# Patient Record
Sex: FEMALE | Race: WHITE | Employment: OTHER | URBAN - METROPOLITAN AREA
[De-identification: names, ages, dates, MRNs, and addresses within clinical notes are randomized per-mention and may not be internally consistent; named-entity substitution may affect disease eponyms.]

---

## 2017-01-09 RX ORDER — ZOLEDRONIC ACID 5 MG/100ML
5 INJECTION, SOLUTION INTRAVENOUS ONCE
Status: COMPLETED | OUTPATIENT
Start: 2017-01-11 | End: 2017-01-11

## 2017-01-11 ENCOUNTER — HOSPITAL ENCOUNTER (OUTPATIENT)
Dept: INFUSION THERAPY | Age: 82
Discharge: HOME | End: 2017-01-11
Payer: MEDICARE

## 2017-01-11 VITALS
DIASTOLIC BLOOD PRESSURE: 73 MMHG | HEART RATE: 80 BPM | TEMPERATURE: 96.8 F | SYSTOLIC BLOOD PRESSURE: 134 MMHG | RESPIRATION RATE: 18 BRPM

## 2017-01-11 PROCEDURE — 96374 THER/PROPH/DIAG INJ IV PUSH: CPT

## 2017-01-11 PROCEDURE — 74011250636 HC RX REV CODE- 250/636: Performed by: INTERNAL MEDICINE

## 2017-01-11 RX ADMIN — ZOLEDRONIC ACID 5 MG: 5 INJECTION, SOLUTION INTRAVENOUS at 11:04

## 2017-01-11 NOTE — PROGRESS NOTES
Outpatient Infusion Center Short Visit Progress Note    8615 Pt admit to [facility] for Reclast ambulatory in stable condition. Assessment completed. No new concerns voiced. Visit Vitals    /73    Pulse 80    Temp 96.8 °F (36 °C)    Resp 18       PIV started with positive blood return flushed and de-accessed per protocol. Medications:  Reclast    1130 Pt tolerated treatment well. D/c home ambulatory in no distress. Jazmin Faye

## 2017-01-31 DIAGNOSIS — M51.16 LUMBAR DISC DISEASE WITH RADICULOPATHY: ICD-10-CM

## 2017-01-31 RX ORDER — DULOXETIN HYDROCHLORIDE 30 MG/1
CAPSULE, DELAYED RELEASE ORAL
Qty: 180 CAP | Refills: 1 | Status: SHIPPED | OUTPATIENT
Start: 2017-01-31 | End: 2017-12-01

## 2017-05-28 DIAGNOSIS — K21.9 GASTROESOPHAGEAL REFLUX DISEASE WITHOUT ESOPHAGITIS: ICD-10-CM

## 2017-05-29 RX ORDER — DULOXETIN HYDROCHLORIDE 60 MG/1
CAPSULE, DELAYED RELEASE ORAL
Qty: 90 CAP | Refills: 0 | Status: SHIPPED | OUTPATIENT
Start: 2017-05-29 | End: 2017-11-19 | Stop reason: SDUPTHER

## 2017-05-29 RX ORDER — OMEPRAZOLE 40 MG/1
CAPSULE, DELAYED RELEASE ORAL
Qty: 90 CAP | Refills: 1 | Status: SHIPPED | OUTPATIENT
Start: 2017-05-29 | End: 2017-11-19 | Stop reason: SDUPTHER

## 2017-05-29 RX ORDER — DOXYCYCLINE 100 MG/1
CAPSULE ORAL
Qty: 180 CAP | Refills: 0 | Status: SHIPPED | OUTPATIENT
Start: 2017-05-29 | End: 2019-01-08

## 2017-06-06 ENCOUNTER — OFFICE VISIT (OUTPATIENT)
Dept: INTERNAL MEDICINE CLINIC | Age: 82
End: 2017-06-06

## 2017-06-06 VITALS
WEIGHT: 128 LBS | OXYGEN SATURATION: 98 % | RESPIRATION RATE: 16 BRPM | SYSTOLIC BLOOD PRESSURE: 144 MMHG | DIASTOLIC BLOOD PRESSURE: 74 MMHG | BODY MASS INDEX: 21.33 KG/M2 | HEIGHT: 65 IN | HEART RATE: 88 BPM

## 2017-06-06 DIAGNOSIS — R25.1 TREMOR: Primary | ICD-10-CM

## 2017-06-06 NOTE — PROGRESS NOTES
Chief Complaint   Patient presents with    Follow-up     6 mon, left hand shaking more      Patient Active Problem List    Diagnosis    Spinal stenosis    Lumbar disc disease with radiculopathy     Pain management and PT  Trans foraminal injections not helping      Osteoporosis     . DEXA Results (most recent):    Results from Hospital Encounter encounter on 08/13/15   DEXA BONE DENSITY STUDY AXIAL   Narrative **Final Report**      ICD Codes / Adm. Diagnosis: V76.12  724.2 / Disorder of bone and cartilage    Lumbago  Examination:  MM DEXA AXIAL SKELETON  - [id number] - Aug 13 2015 10:45AM  Accession No:  [id number]  Reason:  screening      REPORT:  Bone Mineral Density    Indication: Monitoring, estrogen therapy  Age: [de-identified]  Sex: Female. Menopause status:         postmenopausal.  Hormone replacement therapy: yes/no     Risk factors for fall:   None   Risk factors for osteoporosis:  Tobacco use    Current medication for osteoporosis: Calcium, vitamin D, estrogen. Comparison: 2013    Technique: Imaging was performed on the [employer]. World Health   Organization meta-analysis fracture risk calculator (FRAX) analysis was   performed for 10 year fracture risk probability assessment    Excluded sites: L2 due to fracture, L1 due to degenerative changes    Findings:    Fractures identified on Lateral scanogram:  L2    Lumbar spine: L1-L3, excluding L2  Bone mineral density (gm/cm2): 1.113  % of peak bone mass: 94  % for age matched controls:  80  T score: -0.6  Z score:  1.5    Hip: Right femoral neck  Bone mineral density (gm/cm2):  0.718  % of peak bone mass: 69  % for age matched controls:  100  T score: -2.3  Z score:  0           IMPRESSION:    This patient is osteopenic using the World Health Organization criteria  As compared to the prior study, there has been a statistically significant   decrease in bone mineral density.   10 year probability of major osteoporotic fracture:  16.3%  10 year probability of hip fracture:  5.6%             Signing/Reading Doctor: Clive Goodrich ([id number])    Approved: Clive Goodrich ([id number])  Aug 17 2015 10:59AM                                         Pancreatic cyst    Visual loss    PAC (premature atrial contraction)    Rosacea    Anxiety    Hypercholesterolemia    GERD (gastroesophageal reflux disease)     Feels better on high dose       Osteopenia    Cancer (HCC)     skin cancer      Hyperlipidemia LDL goal <130    Osteopenia     Neg 2.3  T score with fracture seen    reclast 2015 sept  New L1 fracture          Subjective:      Cameron Combs is a 80 y.o.  right handed female referred for evaluation of possible tremors  Symptoms have included motor symptoms including tremor with motion. She denies visual symptoms including diplopia, sensory symptoms including paresthesias of generalized, motor symptoms including impaired gait and extensor spasm and cerebellar symptoms including dysmetria. Other notable observations include . Onset of symptoms was gradual, starting about 2 years ago. Symptoms are currently of mild severity. Symptoms occur intermittent and last minute. Previous evaluation has included none, results were . Outside reports reviewed: none. Patient Active Problem List    Diagnosis Date Noted    Spinal stenosis 09/23/2016    Lumbar disc disease with radiculopathy 07/22/2016    Osteoporosis 07/22/2016    Pancreatic cyst 07/14/2015    Visual loss 01/21/2015    PAC (premature atrial contraction) 07/09/2013    Rosacea 06/27/2012    Anxiety 08/16/2011    Hypercholesterolemia     GERD (gastroesophageal reflux disease)     Osteopenia     Cancer ([address]     Hyperlipidemia LDL goal <130 07/20/2010    Osteopenia 07/20/2010        Review of Systems  A comprehensive review of systems was negative except for that written in the HPI.     Objective:     Visit Vitals    /74    Pulse 88    Resp 16    Ht 5' 5\" (1.651 m)    Wt 128 lb (58.1 kg)    SpO2 98%    BMI 21.3 kg/m2     Visit Vitals    /74    Pulse 88    Resp 16    Ht 5' 5\" (1.651 m)    Wt 128 lb (58.1 kg)    SpO2 98%    BMI 21.3 kg/m2     General appearance: alert, cooperative, no distress, appears stated age  Neurologic: Motor:grossly normal  Gait: Normal  No tremor at rest or with intentionm  Imaging      Lab Review      Assessment/Plan:     , tremor benign. The patient is reassured that these symptoms do not appear to represent a serious or threatening condition.   Will observe for progression

## 2017-06-06 NOTE — MR AVS SNAPSHOT
Visit Information Date & Time Provider Department Dept. Phone Encounter #  
 6/6/2017 10:15 AM Freddy Gordon MD [facility] [phone] [id number] Your Appointments 7/24/2017  1:15 PM  
Medicare Physical with Freddy Gordon MD  
[facility]) Appt Note: [address] [facility] [id number] [place] [address] [address] [facility] [address] [facility] [address] Upcoming Health Maintenance Date Due DTaP/Tdap/Td series (1 - Tdap) 5/3/2007 Pneumococcal 65+ High/Highest Risk (2 of 2 - PPSV23) 1/2/2010 GLAUCOMA SCREENING Q2Y 7/1/2016 MEDICARE YEARLY EXAM 7/23/2017 INFLUENZA AGE 9 TO ADULT 8/1/2017 Allergies as of 6/6/2017  Review Complete On: 12/20/2016 By: Roselyn lAlison LPN No Known Allergies Current Immunizations  Reviewed on 3/30/2016 Name Date Influenza Vaccine (Quad) PF 9/27/2016  1:05 PM  
 Pneumococcal Vaccine (Unspecified Type) 1/2/2005 TD Vaccine 5/2/2007 Zoster 1/2/2007 Not reviewed this visit Vitals BP Pulse Resp Height(growth percentile) Weight(growth percentile) SpO2  
 144/74 88 16 5' 5\" (1.651 m) 128 lb (58.1 kg) 98% BMI OB Status Smoking Status 21.3 kg/m2 Postmenopausal Former Smoker Vitals History BMI and BSA Data Body Mass Index Body Surface Area  
 21.3 kg/m 2 1.63 m 2 Preferred Pharmacy Pharmacy Name Phone [address] [facility], [address] [address] [place] [address] [address] Zoila Gomez [address] Your Updated Medication List  
  
   
This list is accurate as of: 6/6/17 10:52 AM.  Always use your most recent med list. amLODIPine 5 mg tablet Commonly known as:  Benjiman Floro Take 0.5 Tabs by mouth daily. CALTRATE PLUS PO Take 1 Tab by mouth. Takes one po daily. doxycycline 100 mg capsule Commonly known as:  VIBRAMYCIN  
 Take 1 capsule by mouth two times daily * DULoxetine 30 mg capsule Commonly known as:  CYMBALTA Take 1 capsule by mouth  daily for 2 weeks then 2  capsules daily * DULoxetine 60 mg capsule Commonly known as:  CYMBALTA Take 1 capsule by mouth  every day FEMHRT LOW DOSE PO Take 0.5 mg by mouth daily. FISH OIL PO Take  by mouth. GenTeal Moderate 0.3 % Drop Generic drug:  artificial tears(hypromellose) Administer  to both eyes as needed. MULTIVITAMIN PO Takes one po daily. omeprazole 40 mg capsule Commonly known as:  PRILOSEC Take 1 capsule by mouth  daily REFRESH LIQUIGEL 1 % Dlgl Generic drug:  carboxymethylcellulose sodium Apply  to eye. RESTASIS 0.05 % ophthalmic emulsion Generic drug:  cycloSPORINE Administer 1 drop to both eyes two (2) times a day. triamcinolone acetonide 0.1 % ointment Commonly known as:  KENALOG  
use thin layer   prn  
  
 VITAMIN D3 2,000 unit Tab Generic drug:  cholecalciferol (vitamin D3) Take 2,000 Units by mouth daily. * Notice: This list has 2 medication(s) that are the same as other medications prescribed for you. Read the directions carefully, and ask your doctor or other care provider to review them with you. Introducing [facility]! Wallace Dodd introduces [employer] patient portal. Now you can access parts of your medical record, email your doctor's office, and request medication refills online. 1. In your internet browser, go to [email or web address] 2. Click on the First Time User? Click Here link in the Sign In box. You will see the New Member Sign Up page. 3. Enter your [employer] Access Code exactly as it appears below. You will not need to use this code after youve completed the sign-up process. If you do not sign up before the expiration date, you must request a new code. · [employer] Access Code: [email or web address] Expires: 9/4/2017 10:52 AM 
 4. Enter the last four digits of your Social Security Number (xxxx) and Date of Birth (mm/dd/yyyy) as indicated and click Submit. You will be taken to the next sign-up page. 5. Create a [employer] ID. This will be your [employer] login ID and cannot be changed, so think of one that is secure and easy to remember. 6. Create a [employer] password. You can change your password at any time. 7. Enter your Password Reset Question and Answer. This can be used at a later time if you forget your password. 8. Enter your e-mail address. You will receive e-mail notification when new information is available in [address] 9. Click Sign Up. You can now view and download portions of your medical record. 10. Click the Download Summary menu link to download a portable copy of your medical information. If you have questions, please visit the Frequently Asked Questions section of the [employer] website. Remember, [employer] is NOT to be used for urgent needs. For medical emergencies, dial 911. Now available from your iPhone and Android! Please provide this summary of care documentation to your next provider. Your primary care clinician is listed as Bailey Molina. If you have any questions after today's visit, please call [phone].

## 2017-07-18 ENCOUNTER — TELEPHONE (OUTPATIENT)
Dept: INTERNAL MEDICINE CLINIC | Age: 82
End: 2017-07-18

## 2017-07-18 DIAGNOSIS — K21.9 GASTROESOPHAGEAL REFLUX DISEASE WITHOUT ESOPHAGITIS: ICD-10-CM

## 2017-07-18 DIAGNOSIS — M81.0 OSTEOPOROSIS WITHOUT CURRENT PATHOLOGICAL FRACTURE, UNSPECIFIED OSTEOPOROSIS TYPE: Primary | ICD-10-CM

## 2017-07-18 DIAGNOSIS — Z79.899 HIGH RISK MEDICATION USE: ICD-10-CM

## 2017-07-18 DIAGNOSIS — E78.5 HYPERLIPIDEMIA LDL GOAL <130: ICD-10-CM

## 2017-07-19 ENCOUNTER — HOSPITAL ENCOUNTER (OUTPATIENT)
Dept: LAB | Age: 82
Discharge: HOME | End: 2017-07-19
Payer: MEDICARE

## 2017-07-19 PROCEDURE — 36415 COLL VENOUS BLD VENIPUNCTURE: CPT

## 2017-07-19 PROCEDURE — 82306 VITAMIN D 25 HYDROXY: CPT

## 2017-07-19 PROCEDURE — 80061 LIPID PANEL: CPT

## 2017-07-19 PROCEDURE — 83735 ASSAY OF MAGNESIUM: CPT

## 2017-07-19 PROCEDURE — 85025 COMPLETE CBC W/AUTO DIFF WBC: CPT

## 2017-07-19 PROCEDURE — 80053 COMPREHEN METABOLIC PANEL: CPT

## 2017-07-20 LAB
25(OH)D3+25(OH)D2 SERPL-MCNC: 51.4 NG/ML (ref 30–100)
ALBUMIN SERPL-MCNC: 3.9 G/DL (ref 3.5–4.7)
ALBUMIN/GLOB SERPL: 1.6 {RATIO} (ref 1.2–2.2)
ALP SERPL-CCNC: 58 IU/L (ref 39–117)
ALT SERPL-CCNC: 15 IU/L (ref 0–32)
AST SERPL-CCNC: 23 IU/L (ref 0–40)
BASOPHILS # BLD AUTO: 0 X10E3/UL (ref 0–0.2)
BASOPHILS NFR BLD AUTO: 0 %
BILIRUB SERPL-MCNC: 0.2 MG/DL (ref 0–1.2)
BUN SERPL-MCNC: 19 MG/DL (ref 8–27)
BUN/CREAT SERPL: 31 (ref 12–28)
CALCIUM SERPL-MCNC: 8.8 MG/DL (ref 8.7–10.3)
CHLORIDE SERPL-SCNC: 103 MMOL/L (ref 96–106)
CHOLEST SERPL-MCNC: 209 MG/DL (ref 100–199)
CO2 SERPL-SCNC: 26 MMOL/L (ref 18–29)
CREAT SERPL-MCNC: 0.62 MG/DL (ref 0.57–1)
EOSINOPHIL # BLD AUTO: 0.1 X10E3/UL (ref 0–0.4)
EOSINOPHIL NFR BLD AUTO: 2 %
ERYTHROCYTE [DISTWIDTH] IN BLOOD BY AUTOMATED COUNT: 13.2 % (ref 12.3–15.4)
GLOBULIN SER CALC-MCNC: 2.4 G/DL (ref 1.5–4.5)
GLUCOSE SERPL-MCNC: 84 MG/DL (ref 65–99)
HCT VFR BLD AUTO: 41.3 % (ref 34–46.6)
HDLC SERPL-MCNC: 81 MG/DL
HGB BLD-MCNC: 13.4 G/DL (ref 11.1–15.9)
IMM GRANULOCYTES # BLD: 0 X10E3/UL (ref 0–0.1)
IMM GRANULOCYTES NFR BLD: 0 %
INTERPRETATION, 910389: NORMAL
LDLC SERPL CALC-MCNC: 113 MG/DL (ref 0–99)
LYMPHOCYTES # BLD AUTO: 1.7 X10E3/UL (ref 0.7–3.1)
LYMPHOCYTES NFR BLD AUTO: 27 %
MAGNESIUM SERPL-MCNC: 2.2 MG/DL (ref 1.6–2.3)
MCH RBC QN AUTO: 31.1 PG (ref 26.6–33)
MCHC RBC AUTO-ENTMCNC: 32.4 G/DL (ref 31.5–35.7)
MCV RBC AUTO: 96 FL (ref 79–97)
MONOCYTES # BLD AUTO: 0.6 X10E3/UL (ref 0.1–0.9)
MONOCYTES NFR BLD AUTO: 10 %
NEUTROPHILS # BLD AUTO: 3.8 X10E3/UL (ref 1.4–7)
NEUTROPHILS NFR BLD AUTO: 61 %
PLATELET # BLD AUTO: 160 X10E3/UL (ref 150–379)
POTASSIUM SERPL-SCNC: 4.3 MMOL/L (ref 3.5–5.2)
PROT SERPL-MCNC: 6.3 G/DL (ref 6–8.5)
RBC # BLD AUTO: 4.31 X10E6/UL (ref 3.77–5.28)
SODIUM SERPL-SCNC: 140 MMOL/L (ref 134–144)
TRIGL SERPL-MCNC: 76 MG/DL (ref 0–149)
VLDLC SERPL CALC-MCNC: 15 MG/DL (ref 5–40)
WBC # BLD AUTO: 6.2 X10E3/UL (ref 3.4–10.8)

## 2017-07-24 ENCOUNTER — OFFICE VISIT (OUTPATIENT)
Dept: INTERNAL MEDICINE CLINIC | Age: 82
End: 2017-07-24

## 2017-07-24 VITALS
HEIGHT: 65 IN | RESPIRATION RATE: 15 BRPM | SYSTOLIC BLOOD PRESSURE: 140 MMHG | BODY MASS INDEX: 21.83 KG/M2 | OXYGEN SATURATION: 98 % | WEIGHT: 131 LBS | TEMPERATURE: 97.9 F | HEART RATE: 88 BPM | DIASTOLIC BLOOD PRESSURE: 80 MMHG

## 2017-07-24 DIAGNOSIS — Z13.39 SCREENING FOR ALCOHOLISM: ICD-10-CM

## 2017-07-24 DIAGNOSIS — F41.9 ANXIETY: ICD-10-CM

## 2017-07-24 DIAGNOSIS — R07.9 CHEST PAIN AT REST: Primary | ICD-10-CM

## 2017-07-24 DIAGNOSIS — M81.0 OSTEOPOROSIS WITHOUT CURRENT PATHOLOGICAL FRACTURE, UNSPECIFIED OSTEOPOROSIS TYPE: ICD-10-CM

## 2017-07-24 DIAGNOSIS — Z00.00 ROUTINE GENERAL MEDICAL EXAMINATION AT A HEALTH CARE FACILITY: ICD-10-CM

## 2017-07-24 DIAGNOSIS — K21.9 GASTROESOPHAGEAL REFLUX DISEASE WITHOUT ESOPHAGITIS: ICD-10-CM

## 2017-07-24 RX ORDER — FLUOCINONIDE 0.5 MG/G
OINTMENT TOPICAL 2 TIMES DAILY
Qty: 15 G | Refills: 0 | Status: SHIPPED | OUTPATIENT
Start: 2017-07-24 | End: 2017-12-01

## 2017-07-24 NOTE — PROGRESS NOTES
Chief Complaint   Patient presents with    Annual Wellness Visit       This is a Subsequent Medicare Annual Wellness Visit providing Personalized Prevention Plan Services (PPPS) (Performed 12 months after initial AWV and PPPS )    I have reviewed the patient's medical history in detail and updated the computerized patient record. History     Past Medical History:   Diagnosis Date    Arthritis     Cancer ([address]     skin cancer - basal and squamous cell    GERD (gastroesophageal reflux disease)     Hypercholesterolemia     PATIENT DENIES    Osteopenia     PAC (premature atrial contraction) 7/9/2013    Pancreatic cyst 7/14/2015    Psychiatric disorder     anxiety    Urinary frequency     Visual loss 1/21/2015      Past Surgical History:   Procedure Laterality Date    CARDIAC SURG PROCEDURE UNLIST  2/11    normal stress test    ENDOSCOPY, COLON, DIAGNOSTIC  2008    HX APPENDECTOMY  2000    HX CATARACT REMOVAL  2012    HX GI  2008    EGD    HX ORTHOPAEDIC Right 2010    knee - arthroscopy    HX ORTHOPAEDIC Right march 2011    TKR  right    HX TONSILLECTOMY      HX TUBAL LIGATION  1976     Current Outpatient Prescriptions   Medication Sig Dispense Refill    omeprazole (PRILOSEC) 40 mg capsule Take 1 capsule by mouth  daily 90 Cap 1    DULoxetine (CYMBALTA) 60 mg capsule Take 1 capsule by mouth  every day 90 Cap 0    doxycycline (VIBRAMYCIN) 100 mg capsule Take 1 capsule by mouth two times daily 180 Cap 0    DULoxetine (CYMBALTA) 30 mg capsule Take 1 capsule by mouth  daily for 2 weeks then 2  capsules daily 180 Cap 1    amLODIPine (NORVASC) 5 mg tablet Take 0.5 Tabs by mouth daily. 30 Tab 0    DOCOSAHEXANOIC ACID/EPA (FISH OIL PO) Take  by mouth.  cycloSPORINE (RESTASIS) 0.05 % ophthalmic emulsion Administer 1 drop to both eyes two (2) times a day.  carboxymethylcellulose sodium (REFRESH LIQUIGEL) 1 % dlgl Apply  to eye.       artificial tears,hypromellose, (GENTEAL MODERATE) 0.3 % drop Administer  to both eyes as needed.  cholecalciferol, vitamin D3, (VITAMIN D3) 2,000 unit Tab Take 2,000 Units by mouth daily.  MULTIVITAMIN PO Takes one po daily.  triamcinolone acetonide (KENALOG) 0.1 % ointment use thin layer   prn 30 g 1    NORETHIND AC/ETHINYL ESTRADIOL (FEMHRT LOW DOSE PO) Take 0.5 mg by mouth daily.  CALCIUM CARB/VIT D3/MINERALS (CALTRATE PLUS PO) Take 1 Tab by mouth. Takes one po daily. No Known Allergies  Family History   Problem Relation Age of Onset    Stroke Mother     Hypertension Mother     Other Mother      aneurysm - aorta    Cancer Father      stomach AND ESOPHAGEAL cancer    Stroke Other     Stroke Maternal Grandmother     Other Son      BLOOD CLOT IN AORTA AND POST OP DVT    Anesth Problems Neg Hx      Social History   Substance Use Topics    Smoking status: Former Smoker     Quit date: 8/5/1956    Smokeless tobacco: Never Used      Comment: former cigarette smoker    Alcohol use 7.0 oz/week     7 Glasses of wine, 7 Shots of liquor per week     Patient Active Problem List   Diagnosis Code    Hyperlipidemia LDL goal <130 E78.5    Hypercholesterolemia E78.00    GERD (gastroesophageal reflux disease) K21.9    Cancer ([facility] [address] C80.1    Anxiety F41.9    Rosacea L71.9    PAC (premature atrial contraction) I49.1    Visual loss H54.7    Pancreatic cyst K86.2    Lumbar disc disease with radiculopathy M51.16    Osteoporosis M81.0    Spinal stenosis M48.00       Depression Risk Factor Screening:     PHQ over the last two weeks 7/24/2017   PHQ Not Done -   Little interest or pleasure in doing things Not at all   Feeling down, depressed or hopeless Not at all   Total Score PHQ 2 0     Alcohol Risk Factor Screening: On any occasion during the past 3 months, have you had more than 3 drinks containing alcohol? No    Do you average more than 7 drinks per week?   No        Functional Ability and Level of Safety:     Hearing Loss moderate    Activities of Daily Living   Self-care. Requires assistance with: no ADLs    Fall Risk   Fall Risk Assessment, last 12 mths 12/20/2016   Able to walk? Yes   Fall in past 12 months? No   Fall with injury? -   Number of falls in past 12 months -   Fall Risk Score -     Abuse Screen   Patient is not abused    Review of Systems   A comprehensive review of systems was negative except for that written in the HPI. Physical Examination     Evaluation of Cognitive Function:  Mood/affect:  happy  Appearance: age appropriate  Family member/caregiver input: no    Visit Vitals    /80    Pulse 88    Temp 97.9 °F (36.6 °C) (Oral)    Resp 15    Ht 5' 5\" (1.651 m)    Wt 131 lb (59.4 kg)    SpO2 98%    BMI 21.8 kg/m2     General appearance: alert, cooperative, no distress, appears stated age  Neck: supple, symmetrical, trachea midline, no adenopathy, thyroid: not enlarged, symmetric, no tenderness/mass/nodules, no carotid bruit and no JVD  Lungs: clear to auscultation bilaterally  Heart: regular rate and rhythm, S1, S2 normal, no murmur, click, rub or gallop  Abdomen: soft, non-tender. Bowel sounds normal. No masses,  no organomegaly    Patient Care Team:  Ford Dandy, MD as PCP - General  Liv Clark MD (Ophthalmology)    Advice/Referrals/Counseling   Education and counseling provided:  Are appropriate based on today's review and evaluation  End-of-Life planning (with patient's consent)  Pneumococcal Vaccine      Assessment/Plan   Drew was seen today for annual wellness visit.     Diagnoses and all orders for this visit:    Chest pain at rest  -     AMB POC EKG ROUTINE W/ 12 LEADS, INTER & REP  -     REFERRAL TO CARDIOLOGY    Osteoporosis without current pathological fracture, unspecified osteoporosis type    Routine general medical examination at a health care facility    Screening for alcoholism    Anxiety    Gastroesophageal reflux disease without esophagitis    Other orders  - fluocinoNIDE (LIDEX) 0.05 % ointment; Apply  to affected area two (2) times a day. .          Fatigue  Low pep  Stress  Weight gain  Itching ears  2 episodes chest pain in JUne no relief with  Antacids  Both at night when lying downSubjective: Follow up of her chronic problems, including osteoporosis, on yearly Reclast.  Has had two doses. Follow up on slightly elevated lipids with a high HDL and slightly elevated LDL. GERD is a chronic issue. She doesn't always follow the best lifestyle. Sometimes eats late and lays down. Has had two episodes of very severe chest pain over the last couple of months that have been in the middle of the night, have been very severe. She considered going to the ER. Both times she took an antacid, like a Pepcid Complete or Tums, and had no immediate relief, but within an hour the discomfort disappeared. She is concerned about these. She's not seen anybody. She has had no symptoms in the last couple weeks. She doesn't exercise as much as she used to. She has some squamous cell cancers on her feet, so is not able to wear sneakers to walk. Right now has to wear open toe sandal-type shoes until the foot is better. She's gained a little bit of weight. She has some stress at home. She has a little bit of low energy. The stress is related to her  aging and issues and her best friend is having lung cancer surgery this week. Patient's alcohol intake is modest, but not 0. Eats late. Takes a daily PPI and requires those. Physical Examination:  She has a regular heart rhythm, clear lungs. Normal cardiac exam.      Plan:  1. If EKG is unchanged, I still think it would be reasonable to have her evaluated and will get cardiology to see her. If her EKG shows any acute changes or new changes will get a workup more quickly. 2. I told her she could go to the ER for acute chest pain, get cardiac enzymes to rule out ischemia.   3. She'll continue with her Cymbalta for stress, anxiety, depression. She is hopefully after her friend having surgery this week she will have less worry and stress. 4. She has also been having some eczema in her ear canal and along the outside of her ear. She's used a little Kenalog in the past.  She says it doesn't work as well. I switched her to Lidex ointment, will try that. Advance Care Planning (ACP) Provider Conversation Snapshot    Date of ACP Conversation: 07/24/17  Persons included in Conversation:  patient  Length of ACP Conversation in minutes:  <16 minutes (Non-Billable)    Authorized Decision Maker (if patient is incapable of making informed decisions): This person is: Other Legally Authorized Decision Maker (e.g. Next of Kin)          For Patients with Decision Making Capacity:   Values/Goals: Exploration of values, goals, and preferences if recovery is not expected, even with continued medical treatment in the event of:  Imminent death  Severe, permanent brain injury    Conversation Outcomes / Follow-Up Plan:   Recommended completion of Advance Directive form after review of ACP materials and conversation with prospective healthcare agent       1. Osteoporosis without current pathological fracture, unspecified osteoporosis type  reclast yearly    2. Routine general medical examination at a health care facility      3. Screening for alcoholism  Has reduced    4. Chest pain at rest  Atypical may be gerd  ekg non diagnostic  Needs work up  - AMB POC EKG ROUTINE W/ 12 LEADS, INTER & REP  - REFERRAL TO CARDIOLOGY    5. Anxiety  controlled    6.  Gastroesophageal reflux disease without esophagitis  Stay on PPI

## 2017-07-24 NOTE — PROGRESS NOTES
Coordination of Care Questions    1. Have you been to the ER, urgent care clinic since your last visit? No       Hospitalized since your last visit? No    2. Have you seen or consulted any other health care providers outside of the [address] [place] [address] since your last visit? Include any pap smears or colon screening.  Yes Dr Kenton Webber

## 2017-07-24 NOTE — MR AVS SNAPSHOT
Visit Information Date & Time Provider Department Dept. Phone Encounter #  
 7/24/2017  1:15 PM Harpreet Cordon, [address] [facility] [phone] [id number] Upcoming Health Maintenance Date Due DTaP/Tdap/Td series (1 - Tdap) 5/3/2007 GLAUCOMA SCREENING Q2Y 7/1/2016 MEDICARE YEARLY EXAM 7/23/2017 INFLUENZA AGE 9 TO ADULT 8/1/2017 Allergies as of 7/24/2017  Review Complete On: 7/24/2017 By: Ned Richmond LPN No Known Allergies Current Immunizations  Reviewed on 7/24/2017 Name Date Influenza Vaccine (Quad) PF 9/27/2016  1:05 PM  
 Pneumococcal Vaccine (Unspecified Type) 1/2/2005 TD Vaccine 5/2/2007 Zoster 1/2/2007 Reviewed by Harpreet Cordon MD on 7/24/2017 at  1:26 PM  
You Were Diagnosed With   
  
 Codes Comments Chest pain at rest    -  Primary ICD-10-CM: R07.9 ICD-9-CM: 786.50 Osteoporosis without current pathological fracture, unspecified osteoporosis type     ICD-10-CM: M81.0 ICD-9-CM: 733.00 Routine general medical examination at a health care facility     ICD-10-CM: Z00.00 ICD-9-CM: V70.0 Screening for alcoholism     ICD-10-CM: Z13.89 ICD-9-CM: V79.1 Anxiety     ICD-10-CM: F41.9 ICD-9-CM: 300.00 Gastroesophageal reflux disease without esophagitis     ICD-10-CM: K21.9 ICD-9-CM: 530.81 Vitals BP Pulse Temp Resp Height(growth percentile) Weight(growth percentile) 140/80 88 97.9 °F (36.6 °C) (Oral) 15 5' 5\" (1.651 m) 131 lb (59.4 kg) SpO2 BMI OB Status Smoking Status 98% 21.8 kg/m2 Postmenopausal Former Smoker Vitals History BMI and BSA Data Body Mass Index Body Surface Area  
 21.8 kg/m 2 1.65 m 2 Preferred Pharmacy Pharmacy Name Phone [address] [address] [phone] Your Updated Medication List  
  
   
This list is accurate as of: 7/24/17  2:31 PM.  Always use your most recent med list.  
  
  
 CALTRATE PLUS PO Take 1 Tab by mouth. Takes one po daily. doxycycline 100 mg capsule Commonly known as:  VIBRAMYCIN Take 1 capsule by mouth two times daily * DULoxetine 30 mg capsule Commonly known as:  CYMBALTA Take 1 capsule by mouth  daily for 2 weeks then 2  capsules daily * DULoxetine 60 mg capsule Commonly known as:  CYMBALTA Take 1 capsule by mouth  every day FEMHRT LOW DOSE PO Take 0.5 mg by mouth daily. FISH OIL PO Take  by mouth. fluocinoNIDE 0.05 % ointment Commonly known as:  LIDEX Apply  to affected area two (2) times a day. GenTeal Moderate 0.3 % Drop Generic drug:  artificial tears(hypromellose) Administer  to both eyes as needed. MULTIVITAMIN PO Takes one po daily. omeprazole 40 mg capsule Commonly known as:  PRILOSEC Take 1 capsule by mouth  daily REFRESH LIQUIGEL 1 % Dlgl Generic drug:  carboxymethylcellulose sodium Apply  to eye. RESTASIS 0.05 % ophthalmic emulsion Generic drug:  cycloSPORINE Administer 1 drop to both eyes two (2) times a day. VITAMIN D3 2,000 unit Tab Generic drug:  cholecalciferol (vitamin D3) Take 2,000 Units by mouth daily. * Notice: This list has 2 medication(s) that are the same as other medications prescribed for you. Read the directions carefully, and ask your doctor or other care provider to review them with you. Prescriptions Sent to Pharmacy Refills  
 fluocinoNIDE (LIDEX) 0.05 % ointment 0 Sig: Apply  to affected area two (2) times a day. Class: Normal  
 Pharmacy: [address] [place] [address] 18, [address] #: [phone] Route: Topical  
  
We Performed the Following AMB POC EKG ROUTINE W/ 12 LEADS, INTER & REP [24064 CPT(R)] REFERRAL TO CARDIOLOGY [KAH00 Custom] Comments:  
 Please evaluate patient for 2 episode CP. To-Do List   
 08/24/2017 10:00 AM  
 Appointment with [address] [place] [address] 2 at [address] [place] [address] ([phone]) Shower or bathe using soap and water. Do not use deodorant, powder, perfumes, or lotion the day of your exam.  If your prior mammograms were not performed at [facility] 6 please bring films with you or forward prior images 2 days before your procedure. Check in at registration 15min before your appointment time unless you were instructed to do otherwise. A script is not necessary, but if you have one, please bring it on the day of the mammogram or have it faxed to the department. [facility] [phone] [address] [place] [address]  [phone] [address]  [phone] [facility] [phone] [facility] [address] [facility] Janesyaakov Alanis [phone] Referral Information Referral ID Referred By Referred To  
  
 [id number] MD Rafael   
   [address] Phone: [phone] Fax: [phone] Visits Status Start Date End Date 1 New Request 7/24/17 7/24/18 If your referral has a status of pending review or denied, additional information will be sent to support the outcome of this decision. Introducing [facility]! Collins Grey introduces CallVU patient portal. Now you can access parts of your medical record, email your doctor's office, and request medication refills online. 1. In your internet browser, go to [email or web address] 2. Click on the First Time User? Click Here link in the Sign In box. You will see the New Member Sign Up page. 3. Enter your CallVU Access Code exactly as it appears below. You will not need to use this code after youve completed the sign-up process. If you do not sign up before the expiration date, you must request a new code. · CallVU Access Code: [email or web address] Expires: 9/4/2017 10:52 AM 
 
4.  Enter the last four digits of your Social Security Number (xxxx) and Date of Birth (mm/dd/yyyy) as indicated and click Submit. You will be taken to the next sign-up page. 5. Create a Apparity ID. This will be your Apparity login ID and cannot be changed, so think of one that is secure and easy to remember. 6. Create a Apparity password. You can change your password at any time. 7. Enter your Password Reset Question and Answer. This can be used at a later time if you forget your password. 8. Enter your e-mail address. You will receive e-mail notification when new information is available in [address] 9. Click Sign Up. You can now view and download portions of your medical record. 10. Click the Download Summary menu link to download a portable copy of your medical information. If you have questions, please visit the Frequently Asked Questions section of the Apparity website. Remember, Apparity is NOT to be used for urgent needs. For medical emergencies, dial 911. Now available from your iPhone and Android! Please provide this summary of care documentation to your next provider. Your primary care clinician is listed as Toni Lowe. If you have any questions after today's visit, please call [phone].

## 2017-07-31 ENCOUNTER — HOSPITAL ENCOUNTER (EMERGENCY)
Age: 82
Discharge: HOME | End: 2017-07-31
Attending: EMERGENCY MEDICINE
Payer: MEDICARE

## 2017-07-31 ENCOUNTER — APPOINTMENT (OUTPATIENT)
Dept: GENERAL RADIOLOGY | Age: 82
End: 2017-07-31
Attending: EMERGENCY MEDICINE
Payer: MEDICARE

## 2017-07-31 VITALS
RESPIRATION RATE: 17 BRPM | BODY MASS INDEX: 21.83 KG/M2 | OXYGEN SATURATION: 97 % | TEMPERATURE: 97.7 F | HEIGHT: 65 IN | WEIGHT: 131 LBS | DIASTOLIC BLOOD PRESSURE: 68 MMHG | HEART RATE: 68 BPM | SYSTOLIC BLOOD PRESSURE: 149 MMHG

## 2017-07-31 DIAGNOSIS — R07.9 ACUTE CHEST PAIN: Primary | ICD-10-CM

## 2017-07-31 LAB
ALBUMIN SERPL BCP-MCNC: 3.7 G/DL (ref 3.5–5)
ALBUMIN/GLOB SERPL: 1.1 {RATIO} (ref 1.1–2.2)
ALP SERPL-CCNC: 82 U/L (ref 45–117)
ALT SERPL-CCNC: 72 U/L (ref 12–78)
ANION GAP BLD CALC-SCNC: 3 MMOL/L (ref 5–15)
AST SERPL W P-5'-P-CCNC: 107 U/L (ref 15–37)
BASOPHILS # BLD AUTO: 0 K/UL (ref 0–0.1)
BASOPHILS # BLD: 0 % (ref 0–1)
BILIRUB SERPL-MCNC: 0.5 MG/DL (ref 0.2–1)
BUN SERPL-MCNC: 17 MG/DL (ref 6–20)
BUN/CREAT SERPL: 28 (ref 12–20)
CALCIUM SERPL-MCNC: 8.8 MG/DL (ref 8.5–10.1)
CHLORIDE SERPL-SCNC: 104 MMOL/L (ref 97–108)
CK SERPL-CCNC: 70 U/L (ref 26–192)
CO2 SERPL-SCNC: 32 MMOL/L (ref 21–32)
CREAT SERPL-MCNC: 0.61 MG/DL (ref 0.55–1.02)
EOSINOPHIL # BLD: 0.1 K/UL (ref 0–0.4)
EOSINOPHIL NFR BLD: 2 % (ref 0–7)
ERYTHROCYTE [DISTWIDTH] IN BLOOD BY AUTOMATED COUNT: 13 % (ref 11.5–14.5)
GLOBULIN SER CALC-MCNC: 3.5 G/DL (ref 2–4)
GLUCOSE SERPL-MCNC: 98 MG/DL (ref 65–100)
HCT VFR BLD AUTO: 42.7 % (ref 35–47)
HGB BLD-MCNC: 14 G/DL (ref 11.5–16)
LYMPHOCYTES # BLD AUTO: 20 % (ref 12–49)
LYMPHOCYTES # BLD: 1.4 K/UL (ref 0.8–3.5)
MCH RBC QN AUTO: 31.5 PG (ref 26–34)
MCHC RBC AUTO-ENTMCNC: 32.8 G/DL (ref 30–36.5)
MCV RBC AUTO: 96.2 FL (ref 80–99)
MONOCYTES # BLD: 0.5 K/UL (ref 0–1)
MONOCYTES NFR BLD AUTO: 8 % (ref 5–13)
NEUTS SEG # BLD: 5.1 K/UL (ref 1.8–8)
NEUTS SEG NFR BLD AUTO: 70 % (ref 32–75)
PLATELET # BLD AUTO: 147 K/UL (ref 150–400)
POTASSIUM SERPL-SCNC: 4.4 MMOL/L (ref 3.5–5.1)
PROT SERPL-MCNC: 7.2 G/DL (ref 6.4–8.2)
RBC # BLD AUTO: 4.44 M/UL (ref 3.8–5.2)
SODIUM SERPL-SCNC: 139 MMOL/L (ref 136–145)
TROPONIN I SERPL-MCNC: <0.04 NG/ML
WBC # BLD AUTO: 7.2 K/UL (ref 3.6–11)

## 2017-07-31 PROCEDURE — 84484 ASSAY OF TROPONIN QUANT: CPT | Performed by: EMERGENCY MEDICINE

## 2017-07-31 PROCEDURE — 80053 COMPREHEN METABOLIC PANEL: CPT | Performed by: EMERGENCY MEDICINE

## 2017-07-31 PROCEDURE — 36415 COLL VENOUS BLD VENIPUNCTURE: CPT | Performed by: EMERGENCY MEDICINE

## 2017-07-31 PROCEDURE — 85025 COMPLETE CBC W/AUTO DIFF WBC: CPT | Performed by: EMERGENCY MEDICINE

## 2017-07-31 PROCEDURE — 71010 XR CHEST PORT: CPT

## 2017-07-31 PROCEDURE — 99285 EMERGENCY DEPT VISIT HI MDM: CPT

## 2017-07-31 PROCEDURE — 82550 ASSAY OF CK (CPK): CPT | Performed by: EMERGENCY MEDICINE

## 2017-07-31 PROCEDURE — 93005 ELECTROCARDIOGRAM TRACING: CPT

## 2017-08-01 LAB
ATRIAL RATE: 70 BPM
CALCULATED P AXIS, ECG09: 75 DEGREES
CALCULATED R AXIS, ECG10: 2 DEGREES
CALCULATED T AXIS, ECG11: 7 DEGREES
DIAGNOSIS, 93000: NORMAL
P-R INTERVAL, ECG05: 156 MS
Q-T INTERVAL, ECG07: 390 MS
QRS DURATION, ECG06: 80 MS
QTC CALCULATION (BEZET), ECG08: 421 MS
VENTRICULAR RATE, ECG03: 70 BPM

## 2017-08-01 NOTE — DISCHARGE INSTRUCTIONS
Chest Pain: Care Instructions  Your Care Instructions  There are many things that can cause chest pain. Some are not serious and will get better on their own in a few days. But some kinds of chest pain need more testing and treatment. Your doctor may have recommended a follow-up visit in the next 8 to 12 hours. If you are not getting better, you may need more tests or treatment. Even though your doctor has released you, you still need to watch for any problems. The doctor carefully checked you, but sometimes problems can develop later. If you have new symptoms or if your symptoms do not get better, get medical care right away. If you have worse or different chest pain or pressure that lasts more than 5 minutes or you passed out (lost consciousness), call 911 or seek other emergency help right away. A medical visit is only one step in your treatment. Even if you feel better, you still need to do what your doctor recommends, such as going to all suggested follow-up appointments and taking medicines exactly as directed. This will help you recover and help prevent future problems. How can you care for yourself at home? · Rest until you feel better. · Take your medicine exactly as prescribed. Call your doctor if you think you are having a problem with your medicine. · Do not drive after taking a prescription pain medicine. When should you call for help? Call 911 if:  · You passed out (lost consciousness). · You have severe difficulty breathing. · You have symptoms of a heart attack. These may include:  ¨ Chest pain or pressure, or a strange feeling in your chest.  ¨ Sweating. ¨ Shortness of breath. ¨ Nausea or vomiting. ¨ Pain, pressure, or a strange feeling in your back, neck, jaw, or upper belly or in one or both shoulders or arms. ¨ Lightheadedness or sudden weakness. ¨ A fast or irregular heartbeat.   After you call 911, the  may tell you to chew 1 adult-strength or 2 to 4 low-dose aspirin. Wait for an ambulance. Do not try to drive yourself. Call your doctor today if:  · You have any trouble breathing. · Your chest pain gets worse. · You are dizzy or lightheaded, or you feel like you may faint. · You are not getting better as expected. · You are having new or different chest pain. Where can you learn more? Go to http://lyndsey-miriam.info/. Enter A120 in the search box to learn more about \"Chest Pain: Care Instructions. \"  Current as of: March 20, 2017  Content Version: 11.3  © 7980-5090 [employer]. Care instructions adapted under license by [employer] (which disclaims liability or warranty for this information). If you have questions about a medical condition or this instruction, always ask your healthcare professional. [address] any warranty or liability for your use of this information. We hope that we have addressed all of your medical concerns. The examination and treatment you received in the Emergency Department were for an emergent problem and were not intended as complete care. It is important that you follow up with your healthcare provider(s) for ongoing care. If your symptoms worsen or do not improve as expected, and you are unable to reach your usual health care provider(s), you should return to the Emergency Department. Today's healthcare is undergoing tremendous change, and patient satisfaction surveys are one of the many tools to assess the quality of medical care. You may receive a survey from the [employer] organization regarding your experience in the Emergency Department. I hope that your experience has been completely positive, particularly the medical care that I provided. As such, please participate in the survey; anything less than excellent does not meet my expectations or intentions.         [facility] and [facility] participate in nationally recognized quality of care measures. If your blood pressure is greater than 120/80, as reported below, we urge that you seek medical care to address the potential of high blood pressure, commonly known as hypertension. Hypertension can be hereditary or can be caused by certain medical conditions, pain, stress, or \"white coat syndrome. \"       Please make an appointment with your health care provider(s) for follow up of your Emergency Department visit. VITALS:   Patient Vitals for the past 8 hrs:   Temp Pulse Resp BP SpO2   07/31/17 2045 - 75 18 157/76 99 %   07/31/17 2030 - 70 16 (!) 149/95 100 %   07/31/17 2015 - - - 161/71 100 %   07/31/17 2010 97.7 °F (36.5 °C) 67 16 171/88 98 %          Thank you for allowing us to provide you with medical care today. We realize that you have many choices for your emergency care needs. Please choose us in the future for any continued health care needs.       Yahir Vidales MD    [address] [place] [address].   Office: [phone]            Recent Results (from the past 24 hour(s))   EKG, 12 LEAD, INITIAL    Collection Time: 07/31/17  8:11 PM   Result Value Ref Range    Ventricular Rate 73 BPM    Atrial Rate 73 BPM    P-R Interval 148 ms    QRS Duration 86 ms    Q-T Interval 386 ms    QTC Calculation (Bezet) 425 ms    Calculated P Axis 66 degrees    Calculated R Axis -6 degrees    Calculated T Axis 2 degrees    Diagnosis       Sinus rhythm with premature atrial complexes  Possible Left atrial enlargement  Inferior infarct , age undetermined  Abnormal ECG  When compared with ECG of 16-SEP-2016 12:11,  premature atrial complexes are now present  Inferior infarct is now present     CBC WITH AUTOMATED DIFF    Collection Time: 07/31/17  8:22 PM   Result Value Ref Range    WBC 7.2 3.6 - 11.0 K/uL    RBC 4.44 3.80 - 5.20 M/uL    HGB 14.0 11.5 - 16.0 g/dL    HCT 42.7 35.0 - 47.0 %    MCV 96.2 80.0 - 99.0 FL    MCH 31.5 26.0 - 34.0 PG MCHC 32.8 30.0 - 36.5 g/dL    RDW 13.0 11.5 - 14.5 %    PLATELET 269 (L) 648 - 400 K/uL    NEUTROPHILS 70 32 - 75 %    LYMPHOCYTES 20 12 - 49 %    MONOCYTES 8 5 - 13 %    EOSINOPHILS 2 0 - 7 %    BASOPHILS 0 0 - 1 %    ABS. NEUTROPHILS 5.1 1.8 - 8.0 K/UL    ABS. LYMPHOCYTES 1.4 0.8 - 3.5 K/UL    ABS. MONOCYTES 0.5 0.0 - 1.0 K/UL    ABS. EOSINOPHILS 0.1 0.0 - 0.4 K/UL    ABS. BASOPHILS 0.0 0.0 - 0.1 K/UL   METABOLIC PANEL, COMPREHENSIVE    Collection Time: 07/31/17  8:22 PM   Result Value Ref Range    Sodium 139 136 - 145 mmol/L    Potassium 4.4 3.5 - 5.1 mmol/L    Chloride 104 97 - 108 mmol/L    CO2 32 21 - 32 mmol/L    Anion gap 3 (L) 5 - 15 mmol/L    Glucose 98 65 - 100 mg/dL    BUN 17 6 - 20 MG/DL    Creatinine 0.61 0.55 - 1.02 MG/DL    BUN/Creatinine ratio 28 (H) 12 - 20      GFR est AA >60 >60 ml/min/1.73m2    GFR est non-AA >60 >60 ml/min/1.73m2    Calcium 8.8 8.5 - 10.1 MG/DL    Bilirubin, total 0.5 0.2 - 1.0 MG/DL    ALT (SGPT) 72 12 - 78 U/L    AST (SGOT) 107 (H) 15 - 37 U/L    Alk. phosphatase 82 45 - 117 U/L    Protein, total 7.2 6.4 - 8.2 g/dL    Albumin 3.7 3.5 - 5.0 g/dL    Globulin 3.5 2.0 - 4.0 g/dL    A-G Ratio 1.1 1.1 - 2.2     CK W/ REFLX CKMB    Collection Time: 07/31/17  8:22 PM   Result Value Ref Range    CK 70 26 - 192 U/L   TROPONIN I    Collection Time: 07/31/17  8:22 PM   Result Value Ref Range    Troponin-I, Qt. <0.04 <0.05 ng/mL       Xr Chest Port    Result Date: 7/31/2017  EXAM:  XR CHEST PORT INDICATION:  chest pain, nausea, diaphoresis, beginning at 1845 hours COMPARISON:  None. FINDINGS: A portable AP radiograph of the chest was obtained at 2019 hours. The patient is on a cardiac monitor. The lungs are clear. The cardiac and mediastinal contours and pulmonary vascularity are remarkable for mild tortuosity of the descending aorta. The bones and soft tissues are grossly within normal limits. IMPRESSION: No acute cardiopulmonary process seen.

## 2017-08-01 NOTE — ED PROVIDER NOTES
HPI Comments: 80 y.o. female with past medical history significant for hypercholesterolemia, PAC's, GERD, skin cancer, and anxiety who presents from home with chief complaint of chest pain. Pt reports a constant epigastric and  lower sternal chest pain and \"tightness\" which started 3 hours ago after \"1 small sip of scotch\", it radiated to to R shoulder, and lasted for 45 minutes. Pt also c/o nausea, vomiting, diaphoresis, and 2-3 large belches during the episode. She states this is the 3rd episode of similar chest pain in the last 2 months, but this episode lasted longer. Pt notes she has hx of esophagitis for which she takes omeprazole, and she took 2 Rolaids when her pain started today. She is currently pain-free. Per Pt's , Pt was seen by her PCP 1 week ago for her prior episodes of chest pain who recommended cardiology follow up. She is scheduled to see cardiology 3 days from now. Pt reports she has previously had several stress tests, 1 nuclear, all of which were normal. She states she stopped regularly exercising 1 year ago d/t back problems. Pt notes she ate chips 5-6 hours ago. Pt denies hx of cardiac problems, cardiac catheterizations, HTN, and DM. Pt denies SOB. There are no other acute medical concerns at this time. Significant FMHx: Grandmother: stroke at 52 y.o. Mother: \"ruptured\" aorta    PCP: Justen Lopez MD    Note written by Esther Samano, as dictated by Myesha Ureña MD 8:42 PM    The history is provided by the patient.         Past Medical History:   Diagnosis Date    Arthritis     Cancer ([facility] [address]     skin cancer - basal and squamous cell    GERD (gastroesophageal reflux disease)     Hypercholesterolemia     PATIENT DENIES    Osteopenia     PAC (premature atrial contraction) 7/9/2013    Pancreatic cyst 7/14/2015    Psychiatric disorder     anxiety    Urinary frequency     Visual loss 1/21/2015       Past Surgical History:   Procedure Laterality Date    CARDIAC SURG PROCEDURE UNLIST  2/11    normal stress test    ENDOSCOPY, COLON, DIAGNOSTIC  2008    HX APPENDECTOMY  2000    HX CATARACT REMOVAL  2012    HX GI  2008    EGD    HX ORTHOPAEDIC Right 2010    knee - arthroscopy    HX ORTHOPAEDIC Right march 2011    TKR  right    HX TONSILLECTOMY      HX TUBAL LIGATION  1976         Family History:   Problem Relation Age of Onset   [facility] Stroke Mother     Hypertension Mother     Other Mother      aneurysm - aorta    Cancer Father      stomach AND ESOPHAGEAL cancer    Stroke Other     Stroke Maternal Grandmother     Other Son      BLOOD CLOT IN AORTA AND POST OP DVT    Anesth Problems Neg Hx        Social History     Social History    Marital status:      Spouse name: N/A    Number of children: N/A    Years of education: N/A     Occupational History    Not on file. Social History Main Topics    Smoking status: Former Smoker     Quit date: 8/5/1956    Smokeless tobacco: Never Used      Comment: former cigarette smoker    Alcohol use 7.0 oz/week     7 Glasses of wine, 7 Shots of liquor per week    Drug use: No    Sexual activity: Not Currently     Partners: Male     Other Topics Concern    Not on file     Social History Narrative         ALLERGIES: Review of patient's allergies indicates no known allergies. Review of Systems   Constitutional: Positive for diaphoresis. Respiratory: Positive for chest tightness. Negative for shortness of breath. Cardiovascular: Positive for chest pain. Gastrointestinal: Positive for nausea and vomiting. All other systems reviewed and are negative. Vitals:    07/31/17 2030 07/31/17 2045 07/31/17 2100 07/31/17 2115   BP: (!) 149/95 157/76 147/74 149/68   Pulse: 70 75 68 68   Resp: 16 18 14 17   Temp:       SpO2: 100% 99% 98% 97%   Weight:       Height:                Physical Exam   Constitutional: She is oriented to person, place, and time. She appears well-developed and well-nourished. No distress. HENT:   Head: Normocephalic and atraumatic. Eyes: Conjunctivae are normal. No scleral icterus. Neck: Neck supple. No tracheal deviation present. Cardiovascular: Normal rate, regular rhythm, normal heart sounds and intact distal pulses. Exam reveals no gallop and no friction rub. No murmur heard. Pulmonary/Chest: Effort normal and breath sounds normal. She has no wheezes. She has no rales. Abdominal: Soft. She exhibits no distension. There is no tenderness. There is no rebound and no guarding. Musculoskeletal: She exhibits no edema. Neurological: She is alert and oriented to person, place, and time. Skin: Skin is warm and dry. No rash noted. Psychiatric: She has a normal mood and affect. Nursing note and vitals reviewed. Note written by Esther Marte, as dictated by Milady Rudd MD 8:42 PM    [facility]  ED Course       Procedures    ED EKG interpretation:  Rhythm: normal sinus rhythm; Rate (approx.): 70; septal Q waves  Note written by Esther Marte, as dictated by Milady Rudd MD 8:22 PM    Progress Note:  Results, treatment, and follow up plan have been discussed with patient/. Questions were answered. Milady Rudd MD    A/P: CP - suspect GI etiology; reassuring exam; VSS; EKG, CBC, CMP, trop, CXR ok; PCP, GI, and cards f/u.   Milady Rudd MD

## 2017-08-24 ENCOUNTER — HOSPITAL ENCOUNTER (OUTPATIENT)
Dept: MAMMOGRAPHY | Age: 82
Discharge: HOME | End: 2017-08-24
Attending: OBSTETRICS & GYNECOLOGY
Payer: MEDICARE

## 2017-08-24 DIAGNOSIS — Z12.31 VISIT FOR SCREENING MAMMOGRAM: ICD-10-CM

## 2017-08-24 PROCEDURE — 77067 SCR MAMMO BI INCL CAD: CPT

## 2017-09-30 ENCOUNTER — HOSPITAL ENCOUNTER (OUTPATIENT)
Dept: CT IMAGING | Age: 82
Discharge: HOME | End: 2017-09-30
Attending: INTERNAL MEDICINE
Payer: MEDICARE

## 2017-09-30 DIAGNOSIS — R10.12 LUQ PAIN: ICD-10-CM

## 2017-09-30 DIAGNOSIS — R10.9 ABDOMINAL PAIN: ICD-10-CM

## 2017-09-30 DIAGNOSIS — R10.11 RUQ ABDOMINAL PAIN: ICD-10-CM

## 2017-09-30 PROCEDURE — 74176 CT ABD & PELVIS W/O CONTRAST: CPT

## 2017-10-31 ENCOUNTER — OFFICE VISIT (OUTPATIENT)
Dept: INTERNAL MEDICINE CLINIC | Age: 82
End: 2017-10-31

## 2017-10-31 VITALS
BODY MASS INDEX: 22.82 KG/M2 | DIASTOLIC BLOOD PRESSURE: 70 MMHG | WEIGHT: 137 LBS | TEMPERATURE: 98 F | OXYGEN SATURATION: 98 % | RESPIRATION RATE: 20 BRPM | SYSTOLIC BLOOD PRESSURE: 148 MMHG | HEART RATE: 78 BPM | HEIGHT: 65 IN

## 2017-10-31 DIAGNOSIS — R10.13 EPIGASTRIC PAIN: Primary | ICD-10-CM

## 2017-10-31 DIAGNOSIS — I49.1 PAC (PREMATURE ATRIAL CONTRACTION): ICD-10-CM

## 2017-10-31 RX ORDER — DICYCLOMINE HYDROCHLORIDE 10 MG/1
CAPSULE ORAL
COMMUNITY
Start: 2017-10-11 | End: 2017-12-01

## 2017-10-31 NOTE — PROGRESS NOTES
Chief Complaint   Patient presents with    Medication Evaluation     Patient has new medication wants to discuss before taking. Subjective:  She has been having episodic abdominal pain that radiates into the chest since June. She had one episode in July where she was seen in the ER. She had normal cardiac enzymes, normal liver enzymes, normal CBC, and sent her home. She saw Dr. Guille Lambert from cardiology. Nuclear stress test and echocardiogram were normal.  He didn't think it was cardiac. She did after a long wait in September saw Dr. Sari Moreno from [facility]. He scheduled her for a CAT scan, which was normal. He said he thought she had gallbladder disease. She tells me 30 years ago at the [facility] ER they told her she might have had gallbladder trouble. There is no family history of gallbladder. She's never had a stone that's been seen. She says attacks occur after meals, not related to wine. She has vomited a couple of times. She says she thinks she's had six attacks in the last 3 1/2 to 4 months. She is fine today. Appetite is good, no jaundice. No urinary symptoms, no bowel symptoms other than a little bit of constipation. Dr. Sari Moreno gave her some Bentyl or Dicyclomine. She is afraid of it. She took three. She read the side effects of constipation, dry eyes, dry mouth, and she decided not to take it. Physical Examination:  Her blood pressure was okay. Her abdomen was non tender. She looked healthy. Impression/Plan:  I suspect she could have gallbladder disease and maybe a calculus cholecystitis. She needs an ultrasound to make sure she doesn't have noncalcified stones. If the ultrasound is negative then she needs a nuclear HIDA scan to evaluate for gallbladder function. In the meantime she'll eat a light diet, she won't take the Dicyclomine, and she'll follow up with me after the tests are done.     Vitals:    10/31/17 1641   BP: 148/70   Pulse: 78   Resp: 20 Temp: 98 °F (36.7 °C)   TempSrc: Oral   SpO2: 98%   Weight: 137 lb (62.1 kg)   Height: 5' 5\" (1.651 m)     Diagnoses and all orders for this visit:    1. Epigastric pain  -     US ABD COMP; Future  -     NM HEPATOBILIARY DUCT SCAN; Future    2. PAC (premature atrial contraction)      Lab Results   Component Value Date/Time    GFR est AA >60 07/31/2017 08:22 PM    GFR est non-AA >60 07/31/2017 08:22 PM    Creatinine 0.61 07/31/2017 08:22 PM    BUN 17 07/31/2017 08:22 PM    Sodium 139 07/31/2017 08:22 PM    Potassium 4.4 07/31/2017 08:22 PM    Chloride 104 07/31/2017 08:22 PM    CO2 32 07/31/2017 08:22 PM     1. PAC (premature atrial contraction)  No issue    2. Epigastric pain  Continued spells worry gallbladder may be esophageal spasm  - US ABD COMP; Future  - NM HEPATOBILIARY DUCT SCAN; Future      CT Results (most recent):    Results from Hospital Encounter encounter on 09/30/17   CT ABD PELV WO CONT   Narrative EXAM:  CT ABDOMEN PELVIS WITHOUT CONTRAST  INDICATION:  Abdominal pain  COMPARISON: X-ray of the lumbar spine, 2/10/2016. MRI of the lumbar spine,  4/26/2016. Peggyann Gang TECHNIQUE:   Unenhanced multislice helical CT was performed from the diaphragm to the  symphysis pubis without intravenous contrast administration. Contiguous 5 mm  axial images were reconstructed and lung and soft tissue windows were generated. Coronal and sagittal reformations were generated. CT dose reduction was achieved through use of a standardized protocol tailored  for this examination and automatic exposure control for dose modulation. Peggyann Gang FINDINGS:  INCIDENTALLY IMAGED CHEST:  Heart/vessels: Within normal limits. Lungs/Pleura: Within normal limits. .  ABDOMEN:  Liver: Within normal limits. Gallbladder/Biliary: Within normal limits. Spleen: Within normal limits. Pancreas: Within normal limits. Adrenals: Within normal limits. Kidneys: Punctate calculus in the lower pole of the left kidney.  Low-attenuation  areas in the central sinus of the left kidney which may represent central sinus  cysts. Peritoneum/Mesenteries: Within normal limits. Extraperitoneum: Within normal limits. Gastrointestinal tract: Within normal limits. The appendix is not confidently  identified  Vascular: Calcifications in the aorta. Lerry Chris PELVIS:  Extraperitoneum: Within normal limits. Ureters: Within normal limits. Bladder: Within normal limits. Reproductive System: Exophytic, rounded soft tissue attenuation projecting off  the lower uterine segment suggesting a fibroid. .  MSK:   Bone cement within L1 which appears compressed. There is posterior pedicle screw  and shiva fixation of L1 and L2. Left inferior facet fractures at L2 and L3. Slight dextroscoliosis of the lumbar spine. Laminectomies from L1-L4. .       Impression IMPRESSION:   1. Punctate calculus in the lower pole the left kidney. 2.  Posterior pedicle screw and shiva fixation of L1 and L2. There is bone cement  within L1 which is slightly compressed. There are postlaminectomy changes from  L1-L4.  Fractures of the left inferior facets of both L2 and L3.   3. Incidental findings as above

## 2017-10-31 NOTE — MR AVS SNAPSHOT
Visit Information Date & Time Provider Department Dept. Phone Encounter #  
 10/31/2017  4:30 PM Zoe Mcginnis, [address] [facility] [phone] [id number] Upcoming Health Maintenance Date Due DTaP/Tdap/Td series (1 - Tdap) 5/3/2007 GLAUCOMA SCREENING Q2Y 7/1/2016 INFLUENZA AGE 9 TO ADULT 8/1/2017 MEDICARE YEARLY EXAM 7/25/2018 Allergies as of 10/31/2017  Review Complete On: 9/30/2017 By: Tej Cortes No Known Allergies Current Immunizations  Reviewed on 10/31/2017 Name Date Influenza Vaccine (Quad) PF 9/27/2016  1:05 PM  
 Pneumococcal Conjugate (PCV-13) 10/12/2016 TD Vaccine 5/2/2007 ZZZ-RETIRED (DO NOT USE) Pneumococcal Vaccine (Unspecified Type) 1/2/2005 Zoster 1/2/2007 Reviewed by Zoe Mcginnis MD on 10/31/2017 at  4:55 PM  
You Were Diagnosed With   
  
 Codes Comments Epigastric pain    -  Primary ICD-10-CM: R10.13 ICD-9-CM: 789.06   
 PAC (premature atrial contraction)     ICD-10-CM: I49.1 ICD-9-CM: 427.61 Vitals BP Pulse Temp Resp Height(growth percentile) Weight(growth percentile) 148/70 78 98 °F (36.7 °C) (Oral) 20 5' 5\" (1.651 m) 137 lb (62.1 kg) SpO2 BMI OB Status Smoking Status 98% 22.8 kg/m2 Postmenopausal Former Smoker Vitals History BMI and BSA Data Body Mass Index Body Surface Area  
 22.8 kg/m 2 1.69 m 2 Preferred Pharmacy Pharmacy Name Phone [address] [place] [address] [phone] Your Updated Medication List  
  
   
This list is accurate as of: 10/31/17  5:16 PM.  Always use your most recent med list.  
  
  
  
  
 CALTRATE PLUS PO Take 1 Tab by mouth. Takes one po daily. dicyclomine 10 mg capsule Commonly known as:  BENTYL  
  
 doxycycline 100 mg capsule Commonly known as:  VIBRAMYCIN Take 1 capsule by mouth two times daily * DULoxetine 30 mg capsule Commonly known as:  CYMBALTA Take 1 capsule by mouth  daily for 2 weeks then 2  capsules daily * DULoxetine 60 mg capsule Commonly known as:  CYMBALTA Take 1 capsule by mouth  every day FEMHRT LOW DOSE PO Take 0.5 mg by mouth daily. FISH OIL PO Take  by mouth. fluocinoNIDE 0.05 % ointment Commonly known as:  LIDEX Apply  to affected area two (2) times a day. GenTeal Moderate 0.3 % Drop Generic drug:  artificial tears(hypromellose) Administer  to both eyes as needed. MULTIVITAMIN PO Takes one po daily. omeprazole 40 mg capsule Commonly known as:  PRILOSEC Take 1 capsule by mouth  daily REFRESH LIQUIGEL 1 % Dlgl Generic drug:  carboxymethylcellulose sodium Apply  to eye. RESTASIS 0.05 % ophthalmic emulsion Generic drug:  cycloSPORINE Administer 1 drop to both eyes two (2) times a day. VITAMIN D3 2,000 unit Tab Generic drug:  cholecalciferol (vitamin D3) Take 2,000 Units by mouth daily. * Notice: This list has 2 medication(s) that are the same as other medications prescribed for you. Read the directions carefully, and ask your doctor or other care provider to review them with you. To-Do List   
 10/31/2017 Imaging:  NM HEPATOBILIARY DUCT SCAN   
  
 10/31/2017 Imaging:  US ABD COMP [facility] & HEALTH SERVICES! [facility] introduces [employer] patient portal. Now you can access parts of your medical record, email your doctor's office, and request medication refills online. 1. In your internet browser, go to [email or web address] 2. Click on the First Time User? Click Here link in the Sign In box. You will see the New Member Sign Up page. 3. Enter your Bluenote Access Code exactly as it appears below. You will not need to use this code after youve completed the sign-up process. If you do not sign up before the expiration date, you must request a new code. · Bluenote Access Code: [email or web address] Expires: 1/29/2018  4:47 PM 
 
4. Enter the last four digits of your Social Security Number (xxxx) and Date of Birth (mm/dd/yyyy) as indicated and click Submit. You will be taken to the next sign-up page. 5. Create a "[employer] ID. This will be your "[employer] login ID and cannot be changed, so think of one that is secure and easy to remember. 6. Create a "[employer] password. You can change your password at any time. 7. Enter your Password Reset Question and Answer. This can be used at a later time if you forget your password. 8. Enter your e-mail address. You will receive e-mail notification when new information is available in [address] 9. Click Sign Up. You can now view and download portions of your medical record. 10. Click the Download Summary menu link to download a portable copy of your medical information. If you have questions, please visit the Frequently Asked Questions section of the "[employer] website. Remember, "[employer] is NOT to be used for urgent needs. For medical emergencies, dial 911. Now available from your iPhone and Android! Please provide this summary of care documentation to your next provider. Your primary care clinician is listed as Benny Carcamo. If you have any questions after today's visit, please call [phone].

## 2017-11-15 ENCOUNTER — HOSPITAL ENCOUNTER (OUTPATIENT)
Dept: NUCLEAR MEDICINE | Age: 82
Discharge: HOME | End: 2017-11-15
Attending: INTERNAL MEDICINE
Payer: MEDICARE

## 2017-11-15 ENCOUNTER — HOSPITAL ENCOUNTER (OUTPATIENT)
Dept: ULTRASOUND IMAGING | Age: 82
Discharge: HOME | End: 2017-11-15
Attending: INTERNAL MEDICINE
Payer: MEDICARE

## 2017-11-15 VITALS — WEIGHT: 136 LBS | BODY MASS INDEX: 22.63 KG/M2

## 2017-11-15 DIAGNOSIS — R10.13 EPIGASTRIC PAIN: ICD-10-CM

## 2017-11-15 PROCEDURE — 74011250636 HC RX REV CODE- 250/636: Performed by: INTERNAL MEDICINE

## 2017-11-15 PROCEDURE — 78227 HEPATOBIL SYST IMAGE W/DRUG: CPT

## 2017-11-15 PROCEDURE — 74011000258 HC RX REV CODE- 258: Performed by: INTERNAL MEDICINE

## 2017-11-15 PROCEDURE — 76700 US EXAM ABDOM COMPLETE: CPT

## 2017-11-15 RX ORDER — SODIUM CHLORIDE 0.9 % (FLUSH) 0.9 %
10 SYRINGE (ML) INJECTION
Status: COMPLETED | OUTPATIENT
Start: 2017-11-15 | End: 2017-11-15

## 2017-11-15 RX ORDER — SODIUM CHLORIDE 9 MG/ML
25 INJECTION, SOLUTION INTRAVENOUS
Status: COMPLETED | OUTPATIENT
Start: 2017-11-15 | End: 2017-11-15

## 2017-11-15 RX ADMIN — Medication 10 ML: at 11:20

## 2017-11-15 RX ADMIN — SODIUM CHLORIDE 25 ML: 900 INJECTION, SOLUTION INTRAVENOUS at 12:25

## 2017-11-15 RX ADMIN — SINCALIDE 1.23 MCG: 5 INJECTION, POWDER, LYOPHILIZED, FOR SOLUTION INTRAVENOUS at 12:25

## 2017-11-17 NOTE — PROGRESS NOTES
Gall bladder not functioning normally   And  Sludge seen    Advise surgical consult at [address] [place] [address] or his young associate DR Satish Stubbs  Or DR Pam Saavedra  ( all same office)

## 2017-11-19 DIAGNOSIS — K21.9 GASTROESOPHAGEAL REFLUX DISEASE WITHOUT ESOPHAGITIS: ICD-10-CM

## 2017-11-19 RX ORDER — DULOXETIN HYDROCHLORIDE 60 MG/1
CAPSULE, DELAYED RELEASE ORAL
Qty: 90 CAP | Refills: 1 | Status: SHIPPED | OUTPATIENT
Start: 2017-11-19 | End: 2018-04-25 | Stop reason: SDUPTHER

## 2017-11-19 RX ORDER — OMEPRAZOLE 40 MG/1
CAPSULE, DELAYED RELEASE ORAL
Qty: 90 CAP | Refills: 1 | Status: SHIPPED | OUTPATIENT
Start: 2017-11-19 | End: 2018-06-18 | Stop reason: SDUPTHER

## 2017-11-20 NOTE — PROGRESS NOTES
Notified Belia Ortiz () per Dr Darren Arnett of the following: The gall bladder is not functioning normally and sludge is seen. Advises surgical consult at [facility] with Dr Hollie Adams or one of his associates. He states understanding and wrote everything down. Provided him with contact information.

## 2017-11-22 ENCOUNTER — OFFICE VISIT (OUTPATIENT)
Dept: SURGERY | Age: 82
End: 2017-11-22

## 2017-11-22 VITALS
HEART RATE: 84 BPM | BODY MASS INDEX: 22.41 KG/M2 | TEMPERATURE: 98.1 F | SYSTOLIC BLOOD PRESSURE: 140 MMHG | HEIGHT: 65 IN | WEIGHT: 134.5 LBS | DIASTOLIC BLOOD PRESSURE: 80 MMHG | RESPIRATION RATE: 18 BRPM | OXYGEN SATURATION: 98 %

## 2017-11-22 DIAGNOSIS — K80.50 BILIARY COLIC: Primary | ICD-10-CM

## 2017-11-22 NOTE — PROGRESS NOTES
1. Have you been to the ER, urgent care clinic since your last visit? Hospitalized since your last visit? No    2. Have you seen or consulted any other health care providers outside of the [address] [place] [address] since your last visit? Include any pap smears or colon screening.  Plastic surgeon Dr. Hudson Resendiz

## 2017-11-22 NOTE — MR AVS SNAPSHOT
Visit Information Date & Time Provider Department Dept. Phone Encounter #  
 11/22/2017  1:00 PM Vera Mathias, [address] [phone] [id number] Follow-up Instructions Routing History Upcoming Health Maintenance Date Due DTaP/Tdap/Td series (1 - Tdap) 5/3/2007 GLAUCOMA SCREENING Q2Y 7/1/2016 Influenza Age 5 to Adult 8/1/2017 MEDICARE YEARLY EXAM 7/25/2018 Allergies as of 11/22/2017  Review Complete On: 11/22/2017 By: Vera Mathias MD  
 No Known Allergies Current Immunizations  Reviewed on 10/31/2017 Name Date Influenza Vaccine (Quad) PF 9/27/2016  1:05 PM  
 Pneumococcal Conjugate (PCV-13) 10/12/2016 TD Vaccine 5/2/2007 ZZZ-RETIRED (DO NOT USE) Pneumococcal Vaccine (Unspecified Type) 1/2/2005 Zoster 1/2/2007 Not reviewed this visit You Were Diagnosed With   
  
 Codes Comments Biliary colic    -  Primary LBY-11-WX: K80.50 ICD-9-CM: 574.20 Vitals BP Pulse Temp Resp Height(growth percentile) Weight(growth percentile) 140/80 (BP 1 Location: Left arm, BP Patient Position: Sitting) 84 98.1 °F (36.7 °C) (Oral) 18 5' 5\" (1.651 m) 134 lb 8 oz (61 kg) SpO2 BMI OB Status Smoking Status 98% 22.38 kg/m2 Postmenopausal Former Smoker Vitals History BMI and BSA Data Body Mass Index Body Surface Area  
 22.38 kg/m 2 1.67 m 2 Preferred Pharmacy Pharmacy Name Phone [address] [place] [address] [phone] Your Updated Medication List  
  
   
This list is accurate as of: 11/22/17  2:00 PM.  Always use your most recent med list.  
  
  
  
  
 CALTRATE PLUS PO Take 1 Tab by mouth. Takes one po daily. dicyclomine 10 mg capsule Commonly known as:  BENTYL  
  
 doxycycline 100 mg capsule Commonly known as:  VIBRAMYCIN Take 1 capsule by mouth two times daily * DULoxetine 30 mg capsule Commonly known as:  CYMBALTA Take 1 capsule by mouth  daily for 2 weeks then 2  capsules daily * DULoxetine 60 mg capsule Commonly known as:  CYMBALTA TAKE 1 CAPSULE BY MOUTH  EVERY DAY  
  
 FEMHRT LOW DOSE PO Take 0.5 mg by mouth daily. FISH OIL PO Take  by mouth. fluocinoNIDE 0.05 % ointment Commonly known as:  LIDEX Apply  to affected area two (2) times a day. GenTeal Moderate 0.3 % Drop Generic drug:  artificial tears(hypromellose) Administer  to both eyes as needed. MULTIVITAMIN PO Takes one po daily. omeprazole 40 mg capsule Commonly known as:  PRILOSEC  
TAKE 1 CAPSULE BY MOUTH  DAILY REFRESH LIQUIGEL 1 % Dlgl Generic drug:  carboxymethylcellulose sodium Apply  to eye. RESTASIS 0.05 % ophthalmic emulsion Generic drug:  cycloSPORINE Administer 1 drop to both eyes two (2) times a day. VITAMIN D3 2,000 unit Tab Generic drug:  cholecalciferol (vitamin D3) Take 2,000 Units by mouth daily. * Notice: This list has 2 medication(s) that are the same as other medications prescribed for you. Read the directions carefully, and ask your doctor or other care provider to review them with you. Introducing hospitals & HEALTH SERVICES! [facility] introduces MediW[employer] patient portal. Now you can access parts of your medical record, email your doctor's office, and request medication refills online. 1. In your internet browser, go to [email or web address] 2. Click on the First Time User? Click Here link in the Sign In box. You will see the New Member Sign Up page. 3. Enter your [employer] Access Code exactly as it appears below. You will not need to use this code after youve completed the sign-up process. If you do not sign up before the expiration date, you must request a new code. · [employer] Access Code: [email or web address] Expires: 1/29/2018  3:47 PM 
 
 4. Enter the last four digits of your Social Security Number (xxxx) and Date of Birth (mm/dd/yyyy) as indicated and click Submit. You will be taken to the next sign-up page. 5. Create a [employer] ID. This will be your [employer] login ID and cannot be changed, so think of one that is secure and easy to remember. 6. Create a [employer] password. You can change your password at any time. 7. Enter your Password Reset Question and Answer. This can be used at a later time if you forget your password. 8. Enter your e-mail address. You will receive e-mail notification when new information is available in [address] 9. Click Sign Up. You can now view and download portions of your medical record. 10. Click the Download Summary menu link to download a portable copy of your medical information. If you have questions, please visit the Frequently Asked Questions section of the [employer] website. Remember, [employer] is NOT to be used for urgent needs. For medical emergencies, dial 911. Now available from your iPhone and Android! Please provide this summary of care documentation to your next provider. Your primary care clinician is listed as Lata Cooper. If you have any questions after today's visit, please call [phone].

## 2017-11-22 NOTE — PROGRESS NOTES
Surgery Consult    Subjective:      Lisa Og is a 80 y.o.  female who was referred by Dr Doug Mcintosh for evaluation of gallbladder surgery. The pt states that she is doing fine rihgt now. Recently, she has been having horrible pain all along her chest and abdomen to the point where she couldn't distinguish it from a heart attack or reflux. The intense pain first started in 1986-87 for which she may have gone to the ER and learned about her gallbladder problems. Thereafter, she suffered from esophagitis; muscle spasms in her esophagus that could be very painful at times. She had been seeing Dr Brent Hagen starting around that times and her symptoms improved. From that point on, she hadn't been experiencing much pain until this past summer. She started having episodes of waking up in the middle of the night and not feeling well all along her chest.  It would last about 45 minutes and it was one of the worst feelings of pain she has ever felt. She projectile vomited twice as a result of this pain. She can recall having 4-5 episodes like this, one in 06/2017, one in 07/2017, and 09/2017--from what she can recall. Dr Jenifer Freitas thought it was her gallbladder and ordered a CT scan as a result of that. She then received an Rx hat she was supposed to take 4x/day so as to prevent esophageal spasms. However, she started reading up on the side effects of the Rx and discontinued it as a result. She took it three times and stopped. The pain she has been experiencing is unlike any of the GERD pain she has experienced. She has been checked by cardiologists and she seems to be fine. PMHx:  She has been dealing with GERD since 1986-87. About 50 years ago, she was told that she has two cysts on her spleen. SHx:  She states that she had major back surgery (Dr Rob Nguyễn) last year. Since then, while the back surgery worked beautifully, she has not been feeling like her normal self.     NM HEPATOBILIARY W INTERVENTION from 11/15/2017:  FINDINGS:  The initial images demonstrate prompt hepatic tracer uptake. The common bile  duct is visualized at 12 minutes. The gallbladder is visualized at 40 minutes. It demonstrates progressive filling. The duodenum is visualized at 20 minutes. Post CCK images demonstrate decreased gallbladder contraction. Calculated  ejection fraction between 0 and 30 minutes is 10 %. IMPRESSION:  Biliary dyskinesia. US ABD COMP from 11/15/2017:  FINDINGS:   Liver: Echogenicity is within normal limits. No focal liver lesion. The  intrahepatic portal veins and hepatic veins are patent. Main portal vein flow: Toward the liver with a velocity of 34 cm/s. Diameter of  0.9 cm. Fluid: No ascites. Aorta and IVC: No abdominal aortic aneurysm. IVC is patent. Gallbladder: No mobile gallstones are demonstrated. There is a nonmobile  heterogeneous echogenic structure adjacent to the gallbladder wall measuring 1.6  x 0.9 x 1.1 cm without internal vascularity. No gallbladder wall thickening or  pericholecystic fluid. Negative sonographic Vanegas sign. Bile ducts: There is no intra or extrahepatic biliary ductal dilatation. The  common bile duct measures 6 mm. Pancreas: There is a cyst or cystic lesion in the distal pancreatic body  measuring 1.3 x 0.9 x 1.2 cm. Kidneys: Right length: 9.1 cm. Left length: 11.1 cm. No hydronephrosis. A left  kidney cyst measures 1.1 x 0.9 x 1.3 cm. Spleen: 10.5 cm in length, which is within normal limits. There is a focal  hypoechoic area within the spleen measuring 1.1 x 1.0 x 0.6 cm. IMPRESSION:   1. A nonmobile heterogeneous echogenic structure adjacent to the gallbladder  wall measuring 1.6 cm may represent tumefactive sludge or a collection of  cholesterol crystals. A polyp or mass is unlikely given the appearance and lack  of internal vascularity.   2. A cyst or cystic lesion in the distal pancreatic body measuring 1.3 cm is  incompletely characterized. This could be further evaluated with CT or MRI with  and without contrast as clinically warranted. 3. A focal hypoechoic area within the spleen measuring 1.1 cm is incompletely  characterized. CT ABD PELV WO CONT from 09/30/2017:  FINDINGS:  INCIDENTALLY IMAGED CHEST:  Heart/vessels: Within normal limits. Lungs/Pleura: Within normal limits. ABDOMEN:  Liver: Within normal limits. Gallbladder/Biliary: Within normal limits. Spleen: Within normal limits. Pancreas: Within normal limits. Adrenals: Within normal limits. Kidneys: Punctate calculus in the lower pole of the left kidney. Low-attenuation  areas in the central sinus of the left kidney which may represent central sinus  cysts. Peritoneum/Mesenteries: Within normal limits. Extraperitoneum: Within normal limits. Gastrointestinal tract: Within normal limits. The appendix is not confidently  identified  Vascular: Calcifications in the aorta. PELVIS:  Extraperitoneum: Within normal limits. Ureters: Within normal limits. Bladder: Within normal limits. Reproductive System: Exophytic, rounded soft tissue attenuation projecting off  the lower uterine segment suggesting a fibroid. MSK:   Bone cement within L1 which appears compressed. There is posterior pedicle screw  and shiva fixation of L1 and L2. Left inferior facet fractures at L2 and L3. Slight dextroscoliosis of the lumbar spine. Laminectomies from L1-L4. IMPRESSION:   1. Punctate calculus in the lower pole the left kidney. 2.  Posterior pedicle screw and shiva fixation of L1 and L2. There is bone cement  within L1 which is slightly compressed. There are postlaminectomy changes from  L1-L4. Fractures of the left inferior facets of both L2 and L3.   3. Incidental findings as above    I independently reviewed these images.          Chief Complaint   Patient presents with    Epigastric Pain     see U/S and Hida Scan done on 11/15/17-here for evaluation for gallbladder surgery Patient Active Problem List    Diagnosis Date Noted    Biliary colic 87/90/6977    Spinal stenosis 09/23/2016    Lumbar disc disease with radiculopathy 07/22/2016    Osteoporosis 07/22/2016    Pancreatic cyst 07/14/2015    Visual loss 01/21/2015    PAC (premature atrial contraction) 07/09/2013    Rosacea 06/27/2012    Anxiety 08/16/2011    Hypercholesterolemia     GERD (gastroesophageal reflux disease)     Cancer ([facility]     Hyperlipidemia LDL goal <130 07/20/2010     Past Medical History:   Diagnosis Date    Arthritis     Biliary colic 79/54/8299    Cancer ([facility]     skin cancer - basal and squamous cell    GERD (gastroesophageal reflux disease)     Hypercholesterolemia     PATIENT DENIES    Osteopenia     PAC (premature atrial contraction) 7/9/2013    Pancreatic cyst 7/14/2015    Psychiatric disorder     anxiety    Urinary frequency     Visual loss 1/21/2015      Past Surgical History:   Procedure Laterality Date    CARDIAC SURG PROCEDURE UNLIST  2/11    normal stress test    ENDOSCOPY, COLON, DIAGNOSTIC  2008    HX APPENDECTOMY  2000    HX CATARACT REMOVAL  2012    HX GI  2008    EGD    HX ORTHOPAEDIC Right 2010    knee - arthroscopy    HX ORTHOPAEDIC Right march 2011    TKR  right    HX ORTHOPAEDIC  2016    back surgery    HX OTHER SURGICAL  11/2017    karina. leg skin cancer surgery    HX TONSILLECTOMY      HX TUBAL LIGATION  1976      Social History   Substance Use Topics    Smoking status: Former Smoker     Quit date: 8/5/1956    Smokeless tobacco: Never Used      Comment: former cigarette smoker    Alcohol use 7.0 oz/week     7 Glasses of wine, 7 Shots of liquor per week      Family History   Problem Relation Age of Onset    Stroke Mother     Hypertension Mother     Other Mother      aneurysm - aorta    Cancer Father      stomach AND ESOPHAGEAL cancer    Stroke Other     Stroke Maternal Grandmother     Other Son      BLOOD CLOT IN AORTA AND POST OP DVT    Anesth Problems Neg Hx       Current Outpatient Prescriptions   Medication Sig    omeprazole (PRILOSEC) 40 mg capsule TAKE 1 CAPSULE BY MOUTH  DAILY    DULoxetine (CYMBALTA) 60 mg capsule TAKE 1 CAPSULE BY MOUTH  EVERY DAY    doxycycline (VIBRAMYCIN) 100 mg capsule Take 1 capsule by mouth two times daily    DULoxetine (CYMBALTA) 30 mg capsule Take 1 capsule by mouth  daily for 2 weeks then 2  capsules daily    DOCOSAHEXANOIC ACID/EPA (FISH OIL PO) Take  by mouth.  carboxymethylcellulose sodium (REFRESH LIQUIGEL) 1 % dlgl Apply  to eye.  cholecalciferol, vitamin D3, (VITAMIN D3) 2,000 unit Tab Take 2,000 Units by mouth daily.  MULTIVITAMIN PO Takes one po daily.  NORETHIND AC/ETHINYL ESTRADIOL (FEMHRT LOW DOSE PO) Take 0.5 mg by mouth daily.  CALCIUM CARB/VIT D3/MINERALS (CALTRATE PLUS PO) Take 1 Tab by mouth. Takes one po daily.  dicyclomine (BENTYL) 10 mg capsule     fluocinoNIDE (LIDEX) 0.05 % ointment Apply  to affected area two (2) times a day.  cycloSPORINE (RESTASIS) 0.05 % ophthalmic emulsion Administer 1 drop to both eyes two (2) times a day.  artificial tears,hypromellose, (GENTEAL MODERATE) 0.3 % drop Administer  to both eyes as needed. No current facility-administered medications for this visit. No Known Allergies     Review of Systems:    A comprehensive review of systems was negative except for that written in the History of Present Illness. Objective:     Visit Vitals    /80 (BP 1 Location: Left arm, BP Patient Position: Sitting)    Pulse 84    Temp 98.1 °F (36.7 °C) (Oral)    Resp 18    Ht 5' 5\" (1.651 m)    Wt 134 lb 8 oz (61 kg)    SpO2 98%    BMI 22.38 kg/m2         Physical Exam:  General appearance: alert, cooperative, no distress, appears stated age  Head: Normocephalic, without obvious abnormality, atraumatic  Neurologic: Grossly normal      Assessment:       ICD-10-CM ICD-9-CM    1.  Biliary colic E90.72 424.30          Plan:     Patient has elected for laparoscopic cholecystectomy. Risks and benefits explained and the patient will schedule an appointment at their earliest convenience. Written by shady Ramirez, as dictated by Hilaria Mims MD.    Total face to face time with patient: 27 minutes. Greater than 50% of the time was spent in counseling. Signed By: Hilaria Mims MD     November 22, 2017

## 2017-12-01 ENCOUNTER — HOSPITAL ENCOUNTER (OUTPATIENT)
Dept: PREADMISSION TESTING | Age: 82
Discharge: HOME | End: 2017-12-01
Payer: MEDICARE

## 2017-12-01 VITALS
SYSTOLIC BLOOD PRESSURE: 170 MMHG | WEIGHT: 133 LBS | DIASTOLIC BLOOD PRESSURE: 77 MMHG | HEIGHT: 65 IN | BODY MASS INDEX: 22.16 KG/M2 | HEART RATE: 74 BPM | TEMPERATURE: 97.7 F

## 2017-12-01 LAB
ALBUMIN SERPL-MCNC: 3.6 G/DL (ref 3.5–5)
ALBUMIN/GLOB SERPL: 1.2 {RATIO} (ref 1.1–2.2)
ALP SERPL-CCNC: 56 U/L (ref 45–117)
ALT SERPL-CCNC: 22 U/L (ref 12–78)
ANION GAP SERPL CALC-SCNC: 7 MMOL/L (ref 5–15)
AST SERPL-CCNC: 19 U/L (ref 15–37)
BASOPHILS # BLD: 0 K/UL (ref 0–0.1)
BASOPHILS NFR BLD: 0 % (ref 0–1)
BILIRUB SERPL-MCNC: 0.5 MG/DL (ref 0.2–1)
BUN SERPL-MCNC: 19 MG/DL (ref 6–20)
BUN/CREAT SERPL: 35 (ref 12–20)
CALCIUM SERPL-MCNC: 9.1 MG/DL (ref 8.5–10.1)
CHLORIDE SERPL-SCNC: 103 MMOL/L (ref 97–108)
CO2 SERPL-SCNC: 30 MMOL/L (ref 21–32)
CREAT SERPL-MCNC: 0.54 MG/DL (ref 0.55–1.02)
EOSINOPHIL # BLD: 0.1 K/UL (ref 0–0.4)
EOSINOPHIL NFR BLD: 2 % (ref 0–7)
ERYTHROCYTE [DISTWIDTH] IN BLOOD BY AUTOMATED COUNT: 13.1 % (ref 11.5–14.5)
GLOBULIN SER CALC-MCNC: 3.1 G/DL (ref 2–4)
GLUCOSE SERPL-MCNC: 82 MG/DL (ref 65–100)
HCT VFR BLD AUTO: 41.8 % (ref 35–47)
HGB BLD-MCNC: 13.9 G/DL (ref 11.5–16)
LYMPHOCYTES # BLD: 1.6 K/UL (ref 0.8–3.5)
LYMPHOCYTES NFR BLD: 31 % (ref 12–49)
MCH RBC QN AUTO: 31.8 PG (ref 26–34)
MCHC RBC AUTO-ENTMCNC: 33.3 G/DL (ref 30–36.5)
MCV RBC AUTO: 95.7 FL (ref 80–99)
MONOCYTES # BLD: 0.6 K/UL (ref 0–1)
MONOCYTES NFR BLD: 11 % (ref 5–13)
NEUTS SEG # BLD: 2.9 K/UL (ref 1.8–8)
NEUTS SEG NFR BLD: 56 % (ref 32–75)
PLATELET # BLD AUTO: 157 K/UL (ref 150–400)
POTASSIUM SERPL-SCNC: 4.3 MMOL/L (ref 3.5–5.1)
PROT SERPL-MCNC: 6.7 G/DL (ref 6.4–8.2)
RBC # BLD AUTO: 4.37 M/UL (ref 3.8–5.2)
SODIUM SERPL-SCNC: 140 MMOL/L (ref 136–145)
WBC # BLD AUTO: 5.2 K/UL (ref 3.6–11)

## 2017-12-01 PROCEDURE — 85025 COMPLETE CBC W/AUTO DIFF WBC: CPT | Performed by: SURGERY

## 2017-12-01 PROCEDURE — 80053 COMPREHEN METABOLIC PANEL: CPT | Performed by: SURGERY

## 2017-12-01 PROCEDURE — 36415 COLL VENOUS BLD VENIPUNCTURE: CPT | Performed by: SURGERY

## 2017-12-01 RX ORDER — NORETHINDRONE ACETATE AND ETHINYL ESTRADIOL .5; 2.5 MG/1; UG/1
1 TABLET ORAL DAILY
COMMUNITY
End: 2017-12-01

## 2017-12-01 RX ORDER — ASPIRIN 81 MG/1
81 TABLET ORAL DAILY
COMMUNITY
End: 2020-10-15 | Stop reason: ALTCHOICE

## 2017-12-01 NOTE — PERIOP NOTES
PT/FAMILY PROVIDED AND REVIEWED PRE-OP INSTRUCTION SHEET. PATIENT GIVEN SURGICAL SITE INFORMATION FAQS INFORMATION HANDOUT. PT PROVIDED WITH GOOD HAND HYGIENE TIPS. PT GIVEN OPPORTUNITY TO ASK QUESTIONS.   PATIENT PROVIDED WITH SRINI WIPES, ORAL AND WRITTEN INSTRUCTIONS

## 2017-12-04 ENCOUNTER — TELEPHONE (OUTPATIENT)
Dept: INTERNAL MEDICINE CLINIC | Age: 82
End: 2017-12-04

## 2017-12-04 NOTE — TELEPHONE ENCOUNTER
Patient is going away for the weekend and would like to know if there is anything she could take for her gallbladder encase there is a flare up. Her surgery is December 15th 2017. She also wants to know if she should stop taking the baby asprin before surgery? She can be reached at [phone].

## 2017-12-05 NOTE — TELEPHONE ENCOUNTER
Notified the patient per Dr Jie Joe to go ahead and stop aspirin. Also following a low fat diet is the only way to prevent attacks. She states understanding and agrees with the treatment plan.

## 2017-12-14 ENCOUNTER — ANESTHESIA EVENT (OUTPATIENT)
Dept: SURGERY | Age: 82
End: 2017-12-14
Payer: MEDICARE

## 2017-12-15 ENCOUNTER — ANESTHESIA (OUTPATIENT)
Dept: SURGERY | Age: 82
End: 2017-12-15
Payer: MEDICARE

## 2017-12-15 ENCOUNTER — HOSPITAL ENCOUNTER (OUTPATIENT)
Age: 82
Setting detail: OUTPATIENT SURGERY
Discharge: HOME | End: 2017-12-15
Attending: SURGERY | Admitting: SURGERY
Payer: MEDICARE

## 2017-12-15 VITALS
WEIGHT: 133 LBS | HEART RATE: 59 BPM | RESPIRATION RATE: 18 BRPM | HEIGHT: 65 IN | OXYGEN SATURATION: 93 % | BODY MASS INDEX: 22.16 KG/M2 | TEMPERATURE: 97.6 F | SYSTOLIC BLOOD PRESSURE: 153 MMHG | DIASTOLIC BLOOD PRESSURE: 52 MMHG

## 2017-12-15 PROCEDURE — 77030037032 HC INSRT SCIS CLICKLLINE DISP STOR -B: Performed by: SURGERY

## 2017-12-15 PROCEDURE — 77030018876 HC APPL CLP LIG4 COVD -B: Performed by: SURGERY

## 2017-12-15 PROCEDURE — 77030035045 HC TRCR ENDOSC VRSPRT BLDLSS COVD -B: Performed by: SURGERY

## 2017-12-15 PROCEDURE — 76010000138 HC OR TIME 0.5 TO 1 HR: Performed by: SURGERY

## 2017-12-15 PROCEDURE — 77030017006 HC DISECTR CRV1 J&J -B: Performed by: SURGERY

## 2017-12-15 PROCEDURE — 77030018836 HC SOL IRR NACL ICUM -A: Performed by: SURGERY

## 2017-12-15 PROCEDURE — 74011250636 HC RX REV CODE- 250/636: Performed by: SURGERY

## 2017-12-15 PROCEDURE — 77030008684 HC TU ET CUF COVD -B: Performed by: ANESTHESIOLOGY

## 2017-12-15 PROCEDURE — 77030035051: Performed by: SURGERY

## 2017-12-15 PROCEDURE — 77030035048 HC TRCR ENDOSC OPTCL COVD -B: Performed by: SURGERY

## 2017-12-15 PROCEDURE — 77030020782 HC GWN BAIR PAWS FLX 3M -B

## 2017-12-15 PROCEDURE — 77030002933 HC SUT MCRYL J&J -A: Performed by: SURGERY

## 2017-12-15 PROCEDURE — 74011250636 HC RX REV CODE- 250/636

## 2017-12-15 PROCEDURE — 76060000032 HC ANESTHESIA 0.5 TO 1 HR: Performed by: SURGERY

## 2017-12-15 PROCEDURE — 77030009852 HC PCH RTVR ENDOSC COVD -B: Performed by: SURGERY

## 2017-12-15 PROCEDURE — 88304 TISSUE EXAM BY PATHOLOGIST: CPT | Performed by: SURGERY

## 2017-12-15 PROCEDURE — 74011250636 HC RX REV CODE- 250/636: Performed by: ANESTHESIOLOGY

## 2017-12-15 PROCEDURE — 77030035029 HC NDL INSUF VERES DISP COVD -B: Performed by: SURGERY

## 2017-12-15 PROCEDURE — 76210000021 HC REC RM PH II 0.5 TO 1 HR: Performed by: SURGERY

## 2017-12-15 PROCEDURE — 77030011640 HC PAD GRND REM COVD -A: Performed by: SURGERY

## 2017-12-15 PROCEDURE — 77030008756 HC TU IRR SUC STRY -B: Performed by: SURGERY

## 2017-12-15 PROCEDURE — 77030010507 HC ADH SKN DERMBND J&J -B: Performed by: SURGERY

## 2017-12-15 PROCEDURE — 76210000016 HC OR PH I REC 1 TO 1.5 HR: Performed by: SURGERY

## 2017-12-15 PROCEDURE — 77030026438 HC STYL ET INTUB CARD -A: Performed by: ANESTHESIOLOGY

## 2017-12-15 PROCEDURE — 77030032490 HC SLV COMPR SCD KNE COVD -B: Performed by: SURGERY

## 2017-12-15 PROCEDURE — 77030031139 HC SUT VCRL2 J&J -A: Performed by: SURGERY

## 2017-12-15 PROCEDURE — 77030020747 HC TU INSUF ENDOSC TELE -A: Performed by: SURGERY

## 2017-12-15 PROCEDURE — 74011000250 HC RX REV CODE- 250: Performed by: SURGERY

## 2017-12-15 PROCEDURE — 74011000250 HC RX REV CODE- 250

## 2017-12-15 RX ORDER — ESTRADIOL AND NORETHINDRONE ACETATE 1; .5 MG/1; MG/1
TABLET ORAL
COMMUNITY
Start: 2017-11-19 | End: 2018-03-23 | Stop reason: ALTCHOICE

## 2017-12-15 RX ORDER — SODIUM CHLORIDE 0.9 % (FLUSH) 0.9 %
5-10 SYRINGE (ML) INJECTION AS NEEDED
Status: DISCONTINUED | OUTPATIENT
Start: 2017-12-15 | End: 2017-12-15 | Stop reason: HOSPADM

## 2017-12-15 RX ORDER — SODIUM CHLORIDE, SODIUM LACTATE, POTASSIUM CHLORIDE, CALCIUM CHLORIDE 600; 310; 30; 20 MG/100ML; MG/100ML; MG/100ML; MG/100ML
125 INJECTION, SOLUTION INTRAVENOUS CONTINUOUS
Status: DISCONTINUED | OUTPATIENT
Start: 2017-12-15 | End: 2017-12-15 | Stop reason: HOSPADM

## 2017-12-15 RX ORDER — OXYCODONE AND ACETAMINOPHEN 5; 325 MG/1; MG/1
1 TABLET ORAL AS NEEDED
Status: DISCONTINUED | OUTPATIENT
Start: 2017-12-15 | End: 2017-12-15 | Stop reason: HOSPADM

## 2017-12-15 RX ORDER — FENTANYL CITRATE 50 UG/ML
50 INJECTION, SOLUTION INTRAMUSCULAR; INTRAVENOUS AS NEEDED
Status: DISCONTINUED | OUTPATIENT
Start: 2017-12-15 | End: 2017-12-15 | Stop reason: HOSPADM

## 2017-12-15 RX ORDER — BUPIVACAINE HYDROCHLORIDE 5 MG/ML
INJECTION, SOLUTION EPIDURAL; INTRACAUDAL AS NEEDED
Status: DISCONTINUED | OUTPATIENT
Start: 2017-12-15 | End: 2017-12-15 | Stop reason: HOSPADM

## 2017-12-15 RX ORDER — ROCURONIUM BROMIDE 10 MG/ML
INJECTION, SOLUTION INTRAVENOUS AS NEEDED
Status: DISCONTINUED | OUTPATIENT
Start: 2017-12-15 | End: 2017-12-15 | Stop reason: HOSPADM

## 2017-12-15 RX ORDER — HYDROCODONE BITARTRATE AND ACETAMINOPHEN 5; 325 MG/1; MG/1
1 TABLET ORAL
Qty: 15 TAB | Refills: 0 | Status: SHIPPED | OUTPATIENT
Start: 2017-12-15 | End: 2018-01-08 | Stop reason: ALTCHOICE

## 2017-12-15 RX ORDER — CEFAZOLIN SODIUM/WATER 2 G/20 ML
2 SYRINGE (ML) INTRAVENOUS ONCE
Status: COMPLETED | OUTPATIENT
Start: 2017-12-15 | End: 2017-12-15

## 2017-12-15 RX ORDER — SUCCINYLCHOLINE CHLORIDE 20 MG/ML
INJECTION INTRAMUSCULAR; INTRAVENOUS AS NEEDED
Status: DISCONTINUED | OUTPATIENT
Start: 2017-12-15 | End: 2017-12-15 | Stop reason: HOSPADM

## 2017-12-15 RX ORDER — SODIUM CHLORIDE 0.9 % (FLUSH) 0.9 %
5-10 SYRINGE (ML) INJECTION EVERY 8 HOURS
Status: DISCONTINUED | OUTPATIENT
Start: 2017-12-15 | End: 2017-12-15 | Stop reason: HOSPADM

## 2017-12-15 RX ORDER — ONDANSETRON 2 MG/ML
INJECTION INTRAMUSCULAR; INTRAVENOUS AS NEEDED
Status: DISCONTINUED | OUTPATIENT
Start: 2017-12-15 | End: 2017-12-15 | Stop reason: HOSPADM

## 2017-12-15 RX ORDER — DIPHENHYDRAMINE HYDROCHLORIDE 50 MG/ML
12.5 INJECTION, SOLUTION INTRAMUSCULAR; INTRAVENOUS AS NEEDED
Status: DISCONTINUED | OUTPATIENT
Start: 2017-12-15 | End: 2017-12-15 | Stop reason: HOSPADM

## 2017-12-15 RX ORDER — MORPHINE SULFATE 10 MG/ML
2 INJECTION, SOLUTION INTRAMUSCULAR; INTRAVENOUS
Status: DISCONTINUED | OUTPATIENT
Start: 2017-12-15 | End: 2017-12-15 | Stop reason: HOSPADM

## 2017-12-15 RX ORDER — SODIUM CHLORIDE 9 MG/ML
25 INJECTION, SOLUTION INTRAVENOUS CONTINUOUS
Status: DISCONTINUED | OUTPATIENT
Start: 2017-12-15 | End: 2017-12-15 | Stop reason: HOSPADM

## 2017-12-15 RX ORDER — FENTANYL CITRATE 50 UG/ML
25 INJECTION, SOLUTION INTRAMUSCULAR; INTRAVENOUS
Status: DISCONTINUED | OUTPATIENT
Start: 2017-12-15 | End: 2017-12-15 | Stop reason: HOSPADM

## 2017-12-15 RX ORDER — PHENYLEPHRINE HCL IN 0.9% NACL 0.4MG/10ML
SYRINGE (ML) INTRAVENOUS AS NEEDED
Status: DISCONTINUED | OUTPATIENT
Start: 2017-12-15 | End: 2017-12-15 | Stop reason: HOSPADM

## 2017-12-15 RX ORDER — NEOSTIGMINE METHYLSULFATE 1 MG/ML
INJECTION INTRAVENOUS AS NEEDED
Status: DISCONTINUED | OUTPATIENT
Start: 2017-12-15 | End: 2017-12-15 | Stop reason: HOSPADM

## 2017-12-15 RX ORDER — MIDAZOLAM HYDROCHLORIDE 1 MG/ML
1 INJECTION, SOLUTION INTRAMUSCULAR; INTRAVENOUS AS NEEDED
Status: DISCONTINUED | OUTPATIENT
Start: 2017-12-15 | End: 2017-12-15 | Stop reason: HOSPADM

## 2017-12-15 RX ORDER — ONDANSETRON 2 MG/ML
4 INJECTION INTRAMUSCULAR; INTRAVENOUS AS NEEDED
Status: DISCONTINUED | OUTPATIENT
Start: 2017-12-15 | End: 2017-12-15 | Stop reason: HOSPADM

## 2017-12-15 RX ORDER — LIDOCAINE HYDROCHLORIDE 10 MG/ML
0.1 INJECTION, SOLUTION EPIDURAL; INFILTRATION; INTRACAUDAL; PERINEURAL AS NEEDED
Status: DISCONTINUED | OUTPATIENT
Start: 2017-12-15 | End: 2017-12-15 | Stop reason: HOSPADM

## 2017-12-15 RX ORDER — PROPOFOL 10 MG/ML
INJECTION, EMULSION INTRAVENOUS AS NEEDED
Status: DISCONTINUED | OUTPATIENT
Start: 2017-12-15 | End: 2017-12-15 | Stop reason: HOSPADM

## 2017-12-15 RX ORDER — DEXAMETHASONE SODIUM PHOSPHATE 4 MG/ML
INJECTION, SOLUTION INTRA-ARTICULAR; INTRALESIONAL; INTRAMUSCULAR; INTRAVENOUS; SOFT TISSUE AS NEEDED
Status: DISCONTINUED | OUTPATIENT
Start: 2017-12-15 | End: 2017-12-15 | Stop reason: HOSPADM

## 2017-12-15 RX ORDER — ACETAMINOPHEN 10 MG/ML
INJECTION, SOLUTION INTRAVENOUS AS NEEDED
Status: DISCONTINUED | OUTPATIENT
Start: 2017-12-15 | End: 2017-12-15 | Stop reason: HOSPADM

## 2017-12-15 RX ORDER — LIDOCAINE HYDROCHLORIDE 20 MG/ML
INJECTION, SOLUTION EPIDURAL; INFILTRATION; INTRACAUDAL; PERINEURAL AS NEEDED
Status: DISCONTINUED | OUTPATIENT
Start: 2017-12-15 | End: 2017-12-15 | Stop reason: HOSPADM

## 2017-12-15 RX ORDER — MIDAZOLAM HYDROCHLORIDE 1 MG/ML
0.5 INJECTION, SOLUTION INTRAMUSCULAR; INTRAVENOUS
Status: DISCONTINUED | OUTPATIENT
Start: 2017-12-15 | End: 2017-12-15 | Stop reason: HOSPADM

## 2017-12-15 RX ORDER — BUPIVACAINE HYDROCHLORIDE AND EPINEPHRINE 5; 5 MG/ML; UG/ML
30 INJECTION, SOLUTION EPIDURAL; INTRACAUDAL; PERINEURAL ONCE
Status: DISCONTINUED | OUTPATIENT
Start: 2017-12-15 | End: 2017-12-15 | Stop reason: HOSPADM

## 2017-12-15 RX ORDER — GLYCOPYRROLATE 0.2 MG/ML
INJECTION INTRAMUSCULAR; INTRAVENOUS AS NEEDED
Status: DISCONTINUED | OUTPATIENT
Start: 2017-12-15 | End: 2017-12-15 | Stop reason: HOSPADM

## 2017-12-15 RX ORDER — FENTANYL CITRATE 50 UG/ML
INJECTION, SOLUTION INTRAMUSCULAR; INTRAVENOUS AS NEEDED
Status: DISCONTINUED | OUTPATIENT
Start: 2017-12-15 | End: 2017-12-15 | Stop reason: HOSPADM

## 2017-12-15 RX ORDER — MORPHINE SULFATE 4 MG/ML
INJECTION, SOLUTION INTRAMUSCULAR; INTRAVENOUS AS NEEDED
Status: DISCONTINUED | OUTPATIENT
Start: 2017-12-15 | End: 2017-12-15 | Stop reason: HOSPADM

## 2017-12-15 RX ADMIN — DEXAMETHASONE SODIUM PHOSPHATE 4 MG: 4 INJECTION, SOLUTION INTRA-ARTICULAR; INTRALESIONAL; INTRAMUSCULAR; INTRAVENOUS; SOFT TISSUE at 07:29

## 2017-12-15 RX ADMIN — SUCCINYLCHOLINE CHLORIDE 120 MG: 20 INJECTION INTRAMUSCULAR; INTRAVENOUS at 07:18

## 2017-12-15 RX ADMIN — ROCURONIUM BROMIDE 5 MG: 10 INJECTION, SOLUTION INTRAVENOUS at 07:18

## 2017-12-15 RX ADMIN — FENTANYL CITRATE 50 MCG: 50 INJECTION, SOLUTION INTRAMUSCULAR; INTRAVENOUS at 07:18

## 2017-12-15 RX ADMIN — ACETAMINOPHEN 1000 MG: 10 INJECTION, SOLUTION INTRAVENOUS at 07:28

## 2017-12-15 RX ADMIN — Medication 2 G: at 07:32

## 2017-12-15 RX ADMIN — FENTANYL CITRATE 25 MCG: 50 INJECTION, SOLUTION INTRAMUSCULAR; INTRAVENOUS at 07:43

## 2017-12-15 RX ADMIN — NEOSTIGMINE METHYLSULFATE 2.5 MG: 1 INJECTION INTRAVENOUS at 07:55

## 2017-12-15 RX ADMIN — LIDOCAINE HYDROCHLORIDE 40 MG: 20 INJECTION, SOLUTION EPIDURAL; INFILTRATION; INTRACAUDAL; PERINEURAL at 07:16

## 2017-12-15 RX ADMIN — FENTANYL CITRATE 25 MCG: 50 INJECTION, SOLUTION INTRAMUSCULAR; INTRAVENOUS at 09:15

## 2017-12-15 RX ADMIN — GLYCOPYRROLATE 0.2 MG: 0.2 INJECTION INTRAMUSCULAR; INTRAVENOUS at 07:38

## 2017-12-15 RX ADMIN — PROPOFOL 100 MG: 10 INJECTION, EMULSION INTRAVENOUS at 07:18

## 2017-12-15 RX ADMIN — Medication 80 MCG: at 07:18

## 2017-12-15 RX ADMIN — Medication 80 MCG: at 07:38

## 2017-12-15 RX ADMIN — PROPOFOL 30 MG: 10 INJECTION, EMULSION INTRAVENOUS at 07:16

## 2017-12-15 RX ADMIN — ROCURONIUM BROMIDE 20 MG: 10 INJECTION, SOLUTION INTRAVENOUS at 07:30

## 2017-12-15 RX ADMIN — SODIUM CHLORIDE, POTASSIUM CHLORIDE, SODIUM LACTATE AND CALCIUM CHLORIDE: 600; 310; 30; 20 INJECTION, SOLUTION INTRAVENOUS at 07:10

## 2017-12-15 RX ADMIN — MORPHINE SULFATE 2 MG: 4 INJECTION, SOLUTION INTRAMUSCULAR; INTRAVENOUS at 07:46

## 2017-12-15 RX ADMIN — PROPOFOL 40 MG: 10 INJECTION, EMULSION INTRAVENOUS at 07:43

## 2017-12-15 RX ADMIN — ONDANSETRON 4 MG: 2 INJECTION INTRAMUSCULAR; INTRAVENOUS at 07:29

## 2017-12-15 RX ADMIN — PROPOFOL 40 MG: 10 INJECTION, EMULSION INTRAVENOUS at 07:46

## 2017-12-15 RX ADMIN — MEPERIDINE HYDROCHLORIDE 12.5 MG: 25 INJECTION INTRAMUSCULAR; INTRAVENOUS; SUBCUTANEOUS at 09:02

## 2017-12-15 RX ADMIN — FENTANYL CITRATE 25 MCG: 50 INJECTION, SOLUTION INTRAMUSCULAR; INTRAVENOUS at 07:38

## 2017-12-15 RX ADMIN — GLYCOPYRROLATE 0.2 MG: 0.2 INJECTION INTRAMUSCULAR; INTRAVENOUS at 07:55

## 2017-12-15 NOTE — H&P (VIEW-ONLY)
Surgery Consult    Subjective:      Libertad Appiah is a 80 y.o.  female who was referred by Dr Franklin Jose for evaluation of gallbladder surgery. The pt states that she is doing fine rihgt now. Recently, she has been having horrible pain all along her chest and abdomen to the point where she couldn't distinguish it from a heart attack or reflux. The intense pain first started in 1986-87 for which she may have gone to the ER and learned about her gallbladder problems. Thereafter, she suffered from esophagitis; muscle spasms in her esophagus that could be very painful at times. She had been seeing Dr Geovanni Eid starting around that times and her symptoms improved. From that point on, she hadn't been experiencing much pain until this past summer. She started having episodes of waking up in the middle of the night and not feeling well all along her chest.  It would last about 45 minutes and it was one of the worst feelings of pain she has ever felt. She projectile vomited twice as a result of this pain. She can recall having 4-5 episodes like this, one in 06/2017, one in 07/2017, and 09/2017--from what she can recall. Dr Guanaco Navas thought it was her gallbladder and ordered a CT scan as a result of that. She then received an Rx hat she was supposed to take 4x/day so as to prevent esophageal spasms. However, she started reading up on the side effects of the Rx and discontinued it as a result. She took it three times and stopped. The pain she has been experiencing is unlike any of the GERD pain she has experienced. She has been checked by cardiologists and she seems to be fine. PMHx:  She has been dealing with GERD since 1986-87. About 50 years ago, she was told that she has two cysts on her spleen. SHx:  She states that she had major back surgery (Dr Aristides Tamayo) last year. Since then, while the back surgery worked beautifully, she has not been feeling like her normal self.     NM HEPATOBILIARY W INTERVENTION from 11/15/2017:  FINDINGS:  The initial images demonstrate prompt hepatic tracer uptake. The common bile  duct is visualized at 12 minutes. The gallbladder is visualized at 40 minutes. It demonstrates progressive filling. The duodenum is visualized at 20 minutes. Post CCK images demonstrate decreased gallbladder contraction. Calculated  ejection fraction between 0 and 30 minutes is 10 %. IMPRESSION:  Biliary dyskinesia. US ABD COMP from 11/15/2017:  FINDINGS:   Liver: Echogenicity is within normal limits. No focal liver lesion. The  intrahepatic portal veins and hepatic veins are patent. Main portal vein flow: Toward the liver with a velocity of 34 cm/s. Diameter of  0.9 cm. Fluid: No ascites. Aorta and IVC: No abdominal aortic aneurysm. IVC is patent. Gallbladder: No mobile gallstones are demonstrated. There is a nonmobile  heterogeneous echogenic structure adjacent to the gallbladder wall measuring 1.6  x 0.9 x 1.1 cm without internal vascularity. No gallbladder wall thickening or  pericholecystic fluid. Negative sonographic Vanegas sign. Bile ducts: There is no intra or extrahepatic biliary ductal dilatation. The  common bile duct measures 6 mm. Pancreas: There is a cyst or cystic lesion in the distal pancreatic body  measuring 1.3 x 0.9 x 1.2 cm. Kidneys: Right length: 9.1 cm. Left length: 11.1 cm. No hydronephrosis. A left  kidney cyst measures 1.1 x 0.9 x 1.3 cm. Spleen: 10.5 cm in length, which is within normal limits. There is a focal  hypoechoic area within the spleen measuring 1.1 x 1.0 x 0.6 cm. IMPRESSION:   1. A nonmobile heterogeneous echogenic structure adjacent to the gallbladder  wall measuring 1.6 cm may represent tumefactive sludge or a collection of  cholesterol crystals. A polyp or mass is unlikely given the appearance and lack  of internal vascularity.   2. A cyst or cystic lesion in the distal pancreatic body measuring 1.3 cm is  incompletely characterized. This could be further evaluated with CT or MRI with  and without contrast as clinically warranted. 3. A focal hypoechoic area within the spleen measuring 1.1 cm is incompletely  characterized. CT ABD PELV WO CONT from 09/30/2017:  FINDINGS:  INCIDENTALLY IMAGED CHEST:  Heart/vessels: Within normal limits. Lungs/Pleura: Within normal limits. ABDOMEN:  Liver: Within normal limits. Gallbladder/Biliary: Within normal limits. Spleen: Within normal limits. Pancreas: Within normal limits. Adrenals: Within normal limits. Kidneys: Punctate calculus in the lower pole of the left kidney. Low-attenuation  areas in the central sinus of the left kidney which may represent central sinus  cysts. Peritoneum/Mesenteries: Within normal limits. Extraperitoneum: Within normal limits. Gastrointestinal tract: Within normal limits. The appendix is not confidently  identified  Vascular: Calcifications in the aorta. PELVIS:  Extraperitoneum: Within normal limits. Ureters: Within normal limits. Bladder: Within normal limits. Reproductive System: Exophytic, rounded soft tissue attenuation projecting off  the lower uterine segment suggesting a fibroid. MSK:   Bone cement within L1 which appears compressed. There is posterior pedicle screw  and shiva fixation of L1 and L2. Left inferior facet fractures at L2 and L3. Slight dextroscoliosis of the lumbar spine. Laminectomies from L1-L4. IMPRESSION:   1. Punctate calculus in the lower pole the left kidney. 2.  Posterior pedicle screw and shiva fixation of L1 and L2. There is bone cement  within L1 which is slightly compressed. There are postlaminectomy changes from  L1-L4. Fractures of the left inferior facets of both L2 and L3.   3. Incidental findings as above    I independently reviewed these images.          Chief Complaint   Patient presents with    Epigastric Pain     see U/S and Hida Scan done on 11/15/17-here for evaluation for gallbladder surgery Patient Active Problem List    Diagnosis Date Noted    Biliary colic 89/53/0571    Spinal stenosis 09/23/2016    Lumbar disc disease with radiculopathy 07/22/2016    Osteoporosis 07/22/2016    Pancreatic cyst 07/14/2015    Visual loss 01/21/2015    PAC (premature atrial contraction) 07/09/2013    Rosacea 06/27/2012    Anxiety 08/16/2011    Hypercholesterolemia     GERD (gastroesophageal reflux disease)     [facility] ([facility]     Hyperlipidemia LDL goal <130 07/20/2010     Past Medical History:   Diagnosis Date    Arthritis     Biliary colic 12/94/4411    Cancer ([facility]     skin cancer - basal and squamous cell    GERD (gastroesophageal reflux disease)     Hypercholesterolemia     PATIENT DENIES    Osteopenia     PAC (premature atrial contraction) 7/9/2013    Pancreatic cyst 7/14/2015    Psychiatric disorder     anxiety    Urinary frequency     Visual loss 1/21/2015      Past Surgical History:   Procedure Laterality Date    CARDIAC SURG PROCEDURE UNLIST  2/11    normal stress test    ENDOSCOPY, COLON, DIAGNOSTIC  2008    HX APPENDECTOMY  2000    HX CATARACT REMOVAL  2012    HX GI  2008    EGD    HX ORTHOPAEDIC Right 2010    knee - arthroscopy    HX ORTHOPAEDIC Right march 2011    TKR  right    HX ORTHOPAEDIC  2016    back surgery    HX OTHER SURGICAL  11/2017    karina. leg skin cancer surgery    HX TONSILLECTOMY      HX TUBAL LIGATION  1976      Social History   Substance Use Topics    Smoking status: Former Smoker     Quit date: 8/5/1956    Smokeless tobacco: Never Used      Comment: former cigarette smoker    Alcohol use 7.0 oz/week     7 Glasses of wine, 7 Shots of liquor per week      Family History   Problem Relation Age of Onset    Stroke Mother     Hypertension Mother     Other Mother      aneurysm - aorta    Cancer Father      stomach AND ESOPHAGEAL cancer    Stroke Other     Stroke Maternal Grandmother     Other Son      BLOOD CLOT IN AORTA AND POST OP DVT    Anesth Problems Neg Hx       Current Outpatient Prescriptions   Medication Sig    omeprazole (PRILOSEC) 40 mg capsule TAKE 1 CAPSULE BY MOUTH  DAILY    DULoxetine (CYMBALTA) 60 mg capsule TAKE 1 CAPSULE BY MOUTH  EVERY DAY    doxycycline (VIBRAMYCIN) 100 mg capsule Take 1 capsule by mouth two times daily    DULoxetine (CYMBALTA) 30 mg capsule Take 1 capsule by mouth  daily for 2 weeks then 2  capsules daily    DOCOSAHEXANOIC ACID/EPA (FISH OIL PO) Take  by mouth.  carboxymethylcellulose sodium (REFRESH LIQUIGEL) 1 % dlgl Apply  to eye.  cholecalciferol, vitamin D3, (VITAMIN D3) 2,000 unit Tab Take 2,000 Units by mouth daily.  MULTIVITAMIN PO Takes one po daily.  NORETHIND AC/ETHINYL ESTRADIOL (FEMHRT LOW DOSE PO) Take 0.5 mg by mouth daily.  CALCIUM CARB/VIT D3/MINERALS (CALTRATE PLUS PO) Take 1 Tab by mouth. Takes one po daily.  dicyclomine (BENTYL) 10 mg capsule     fluocinoNIDE (LIDEX) 0.05 % ointment Apply  to affected area two (2) times a day.  cycloSPORINE (RESTASIS) 0.05 % ophthalmic emulsion Administer 1 drop to both eyes two (2) times a day.  artificial tears,hypromellose, (GENTEAL MODERATE) 0.3 % drop Administer  to both eyes as needed. No current facility-administered medications for this visit. No Known Allergies     Review of Systems:    A comprehensive review of systems was negative except for that written in the History of Present Illness. Objective:     Visit Vitals    /80 (BP 1 Location: Left arm, BP Patient Position: Sitting)    Pulse 84    Temp 98.1 °F (36.7 °C) (Oral)    Resp 18    Ht 5' 5\" (1.651 m)    Wt 134 lb 8 oz (61 kg)    SpO2 98%    BMI 22.38 kg/m2         Physical Exam:  General appearance: alert, cooperative, no distress, appears stated age  Head: Normocephalic, without obvious abnormality, atraumatic  Neurologic: Grossly normal      Assessment:       ICD-10-CM ICD-9-CM    1.  Biliary colic C48.40 175.43          Plan:     Patient has elected for laparoscopic cholecystectomy. Risks and benefits explained and the patient will schedule an appointment at their earliest convenience. Written by shady Kirkpatrick, as dictated by Tiesha Almendarez MD.    Total face to face time with patient: 27 minutes. Greater than 50% of the time was spent in counseling. Signed By: Tiesha Almendarez MD     November 22, 2017

## 2017-12-15 NOTE — INTERVAL H&P NOTE
H&P Update:  Roberto Gibson was seen and examined. History and physical has been reviewed. The patient has been examined.  There have been no significant clinical changes since the completion of the originally dated History and Physical.  Heart and lung exams are normal    Signed By: Kwadwo Wagner MD     December 15, 2017 6:29 AM

## 2017-12-15 NOTE — BRIEF OP NOTE
BRIEF OPERATIVE NOTE    Date of Procedure: 12/15/2017   Preoperative Diagnosis: BILIARY COLIC   Postoperative Diagnosis: biliary colic    Procedure(s):  LAPAROSCOPIC CHOLECYSTECTOMY  Surgeon(s) and Role:     * Sean Mas MD - Primary         Assistant Staff:       Surgical Staff:  Circ-1: Rhea Wren RN  Circ-Intern: Marla Lind  Scrub Tech-1: Merlin Dowse  Surg Asst-1: Stephany Quintero  Event Time In   Incision Start 7853   Incision Close 0801     Anesthesia: General   Estimated Blood Loss: minimal  Specimens:   ID Type Source Tests Collected by Time Destination   1 : gallbladder Fresh Gallbladder  Sean Mas MD 12/15/2017 6803 Pathology      Findings: thick gallbladder wall   Complications: none  Implants: * No implants in log *  [id number]

## 2017-12-15 NOTE — DISCHARGE INSTRUCTIONS
Cholecystectomy: What to Expect at [address] [place] [address]  After your surgery, it is normal to feel weak and tired for several days after you return home. Your belly may be swollen. If you had laparoscopic surgery, you may also have pain in your shoulder for about 24 hours. You may have gas or need to burp a lot at first, and a few people get diarrhea. The diarrhea usually goes away in 2 to 4 weeks, but it may last longer. How quickly you recover depends on whether you had a laparoscopic or open surgery. · For a laparoscopic surgery, most people can go back to work or their normal routine in 1 to 2 weeks, but it may take longer, depending on the type of work you do. · For an open surgery, it will probably take 4 to 6 weeks before you get back to your normal routine. This care sheet gives you a general idea about how long it will take for you to recover. However, each person recovers at a different pace. Follow the steps below to get better as quickly as possible. How can you care for yourself at home? Activity  ? · Rest when you feel tired. Getting enough sleep will help you recover. ? · Try to walk each day. Start out by walking a little more than you did the day before. Gradually increase the amount you walk. Walking boosts blood flow and helps prevent pneumonia and constipation. ? · For about 2 to 4 weeks, avoid lifting anything that would make you strain. This may include a child, heavy grocery bags and milk containers, a heavy briefcase or backpack, cat litter or dog food bags, or a vacuum . ? · Avoid strenuous activities, such as biking, jogging, weightlifting, and aerobic exercise, until your doctor says it is okay. ? · You may shower 24 to 48 hours after surgery, if your doctor okays it. Pat the cut (incision) dry. Do not take a bath for the first 2 weeks, or until your doctor tells you it is okay.    ? · You may drive when you are no longer taking pain medicine and can quickly move your foot from the gas pedal to the brake. You must also be able to sit comfortably for a long period of time, even if you do not plan to go far. You might get caught in traffic. ? · For a laparoscopic surgery, most people can go back to work or their normal routine in 1 to 2 weeks, but it may take longer. ? · Your doctor will tell you when you can have sex again. ? Diet  ? · Eat smaller meals more often instead of fewer larger meals. You can eat a normal diet, but avoid eating fatty foods for about 1 month. Fatty foods include hamburger, whole milk, cheese, and many snack foods. If your stomach is upset, try bland, low-fat foods like plain rice, broiled chicken, toast, and yogurt. ? · Drink plenty of fluids (unless your doctor tells you not to). ? · If you have diarrhea, try avoiding spicy foods, dairy products, fatty foods, and alcohol. You can also watch to see if specific foods cause it, and stop eating them. If the diarrhea continues for more than 2 weeks, talk to your doctor. ? · You may notice that your bowel movements are not regular right after your surgery. This is common. Try to avoid constipation and straining with bowel movements. You may want to take a fiber supplement every day. If you have not had a bowel movement after a couple of days, ask your doctor about taking a mild laxative. Medicines  ? · Your doctor will tell you if and when you can restart your medicines. He or she will also give you instructions about taking any new medicines. ? · If you take blood thinners, such as warfarin (Coumadin), clopidogrel (Plavix), or aspirin, be sure to talk to your doctor. He or she will tell you if and when to start taking those medicines again. Make sure that you understand exactly what your doctor wants you to do. ? · Take pain medicines exactly as directed. ¨ If the doctor gave you a prescription medicine for pain, take it as prescribed.   ¨ If you are not taking a prescription pain medicine, take an over-the-counter medicine such as acetaminophen (Tylenol), ibuprofen (Advil, Motrin), or naproxen (Aleve). Read and follow all instructions on the label. ¨ Do not take two or more pain medicines at the same time unless the doctor told you to. Many pain medicines contain acetaminophen, which is Tylenol. Too much Tylenol can be harmful. ? · If you think your pain medicine is making you sick to your stomach:  ¨ Take your medicine after meals (unless your doctor tells you not to). ¨ Ask your doctor for a different pain medicine. ? · If your doctor prescribed antibiotics, take them as directed. Do not stop taking them just because you feel better. You need to take the full course of antibiotics. Incision care  ? · You have surgical glue on the incision, or cut, leave the this until it falls off.   ? · After 24 to 48 hours, wash the area daily with warm, soapy water, and pat it dry. ? ·    ? · Keep the area clean and dry. You may cover it with a gauze bandage if it weeps or rubs against clothing. Change the bandage every day. ?Ice  ? · To reduce swelling and pain, put ice or a cold pack on your belly for 10 to 20 minutes at a time. Do this every 1 to 2 hours. Put a thin cloth between the ice and your skin. Follow-up care is a key part of your treatment and safety. Be sure to make and go to all appointments, and call your doctor if you are having problems. It's also a good idea to know your test results and keep a list of the medicines you take. When should you call for help? Call 911 anytime you think you may need emergency care. For example, call if:  ? · You passed out (lost consciousness). ? · You are short of breath. Faizan Patel ? Call your doctor now or seek immediate medical care if:  ? · You are sick to your stomach and cannot drink fluids. ? · You have pain that does not get better when you take your pain medicine. ? · You cannot pass stools or gas.    ? · You have signs of infection, such as:  ¨ Increased pain, swelling, warmth, or redness. ¨ Red streaks leading from the incision. ¨ Pus draining from the incision. ¨ A fever. ? · Bright red blood has soaked through the bandage over your incision. ? · You have loose stitches, or your incision comes open. ? · You have signs of a blood clot in your leg (called a deep vein thrombosis), such as:  ¨ Pain in your calf, back of knee, thigh, or groin. ¨ Redness and swelling in your leg or groin. ? Watch closely for any changes in your health, and be sure to contact your doctor if you have any problems. Where can you learn more? Go to http://lyndsey-miriam.info/. Enter [id number] [id number] in the search box to learn more about \"Cholecystectomy: What to Expect at Home. \"  Current as of: May 12, 2017  Content Version: 11.4  © 9472-7378 [employer]. Care instructions adapted under license by [employer] (which disclaims liability or warranty for this information). If you have questions about a medical condition or this instruction, always ask your healthcare professional. [place] [address] any warranty or liability for your use of this information. Patient Discharge Instructions    Laura Coleman / [id number] : 1934    Admitted 12/15/2017 Discharged: 12/15/2017     Take Home Medications            · It is important that you take the medication exactly as they are prescribed. · Keep your medication in the bottles provided by the pharmacist and keep a list of the medication names, dosages, and times to be taken in your wallet. · Do not take other medications without consulting your doctor.        What to do at Home    Recommended diet: Regular Diet,     Recommended activity: Activity as tolerated, may shower any time          Follow-up Appointments   Procedures    FOLLOW UP VISIT Appointment in: Two Weeks     Standing Status:   Standing     Number of Occurrences:   1     Order Specific Question:   Appointment in     Answer: Two Weeks           Information obtained by :  I understand that if any problems occur once I am at home I am to contact my physician. I understand and acknowledge receipt of the instructions indicated above. Physician's or R.N.'s Signature                                                                  Date/Time                                                                                                                                              Patient or Representative Signature                                                          Date/Time    ______________________________________________________________________    Anesthesia Discharge Instructions    After general anesthesia or intervenous sedation, for 24 hours or while taking prescription Narcotics:  · Limit your activities  · Do not drive or operate hazardous machinery  · If you have not urinated within 8 hours after discharge, please contact your surgeon on call. · Do not make important personal or business decisions  · Do not drink alcoholic beverages    Report the following to your surgeon:  · Excessive pain, swelling, redness or odor of or around the surgical area  · Temperature over 100.5 degrees  · Nausea and vomiting lasting longer than 4 hours or if unable to take medication  · Any signs of decreased circulation or nerve impairment to extremity:  Change in color, persistent numbness, tingling, coldness or increased pain.   · Any questions

## 2017-12-15 NOTE — IP AVS SNAPSHOT
[address] [facility] [address] [place] [address] 
[phone] Patient: Laura Coleman MRN: [id number] :1934 About your hospitalization You were admitted on:  December 15, 2017 You last received care in the:  [facility] [place] PACU You were discharged on:  December 15, 2017 Why you were hospitalized Your primary diagnosis was:  Biliary Colic Things You Need To Do (next 8 weeks) Follow up with Lucia Rivas MD  
  
Phone:  [phone] Where:  [address] [facility], [address] [facility], [employer] [phone] Schedule an appointment with Holger Ortega MD as soon as possible for a visit in 2 week(s) Phone:  [phone] Where:  [address] [facility], [address], [employer] [address] [phone]  POST OP 10 MIN with Bk Nash NP at  9:00 AM  
Where:  [facility] [phone] ([facility]) Discharge Orders None A check aster indicates which time of day the medication should be taken. My Medications TAKE these medications as instructed Instructions Each Dose to Equal  
 Morning Noon Evening Bedtime AMABELZ 1-0.5 mg per tablet Generic drug:  estradiol-norethindrone Your last dose was: Your next dose is:    
   
   
      
   
   
   
  
 aspirin delayed-release 81 mg tablet Your last dose was: Your next dose is: Take 81 mg by mouth daily. 81 mg CALTRATE PLUS PO Your last dose was: Your next dose is: Take 1 Tab by mouth daily. Takes one po daily. 1 Tab  
    
   
   
   
  
 doxycycline 100 mg capsule Commonly known as:  VIBRAMYCIN Your last dose was: Your next dose is: Take 1 capsule by mouth two times daily DULoxetine 60 mg capsule Commonly known as:  CYMBALTA Your last dose was: Your next dose is: TAKE 1 CAPSULE BY MOUTH  EVERY DAY  
     
   
   
   
  
 FISH OIL PO Your last dose was: Your next dose is: Take 1 Cap by mouth daily. 1 Cap GenTeal Moderate 0.3 % Drop Generic drug:  artificial tears(hypromellose) Your last dose was: Your next dose is:    
   
   
 Administer 1 Drop to both eyes nightly. 1 Drop HYDROcodone-acetaminophen 5-325 mg per tablet Commonly known as:  Crow Rabago Your last dose was: Your next dose is: Take 1 Tab by mouth every four (4) hours as needed for Pain. Max Daily Amount: 6 Tabs. 1 Tab MULTIVITAMIN PO Your last dose was: Your next dose is: Take 1 Tab by mouth daily. Takes one po daily. 1 Tab  
    
   
   
   
  
 omeprazole 40 mg capsule Commonly known as:  PRILOSEC Your last dose was: Your next dose is: TAKE 1 CAPSULE BY MOUTH  DAILY REFRESH LIQUIGEL 1 % Dlgl Generic drug:  carboxymethylcellulose sodium Your last dose was: Your next dose is:    
   
   
 Apply  to eye daily. 1 DROP BOTH EYES  
     
   
   
   
  
 VITAMIN D3 2,000 unit Tab Generic drug:  cholecalciferol (vitamin D3) Your last dose was: Your next dose is: Take 2,000 Units by mouth daily. 2000 Units Where to Get Your Medications Information on where to get these meds will be given to you by the nurse or doctor. ! Ask your nurse or doctor about these medications HYDROcodone-acetaminophen 5-325 mg per tablet Discharge Instructions Cholecystectomy: What to Expect at [facility] Your Recovery After your surgery, it is normal to feel weak and tired for several days after you return home. Your belly may be swollen.  If you had laparoscopic surgery, you may also have pain in your shoulder for about 24 hours. You may have gas or need to burp a lot at first, and a few people get diarrhea. The diarrhea usually goes away in 2 to 4 weeks, but it may last longer. How quickly you recover depends on whether you had a laparoscopic or open surgery. · For a laparoscopic surgery, most people can go back to work or their normal routine in 1 to 2 weeks, but it may take longer, depending on the type of work you do. · For an open surgery, it will probably take 4 to 6 weeks before you get back to your normal routine. This care sheet gives you a general idea about how long it will take for you to recover. However, each person recovers at a different pace. Follow the steps below to get better as quickly as possible. How can you care for yourself at home? Activity ? · Rest when you feel tired. Getting enough sleep will help you recover. ? · Try to walk each day. Start out by walking a little more than you did the day before. Gradually increase the amount you walk. Walking boosts blood flow and helps prevent pneumonia and constipation. ? · For about 2 to 4 weeks, avoid lifting anything that would make you strain. This may include a child, heavy grocery bags and milk containers, a heavy briefcase or backpack, cat litter or dog food bags, or a vacuum . ? · Avoid strenuous activities, such as biking, jogging, weightlifting, and aerobic exercise, until your doctor says it is okay. ? · You may shower 24 to 48 hours after surgery, if your doctor okays it. Pat the cut (incision) dry. Do not take a bath for the first 2 weeks, or until your doctor tells you it is okay. ? · You may drive when you are no longer taking pain medicine and can quickly move your foot from the gas pedal to the brake. You must also be able to sit comfortably for a long period of time, even if you do not plan to go far. You might get caught in traffic. ? · For a laparoscopic surgery, most people can go back to work or their normal routine in 1 to 2 weeks, but it may take longer. ? · Your doctor will tell you when you can have sex again. ? Diet ? · Eat smaller meals more often instead of fewer larger meals. You can eat a normal diet, but avoid eating fatty foods for about 1 month. Fatty foods include hamburger, whole milk, cheese, and many snack foods. If your stomach is upset, try bland, low-fat foods like plain rice, broiled chicken, toast, and yogurt. ? · Drink plenty of fluids (unless your doctor tells you not to). ? · If you have diarrhea, try avoiding spicy foods, dairy products, fatty foods, and alcohol. You can also watch to see if specific foods cause it, and stop eating them. If the diarrhea continues for more than 2 weeks, talk to your doctor. ? · You may notice that your bowel movements are not regular right after your surgery. This is common. Try to avoid constipation and straining with bowel movements. You may want to take a fiber supplement every day. If you have not had a bowel movement after a couple of days, ask your doctor about taking a mild laxative. Medicines ? · Your doctor will tell you if and when you can restart your medicines. He or she will also give you instructions about taking any new medicines. ? · If you take blood thinners, such as warfarin (Coumadin), clopidogrel (Plavix), or aspirin, be sure to talk to your doctor. He or she will tell you if and when to start taking those medicines again. Make sure that you understand exactly what your doctor wants you to do. ? · Take pain medicines exactly as directed. ¨ If the doctor gave you a prescription medicine for pain, take it as prescribed. ¨ If you are not taking a prescription pain medicine, take an over-the-counter medicine such as acetaminophen (Tylenol), ibuprofen (Advil, Motrin), or naproxen (Aleve). Read and follow all instructions on the label. ¨ Do not take two or more pain medicines at the same time unless the doctor told you to. Many pain medicines contain acetaminophen, which is Tylenol. Too much Tylenol can be harmful. ? · If you think your pain medicine is making you sick to your stomach: 
¨ Take your medicine after meals (unless your doctor tells you not to). ¨ Ask your doctor for a different pain medicine. ? · If your doctor prescribed antibiotics, take them as directed. Do not stop taking them just because you feel better. You need to take the full course of antibiotics. Incision care ? · You have surgical glue on the incision, or cut, leave the this until it falls off.  
? · After 24 to 48 hours, wash the area daily with warm, soapy water, and pat it dry. ? ·   
? · Keep the area clean and dry. You may cover it with a gauze bandage if it weeps or rubs against clothing. Change the bandage every day. ?Ice ? · To reduce swelling and pain, put ice or a cold pack on your belly for 10 to 20 minutes at a time. Do this every 1 to 2 hours. Put a thin cloth between the ice and your skin. Follow-up care is a key part of your treatment and safety. Be sure to make and go to all appointments, and call your doctor if you are having problems. It's also a good idea to know your test results and keep a list of the medicines you take. When should you call for help? Call 911 anytime you think you may need emergency care. For example, call if: 
? · You passed out (lost consciousness). ? · You are short of breath. Faizan Patel ? Call your doctor now or seek immediate medical care if: 
? · You are sick to your stomach and cannot drink fluids. ? · You have pain that does not get better when you take your pain medicine. ? · You cannot pass stools or gas. ? · You have signs of infection, such as: 
¨ Increased pain, swelling, warmth, or redness. ¨ Red streaks leading from the incision. ¨ Pus draining from the incision. ¨ A fever. ? · Bright red blood has soaked through the bandage over your incision. ? · You have loose stitches, or your incision comes open. ? · You have signs of a blood clot in your leg (called a deep vein thrombosis), such as: 
¨ Pain in your calf, back of knee, thigh, or groin. ¨ Redness and swelling in your leg or groin. ? Watch closely for any changes in your health, and be sure to contact your doctor if you have any problems. Where can you learn more? Go to http://lyndsey-miriam.info/. Enter [phone] in the search box to learn more about \"Cholecystectomy: What to Expect at Home. \" Current as of: May 12, 2017 Content Version: 11.4 © 4696-0335 [employer]. Care instructions adapted under license by [employer] (which disclaims liability or warranty for this information). If you have questions about a medical condition or this instruction, always ask your healthcare professional. [place] [address] any warranty or liability for your use of this information. Patient Discharge Instructions Constance Hahn / [id number] : 1934 Admitted 12/15/2017 Discharged: 12/15/2017 Take Home Medications · It is important that you take the medication exactly as they are prescribed. · Keep your medication in the bottles provided by the pharmacist and keep a list of the medication names, dosages, and times to be taken in your wallet. · Do not take other medications without consulting your doctor. What to do at [facility] Recommended diet: Regular Diet, Recommended activity: Activity as tolerated, may shower any time Follow-up Appointments Procedures  FOLLOW UP VISIT Appointment in: Two Weeks Standing Status:   Standing Number of Occurrences:   1 Order Specific Question:   Appointment in Answer: Two Weeks Information obtained by : 
 I understand that if any problems occur once I am at home I am to contact my physician. I understand and acknowledge receipt of the instructions indicated above. Physician's or R.N.'s Signature                                                                  Date/Time Patient or Representative Signature                                                          Date/Time 
 
______________________________________________________________________ Anesthesia Discharge Instructions After general anesthesia or intervenous sedation, for 24 hours or while taking prescription Narcotics: · Limit your activities · Do not drive or operate hazardous machinery · If you have not urinated within 8 hours after discharge, please contact your surgeon on call. · Do not make important personal or business decisions · Do not drink alcoholic beverages Report the following to your surgeon: 
· Excessive pain, swelling, redness or odor of or around the surgical area · Temperature over 100.5 degrees · Nausea and vomiting lasting longer than 4 hours or if unable to take medication · Any signs of decreased circulation or nerve impairment to extremity:  Change in color, persistent numbness, tingling, coldness or increased pain. · Any questions ACO Transitions of Care Introducing [employer] [address] Berta Lorenz offers a voluntary care coordination program to provide high quality service and care to [facility] fee-for-service beneficiaries. hCica Moore was designed to help you enhance your health and well-being through the following services: ? Transitions of Care  support for individuals who are transitioning from one care setting to another (example: Hospital to home). ? Chronic and Complex Care Coordination  support for individuals and caregivers of those with serious or chronic illnesses or with more than one chronic (ongoing) condition and those who take a number of different medications. If you meet specific medical criteria, a [facility] [address] may call you directly to coordinate your care with your primary care physician and your other care providers. For questions about the [facility] programs, please, contact your physicians office. For general questions or additional information about Accountable Care Organizations: 
Please visit www.medicare.gov/acos. html or call 1-800-MEDICARE (9-191.565.4730) TTY users should call 2-731.954.4804. Introducing [facility]! [facility] introduces Inforama patient portal. Now you can access parts of your medical record, email your doctor's office, and request medication refills online. 1. In your internet browser, go to [email or web address] 2. Click on the First Time User? Click Here link in the Sign In box. You will see the New Member Sign Up page. 3. Enter your Inforama Access Code exactly as it appears below. You will not need to use this code after youve completed the sign-up process. If you do not sign up before the expiration date, you must request a new code. · In[employer] Access Code: [email or web address] Expires: 1/29/2018  3:47 PM 
 
4. Enter the last four digits of your Social Security Number (xxxx) and Date of Birth (mm/dd/yyyy) as indicated and click Submit. You will be taken to the next sign-up page. 5. Create a Inforama ID. This will be your In[employer]ama login ID and cannot be changed, so think of one that is secure and easy to remember. 6. Create a Rettyt password. You can change your password at any time. 7. Enter your Password Reset Question and Answer. This can be used at a later time if you forget your password. 8. Enter your e-mail address. You will receive e-mail notification when new information is available in [address] 9. Click Sign Up. You can now view and download portions of your medical record. 10. Click the Download Summary menu link to download a portable copy of your medical information. If you have questions, please visit the Frequently Asked Questions section of the [employer] website. Remember, ZAOZAO is NOT to be used for urgent needs. For medical emergencies, dial 911. Now available from your iPhone and Android! Providers Seen During Your Hospitalization Provider Specialty Primary office phone Katheran Oppenheim, MD General Surgery [phone] Your Primary Care Physician (PCP) Primary Care Physician Office Phone Office Fax Hilda Batistahailey [phone] You are allergic to the following Allergen Reactions Adhesive Tape-Silicones Other (comments) \"SKIN IS SO FRAGILE\" Recent Documentation Height Weight BMI OB Status Smoking Status 1.651 m 60.3 kg 22.13 kg/m2 Postmenopausal Former Smoker Emergency Contacts Name Discharge Info Relation Home Work Mobile Omar Briceno DISCHARGE CAREGIVER [3] Spouse [3] [phone] Patient Belongings The following personal items are in your possession at time of discharge: 
     Visual Aid: Glasses      Home Medications: None   Jewelry: None  Clothing: Other (comment) (eyeglasses and hearing aid returned to patient)    Other Valuables: Eyeglasses, Home Medical Equipment(comment) (hearing aids) Please provide this summary of care documentation to your next provider. Signatures-by signing, you are acknowledging that this After Visit Summary has been reviewed with you and you have received a copy.   
  
 
  
    
    
 Patient Signature: ____________________________________________________________ Date:  ____________________________________________________________  
  
Earnstine Kartik Provider Signature:  ____________________________________________________________ Date:  ____________________________________________________________

## 2017-12-15 NOTE — PERIOP NOTES
Patient: Meghan Almazan MRN: [id number]  SSN: [id number]   YOB: 1934  Age: 80 y.o. Sex: female     Patient is status post Procedure(s):  LAPAROSCOPIC CHOLECYSTECTOMY. Surgeon(s) and Role:     * Jonette Hatchet, MD - Primary    Local/Dose/Irrigation:  18 mL 0.5% marcaine plain                  Peripheral IV 12/15/17 Left;Mid Arm (Active)   Site Assessment Clean, dry, & intact 12/15/2017  6:19 AM   Phlebitis Assessment 0 12/15/2017  6:19 AM   Infiltration Assessment 0 12/15/2017  6:19 AM   Dressing Status Clean, dry, & intact; New 12/15/2017  6:19 AM   Dressing Type Tape;Transparent 12/15/2017  6:19 AM   Hub Color/Line Status Pink; Infusing 12/15/2017  6:19 AM   Action Taken Open ports on tubing capped 12/15/2017  6:19 AM   Alcohol Cap Used Yes 12/15/2017  6:19 AM            Airway - Endotracheal Tube 12/15/17 Oral (Active)                   Dressing/Packing:  Wound Abdomen-DRESSING TYPE: Topical skin adhesive/glue (12/15/17 0700)  Splint/Cast:  ]    Other:  Lower back/BLE pain (lumbar surgery)

## 2017-12-15 NOTE — ANESTHESIA POSTPROCEDURE EVALUATION
Post-Anesthesia Evaluation and Assessment    Patient: Carmen Diaz MRN: [id number]  SSN: [id number]    YOB: 1934  Age: 80 y.o. Sex: female       Cardiovascular Function/Vital Signs  Visit Vitals    /52    Pulse (!) 59    Temp 36.4 °C (97.6 °F)    Resp 18    Ht 5' 5\" (1.651 m)    Wt 60.3 kg (133 lb)    SpO2 93%    BMI 22.13 kg/m2       Patient is status post general anesthesia for Procedure(s):  LAPAROSCOPIC CHOLECYSTECTOMY. Nausea/Vomiting: None    Postoperative hydration reviewed and adequate. Pain:  Pain Scale 1: Numeric (0 - 10) (12/15/17 0935)  Pain Intensity 1: 5 (12/15/17 0915)   Managed    Neurological Status:   Neuro (WDL): Within Defined Limits (12/15/17 0902)  Neuro  Neurologic State: Alert (12/15/17 0902)  LUE Motor Response: Purposeful (12/15/17 0902)  LLE Motor Response: Purposeful (12/15/17 0902)  RUE Motor Response: Purposeful (12/15/17 0902)  RLE Motor Response: Purposeful (12/15/17 0902)   At baseline    Mental Status and Level of Consciousness: Arousable    Pulmonary Status:   O2 Device: Room air (12/15/17 0902)   Adequate oxygenation and airway patent    Complications related to anesthesia: None    Post-anesthesia assessment completed.  No concerns    Signed By: Vj Vela MD     December 15, 2017

## 2017-12-15 NOTE — ANESTHESIA PREPROCEDURE EVALUATION
Anesthetic History   No history of anesthetic complications            Review of Systems / Medical History  Patient summary reviewed, nursing notes reviewed and pertinent labs reviewed    Pulmonary  Within defined limits                 Neuro/Psych   Within defined limits           Cardiovascular                  Exercise tolerance: >4 METS  Comments: PACs  Nl MUGA in 8/2017   GI/Hepatic/Renal     GERD           Endo/Other        Arthritis     Other Findings   Comments: thierno           Physical Exam    Airway  Mallampati: II  TM Distance: 4 - 6 cm  Neck ROM: normal range of motion   Mouth opening: Normal     Cardiovascular  Regular rate and rhythm,  S1 and S2 normal,  no murmur, click, rub, or gallop             Dental  No notable dental hx       Pulmonary  Breath sounds clear to auscultation               Abdominal  GI exam deferred       Other Findings            Anesthetic Plan    ASA: 2  Anesthesia type: general          Induction: Intravenous  Anesthetic plan and risks discussed with: Patient

## 2017-12-18 NOTE — OP NOTES
[facility] OP NOTE    Sukh Martínez  MR#: [id number]  : 1934  ACCOUNT #: [de-identified]   DATE OF SERVICE: 12/15/2017    PREOPERATIVE DIAGNOSIS:  Biliary colic. POSTOPERATIVE DIAGNOSIS:  Biliary colic. PROCEDURE:  Laparoscopic cholecystectomy. SURGEON:  Dr. Dorothy Sun. Agustin     ANESTHESIA:  General supplemented with 0.5% Marcaine. FINDINGS:  Were that of a very thick-walled gallbladder with no intraluminal palpable masses, one of which was suspected preoperatively on the ultrasound exam.      SPECIMEN REMOVED:  Gallbladder. ESTIMATED BLOOD LOSS:  Minimal.    COMPLICATIONS:  None. DRAINS:  None. CONDITION:  Good to the PACU. DESCRIPTION OF THE PROCEDURE:  With the patient supine and suitably anesthetized the abdomen was prepared with ChloraPrep and draped as a sterile field. 0.5% Marcaine was infiltrated in the appropriate places. A subumbilical incision was made. A Veress needle was inserted. Pneumoperitoneum was established. That needle was removed and a 5 mm trocar was placed. Camera was inserted and then two 5 mm trocars were placed in the right upper quadrant and a 12 mm trocar was placed in the midline superiorly all under direct vision. The patient was placed in reverse Trendelenburg position and turned to the left. The fundus was gallbladder was grasped and retracted superiorly and to the right. Adhesions from the omentum and transverse mesocolon were taken down with blunt and cautery dissection. The infundibulum was grasped and the peritoneum overlying it was opened both anteriorly and posteriorly. The cystic duct and cystic arteries were dissected out. The common bile duct was easily visualized and carefully out of harm's way. The cystic duct was triply clipped distally, singly clipped proximally and divided. The cystic artery was triply clipped proximally, singly clipped distally and divided.   The gallbladder was then removed from the liver bed with a combination of blunt and cautery dissection. Anything of substance was clipped to avoid any leakage from a potential duct of Luschka. The gallbladder was placed into a retrieval bag and brought out through the 12 mm site. The trocars were placed and the right upper quadrant was inspected. The liver bed was dry. The area was irrigated and aspirated until clear. The patient was returned to the flat position and again irrigated with clear return. Camera was transposed to the 12 mm site and the three 5 mm trocars were removed under direct vision with no bleeding seen. Pneumoperitoneum was released and the 12 mm trocar was removed. The fascial defect there was closed with a single 0 Vicryl suture. All 4 incisions were closed with subcuticular Monocryl followed by Dermabond. At termination of procedure, all counts were correct. The patient tolerated this well and was brought to the PACU in satisfactory condition. ANNA MARIE Tena MD       [facility], [place], operated by [facility] / [place]  D: 12/15/2017 08:05     T: 12/17/2017 22:58  JOB #: [id number]

## 2017-12-19 ENCOUNTER — TELEPHONE (OUTPATIENT)
Dept: SURGERY | Age: 82
End: 2017-12-19

## 2017-12-19 NOTE — TELEPHONE ENCOUNTER
I returned patients call and she states she is constipated and has taken the Docusate and not really been helpful. We discussed pushing fluids, increasing activity as tolerated, avoiding cheese and bananas and trying peaches, prunes, pears, prune juice, etc. She could also try mineral oil or another OTC stool softener if desired. She will try these things and call back if they are not helpful.

## 2017-12-19 NOTE — TELEPHONE ENCOUNTER
Patient stated she hasn't had a bowel movement since 3 days after surgery. She wants to know if it's okay to take laxative.

## 2018-01-03 ENCOUNTER — OFFICE VISIT (OUTPATIENT)
Dept: SURGERY | Age: 83
End: 2018-01-03

## 2018-01-03 VITALS
WEIGHT: 136 LBS | HEART RATE: 82 BPM | SYSTOLIC BLOOD PRESSURE: 126 MMHG | DIASTOLIC BLOOD PRESSURE: 88 MMHG | HEIGHT: 65 IN | RESPIRATION RATE: 18 BRPM | BODY MASS INDEX: 22.66 KG/M2 | TEMPERATURE: 98.4 F | OXYGEN SATURATION: 95 %

## 2018-01-03 DIAGNOSIS — Z09 POSTOPERATIVE EXAMINATION: Primary | ICD-10-CM

## 2018-01-03 NOTE — MR AVS SNAPSHOT
Visit Information Date & Time Provider Department Dept. Phone Encounter #  
 1/3/2018  9:00 AM Ginger Gillis NP [address] [phone] [id number] Your Appointments 1/8/2018  2:45 PM  
ROUTINE CARE with Mima Claude, MD  
[facility] [address] [facility]) Appt Note: f/up, surgery on 12/15/17 Dr. Rosemary Rodriguez gallbladder removed, mcr12/18/17  
 [place] [address] [place] [id number] [place] [address] [address] [facility] [address] [phone] Upcoming Health Maintenance Date Due DTaP/Tdap/Td series (1 - Tdap) 5/3/2007 GLAUCOMA SCREENING Q2Y 7/1/2016 Influenza Age 5 to Adult 8/1/2017 MEDICARE YEARLY EXAM 7/25/2018 Allergies as of 1/3/2018  Review Complete On: 1/3/2018 By: Ginger Gillis NP Severity Noted Reaction Type Reactions Adhesive Tape-silicones  52/36/1727    Other (comments) \"SKIN IS SO FRAGILE\" Current Immunizations  Reviewed on 10/31/2017 Name Date Influenza Vaccine (Quad) PF 9/27/2016  1:05 PM  
 Pneumococcal Conjugate (PCV-13) 10/12/2016 TD Vaccine 5/2/2007 ZZZ-RETIRED (DO NOT USE) Pneumococcal Vaccine (Unspecified Type) 1/2/2005 Zoster 1/2/2007 Not reviewed this visit You Were Diagnosed With   
  
 Codes Comments Postoperative examination    -  Primary ICD-10-CM: X29 ICD-9-CM: V67.00 Vitals BP Pulse Temp Resp Height(growth percentile) Weight(growth percentile) 126/88 (BP 1 Location: Left arm, BP Patient Position: Sitting) 82 98.4 °F (36.9 °C) (Oral) 18 5' 5\" (1.651 m) 136 lb (61.7 kg) SpO2 BMI OB Status Smoking Status 95% 22.63 kg/m2 Postmenopausal Former Smoker Vitals History BMI and BSA Data Body Mass Index Body Surface Area  
 22.63 kg/m 2 1.68 m 2 Preferred Pharmacy Pharmacy Name Phone [address] [place] [address] [phone] Your Updated Medication List  
  
   
This list is accurate as of: 1/3/18  9:41 AM.  Always use your most recent med list.  
  
  
  
  
 AMABELZ 1-0.5 mg per tablet Generic drug:  estradiol-norethindrone  
  
 aspirin delayed-release 81 mg tablet Take 81 mg by mouth daily. CALTRATE PLUS PO Take 1 Tab by mouth daily. Takes one po daily. doxycycline 100 mg capsule Commonly known as:  VIBRAMYCIN Take 1 capsule by mouth two times daily DULoxetine 60 mg capsule Commonly known as:  CYMBALTA TAKE 1 CAPSULE BY MOUTH  EVERY DAY  
  
 FISH OIL PO Take 1 Cap by mouth daily. GenTeal Moderate 0.3 % Drop Generic drug:  artificial tears(hypromellose) Administer 1 Drop to both eyes nightly. HYDROcodone-acetaminophen 5-325 mg per tablet Commonly known as:  Clearnce [place] Take 1 Tab by mouth every four (4) hours as needed for Pain. Max Daily Amount: 6 Tabs. MULTIVITAMIN PO Take 1 Tab by mouth daily. Takes one po daily. omeprazole 40 mg capsule Commonly known as:  PRILOSEC  
TAKE 1 CAPSULE BY MOUTH  DAILY REFRESH LIQUIGEL 1 % Dlgl Generic drug:  carboxymethylcellulose sodium Apply  to eye daily. 1 DROP BOTH EYES  
  
 VITAMIN D3 2,000 unit Tab Generic drug:  cholecalciferol (vitamin D3) Take 2,000 Units by mouth daily. Patient Instructions Drink at least 8 cups of water per day. Avoid bananas and dairy products. Prunes, prune juice and pineapples are helpful to relieve constipation. Also may take Colace over-the-counter and take as directed until relief. If needed, may take Miralax over the counter and use as directed. Cholecystectomy: What to Expect at [facility] Your Recovery After your surgery, it is normal to feel weak and tired for several days after you return home. Your belly may be swollen. If you had laparoscopic surgery, you may also have pain in your shoulder for about 24 hours. You may have gas or need to burp a lot at first, and a few people get diarrhea. The diarrhea usually goes away in 2 to 4 weeks, but it may last longer. How quickly you recover depends on whether you had a laparoscopic or open surgery. · For a laparoscopic surgery, most people can go back to work or their normal routine in 1 to 2 weeks, but it may take longer, depending on the type of work you do. · For an open surgery, it will probably take 4 to 6 weeks before you get back to your normal routine. This care sheet gives you a general idea about how long it will take for you to recover. However, each person recovers at a different pace. Follow the steps below to get better as quickly as possible. How can you care for yourself at home? Activity ? · Rest when you feel tired. Getting enough sleep will help you recover. ? · Try to walk each day. Start out by walking a little more than you did the day before. Gradually increase the amount you walk. Walking boosts blood flow and helps prevent pneumonia and constipation. ? · For about 2 to 4 weeks, avoid lifting anything that would make you strain. This may include a child, heavy grocery bags and milk containers, a heavy briefcase or backpack, cat litter or dog food bags, or a vacuum . ? · Avoid strenuous activities, such as biking, jogging, weightlifting, and aerobic exercise, until your doctor says it is okay. ? · You may shower 24 to 48 hours after surgery, if your doctor okays it. Pat the cut (incision) dry. Do not take a bath for the first 2 weeks, or until your doctor tells you it is okay. ? · You may drive when you are no longer taking pain medicine and can quickly move your foot from the gas pedal to the brake. You must also be able to sit comfortably for a long period of time, even if you do not plan to go far. You might get caught in traffic.   
? · For a laparoscopic surgery, most people can go back to work or their normal routine in 1 to 2 weeks, but it may take longer. For an open surgery, it will probably take 4 to 6 weeks before you get back to your normal routine. ? · Your doctor will tell you when you can have sex again. ? Diet ? · Eat smaller meals more often instead of fewer larger meals. You can eat a normal diet, but avoid eating fatty foods for about 1 month. Fatty foods include hamburger, whole milk, cheese, and many snack foods. If your stomach is upset, try bland, low-fat foods like plain rice, broiled chicken, toast, and yogurt. ? · Drink plenty of fluids (unless your doctor tells you not to). ? · If you have diarrhea, try avoiding spicy foods, dairy products, fatty foods, and alcohol. You can also watch to see if specific foods cause it, and stop eating them. If the diarrhea continues for more than 2 weeks, talk to your doctor. ? · You may notice that your bowel movements are not regular right after your surgery. This is common. Try to avoid constipation and straining with bowel movements. You may want to take a fiber supplement every day. If you have not had a bowel movement after a couple of days, ask your doctor about taking a mild laxative. Medicines ? · Your doctor will tell you if and when you can restart your medicines. He or she will also give you instructions about taking any new medicines. ? · If you take blood thinners, such as warfarin (Coumadin), clopidogrel (Plavix), or aspirin, be sure to talk to your doctor. He or she will tell you if and when to start taking those medicines again. Make sure that you understand exactly what your doctor wants you to do. ? · Take pain medicines exactly as directed. ¨ If the doctor gave you a prescription medicine for pain, take it as prescribed. ¨ If you are not taking a prescription pain medicine, take an over-the-counter medicine such as acetaminophen (Tylenol), ibuprofen (Advil, Motrin), or naproxen (Aleve).  Read and follow all instructions on the label. ¨ Do not take two or more pain medicines at the same time unless the doctor told you to. Many pain medicines contain acetaminophen, which is Tylenol. Too much Tylenol can be harmful. ? · If you think your pain medicine is making you sick to your stomach: 
¨ Take your medicine after meals (unless your doctor tells you not to). ¨ Ask your doctor for a different pain medicine. ? · If your doctor prescribed antibiotics, take them as directed. Do not stop taking them just because you feel better. You need to take the full course of antibiotics. Incision care ? · If you have strips of tape on the incision, or cut, leave the tape on for a week or until it falls off.  
? · After 24 to 48 hours, wash the area daily with warm, soapy water, and pat it dry. ? · You may have staples to hold the cut together. Keep them dry until your doctor takes them out. This is usually in 7 to 10 days. ? · Keep the area clean and dry. You may cover it with a gauze bandage if it weeps or rubs against clothing. Change the bandage every day. ?Ice ? · To reduce swelling and pain, put ice or a cold pack on your belly for 10 to 20 minutes at a time. Do this every 1 to 2 hours. Put a thin cloth between the ice and your skin. Follow-up care is a key part of your treatment and safety. Be sure to make and go to all appointments, and call your doctor if you are having problems. It's also a good idea to know your test results and keep a list of the medicines you take. When should you call for help? Call 911 anytime you think you may need emergency care. For example, call if: 
? · You passed out (lost consciousness). ? · You are short of breath. [place] Arlet ? Call your doctor now or seek immediate medical care if: 
? · You are sick to your stomach and cannot drink fluids. ? · You have pain that does not get better when you take your pain medicine. ? · You cannot pass stools or gas. ? · You have signs of infection, such as: ¨ Increased pain, swelling, warmth, or redness. ¨ Red streaks leading from the incision. ¨ Pus draining from the incision. ¨ A fever. ? · Bright red blood has soaked through the bandage over your incision. ? · You have loose stitches, or your incision comes open. ? · You have signs of a blood clot in your leg (called a deep vein thrombosis), such as: 
¨ Pain in your calf, back of knee, thigh, or groin. ¨ Redness and swelling in your leg or groin. ? Watch closely for any changes in your health, and be sure to contact your doctor if you have any problems. Where can you learn more? Go to http://lyndsey-miriam.info/. Enter [phone] in the search box to learn more about \"Cholecystectomy: What to Expect at Home. \" Current as of: May 12, 2017 Content Version: 11.4 © 0870-2541 [employer]. Care instructions adapted under license by [employer] (which disclaims liability or warranty for this information). If you have questions about a medical condition or this instruction, always ask your healthcare professional. [address] any warranty or liability for your use of this information. Introducing [facility]! Shola Perry introduces apstrata patient portal. Now you can access parts of your medical record, email your doctor's office, and request medication refills online. 1. In your internet browser, go to https://[employer]. "[employer] 2. Click on the First Time User? Click Here link in the Sign In box. You will see the New Member Sign Up page. 3. Enter your apstrata Access Code exactly as it appears below. You will not need to use this code after youve completed the sign-up process. If you do not sign up before the expiration date, you must request a new code. · apstrata Access Code: [email or web address] Expires: 1/29/2018  3:47 PM 
 
4.  Enter the last four digits of your Social Security Number (xxxx) and Date of Birth (mm/dd/yyyy) as indicated and click Submit. You will be taken to the next sign-up page. 5. Create a [employer] ID. This will be your [employer] login ID and cannot be changed, so think of one that is secure and easy to remember. 6. Create a [employer] password. You can change your password at any time. 7. Enter your Password Reset Question and Answer. This can be used at a later time if you forget your password. 8. Enter your e-mail address. You will receive e-mail notification when new information is available in [address] 9. Click Sign Up. You can now view and download portions of your medical record. 10. Click the Download Summary menu link to download a portable copy of your medical information. If you have questions, please visit the Frequently Asked Questions section of the [employer] website. Remember, [employer] is NOT to be used for urgent needs. For medical emergencies, dial 911. Now available from your iPhone and Android! Please provide this summary of care documentation to your next provider. Your primary care clinician is listed as Christopher Meredith. If you have any questions after today's visit, please call [phone].

## 2018-01-03 NOTE — PATIENT INSTRUCTIONS
Drink at least 8 cups of water per day. Avoid bananas and dairy products. Prunes, prune juice and pineapples are helpful to relieve constipation. Also may take Colace over-the-counter and take as directed until relief. If needed, may take Miralax over the counter and use as directed. Cholecystectomy: What to Expect at [address] [place] [address]  After your surgery, it is normal to feel weak and tired for several days after you return home. Your belly may be swollen. If you had laparoscopic surgery, you may also have pain in your shoulder for about 24 hours. You may have gas or need to burp a lot at first, and a few people get diarrhea. The diarrhea usually goes away in 2 to 4 weeks, but it may last longer. How quickly you recover depends on whether you had a laparoscopic or open surgery. · For a laparoscopic surgery, most people can go back to work or their normal routine in 1 to 2 weeks, but it may take longer, depending on the type of work you do. · For an open surgery, it will probably take 4 to 6 weeks before you get back to your normal routine. This care sheet gives you a general idea about how long it will take for you to recover. However, each person recovers at a different pace. Follow the steps below to get better as quickly as possible. How can you care for yourself at home? Activity  ? · Rest when you feel tired. Getting enough sleep will help you recover. ? · Try to walk each day. Start out by walking a little more than you did the day before. Gradually increase the amount you walk. Walking boosts blood flow and helps prevent pneumonia and constipation. ? · For about 2 to 4 weeks, avoid lifting anything that would make you strain. This may include a child, heavy grocery bags and milk containers, a heavy briefcase or backpack, cat litter or dog food bags, or a vacuum .    ? · Avoid strenuous activities, such as biking, jogging, weightlifting, and aerobic exercise, until your doctor says it is okay. ? · You may shower 24 to 48 hours after surgery, if your doctor okays it. Pat the cut (incision) dry. Do not take a bath for the first 2 weeks, or until your doctor tells you it is okay. ? · You may drive when you are no longer taking pain medicine and can quickly move your foot from the gas pedal to the brake. You must also be able to sit comfortably for a long period of time, even if you do not plan to go far. You might get caught in traffic. ? · For a laparoscopic surgery, most people can go back to work or their normal routine in 1 to 2 weeks, but it may take longer. For an open surgery, it will probably take 4 to 6 weeks before you get back to your normal routine. ? · Your doctor will tell you when you can have sex again. ? Diet  ? · Eat smaller meals more often instead of fewer larger meals. You can eat a normal diet, but avoid eating fatty foods for about 1 month. Fatty foods include hamburger, whole milk, cheese, and many snack foods. If your stomach is upset, try bland, low-fat foods like plain rice, broiled chicken, toast, and yogurt. ? · Drink plenty of fluids (unless your doctor tells you not to). ? · If you have diarrhea, try avoiding spicy foods, dairy products, fatty foods, and alcohol. You can also watch to see if specific foods cause it, and stop eating them. If the diarrhea continues for more than 2 weeks, talk to your doctor. ? · You may notice that your bowel movements are not regular right after your surgery. This is common. Try to avoid constipation and straining with bowel movements. You may want to take a fiber supplement every day. If you have not had a bowel movement after a couple of days, ask your doctor about taking a mild laxative. Medicines  ? · Your doctor will tell you if and when you can restart your medicines. He or she will also give you instructions about taking any new medicines.    ? · If you take blood thinners, such as warfarin (Coumadin), clopidogrel (Plavix), or aspirin, be sure to talk to your doctor. He or she will tell you if and when to start taking those medicines again. Make sure that you understand exactly what your doctor wants you to do. ? · Take pain medicines exactly as directed. ¨ If the doctor gave you a prescription medicine for pain, take it as prescribed. ¨ If you are not taking a prescription pain medicine, take an over-the-counter medicine such as acetaminophen (Tylenol), ibuprofen (Advil, Motrin), or naproxen (Aleve). Read and follow all instructions on the label. ¨ Do not take two or more pain medicines at the same time unless the doctor told you to. Many pain medicines contain acetaminophen, which is Tylenol. Too much Tylenol can be harmful. ? · If you think your pain medicine is making you sick to your stomach:  ¨ Take your medicine after meals (unless your doctor tells you not to). ¨ Ask your doctor for a different pain medicine. ? · If your doctor prescribed antibiotics, take them as directed. Do not stop taking them just because you feel better. You need to take the full course of antibiotics. Incision care  ? · If you have strips of tape on the incision, or cut, leave the tape on for a week or until it falls off.   ? · After 24 to 48 hours, wash the area daily with warm, soapy water, and pat it dry. ? · You may have staples to hold the cut together. Keep them dry until your doctor takes them out. This is usually in 7 to 10 days. ? · Keep the area clean and dry. You may cover it with a gauze bandage if it weeps or rubs against clothing. Change the bandage every day. ?Ice  ? · To reduce swelling and pain, put ice or a cold pack on your belly for 10 to 20 minutes at a time. Do this every 1 to 2 hours. Put a thin cloth between the ice and your skin. Follow-up care is a key part of your treatment and safety. Be sure to make and go to all appointments, and call your doctor if you are having problems.  It's also a good idea to know your test results and keep a list of the medicines you take. When should you call for help? Call 911 anytime you think you may need emergency care. For example, call if:  ? · You passed out (lost consciousness). ? · You are short of breath. [place] ? Call your doctor now or seek immediate medical care if:  ? · You are sick to your stomach and cannot drink fluids. ? · You have pain that does not get better when you take your pain medicine. ? · You cannot pass stools or gas. ? · You have signs of infection, such as:  ¨ Increased pain, swelling, warmth, or redness. ¨ Red streaks leading from the incision. ¨ Pus draining from the incision. ¨ A fever. ? · Bright red blood has soaked through the bandage over your incision. ? · You have loose stitches, or your incision comes open. ? · You have signs of a blood clot in your leg (called a deep vein thrombosis), such as:  ¨ Pain in your calf, back of knee, thigh, or groin. ¨ Redness and swelling in your leg or groin. ? Watch closely for any changes in your health, and be sure to contact your doctor if you have any problems. Where can you learn more? Go to http://lyndsey-miriam.info/. Enter [phone] in the search box to learn more about \"Cholecystectomy: What to Expect at Home. \"  Current as of: May 12, 2017  Content Version: 11.4  © 9376-2040 [employer]. Care instructions adapted under license by [employer] (which disclaims liability or warranty for this information). If you have questions about a medical condition or this instruction, always ask your healthcare professional. [address] [address] any warranty or liability for your use of this information.

## 2018-01-08 ENCOUNTER — OFFICE VISIT (OUTPATIENT)
Dept: INTERNAL MEDICINE CLINIC | Age: 83
End: 2018-01-08

## 2018-01-08 VITALS
WEIGHT: 134 LBS | HEART RATE: 78 BPM | SYSTOLIC BLOOD PRESSURE: 152 MMHG | RESPIRATION RATE: 16 BRPM | HEIGHT: 65 IN | DIASTOLIC BLOOD PRESSURE: 82 MMHG | BODY MASS INDEX: 22.33 KG/M2 | OXYGEN SATURATION: 98 %

## 2018-01-08 DIAGNOSIS — F41.9 ANXIETY: ICD-10-CM

## 2018-01-08 DIAGNOSIS — K80.50 BILIARY COLIC: ICD-10-CM

## 2018-01-08 DIAGNOSIS — K21.9 GASTROESOPHAGEAL REFLUX DISEASE WITHOUT ESOPHAGITIS: Primary | ICD-10-CM

## 2018-01-08 NOTE — PROGRESS NOTES
Chief Complaint   Patient presents with    Follow-up     Pt had gallbladder removed on 12/15/17     Feels better  Had lap cholecystectomy in December for   Chronic cholecystitis, no issues no attacks any longer  No diarrhea  Review of Systems - General ROS: positive for  - fatigue  Psychological ROS: negative for - depression  Hematological and Lymphatic ROS: negative  Endocrine ROS: negative for - hair pattern changes, hot flashes or palpitations  Respiratory ROS: no cough, shortness of breath, or wheezing  Cardiovascular ROS: no chest pain or dyspnea on exertion  Gastrointestinal ROS: no abdominal pain, change in bowel habits, or black or bloody stools  Genito-Urinary ROS: no dysuria, trouble voiding, or hematuria  Musculoskeletal ROS: positive for - gait disturbance, joint pain, joint stiffness and joint swelling  Neurological ROS: no TIA or stroke symptoms  Dermatological ROS: negative for - abad skin cancers    Patient Active Problem List    Diagnosis    Biliary colic    Spinal stenosis    Lumbar disc disease with radiculopathy     Pain management and PT  Trans foraminal injections not helping      Osteoporosis     . DEXA Results (most recent):    Results from Hospital Encounter encounter on 08/13/15   DEXA BONE DENSITY STUDY AXIAL   Narrative **Final Report**      ICD Codes / Adm. Diagnosis: V76.12  724.2 / Disorder of bone and cartilage    Lumbago  Examination:  MM DEXA AXIAL SKELETON  - [id number] - Aug 13 2015 10:45AM  Accession No:  [id number]  Reason:  screening      REPORT:  Bone Mineral Density    Indication: Monitoring, estrogen therapy  Age: [de-identified]  Sex: Female. Menopause status:         postmenopausal.  Hormone replacement therapy: yes/no     Risk factors for fall:   None   Risk factors for osteoporosis:  Tobacco use    Current medication for osteoporosis: Calcium, vitamin D, estrogen. Comparison: 2013    Technique: Imaging was performed on the [employer].          World Health   Organization meta-analysis fracture risk calculator (FRAX) analysis was   performed for 10 year fracture risk probability assessment    Excluded sites: L2 due to fracture, L1 due to degenerative changes    Findings:    Fractures identified on Lateral scanogram:  L2    Lumbar spine: L1-L3, excluding L2  Bone mineral density (gm/cm2): 1.113  % of peak bone mass: 94  % for age matched controls:  80  T score: -0.6  Z score:  1.5    Hip: Right femoral neck  Bone mineral density (gm/cm2):  0.718  % of peak bone mass: 69  % for age matched controls:  100  T score: -2.3  Z score:  0           IMPRESSION:    This patient is osteopenic using the World Health Organization criteria  As compared to the prior study, there has been a statistically significant   decrease in bone mineral density.   10 year probability of major osteoporotic fracture:  16.3%  10 year probability of hip fracture:  5.6%             Signing/Reading Doctor: Lorenzo Richardson ([id number])    Idania Bedolla ([id number])  Aug 17 2015 10:59AM                                 Jan 2017 reclast  And annual      Pancreatic cyst    Visual loss    PAC (premature atrial contraction)     Normal echo and stress test for atypical chest pain      Rosacea    Anxiety    Hypercholesterolemia    GERD (gastroesophageal reflux disease)     Feels better on high dose       Cancer ([address]     skin cancer      Hyperlipidemia LDL goal <130     Past Surgical History:   Procedure Laterality Date    CARDIAC SURG PROCEDURE UNLIST  2/11    normal stress test    ENDOSCOPY, COLON, DIAGNOSTIC  2008    HX APPENDECTOMY  2000    HX BACK SURGERY      KYPHOPLASTY    HX CATARACT REMOVAL Bilateral 2012    HX CHOLECYSTECTOMY  12/15/2017    Lap Rachel by Dr. Kwasi Cook HX GI  2008    EGD    HX GI      COLONOSCOPY    HX GYN      UTERINE BIOPSY    HX LUMBAR FUSION  2016    HX LUMBAR LAMINECTOMY  2016    HX ORTHOPAEDIC Right 2010    knee - arthroscopy    HX ORTHOPAEDIC Right march 2011    TKR  right    HX OTHER SURGICAL  11/2017    karina. leg skin cancer surgery    HX TONSILLECTOMY      HX TUBAL LIGATION  1976       Mood good on cymbalta  gerd stable      Vitals:    01/08/18 1510   BP: 152/82   Pulse: 78   Resp: 16   SpO2: 98%   Weight: 134 lb (60.8 kg)   Height: 5' 5\" (1.651 m)     BP Readings from Last 3 Encounters:   01/08/18 152/82   01/03/18 126/88   12/15/17 153/52       S1 and S2 normal, no murmurs, clicks, gallops or rubs. Regular rate and rhythm. Chest is clear; no wheezes or rales. No edema or JVD. Gait normal    1. Gastroesophageal reflux disease without esophagitis  Stable cannot wean ppi    2. Biliary colic  resolved    3.  Anxiety  Doing well with cymbalta      Prolonged visit with 15 minutes of time out  of more than a  25 minute visit spent counseling patient and family and formulation of care

## 2018-01-24 ENCOUNTER — OFFICE VISIT (OUTPATIENT)
Dept: INTERNAL MEDICINE CLINIC | Age: 83
End: 2018-01-24

## 2018-01-24 ENCOUNTER — HOSPITAL ENCOUNTER (OUTPATIENT)
Dept: GENERAL RADIOLOGY | Age: 83
Discharge: HOME | End: 2018-01-24
Attending: INTERNAL MEDICINE
Payer: MEDICARE

## 2018-01-24 VITALS
RESPIRATION RATE: 16 BRPM | OXYGEN SATURATION: 98 % | HEIGHT: 65 IN | HEART RATE: 90 BPM | BODY MASS INDEX: 22.33 KG/M2 | SYSTOLIC BLOOD PRESSURE: 149 MMHG | DIASTOLIC BLOOD PRESSURE: 70 MMHG | WEIGHT: 134 LBS

## 2018-01-24 DIAGNOSIS — M70.62 TROCHANTERIC BURSITIS OF LEFT HIP: Primary | ICD-10-CM

## 2018-01-24 DIAGNOSIS — M70.62 TROCHANTERIC BURSITIS OF LEFT HIP: ICD-10-CM

## 2018-01-24 DIAGNOSIS — M25.552 HIP PAIN, ACUTE, LEFT: ICD-10-CM

## 2018-01-24 PROCEDURE — 73502 X-RAY EXAM HIP UNI 2-3 VIEWS: CPT

## 2018-01-24 NOTE — PROGRESS NOTES
Chief Complaint   Patient presents with    Leg Pain     start groin and travels down,      Left groin and left thigh for weeks    Has been doing leg exercises faithfully    Has tenderness left   Hip      Using tylenol    Subjective:     Chief Complaint   Patient presents with    Leg Pain     start groin and travels down, on left for a few weeks was doing stretching has let up     She is a 80y.o. year old female who presents for evaluation. Objective:     Vitals:    01/24/18 1140   BP: 149/70   Pulse: 90   Resp: 16   SpO2: 98%   Weight: 134 lb (60.8 kg)   Height: 5' 5\" (1.651 m)       Physical Examination: General appearance - alert, well appearing, and in no distress  Neck - supple, no significant adenopathy  Chest - clear to auscultation, no wheezes, rales or rhonchi, symmetric air entry  Heart - normal rate, regular rhythm, normal S1, S2, no murmurs, rubs, clicks or gallops  Abdomen - soft, nontender, nondistended, no masses or organomegaly  Extremities - peripheral pulses normal, no pedal edema, no clubbing or cyanosis  A left hip exam was performed. GENERAL: no acute distress  SWELLING: none  WARMTH: no warmth  TENDERNESS: diffuse and maximal at greater trochanter  ROM: limited by pain  STRENGTH: limited by pain  GAIT: antalgic    Assessment/ Plan:       ICD-10-CM ICD-9-CM    1. Trochanteric bursitis of left hip M70.62 726.5 XR HIP LT W OR WO PELV 2-3 VWS   2.  Hip pain, acute, left M25.552 719.45          Follow-up Disposition: Not on File    Use aleve 440 twice a day with 20 mg omeprazole daily for GI protection    No injury  No hx djd hip

## 2018-01-24 NOTE — PROGRESS NOTES
Coordination of Care Questions    1. Have you been to the ER, urgent care clinic since your last visit? No       Hospitalized since your last visit? No    2. Have you seen or consulted any other health care providers outside of the [address] [place] [address] since your last visit? Include any pap smears or colon screening.  No

## 2018-01-25 NOTE — PROGRESS NOTES
Notified the patient per Dr Barbra Og that the hip looks normal on xray. She states understanding and is really pleased.

## 2018-02-27 ENCOUNTER — TELEPHONE (OUTPATIENT)
Dept: INTERNAL MEDICINE CLINIC | Age: 83
End: 2018-02-27

## 2018-02-27 NOTE — TELEPHONE ENCOUNTER
Patient called in because her GYN MD is retiring and she needs a referral for another one. Please call and advise.   [phone]

## 2018-03-23 ENCOUNTER — OFFICE VISIT (OUTPATIENT)
Dept: OBGYN CLINIC | Age: 83
End: 2018-03-23

## 2018-03-23 DIAGNOSIS — M81.0 OSTEOPOROSIS, UNSPECIFIED OSTEOPOROSIS TYPE, UNSPECIFIED PATHOLOGICAL FRACTURE PRESENCE: Primary | ICD-10-CM

## 2018-03-23 DIAGNOSIS — Z78.0 POSTMENOPAUSAL: ICD-10-CM

## 2018-03-23 DIAGNOSIS — Z79.890 HORMONE REPLACEMENT THERAPY (HRT): ICD-10-CM

## 2018-03-23 RX ORDER — CHOLECALCIFEROL (VITAMIN D3) 125 MCG
CAPSULE ORAL
COMMUNITY
End: 2018-04-30 | Stop reason: ALTCHOICE

## 2018-03-23 RX ORDER — ESTRADIOL AND NORETHINDRONE ACETATE .5; .1 MG/1; MG/1
1 TABLET ORAL DAILY
Qty: 30 TAB | Refills: 2 | Status: ON HOLD | OUTPATIENT
Start: 2018-03-23 | End: 2018-06-26

## 2018-03-23 NOTE — PATIENT INSTRUCTIONS
Hormone Therapy (HT): Care Instructions  Your Care Instructions    Hormone therapy (HT) is medicine to treat symptoms of menopause, such as hot flashes, vaginal dryness, and sleep problems. It replaces the hormones that drop at menopause. Most women get relief from these symptoms within weeks of starting HT. HT contains two female hormones, estrogen and progestin. HT may come in the form of a pill, patch, gel, spray, or vaginal ring. A vaginal cream or a vaginal ring that has a much lower dose of estrogen may be used to relieve vaginal dryness only. HT has some risks. Most doctors recommend that women only take HT for as short a time as possible. This is to reduce the chances of heart disease, breast cancer, blood clots, and stroke that may be connected to HT. Be sure to have regular checkups with your doctor when taking HT. Talk with your doctor about whether HT is right for you. If you decide that the benefits of HT outweigh the risks, ask your doctor to prescribe the lowest effective dose for as short a time as possible. Follow-up care is a key part of your treatment and safety. Be sure to make and go to all appointments, and call your doctor if you are having problems. It's also a good idea to know your test results and keep a list of the medicines you take. Why might you take HT?  · HT reduces symptoms of menopause. These include hot flashes, mood swings, and sleep problems. · The estrogen in HT helps to prevent thinning bones. And it may lower the chance of colon cancer. · HT helps keep the lining of the vagina moist and thick. This can reduce irritation. · HT helps protect against dental problems, such as tooth loss and gum disease. What are the risks of taking HT? · Some women who take HT may have vaginal bleeding, bloating, nausea, sore breasts, mood swings, and headaches. Talk to your doctor about changing the type of HT you take or lowering the dose.  This may help to end these side effects. · Taking HT may slightly increase your risk for heart disease, breast cancer, ovarian cancer, blood clots, and stroke. · You should not take HT if you:  ¨ Could be pregnant. ¨ Have a personal history of breast cancer, endometrial cancer, pulmonary embolism, deep vein thrombosis, heart attack, or stroke. ¨ Have vaginal bleeding from an unknown cause. ¨ Have active liver disease. What can you do to reduce the symptoms of menopause? · Eat healthy foods and get regular exercise. This also will help to maintain strong bones and a healthy heart. · Do not smoke. If you smoke, you can reduce hot flashes and long-term health risks by stopping. If you need help quitting, talk to your doctor about stop-smoking programs and medicines. These can increase your chances of quitting for good. · Practice daily breathing exercises (meditation) to reduce hot flashes and mood swings. · Limit the amount of alcohol you drink. This can reduce symptoms of menopause and long-term health risks. · Keep your home and office cool. · Use a vaginal lubricant, such as Astroglide, Wet Gel Lubricant, or K-Y Jelly. · Do pelvic floor (Kegel) exercises, which tighten and strengthen pelvic muscles. To do Kegel exercises:  ¨ Squeeze the same muscles you would use to stop your urine. Your belly and thighs should not move. ¨ Hold the squeeze for 3 seconds, then relax for 3 seconds. ¨ Start with 3 seconds. Then add 1 second each week until you are able to squeeze for 10 seconds. ¨ Repeat the exercise 10 to 15 times a session. Do three or more sessions a day. Where can you learn more? Go to http://lyndsey-miriam.info/. Enter [phone] in the search box to learn more about \"Hormone Therapy (HT): Care Instructions. \"  Current as of: October 13, 2016  Content Version: 11.4  © 6608-8191 Healthwise, [employer].  Care instructions adapted under license by [employer] (which disclaims liability or warranty for this information). If you have questions about a medical condition or this instruction, always ask your healthcare professional. [place] [address] any warranty or liability for your use of this information.

## 2018-03-23 NOTE — PROGRESS NOTES
Consult: HRT    Margret Rocha is a 80 y.o. postmenopausal  female  A1 presenting for consultation regarding hormone replacement therapy. Has been followed by Dr Rafaela Harman for years but she retired. She was on HRT due to concerns of osteoporosis. Denies any issues with VMS or vaginal dryness. She has had compression fx of the spine in the past. She has done reclast infusions and takes vit D and calcium. Weight bearing exercise limited due to back and joint pains. Pt otherwise doing well. She did have episode of PMB approx 3 years ago around the time her HRT prescription changed, EMBx was benign and she has not had any further vaginal bleeding since that time. Ob/Gyn Hx:   A1 - 2 vaginal deliveries, 1 miscarriage  Menopause- age 46  ? VMS- denies  ? Vag dryness- denies  ? HRT- yes, for may years  STI- denies  ? SA- no    Health maintenance:  Pap- many years ago, per patient no history of abnormal  Mammo- 17 B1  Colonoscopy- per patient no longer needed  Dexa- 2015 osteoporosis    Past Medical History:   Diagnosis Date    Arthritis     Biliary colic     Cancer ([facility] [address]     skin cancer - basal and squamous cell, LEGS, FACE    GERD (gastroesophageal reflux disease)     Hypercholesterolemia     PATIENT DENIES    Osteopenia     PAC (premature atrial contraction) 2013    Pancreatic cyst 2015    Psychiatric disorder     anxiety    Urinary frequency     Visual loss 2015       Past Surgical History:   Procedure Laterality Date    CARDIAC SURG PROCEDURE UNLIST      normal stress test    ENDOSCOPY, COLON, DIAGNOSTIC      HX APPENDECTOMY      HX BACK SURGERY      KYPHOPLASTY    HX CATARACT REMOVAL Bilateral 2012    HX CHOLECYSTECTOMY  12/15/2017    Lap Rachel by Dr. Johnny Cross HX GI      EGD    HX GI      COLONOSCOPY    HX GYN      UTERINE BIOPSY    HX LUMBAR FUSION      HX LUMBAR LAMINECTOMY    HX ORTHOPAEDIC Right 2010    knee - arthroscopy    HX ORTHOPAEDIC Right march 2011    TKR  right    HX OTHER SURGICAL  11/2017    karina. leg skin cancer surgery    HX TONSILLECTOMY      HX TUBAL LIGATION  1976       Family History   Problem Relation Age of Onset    Stroke Mother     Hypertension Mother     Other Mother      aneurysm - aorta    Cancer Father      stomach AND ESOPHAGEAL cancer    Stroke Maternal Grandmother     Other Son      BLOOD CLOT IN AORTA AND POST OP DVT    Anesth Problems Neg Hx        Social History     Social History    Marital status:      Spouse name: N/A    Number of children: N/A    Years of education: N/A     Occupational History    Not on file. Social History Main Topics    Smoking status: Former Smoker     Quit date: 8/5/1956    Smokeless tobacco: Never Used      Comment: former cigarette smoker    Alcohol use 7.0 oz/week     7 Glasses of wine, 7 Shots of liquor per week    Drug use: No    Sexual activity: Not Currently     Partners: Male     Other Topics Concern    Not on file     Social History Narrative       Current Outpatient Prescriptions   Medication Sig Dispense Refill    naproxen sodium (ALEVE) 220 mg cap Take  by mouth.  AMABELZ 1-0.5 mg per tablet       aspirin delayed-release 81 mg tablet Take 81 mg by mouth daily.  omeprazole (PRILOSEC) 40 mg capsule TAKE 1 CAPSULE BY MOUTH  DAILY 90 Cap 1    DULoxetine (CYMBALTA) 60 mg capsule TAKE 1 CAPSULE BY MOUTH  EVERY DAY (Patient taking differently: TAKE 1 CAPSULE BY MOUTH  EVERY DAY. BEDTIME) 90 Cap 1    doxycycline (VIBRAMYCIN) 100 mg capsule Take 1 capsule by mouth two times daily (Patient taking differently: Take 1 capsule by mouth two times daily. TKAES 1 TAB DAILY) 180 Cap 0    DOCOSAHEXANOIC ACID/EPA (FISH OIL PO) Take 1 Cap by mouth daily.  carboxymethylcellulose sodium (REFRESH LIQUIGEL) 1 % dlgl Apply  to eye daily.  1 DROP BOTH EYES      artificial tears,hypromellose, (GENTEAL MODERATE) 0.3 % drop Administer 1 Drop to both eyes nightly.  cholecalciferol, vitamin D3, (VITAMIN D3) 2,000 unit Tab Take 2,000 Units by mouth daily.  MULTIVITAMIN PO Take 1 Tab by mouth daily. Takes one po daily.  CALCIUM CARB/VIT D3/MINERALS (CALTRATE PLUS PO) Take 1 Tab by mouth daily. Takes one po daily. Allergies   Allergen Reactions    Adhesive Tape-Silicones Other (comments)     \"SKIN IS SO FRAGILE\"       Review of Systems - History obtained from the patient  Constitutional: negative for weight loss, fever, night sweats  HEENT: negative for hearing loss, earache, congestion, snoring, sorethroat  CV: negative for chest pain, palpitations, edema  Resp: negative for cough, shortness of breath, wheezing  GI: negative for change in bowel habits, abdominal pain, black or bloody stools  : negative for frequency, dysuria, hematuria, vaginal discharge  MSK: negative for joint pain, muscle pain, +back pain  Breast: negative for breast lumps, nipple discharge, galactorrhea  Skin :negative for itching, rash, hives  Neuro: negative for dizziness, headache, confusion, weakness  Psych: negative for anxiety, depression, change in mood  Heme/lymph: negative for bleeding, bruising, pallor    Physical Exam  There were no vitals taken for this visit.      Constitutional  · Appearance: well-nourished, well developed, alert, in no acute distress    HENT  · Head and Face: appears normal    Neck  · Inspection/Palpation: normal appearance, no masses or tenderness  · Lymph Nodes: no lymphadenopathy present  · Thyroid: gland size normal, nontender, no nodules or masses present on palpation    Chest  · Respiratory Effort: non-labored breathing  · Auscultation: CTAB, normal breath sounds    Cardiovascular  · Heart:  · Auscultation: regular rate and rhythm without murmur  · Extremities: no peripheral edema    Gastrointestinal  · Abdominal Examination: abdomen non-tender to palpation, normal bowel sounds, no masses present  · Liver and spleen: no hepatomegaly present, spleen not palpable  · Hernias: no hernias identified    Neurologic/Psychiatric  · Mental Status:  · Orientation: grossly oriented to person, place and time  · Mood and Affect: mood normal, affect appropriate      Assessment/Plan:  80 y.o. postmenopausal female presenting for consultation regarding HRT.     -counseled pt that typically we use lowest dose for shortest duration, and that we weigh risks/benefits  -reviewed increased risks of cardiovascular dz, stroke, breast and uterine cancer, balanced in her case by positive benefits to bone health given osteoporosis with prior fractures  -after discussion, will decrease dose of AMABELZ combined HRT from 1-0.5 --> 0.5-0.1  -pt likely to have repeat dexa scan with PCP this summer --> encouraged her to continue to work with PCP to optimize bone health    RTC: 3 months for annual exam and follow up of response to decreased dose of HRT    Stevie Elder MD  3/23/2018  2:49 PM

## 2018-04-25 RX ORDER — DULOXETIN HYDROCHLORIDE 60 MG/1
CAPSULE, DELAYED RELEASE ORAL
Qty: 15 CAP | Refills: 0 | Status: ON HOLD | OUTPATIENT
Start: 2018-04-25 | End: 2018-06-25 | Stop reason: SDUPTHER

## 2018-04-30 ENCOUNTER — TELEPHONE (OUTPATIENT)
Dept: INTERNAL MEDICINE CLINIC | Age: 83
End: 2018-04-30

## 2018-04-30 ENCOUNTER — OFFICE VISIT (OUTPATIENT)
Dept: INTERNAL MEDICINE CLINIC | Age: 83
End: 2018-04-30

## 2018-04-30 VITALS
HEIGHT: 65 IN | SYSTOLIC BLOOD PRESSURE: 149 MMHG | HEART RATE: 90 BPM | BODY MASS INDEX: 22.33 KG/M2 | WEIGHT: 134 LBS | RESPIRATION RATE: 14 BRPM | OXYGEN SATURATION: 98 % | DIASTOLIC BLOOD PRESSURE: 82 MMHG

## 2018-04-30 DIAGNOSIS — M51.16 LUMBAR DISC DISEASE WITH RADICULOPATHY: Primary | ICD-10-CM

## 2018-04-30 RX ORDER — MELOXICAM 15 MG/1
15 TABLET ORAL DAILY
Qty: 30 TAB | Refills: 2 | Status: SHIPPED | OUTPATIENT
Start: 2018-04-30 | End: 2018-06-26

## 2018-04-30 NOTE — MR AVS SNAPSHOT
[address] [place] [address] [address] [place] [address] 
[phone] Patient: Karen Dewitt MRN:  [id number] Visit Information Date & Time Provider Department Dept. Phone Encounter #  
 2018  2:15 PM Dylan Trevino, [address] [place] [address] [facility] [phone] [id number] Your Appointments 2018  1:00 PM  
ESTABLISHED PATIENT with Yuly Martin MD  
[address] [facility] ([address]) Appt Note: ae and f/u of HRT  
 [address], [place] [address] [facility] [address]  
100 [facility], [address] Upcoming Health Maintenance Date Due DTaP/Tdap/Td series (1 - Tdap) 5/3/2007 GLAUCOMA SCREENING Q2Y 2016 MEDICARE YEARLY EXAM 2018 Influenza Age 5 to Adult 2018 Allergies as of 2018  Review Complete On: 2018 By: Phan Mattson LPN Severity Noted Reaction Type Reactions Adhesive Tape-silicones      Other (comments) \"SKIN IS SO FRAGILE\" Current Immunizations  Reviewed on 10/31/2017 Name Date Influenza Vaccine (Quad) PF 2016  1:05 PM  
 Pneumococcal Conjugate (PCV-13) 10/12/2016 TD Vaccine 2007 ZZZ-RETIRED (DO NOT USE) Pneumococcal Vaccine (Unspecified Type) 2005 Zoster 2007 Not reviewed this visit You Were Diagnosed With   
  
 Codes Comments Lumbar disc disease with radiculopathy    -  Primary ICD-10-CM: M51.16 
ICD-9-CM: 722.10 Vitals BP Pulse Resp Height(growth percentile) Weight(growth percentile) SpO2  
 149/82 (BP 1 Location: Left arm, BP Patient Position: Sitting) 90 14 5' 5\" (1.651 m) 134 lb (60.8 kg) 98% BMI OB Status Smoking Status 22.3 kg/m2 Postmenopausal Former Smoker Vitals History BMI and BSA Data Body Mass Index Body Surface Area  
 22.3 kg/m 2 1.67 m 2 Preferred Pharmacy Pharmacy Name Phone [facility] [place] [address] [phone] Your Updated Medication List  
  
   
This list is accurate as of 4/30/18  3:06 PM.  Always use your most recent med list.  
  
  
  
  
 aspirin delayed-release 81 mg tablet Take 81 mg by mouth daily. CALTRATE PLUS PO Take 1 Tab by mouth daily. Takes one po daily. doxycycline 100 mg capsule Commonly known as:  VIBRAMYCIN Take 1 capsule by mouth two times daily DULoxetine 60 mg capsule Commonly known as:  CYMBALTA TAKE 1 CAPSULE BY MOUTH  EVERY DAY  **waiting on mail order**  
  
 estradiol-norethindrone 0.5-0.1 mg per tablet Commonly known as:  AMABELZ Take 1 Tab by mouth daily. FISH OIL PO Take 1 Cap by mouth daily. GenTeal Moderate 0.3 % Drop Generic drug:  artificial tears(hypromellose) Administer 1 Drop to both eyes nightly. meloxicam 15 mg tablet Commonly known as:  MOBIC Take 1 Tab by mouth daily. MULTIVITAMIN PO Take 1 Tab by mouth daily. Takes one po daily. omeprazole 40 mg capsule Commonly known as:  PRILOSEC  
TAKE 1 CAPSULE BY MOUTH  DAILY REFRESH LIQUIGEL 1 % Dlgl Generic drug:  carboxymethylcellulose sodium Apply  to eye daily. 1 DROP BOTH EYES  
  
 VITAMIN D3 2,000 unit Tab Generic drug:  cholecalciferol (vitamin D3) Take 2,000 Units by mouth daily. Prescriptions Sent to Pharmacy Refills  
 meloxicam (MOBIC) 15 mg tablet 2 Sig: Take 1 Tab by mouth daily. Class: Normal  
 Pharmacy: [id number] [place] [address] 18, [address] #: [phone] Route: Oral  
  
We Performed the Following REFERRAL TO NEUROSURGERY [TDE48 Custom] Referral Information Referral ID Referred By Referred To  
  
 [id number] [facility] [address] [facility], [address] Visits Status Start Date End Date 1 New Request 4/30/18 4/30/19 If your referral has a status of pending review or denied, additional information will be sent to support the outcome of this decision. Introducing [facility]! [facility] introduces [employer] patient portal. Now you can access parts of your medical record, email your doctor's office, and request medication refills online. 1. In your internet browser, go to [email or web address] 2. Click on the First Time User? Click Here link in the Sign In box. You will see the New Member Sign Up page. 3. Enter your [employer] Access Code exactly as it appears below. You will not need to use this code after youve completed the sign-up process. If you do not sign up before the expiration date, you must request a new code. · [employer] Access Code: [email or web address] Expires: 7/29/2018  3:06 PM 
 
4. Enter the last four digits of your Social Security Number (xxxx) and Date of Birth (mm/dd/yyyy) as indicated and click Submit. You will be taken to the next sign-up page. 5. Create a [employer] ID. This will be your [employer] login ID and cannot be changed, so think of one that is secure and easy to remember. 6. Create a [employer] password. You can change your password at any time. 7. Enter your Password Reset Question and Answer. This can be used at a later time if you forget your password. 8. Enter your e-mail address. You will receive e-mail notification when new information is available in [address] 9. Click Sign Up. You can now view and download portions of your medical record. 10. Click the Download Summary menu link to download a portable copy of your medical information. If you have questions, please visit the Frequently Asked Questions section of the [employer] website. Remember, [employer] is NOT to be used for urgent needs. For medical emergencies, dial 911. Now available from your iPhone and Android! Please provide this summary of care documentation to your next provider. Your primary care clinician is listed as Rony Rees. If you have any questions after today's visit, please call [phone].

## 2018-04-30 NOTE — PROGRESS NOTES
Coordination of Care Questions    1. Have you been to the ER, urgent care clinic since your last visit? No       Hospitalized since your last visit? No    2. Have you seen or consulted any other health care providers outside of the [address] [place] [address] since your last visit? Include any pap smears or colon screening.  No

## 2018-04-30 NOTE — TELEPHONE ENCOUNTER
Patient called in because she is in a great deal of pain and will be having back surgery soon. She would like a call back for some advice on whether she should go through with this surgery.

## 2018-04-30 NOTE — PROGRESS NOTES
Chief Complaint   Patient presents with    Back Pain     lumbar fusion, using Aleve OTC along with a cane or walker     Seeing DR Pedersen and Tamie Tomlinson, propose new surgery in June vs implantable pain stimulator    Pain severe for weeks mostly left 2 shots not helpng consider spinal cord stimyltor    4 aleve for day lately not much relief      Patient Active Problem List    Diagnosis    Biliary colic    Spinal stenosis    Lumbar disc disease with radiculopathy     Pain management and PT  Trans foraminal injections not helping      Osteoporosis     . DEXA Results (most recent):    Results from Hospital Encounter encounter on 08/13/15   DEXA BONE DENSITY STUDY AXIAL   Narrative **Final Report**      ICD Codes / Adm. Diagnosis: V76.12  724.2 / Disorder of bone and cartilage    Lumbago  Examination:  MM DEXA AXIAL SKELETON  - [id number] - Aug 13 2015 10:45AM  Accession No:  [id number]  Reason:  screening      REPORT:  Bone Mineral Density    Indication: Monitoring, estrogen therapy  Age: [de-identified]  Sex: Female. Menopause status:         postmenopausal.  Hormone replacement therapy: yes/no     Risk factors for fall:   None   Risk factors for osteoporosis:  Tobacco use    Current medication for osteoporosis: Calcium, vitamin D, estrogen. Comparison: 2013    Technique: Imaging was performed on the [employer].          World Health   Organization meta-analysis fracture risk calculator (FRAX) analysis was   performed for 10 year fracture risk probability assessment    Excluded sites: L2 due to fracture, L1 due to degenerative changes    Findings:    Fractures identified on Lateral scanogram:  L2    Lumbar spine: L1-L3, excluding L2  Bone mineral density (gm/cm2): 1.113  % of peak bone mass: 94  % for age matched controls:  80  T score: -0.6  Z score:  1.5    Hip: Right femoral neck  Bone mineral density (gm/cm2):  0.718  % of peak bone mass: 69  % for age matched controls:  100  T score: -2.3  Z score:  0 IMPRESSION:    This patient is osteopenic using the World Health Organization criteria  As compared to the prior study, there has been a statistically significant   decrease in bone mineral density. 10 year probability of major osteoporotic fracture:  16.3%  10 year probability of hip fracture:  5.6%             Signing/Reading Doctor: Nawaf Craft ([id number])    Jake Dalton ([id number])  Aug 17 2015 10:59AM                                 Jan 2017 reclast  And annual      Pancreatic cyst    Visual loss    PAC (premature atrial contraction)     Normal echo and stress test for atypical chest pain      Rosacea    Anxiety    Hypercholesterolemia    GERD (gastroesophageal reflux disease)     Feels better on high dose       Cancer (HCC)     skin cancer      Hyperlipidemia LDL goal <130     Vitals:    04/30/18 1437   BP: 149/82   Pulse: 90   Resp: 14   SpO2: 98%   Weight: 134 lb (60.8 kg)   Height: 5' 5\" (1.651 m)     Uses a cane for ambulation support and balance  Slowly mobilizing with pain into thighs    1.  Lumbar disc disease with radiculopathy  Doing poorly  - REFERRAL TO NEUROSURGERY as second opinionTrial mobic 15 daily with tylenol 1000 to 2000 mg per day

## 2018-05-31 ENCOUNTER — OFFICE VISIT (OUTPATIENT)
Dept: INTERNAL MEDICINE CLINIC | Age: 83
End: 2018-05-31

## 2018-05-31 ENCOUNTER — HOSPITAL ENCOUNTER (OUTPATIENT)
Dept: LAB | Age: 83
Discharge: HOME | End: 2018-05-31
Payer: MEDICARE

## 2018-05-31 VITALS
WEIGHT: 131 LBS | DIASTOLIC BLOOD PRESSURE: 70 MMHG | RESPIRATION RATE: 14 BRPM | TEMPERATURE: 98.1 F | HEIGHT: 64 IN | SYSTOLIC BLOOD PRESSURE: 136 MMHG | BODY MASS INDEX: 22.36 KG/M2 | OXYGEN SATURATION: 99 % | HEART RATE: 79 BPM

## 2018-05-31 DIAGNOSIS — M81.0 OSTEOPOROSIS, UNSPECIFIED OSTEOPOROSIS TYPE, UNSPECIFIED PATHOLOGICAL FRACTURE PRESENCE: ICD-10-CM

## 2018-05-31 DIAGNOSIS — M48.061 SPINAL STENOSIS OF LUMBAR REGION, UNSPECIFIED WHETHER NEUROGENIC CLAUDICATION PRESENT: ICD-10-CM

## 2018-05-31 DIAGNOSIS — Z01.818 PREOP EXAM FOR INTERNAL MEDICINE: Primary | ICD-10-CM

## 2018-05-31 PROCEDURE — 36415 COLL VENOUS BLD VENIPUNCTURE: CPT

## 2018-05-31 PROCEDURE — 80048 BASIC METABOLIC PNL TOTAL CA: CPT

## 2018-05-31 PROCEDURE — 85025 COMPLETE CBC W/AUTO DIFF WBC: CPT

## 2018-05-31 RX ORDER — GABAPENTIN 100 MG/1
100 CAPSULE ORAL 3 TIMES DAILY
COMMUNITY
Start: 2018-05-07 | End: 2018-06-26

## 2018-05-31 NOTE — MR AVS SNAPSHOT
[address] [place] [address] Drive [address] [facility] 57 
[phone] Patient: Erika Panda MRN:  [id number] Visit Information Date & Time Provider Department Dept. Phone Encounter #  
 2018  2:15 PM Unique Gao, [facility] [phone] Your Appointments 2018  1:00 PM  
ESTABLISHED PATIENT with Dakota Garcia MD  
[address] [facility] ([address]) Appt Note: ae and f/u of HRT  
 [address], [place] [address] [facility] [address]  
[address], [address] [facility] Upcoming Health Maintenance Date Due DTaP/Tdap/Td series (1 - Tdap) 5/3/2007 GLAUCOMA SCREENING Q2Y 2016 MEDICARE YEARLY EXAM 2018 Influenza Age 5 to Adult 2018 Allergies as of 2018  Review Complete On: 2018 By: Richie Elaine LPN Severity Noted Reaction Type Reactions Adhesive Tape-silicones  /15/6578    Other (comments) \"SKIN IS SO FRAGILE\" Current Immunizations  Reviewed on 10/31/2017 Name Date Influenza Vaccine (Quad) PF 2016  1:05 PM  
 Pneumococcal Conjugate (PCV-13) 10/12/2016 TD Vaccine 2007 ZZZ-RETIRED (DO NOT USE) Pneumococcal Vaccine (Unspecified Type) 2005 Zoster 2007 Not reviewed this visit You Were Diagnosed With   
  
 Codes Comments Preop exam for internal medicine    -  Primary ICD-10-CM: Q18.692 ICD-9-CM: V72.83 Spinal stenosis of lumbar region, unspecified whether neurogenic claudication present     ICD-10-CM: M48.061 
ICD-9-CM: 724.02 Vitals BP Pulse Temp Resp Height(growth percentile) Weight(growth percentile) 136/70 79 98.1 °F (36.7 °C) (Oral) 14 5' 4\" (1.626 m) 131 lb (59.4 kg) SpO2 BMI OB Status Smoking Status 99% 22.49 kg/m2 Postmenopausal Former Smoker Vitals History BMI and BSA Data Body Mass Index Body Surface Area  
 22.49 kg/m 2 1.64 m 2 Preferred Pharmacy Pharmacy Name Phone [facility]  [phone] Your Updated Medication List  
  
   
This list is accurate as of 5/31/18  2:59 PM.  Always use your most recent med list.  
  
  
  
  
 aspirin delayed-release 81 mg tablet Take 81 mg by mouth daily. CALTRATE PLUS PO Take 1 Tab by mouth daily. Takes one po daily. doxycycline 100 mg capsule Commonly known as:  VIBRAMYCIN Take 1 capsule by mouth two times daily DULoxetine 60 mg capsule Commonly known as:  CYMBALTA TAKE 1 CAPSULE BY MOUTH  EVERY DAY  **waiting on mail order**  
  
 estradiol-norethindrone 0.5-0.1 mg per tablet Commonly known as:  AMABELZ Take 1 Tab by mouth daily. FISH OIL PO Take 1 Cap by mouth daily. gabapentin 100 mg capsule Commonly known as:  NEURONTIN Take 100 mg by mouth three (3) times daily. GenTeal Moderate 0.3 % Drop Generic drug:  artificial tears(hypromellose) Administer 1 Drop to both eyes nightly. meloxicam 15 mg tablet Commonly known as:  MOBIC Take 1 Tab by mouth daily. MULTIVITAMIN PO Take 1 Tab by mouth daily. Takes one po daily. omeprazole 40 mg capsule Commonly known as:  PRILOSEC  
TAKE 1 CAPSULE BY MOUTH  DAILY REFRESH LIQUIGEL 1 % Dlgl Generic drug:  carboxymethylcellulose sodium Apply  to eye daily. 1 DROP BOTH EYES  
  
 VITAMIN D3 2,000 unit Tab Generic drug:  cholecalciferol (vitamin D3) Take 2,000 Units by mouth daily. We Performed the Following CBC WITH AUTOMATED DIFF [28583 CPT(R)] METABOLIC PANEL, BASIC [82556 CPT(R)] Introducing [facility]! [facility] introduces AYLIEN patient portal. Now you can access parts of your medical record, email your doctor's office, and request medication refills online. 1. In your internet browser, go to [email or web address] 2. Click on the First Time User? Click Here link in the Sign In box. You will see the New Member Sign Up page. 3. Enter your Eltechs Access Code exactly as it appears below. You will not need to use this code after youve completed the sign-up process. If you do not sign up before the expiration date, you must request a new code. · Eltechs Access Code: [email or web address] Expires: 7/29/2018  3:06 PM 
 
4. Enter the last four digits of your Social Security Number (xxxx) and Date of Birth (mm/dd/yyyy) as indicated and click Submit. You will be taken to the next sign-up page. 5. Create a Trendientt ID. This will be your Eltechs login ID and cannot be changed, so think of one that is secure and easy to remember. 6. Create a Eltechs password. You can change your password at any time. 7. Enter your Password Reset Question and Answer. This can be used at a later time if you forget your password. 8. Enter your e-mail address. You will receive e-mail notification when new information is available in [address] 9. Click Sign Up. You can now view and download portions of your medical record. 10. Click the Download Summary menu link to download a portable copy of your medical information. If you have questions, please visit the Frequently Asked Questions section of the Eltechs website. Remember, Eltechs is NOT to be used for urgent needs. For medical emergencies, dial 911. Now available from your iPhone and Android! Please provide this summary of care documentation to your next provider. Your primary care clinician is listed as Linda Brooks. If you have any questions after today's visit, please call [phone].

## 2018-05-31 NOTE — PROGRESS NOTES
Coordination of Care Questions    1. Have you been to the ER, urgent care clinic since your last visit? No       Hospitalized since your last visit? No    2. Have you seen or consulted any other health care providers outside of the [facility] since your last visit? Include any pap smears or colon screening.  No

## 2018-05-31 NOTE — PROGRESS NOTES
Indio Her was referred for evaluation by:Dr. Emily Deshpande for Pre- Op Evaluation. Please see encounter details and orders for consultative summary. Type of surgery : lumbar  Surgery site : [facility]  Anesthesia type: general  Date of procedure:  6/20/18    Allergies: Allergies   Allergen Reactions    Adhesive Tape-Silicones Other (comments)     \"SKIN IS SO FRAGILE\"     Latex allergy: no  Prior reactions to anesthesia:  None    Functional status:  she is able to ambulate up no flights of step withno shortness of breath, chest pain  Prior cardiac evaluation:   Yes dr Britt Oppenheim    Current Outpatient Prescriptions   Medication Sig    gabapentin (NEURONTIN) 100 mg capsule Take 100 mg by mouth three (3) times daily.  meloxicam (MOBIC) 15 mg tablet Take 1 Tab by mouth daily.  DULoxetine (CYMBALTA) 60 mg capsule TAKE 1 CAPSULE BY MOUTH  EVERY DAY  **waiting on mail order**    estradiol-norethindrone (AMABELZ) 0.5-0.1 mg per tablet Take 1 Tab by mouth daily.  aspirin delayed-release 81 mg tablet Take 81 mg by mouth daily.  omeprazole (PRILOSEC) 40 mg capsule TAKE 1 CAPSULE BY MOUTH  DAILY    doxycycline (VIBRAMYCIN) 100 mg capsule Take 1 capsule by mouth two times daily (Patient taking differently: Take 1 capsule by mouth two times daily. TKAES 1 TAB DAILY)    DOCOSAHEXANOIC ACID/EPA (FISH OIL PO) Take 1 Cap by mouth daily.  carboxymethylcellulose sodium (REFRESH LIQUIGEL) 1 % dlgl Apply  to eye daily. 1 DROP BOTH EYES    artificial tears,hypromellose, (GENTEAL MODERATE) 0.3 % drop Administer 1 Drop to both eyes nightly.  cholecalciferol, vitamin D3, (VITAMIN D3) 2,000 unit Tab Take 2,000 Units by mouth daily.  MULTIVITAMIN PO Take 1 Tab by mouth daily. Takes one po daily.  CALCIUM CARB/VIT D3/MINERALS (CALTRATE PLUS PO) Take 1 Tab by mouth daily. Takes one po daily. No current facility-administered medications for this visit.       Past Medical History:   Diagnosis Date    Arthritis     Biliary colic 68/34/5876    Cancer ([facility] [address]     skin cancer - basal and squamous cell, LEGS, FACE    GERD (gastroesophageal reflux disease)     Hypercholesterolemia     PATIENT DENIES    Osteopenia     PAC (premature atrial contraction) 7/9/2013    Pancreatic cyst 7/14/2015    Psychiatric disorder     anxiety    Urinary frequency     Visual loss 1/21/2015     Past Surgical History:   Procedure Laterality Date    CARDIAC SURG PROCEDURE UNLIST  2/11    normal stress test    ENDOSCOPY, COLON, DIAGNOSTIC  2008    HX APPENDECTOMY  2000    HX BACK SURGERY      KYPHOPLASTY    HX CATARACT REMOVAL Bilateral 2012    HX CHOLECYSTECTOMY  12/15/2017    Lap Rachel by Dr. Malena Arvizu HX GI  2008    EGD    HX GI      COLONOSCOPY    HX GYN      UTERINE BIOPSY    HX LUMBAR FUSION  2016    HX LUMBAR LAMINECTOMY  2016    HX ORTHOPAEDIC Right 2010    knee - arthroscopy    HX ORTHOPAEDIC Right march 2011    TKR  right    HX OTHER SURGICAL  11/2017    karina. leg skin cancer surgery    HX TONSILLECTOMY      HX TUBAL LIGATION  1976     Social History   Substance Use Topics    Smoking status: Former Smoker     Quit date: 8/5/1956    Smokeless tobacco: Never Used      Comment: former cigarette smoker    Alcohol use 7.0 oz/week     7 Glasses of wine, 7 Shots of liquor per week     Has not had reclast since jan 2017  Blood pressure 136/70, pulse 79, temperature 98.1 °F (36.7 °C), temperature source Oral, resp. rate 14, height 5' 4\" (1.626 m), weight 131 lb (59.4 kg), SpO2 99 %. S1 and S2 normal, no murmurs, clicks, gallops or rubs. Regular rate and rhythm. Chest is clear; no wheezes or rales. No edema or JVD. The abdomen is soft without tenderness, guarding, mass, rebound or organomegaly. Bowel sounds are normal. No CVA tenderness or inguinal adenopathy noted. Gait antalgic  Alert oriented    1.  Preop exam for internal medicine  Clear for surgery to see cardiology too  - CBC WITH AUTOMATED DIFF  - METABOLIC PANEL, BASIC    2. Spinal stenosis of lumbar region, unspecified whether neurogenic claudication present      3.  Osteoporosis, unspecified osteoporosis type, unspecified pathological fracture presence  Missed IV reclast 1/18 redo in fall 2018

## 2018-06-01 LAB
BASOPHILS # BLD AUTO: 0 X10E3/UL (ref 0–0.2)
BASOPHILS NFR BLD AUTO: 0 %
BUN SERPL-MCNC: 16 MG/DL (ref 8–27)
BUN/CREAT SERPL: 25 (ref 12–28)
CALCIUM SERPL-MCNC: 9.3 MG/DL (ref 8.7–10.3)
CHLORIDE SERPL-SCNC: 100 MMOL/L (ref 96–106)
CO2 SERPL-SCNC: 27 MMOL/L (ref 18–29)
CREAT SERPL-MCNC: 0.63 MG/DL (ref 0.57–1)
EOSINOPHIL # BLD AUTO: 0.1 X10E3/UL (ref 0–0.4)
EOSINOPHIL NFR BLD AUTO: 2 %
ERYTHROCYTE [DISTWIDTH] IN BLOOD BY AUTOMATED COUNT: 13.3 % (ref 12.3–15.4)
GFR SERPLBLD CREATININE-BSD FMLA CKD-EPI: 83 ML/MIN/1.73
GFR SERPLBLD CREATININE-BSD FMLA CKD-EPI: 96 ML/MIN/1.73
GLUCOSE SERPL-MCNC: 83 MG/DL (ref 65–99)
HCT VFR BLD AUTO: 42.1 % (ref 34–46.6)
HGB BLD-MCNC: 13.9 G/DL (ref 11.1–15.9)
IMM GRANULOCYTES # BLD: 0 X10E3/UL (ref 0–0.1)
IMM GRANULOCYTES NFR BLD: 0 %
LYMPHOCYTES # BLD AUTO: 1.7 X10E3/UL (ref 0.7–3.1)
LYMPHOCYTES NFR BLD AUTO: 29 %
MCH RBC QN AUTO: 31.2 PG (ref 26.6–33)
MCHC RBC AUTO-ENTMCNC: 33 G/DL (ref 31.5–35.7)
MCV RBC AUTO: 95 FL (ref 79–97)
MONOCYTES # BLD AUTO: 0.5 X10E3/UL (ref 0.1–0.9)
MONOCYTES NFR BLD AUTO: 8 %
NEUTROPHILS # BLD AUTO: 3.5 X10E3/UL (ref 1.4–7)
NEUTROPHILS NFR BLD AUTO: 61 %
PLATELET # BLD AUTO: 176 X10E3/UL (ref 150–379)
POTASSIUM SERPL-SCNC: 4.7 MMOL/L (ref 3.5–5.2)
RBC # BLD AUTO: 4.45 X10E6/UL (ref 3.77–5.28)
SODIUM SERPL-SCNC: 140 MMOL/L (ref 134–144)
WBC # BLD AUTO: 5.8 X10E3/UL (ref 3.4–10.8)

## 2018-06-05 ENCOUNTER — OFFICE VISIT (OUTPATIENT)
Dept: OBGYN CLINIC | Age: 83
End: 2018-06-05

## 2018-06-05 VITALS
WEIGHT: 130.2 LBS | SYSTOLIC BLOOD PRESSURE: 128 MMHG | HEIGHT: 64 IN | BODY MASS INDEX: 22.23 KG/M2 | DIASTOLIC BLOOD PRESSURE: 82 MMHG

## 2018-06-05 DIAGNOSIS — N64.89 NIPPLE CRUSTING: Primary | ICD-10-CM

## 2018-06-05 DIAGNOSIS — M81.0 OSTEOPOROSIS, UNSPECIFIED OSTEOPOROSIS TYPE, UNSPECIFIED PATHOLOGICAL FRACTURE PRESENCE: ICD-10-CM

## 2018-06-05 DIAGNOSIS — Z01.411 ENCOUNTER FOR GYNECOLOGICAL EXAMINATION WITH ABNORMAL FINDING: ICD-10-CM

## 2018-06-05 NOTE — PROGRESS NOTES
Annual exam    Gricelda Mccarthy is a 80 y.o.  A1 postmenopausal  female presenting for annual exam. She previously presented for consultation about discontinuing HRT. She had previously followed with Dr. Bibi Gallo and had been on AMABELZ combined HRT for many years due to osteoporosis. Denies any issues with VMS or vaginal dryness. She has had compression fx of the spine in the past and she is scheduled for spinal fusion surgery on . She has done reclast infusions and takes vit D and calcium. Did not do well with bisphosphonates. Weight bearing exercise limited due to back and joint pains. Pt otherwise doing well. She did have episode of PMB approx 3 years ago around the time her HRT prescription changed, EMBx was benign and she has not had any further vaginal bleeding since that time. At prior visit, we reduced her dose of AMABELZ with goal of weaning of HRT altogether, as this is not considered fist line tx for osteoporosis and risks felt to outweigh benefits. Pt has not had any symptoms or side effects since decreasing her dose and would like to stop HRT completely at this time. Only other complaint today is left nipple crusting for several years. Ob/Gyn Hx:   A1 - 2 vaginal deliveries, 1 miscarriage  Menopause- age 46  ? VMS- denies  ? Vag dryness- denies  ? HRT- yes, for may years  STI- denies  ? SA- no     Health maintenance:  Pap- many years ago, per patient no history of abnormal  Mammo- 17 B1  Colonoscopy- per patient no longer needed  Dexa- 2015 osteoporosis    Past Medical History:   Diagnosis Date    Arthritis     Biliary colic     Cancer ([facility] [address]     skin cancer - basal and squamous cell, LEGS, FACE    GERD (gastroesophageal reflux disease)     Hypercholesterolemia     PATIENT DENIES    Osteopenia     PAC (premature atrial contraction) 2013    Pancreatic cyst 2015    Psychiatric disorder     anxiety    Urinary frequency     Visual loss 1/21/2015       Past Surgical History:   Procedure Laterality Date    CARDIAC SURG PROCEDURE UNLIST  2/11    normal stress test    ENDOSCOPY, COLON, DIAGNOSTIC  2008    HX APPENDECTOMY  2000    HX BACK SURGERY      KYPHOPLASTY    HX CATARACT REMOVAL Bilateral 2012    HX CHOLECYSTECTOMY  12/15/2017    Lap Rachel by Dr. Linette Goldberg HX GI  2008    EGD    HX GI      COLONOSCOPY    HX GYN      UTERINE BIOPSY    HX LUMBAR FUSION  2016    HX LUMBAR LAMINECTOMY  2016    HX ORTHOPAEDIC Right 2010    knee - arthroscopy    HX ORTHOPAEDIC Right march 2011    TKR  right    HX OTHER SURGICAL  11/2017    karina. leg skin cancer surgery    HX TONSILLECTOMY      HX TUBAL LIGATION  1976       Family History   Problem Relation Age of Onset    Stroke Mother     Hypertension Mother     Other Mother      aneurysm - aorta    Cancer Father      stomach AND ESOPHAGEAL cancer    Stroke Maternal Grandmother     Other Son      BLOOD CLOT IN AORTA AND POST OP DVT    Anesth Problems Neg Hx        Social History     Social History    Marital status:      Spouse name: N/A    Number of children: N/A    Years of education: N/A     Occupational History    Not on file. Social History Main Topics    Smoking status: Former Smoker     Quit date: 8/5/1956    Smokeless tobacco: Never Used      Comment: former cigarette smoker    Alcohol use 7.0 oz/week     7 Glasses of wine, 7 Shots of liquor per week    Drug use: No    Sexual activity: Not Currently     Partners: Male     Other Topics Concern    Not on file     Social History Narrative       Current Outpatient Prescriptions   Medication Sig Dispense Refill    gabapentin (NEURONTIN) 100 mg capsule Take 100 mg by mouth three (3) times daily.  meloxicam (MOBIC) 15 mg tablet Take 1 Tab by mouth daily.  30 Tab 2    DULoxetine (CYMBALTA) 60 mg capsule TAKE 1 CAPSULE BY MOUTH  EVERY DAY  **waiting on mail order** 15 Cap 0    estradiol-norethindrone (AMABELZ) 0.5-0.1 mg per tablet Take 1 Tab by mouth daily. 30 Tab 2    aspirin delayed-release 81 mg tablet Take 81 mg by mouth daily.  omeprazole (PRILOSEC) 40 mg capsule TAKE 1 CAPSULE BY MOUTH  DAILY 90 Cap 1    doxycycline (VIBRAMYCIN) 100 mg capsule Take 1 capsule by mouth two times daily (Patient taking differently: Take 1 capsule by mouth two times daily. TKAES 1 TAB DAILY) 180 Cap 0    DOCOSAHEXANOIC ACID/EPA (FISH OIL PO) Take 1 Cap by mouth daily.  carboxymethylcellulose sodium (REFRESH LIQUIGEL) 1 % dlgl Apply  to eye daily. 1 DROP BOTH EYES      artificial tears,hypromellose, (GENTEAL MODERATE) 0.3 % drop Administer 1 Drop to both eyes nightly.  cholecalciferol, vitamin D3, (VITAMIN D3) 2,000 unit Tab Take 2,000 Units by mouth daily.  MULTIVITAMIN PO Take 1 Tab by mouth daily. Takes one po daily.  CALCIUM CARB/VIT D3/MINERALS (CALTRATE PLUS PO) Take 1 Tab by mouth daily. Takes one po daily.          Allergies   Allergen Reactions    Adhesive Tape-Silicones Other (comments)     \"SKIN IS SO FRAGILE\"       Review of Systems - History obtained from the patient  Constitutional: negative for weight loss, fever, night sweats  HEENT: negative for hearing loss, earache, congestion, snoring, sorethroat  CV: negative for chest pain, palpitations, edema  Resp: negative for cough, shortness of breath, wheezing  GI: negative for change in bowel habits, abdominal pain, black or bloody stools  : negative for frequency, dysuria, hematuria, vaginal discharge  MSK: +chronic back pain  Breast: negative for breast lumps, nipple discharge, galactorrhea, +nipple crusting  Skin :negative for itching, rash, hives  Neuro: negative for dizziness, headache, confusion, weakness  Psych: negative for anxiety, depression, change in mood  Heme/lymph: negative for bleeding, bruising, pallor    Physical Exam    Visit Vitals    /82 (BP 1 Location: Left arm, BP Patient Position: Sitting)    Ht 5' 4\" (1.626 m)    Wt 130 lb 3.2 oz (59.1 kg)    BMI 22.35 kg/m2       Constitutional  · Appearance: well-nourished, well developed, alert, in no acute distress    HENT  · Head and Face: appears normal    Neck  · Inspection/Palpation: normal appearance, no masses or tenderness  · Lymph Nodes: no lymphadenopathy present  · Thyroid: gland size normal, nontender, no nodules or masses present on palpation    Chest  · Respiratory Effort: non-labored breathing  · Auscultation: CTAB, normal breath sounds    Cardiovascular  · Heart:  · Auscultation: regular rate and rhythm without murmur  · Extremities: no peripheral edema    Breasts  · Inspection of Breasts: breasts symmetrical, +left nipple with inversion and crusting of nipple, otherwise normal appearing breasts  · Palpation of Breasts and Axillae: no masses present on palpation, no breast tenderness  · Axillary Lymph Nodes: no lymphadenopathy present    Gastrointestinal  · Abdominal Examination: abdomen non-tender to palpation, normal bowel sounds, no masses present  · Liver and spleen: no hepatomegaly present, spleen not palpable  · Hernias: no hernias identified    Genitourinary  · External Genitalia: normal appearance for age, no discharge present, no tenderness present, no inflammatory lesions present, no masses present, +atrophy of UG mucosa  · Vagina: normal vaginal vault without central or paravaginal defects, no discharge present, no inflammatory lesions present, no masses present  · Bladder: non-tender to palpation  · Urethra: appears normal  · Cervix: normal   · Uterus: normal size, shape and consistency, small, mobile  · Adnexa: no adnexal tenderness present, no adnexal masses present  · Perineum: perineum within normal limits, no evidence of trauma, no rashes or skin lesions present    Skin  · General Inspection: no rash, no lesions identified    Neurologic/Psychiatric  · Mental Status:  · Orientation: grossly oriented to person, place and time  · Mood and Affect: mood normal, affect appropriate      Assessment/Plan:  80 y.o. postmenopausal female presenting for annual exam. Overall doing well.      Health Maintenance:  -counseled re: diet, exercise, healthy lifestyle  -pap/HPV - beyond recommended age for screening paps  -refer to breast center today for evaluation of nipple inversion and crusting --> and for either screening vs. Diagnostic mammogram based on their findings   -advised repeat dexa scan with PCP --> known osteoporosis and fragility fractures --> pt getting reclast infusions and is taking vit D and calcium, weight bearing exercise limited by back pain  -discontinue HRT at this time (take pill every other day for next 2 weeks until completion, then may discontinue pills altogether)    RTC: 1 year for annual wellness assessment, or sooner rajiv Parsons MD  6/5/2018  1:55 PM

## 2018-06-05 NOTE — PATIENT INSTRUCTIONS
Pelvic Exam: Care Instructions  Your Care Instructions    When your doctor examines all of your pelvic organs, it's called a pelvic exam. Two good reasons to have this kind of exam are to check for sexually transmitted infections (STIs) and to get a Pap test. A Pap test is also called a Pap smear. It checks for early changes that can lead to cancer of the cervix. Sometimes a pelvic exam is part of a regular checkup. In this case, you can do some things to make your test results as accurate as possible. · Try to schedule the exam when you don't have your period. · Don't use douches, tampons, or vaginal medicines, sprays, or powders for 24 hours before your exam.  · Don't have sex for 24 hours before your exam.  Other times, women have this kind of exam at any time of the month. This is because they have pelvic pain, bleeding, or discharge. Or they may have another pelvic problem. Before your exam, it's important to share some information with your doctor. For example, if you are a survivor of rape or sexual abuse, you can talk about any concerns you may have. Your doctor will also want to know if you are pregnant or use birth control. And he or she will want to hear about any problems, surgeries, or procedures you have had in your pelvic area. You will also need to tell your doctor when your last period was. Follow-up care is a key part of your treatment and safety. Be sure to make and go to all appointments, and call your doctor if you are having problems. It's also a good idea to know your test results and keep a list of the medicines you take. How is a pelvic exam done? · During a pelvic exam, you will:  ¨ Take off your clothes below the waist. You will get a paper or cloth cover to put over the lower half of your body. Paddy Christophe on your back on an exam table. Your feet will be raised above you. Stirrups will support your feet. · The doctor will:  Skylar Bright you to relax your knees.  Your knees need to lean out, toward the walls. ¨ Check the opening of your vagina for sores or swelling. ¨ Gently put a tool called a speculum into your vagina. It opens the vagina a little bit. You will feel some pressure. But if you are relaxed, it will not hurt. It lets your doctor see inside the vagina. ¨ Use a small brush, spatula, or swab to get a sample of cells, if you are having a Pap test or culture. The doctor then removes the speculum. ¨ Put on gloves and put one or two fingers of one hand into your vagina. The other hand goes on your lower belly. This lets your doctor feel your pelvic organs. You will probably feel some pressure. Try to stay relaxed. ¨ Put one gloved finger into your rectum and one into your vagina, if needed. This can also help check your pelvic organs. This exam takes about 10 minutes. At the end, you will get a washcloth or tissue to clean your vaginal area. It's normal to have some discharge after this exam. You can then get dressed. Some test results may be ready right away. But results from a culture or a Pap test may take several days or a few weeks. Why should you have a pelvic exam?  · You want to have recommended screening tests. This includes a Pap test.  · You think you have a vaginal infection. Signs include itching, burning, or unusual discharge. · You might have been exposed to a sexually transmitted infection (STI), such as chlamydia or herpes. · You have vaginal bleeding that is not part of your normal menstrual period. · You have pain in your belly or pelvis. · You have been sexually assaulted. A pelvic exam lets your doctor collect evidence and check for STIs. · You are pregnant. · You are having trouble getting pregnant. What are the risks of a pelvic exam?  There are no risks from a pelvic exam.  When should you call for help? Watch closely for changes in your health, and be sure to contact your doctor if you have any problems. Where can you learn more?   Go to http://lyndsey-miriam.info/. Enter T460 in the search box to learn more about \"Pelvic Exam: Care Instructions. \"  Current as of: October 13, 2016  Content Version: 11.4  © 1070-8512 Healthwise, [employer]. Care instructions adapted under license by [employer] (which disclaims liability or warranty for this information). If you have questions about a medical condition or this instruction, always ask your healthcare professional. [place] [address] any warranty or liability for your use of this information.

## 2018-06-12 ENCOUNTER — HOSPITAL ENCOUNTER (OUTPATIENT)
Dept: LAB | Age: 83
Discharge: HOME | End: 2018-06-12

## 2018-06-12 ENCOUNTER — OFFICE VISIT (OUTPATIENT)
Dept: SURGERY | Age: 83
End: 2018-06-12

## 2018-06-12 VITALS
WEIGHT: 129 LBS | HEIGHT: 64 IN | SYSTOLIC BLOOD PRESSURE: 161 MMHG | BODY MASS INDEX: 22.02 KG/M2 | HEART RATE: 88 BPM | DIASTOLIC BLOOD PRESSURE: 72 MMHG

## 2018-06-12 DIAGNOSIS — N64.52 DISCHARGE FROM LEFT NIPPLE: Primary | ICD-10-CM

## 2018-06-12 NOTE — MR AVS SNAPSHOT
[address] [facility] 
 
 
 [address] [facility] 
[phone] Patient: Yaneth Terry MRN:  :[id number] Visit Information Date & Time Provider Department Dept. Phone Encounter #  
 2018 10:30 AM Chante Xavier, [address] [place] [phone] [id number] Your Appointments 10/16/2018  1:45 PM  
Follow Up with Chante Xavier MD  
[address] [facility] ([facility]) Appt Note: 4 month follow up per dr anaya/ cp$0 18  [address] [facility] [address]  
  
   
 [address] [place] [address] [facility] Upcoming Health Maintenance Date Due DTaP/Tdap/Td series (1 - Tdap) 5/3/2007 GLAUCOMA SCREENING Q2Y 2016 MEDICARE YEARLY EXAM 2018 Influenza Age 5 to Adult 2018 Allergies as of 2018  Review Complete On: 2018 By: Nafisa Keller RN Severity Noted Reaction Type Reactions Adhesive Tape-silicones      Other (comments) \"SKIN IS SO FRAGILE\" Current Immunizations  Reviewed on 10/31/2017 Name Date Influenza Vaccine (Quad) PF 2016  1:05 PM  
 Pneumococcal Conjugate (PCV-13) 10/12/2016 TD Vaccine 2007 ZZZ-RETIRED (DO NOT USE) Pneumococcal Vaccine (Unspecified Type) 2005 Zoster 2007 Not reviewed this visit You Were Diagnosed With   
  
 Codes Comments Discharge from left nipple    -  Primary ICD-10-CM: N64.52 
ICD-9-CM: 611.79 Vitals BP Pulse Height(growth percentile) Weight(growth percentile) BMI OB Status 161/72 88 5' 4\" (1.626 m) 129 lb (58.5 kg) 22.14 kg/m2 Postmenopausal  
 Smoking Status Former Smoker BMI and BSA Data Body Mass Index Body Surface Area  
 22.14 kg/m 2 1.63 m 2 Preferred Pharmacy Pharmacy Name Phone [address] [place] [address] [phone] Your Updated Medication List  
  
   
This list is accurate as of 6/12/18  3:41 PM.  Always use your most recent med list.  
  
  
  
  
 aspirin delayed-release 81 mg tablet Take 81 mg by mouth daily. CALTRATE PLUS PO Take 1 Tab by mouth daily. Takes one po daily. doxycycline 100 mg capsule Commonly known as:  VIBRAMYCIN Take 1 capsule by mouth two times daily DULoxetine 60 mg capsule Commonly known as:  CYMBALTA TAKE 1 CAPSULE BY MOUTH  EVERY DAY  **waiting on mail order**  
  
 estradiol-norethindrone 0.5-0.1 mg per tablet Commonly known as:  AMABELZ Take 1 Tab by mouth daily. FISH OIL PO Take 1 Cap by mouth daily. gabapentin 100 mg capsule Commonly known as:  NEURONTIN Take 100 mg by mouth three (3) times daily. GenTeal Moderate 0.3 % Drop Generic drug:  artificial tears(hypromellose) Administer 1 Drop to both eyes nightly. meloxicam 15 mg tablet Commonly known as:  MOBIC Take 1 Tab by mouth daily. MULTIVITAMIN PO Take 1 Tab by mouth daily. Takes one po daily. omeprazole 40 mg capsule Commonly known as:  PRILOSEC  
TAKE 1 CAPSULE BY MOUTH  DAILY REFRESH LIQUIGEL 1 % Dlgl Generic drug:  carboxymethylcellulose sodium Apply  to eye daily. 1 DROP BOTH EYES  
  
 VITAMIN D3 2,000 unit Tab Generic drug:  cholecalciferol (vitamin D3) Take 2,000 Units by mouth daily. We Performed the Following SURGICAL PATHOLOGY [RHP2371 Custom] Patient Instructions Breast Self-Exam: Care Instructions Your Care Instructions A breast self-exam is when you check your breasts for lumps or changes. This regular exam helps you learn how your breasts normally look and feel. Most breast problems or changes are not because of cancer.  
Breast self-exam is not a substitute for a mammogram. Having regular breast exams by your doctor and regular mammograms improve your chances of finding any problems with your breasts. Some women set a time each month to do a step-by-step breast self-exam. Other women like a less formal system. They might look at their breasts as they brush their teeth, or feel their breasts once in a while in the shower. If you notice a change in your breast, tell your doctor. Follow-up care is a key part of your treatment and safety. Be sure to make and go to all appointments, and call your doctor if you are having problems. It's also a good idea to know your test results and keep a list of the medicines you take. How do you do a breast self-exam? 
· The best time to examine your breasts is usually one week after your menstrual period begins. Your breasts should not be tender then. If you do not have periods, you might do your exam on a day of the month that is easy to remember. · To examine your breasts: ¨ Remove all your clothes above the waist and lie down. When you are lying down, your breast tissue spreads evenly over your chest wall, which makes it easier to feel all your breast tissue. ¨ Use the pads-not the fingertips-of the 3 middle fingers of your left hand to check your right breast. Move your fingers slowly in small coin-sized circles that overlap. ¨ Use three levels of pressure to feel of all your breast tissue. Use light pressure to feel the tissue close to the skin surface. Use medium pressure to feel a little deeper. Use firm pressure to feel your tissue close to your breastbone and ribs. Use each pressure level to feel your breast tissue before moving on to the next spot. ¨ Check your entire breast, moving up and down as if following a strip from the collarbone to the bra line, and from the armpit to the ribs. Repeat until you have covered the entire breast. 
¨ Repeat this procedure for your left breast, using the pads of the 3 middle fingers of your right hand. · To examine your breasts while in the shower: 
¨ Place one arm over your head and lightly soap your breast on that side. ¨ Using the pads of your fingers, gently move your hand over your breast (in the strip pattern described above), feeling carefully for any lumps or changes. ¨ Repeat for the other breast. 
· Have your doctor inspect anything you notice to see if you need further testing. Where can you learn more? Go to http://lyndsey-miriam.info/. Enter P148 in the search box to learn more about \"Breast Self-Exam: Care Instructions. \" Current as of: May 12, 2017 Content Version: 11.4 © 3788-9696 [employer]. Care instructions adapted under license by [employer] (which disclaims liability or warranty for this information). If you have questions about a medical condition or this instruction, always ask your healthcare professional. [place] [address] any warranty or liability for your use of this information. Breast Self-Exam: Care Instructions Your Care Instructions A breast self-exam is when you check your breasts for lumps or changes. This regular exam helps you learn how your breasts normally look and feel. Most breast problems or changes are not because of cancer. Breast self-exam is not a substitute for a mammogram. Having regular breast exams by your doctor and regular mammograms improve your chances of finding any problems with your breasts. Some women set a time each month to do a step-by-step breast self-exam. Other women like a less formal system. They might look at their breasts as they brush their teeth, or feel their breasts once in a while in the shower. If you notice a change in your breast, tell your doctor. Follow-up care is a key part of your treatment and safety. Be sure to make and go to all appointments, and call your doctor if you are having problems.  It's also a good idea to know your test results and keep a list of the medicines you take. How do you do a breast self-exam? 
· The best time to examine your breasts is usually one week after your menstrual period begins. Your breasts should not be tender then. If you do not have periods, you might do your exam on a day of the month that is easy to remember. · To examine your breasts: ¨ Remove all your clothes above the waist and lie down. When you are lying down, your breast tissue spreads evenly over your chest wall, which makes it easier to feel all your breast tissue. ¨ Use the pads-not the fingertips-of the 3 middle fingers of your left hand to check your right breast. Move your fingers slowly in small coin-sized circles that overlap. ¨ Use three levels of pressure to feel of all your breast tissue. Use light pressure to feel the tissue close to the skin surface. Use medium pressure to feel a little deeper. Use firm pressure to feel your tissue close to your breastbone and ribs. Use each pressure level to feel your breast tissue before moving on to the next spot. ¨ Check your entire breast, moving up and down as if following a strip from the collarbone to the bra line, and from the armpit to the ribs. Repeat until you have covered the entire breast. 
¨ Repeat this procedure for your left breast, using the pads of the 3 middle fingers of your right hand. · To examine your breasts while in the shower: 
¨ Place one arm over your head and lightly soap your breast on that side. ¨ Using the pads of your fingers, gently move your hand over your breast (in the strip pattern described above), feeling carefully for any lumps or changes. ¨ Repeat for the other breast. 
· Have your doctor inspect anything you notice to see if you need further testing. Where can you learn more? Go to http://lyndsey-miriam.info/. Enter P148 in the search box to learn more about \"Breast Self-Exam: Care Instructions. \" Current as of: May 12, 2017 Content Version: 11.4 © 8789-2744 [employer]. Care instructions adapted under license by [employer] (which disclaims liability or warranty for this information). If you have questions about a medical condition or this instruction, always ask your healthcare professional. [address] any warranty or liability for your use of this information. Introducing [facility]! [employer] introduces AetherPal patient portal. Now you can access parts of your medical record, email your doctor's office, and request medication refills online. 1. In your internet browser, go to [email or web address] 2. Click on the First Time User? Click Here link in the Sign In box. You will see the New Member Sign Up page. 3. Enter your AetherPal Access Code exactly as it appears below. You will not need to use this code after youve completed the sign-up process. If you do not sign up before the expiration date, you must request a new code. · AetherPal Access Code: [email or web address] Expires: 7/29/2018  3:06 PM 
 
4. Enter the last four digits of your Social Security Number (xxxx) and Date of Birth (mm/dd/yyyy) as indicated and click Submit. You will be taken to the next sign-up page. 5. Create a AetherPal ID. This will be your AetherPal login ID and cannot be changed, so think of one that is secure and easy to remember. 6. Create a AetherPal password. You can change your password at any time. 7. Enter your Password Reset Question and Answer. This can be used at a later time if you forget your password. 8. Enter your e-mail address. You will receive e-mail notification when new information is available in [address] 9. Click Sign Up. You can now view and download portions of your medical record. 10. Click the Download Summary menu link to download a portable copy of your medical information.  
 
If you have questions, please visit the Frequently Asked Questions section of the [email or web address]. Remember, Nutrinohart is NOT to be used for urgent needs. For medical emergencies, dial 911. Now available from your iPhone and Android! Please provide this summary of care documentation to your next provider. Your primary care clinician is listed as Doreatha Galeazzi. If you have any questions after today's visit, please call [phone].

## 2018-06-12 NOTE — PROGRESS NOTES
HISTORY OF PRESENT ILLNESS  Matty Kent is a 80 y.o. female. HPI  NEW patient consult referred by Dr. Ayanna Rob for inverted LEFT nipple and nipple discharge. Patient has LEFT nipple inversion for at least 5 years and recently noticed some skin colored crust on her nipple. She is not sure why she is here but has recently changed gyn. She has been weaning off estrogen replacement to stop it soon. Denies palpable lump or skin changes. No breast pain. Has severe back, hip, and leg pain and is having surgery soon. Obstetric History       T0      L0     SAB0   TAB0   Ectopic0   Molar0   Multiple0   Live Births0    Obstetric Comments   Menarche 15, LMP , # of children 2, age of 4st delivery 22, Hysterectomy/oophorectomy no/no, Breast bx no, history of breast feeding yes, BCP yes, Hormone therapy yes     Denies FH of breast or ovarian cancer. Mammogram, 2017, BIRADS 1    ROS    Physical Exam   Pulmonary/Chest: Right breast exhibits no inverted nipple, no mass, no nipple discharge, no skin change and no tenderness. Left breast exhibits inverted nipple (nipple can jenn. scab on nipple). Left breast exhibits no mass, no nipple discharge, no skin change and no tenderness. Breasts are symmetrical.   Lymphadenopathy:     She has no cervical adenopathy. She has no axillary adenopathy. Right: No supraclavicular adenopathy present. Left: No supraclavicular adenopathy present. BREAST ULTRASOUND  Indication: crusting on the LEFT nipple  Technique: The area was scanned using a high-frequency linear-array near-field transducer  Findings: Bilateral dilated subareolar milk ducts. No abnormal mass, lesion, or shadowing noted. Impression: Normal breast tissue   Disposition: No worrisome finding on ultrasound    Procedure:   The eschar was removed and sent to pathology  Pathology: Left nipple tissue, biopsy:       Fragments containing layers of keratin scale crust and keratin  debris; no viable cellular tissue is present for evaluation. ASSESSMENT and PLAN    ICD-10-CM ICD-9-CM    1. Discharge from left nipple N64.52 611.79 SURGICAL PATHOLOGY     1. Will see if I can get a tissue diagnosis from the eschar. The scabbing could be a benign process, or possibly Paget's disease or a very early breast cancer. 2. Pt is having back surgery next week. 3. If the biopsy is inconclusive, follow up in 4 months. If the crusting is still present I will do an incisional biopsy . No cells in sample  If the crusting persists, will do a nipple biopsy when patient comes to see me in October.

## 2018-06-12 NOTE — PATIENT INSTRUCTIONS
Breast Self-Exam: Care Instructions  Your Care Instructions    A breast self-exam is when you check your breasts for lumps or changes. This regular exam helps you learn how your breasts normally look and feel. Most breast problems or changes are not because of cancer. Breast self-exam is not a substitute for a mammogram. Having regular breast exams by your doctor and regular mammograms improve your chances of finding any problems with your breasts. Some women set a time each month to do a step-by-step breast self-exam. Other women like a less formal system. They might look at their breasts as they brush their teeth, or feel their breasts once in a while in the shower. If you notice a change in your breast, tell your doctor. Follow-up care is a key part of your treatment and safety. Be sure to make and go to all appointments, and call your doctor if you are having problems. It's also a good idea to know your test results and keep a list of the medicines you take. How do you do a breast self-exam?  · The best time to examine your breasts is usually one week after your menstrual period begins. Your breasts should not be tender then. If you do not have periods, you might do your exam on a day of the month that is easy to remember. · To examine your breasts:  ¨ Remove all your clothes above the waist and lie down. When you are lying down, your breast tissue spreads evenly over your chest wall, which makes it easier to feel all your breast tissue. ¨ Use the pads-not the fingertips-of the 3 middle fingers of your left hand to check your right breast. Move your fingers slowly in small coin-sized circles that overlap. ¨ Use three levels of pressure to feel of all your breast tissue. Use light pressure to feel the tissue close to the skin surface. Use medium pressure to feel a little deeper. Use firm pressure to feel your tissue close to your breastbone and ribs.  Use each pressure level to feel your breast tissue before moving on to the next spot. ¨ Check your entire breast, moving up and down as if following a strip from the collarbone to the bra line, and from the armpit to the ribs. Repeat until you have covered the entire breast.  ¨ Repeat this procedure for your left breast, using the pads of the 3 middle fingers of your right hand. · To examine your breasts while in the shower:  ¨ Place one arm over your head and lightly soap your breast on that side. ¨ Using the pads of your fingers, gently move your hand over your breast (in the strip pattern described above), feeling carefully for any lumps or changes. ¨ Repeat for the other breast.  · Have your doctor inspect anything you notice to see if you need further testing. Where can you learn more? Go to http://lyndsey-miriam.info/. Enter P148 in the search box to learn more about \"Breast Self-Exam: Care Instructions. \"  Current as of: May 12, 2017  Content Version: 11.4  © 8987-3450 [employer]. Care instructions adapted under license by [employer] (which disclaims liability or warranty for this information). If you have questions about a medical condition or this instruction, always ask your healthcare professional. [place] [address] any warranty or liability for your use of this information. Breast Self-Exam: Care Instructions  Your Care Instructions    A breast self-exam is when you check your breasts for lumps or changes. This regular exam helps you learn how your breasts normally look and feel. Most breast problems or changes are not because of cancer. Breast self-exam is not a substitute for a mammogram. Having regular breast exams by your doctor and regular mammograms improve your chances of finding any problems with your breasts. Some women set a time each month to do a step-by-step breast self-exam. Other women like a less formal system.  They might look at their breasts as they brush their teeth, or feel their breasts once in a while in the shower. If you notice a change in your breast, tell your doctor. Follow-up care is a key part of your treatment and safety. Be sure to make and go to all appointments, and call your doctor if you are having problems. It's also a good idea to know your test results and keep a list of the medicines you take. How do you do a breast self-exam?  · The best time to examine your breasts is usually one week after your menstrual period begins. Your breasts should not be tender then. If you do not have periods, you might do your exam on a day of the month that is easy to remember. · To examine your breasts:  ¨ Remove all your clothes above the waist and lie down. When you are lying down, your breast tissue spreads evenly over your chest wall, which makes it easier to feel all your breast tissue. ¨ Use the pads-not the fingertips-of the 3 middle fingers of your left hand to check your right breast. Move your fingers slowly in small coin-sized circles that overlap. ¨ Use three levels of pressure to feel of all your breast tissue. Use light pressure to feel the tissue close to the skin surface. Use medium pressure to feel a little deeper. Use firm pressure to feel your tissue close to your breastbone and ribs. Use each pressure level to feel your breast tissue before moving on to the next spot. ¨ Check your entire breast, moving up and down as if following a strip from the collarbone to the bra line, and from the armpit to the ribs. Repeat until you have covered the entire breast.  ¨ Repeat this procedure for your left breast, using the pads of the 3 middle fingers of your right hand. · To examine your breasts while in the shower:  ¨ Place one arm over your head and lightly soap your breast on that side. ¨ Using the pads of your fingers, gently move your hand over your breast (in the strip pattern described above), feeling carefully for any lumps or changes.   ¨ Repeat for the other breast.  · Have your doctor inspect anything you notice to see if you need further testing. Where can you learn more? Go to http://lyndsey-miriam.info/. Enter P148 in the search box to learn more about \"Breast Self-Exam: Care Instructions. \"  Current as of: May 12, 2017  Content Version: 11.4  © 3444-0358 [employer]. Care instructions adapted under license by [employer] (which disclaims liability or warranty for this information). If you have questions about a medical condition or this instruction, always ask your healthcare professional. [place] [address] any warranty or liability for your use of this information.

## 2018-06-18 DIAGNOSIS — K21.9 GASTROESOPHAGEAL REFLUX DISEASE WITHOUT ESOPHAGITIS: ICD-10-CM

## 2018-06-18 RX ORDER — OMEPRAZOLE 40 MG/1
CAPSULE, DELAYED RELEASE ORAL
Qty: 90 CAP | Refills: 3 | Status: SHIPPED | OUTPATIENT
Start: 2018-06-18 | End: 2018-10-08 | Stop reason: CLARIF

## 2018-06-19 ENCOUNTER — ANESTHESIA EVENT (OUTPATIENT)
Dept: SURGERY | Age: 83
DRG: 460 | End: 2018-06-19
Payer: MEDICARE

## 2018-06-20 ENCOUNTER — APPOINTMENT (OUTPATIENT)
Dept: GENERAL RADIOLOGY | Age: 83
DRG: 460 | End: 2018-06-20
Attending: ORTHOPAEDIC SURGERY
Payer: MEDICARE

## 2018-06-20 ENCOUNTER — ANESTHESIA (OUTPATIENT)
Dept: SURGERY | Age: 83
DRG: 460 | End: 2018-06-20
Payer: MEDICARE

## 2018-06-20 ENCOUNTER — HOSPITAL ENCOUNTER (INPATIENT)
Age: 83
LOS: 6 days | Discharge: NURSING, REHAB OR LONG-TERM CARE | DRG: 460 | End: 2018-06-26
Attending: ORTHOPAEDIC SURGERY | Admitting: ORTHOPAEDIC SURGERY
Payer: MEDICARE

## 2018-06-20 DIAGNOSIS — F41.9 ANXIETY: Primary | ICD-10-CM

## 2018-06-20 DIAGNOSIS — M48.061 SPINAL STENOSIS OF LUMBAR REGION, UNSPECIFIED WHETHER NEUROGENIC CLAUDICATION PRESENT: ICD-10-CM

## 2018-06-20 LAB
ABO + RH BLD: NORMAL
BLOOD GROUP ANTIBODIES SERPL: NORMAL
GLUCOSE BLD STRIP.AUTO-MCNC: 86 MG/DL (ref 65–100)
SERVICE CMNT-IMP: NORMAL
SPECIMEN EXP DATE BLD: NORMAL

## 2018-06-20 PROCEDURE — 77030031139 HC SUT VCRL2 J&J -A: Performed by: ORTHOPAEDIC SURGERY

## 2018-06-20 PROCEDURE — 72020 X-RAY EXAM OF SPINE 1 VIEW: CPT

## 2018-06-20 PROCEDURE — 65270000029 HC RM PRIVATE

## 2018-06-20 PROCEDURE — 77030002924 HC SUT GORTX WLGO -B: Performed by: ORTHOPAEDIC SURGERY

## 2018-06-20 PROCEDURE — 74011000250 HC RX REV CODE- 250: Performed by: ORTHOPAEDIC SURGERY

## 2018-06-20 PROCEDURE — 77030003195 HC GRFT RECOMB BN MEDT -J: Performed by: ORTHOPAEDIC SURGERY

## 2018-06-20 PROCEDURE — C1713 ANCHOR/SCREW BN/BN,TIS/BN: HCPCS | Performed by: ORTHOPAEDIC SURGERY

## 2018-06-20 PROCEDURE — 77030004391 HC BUR FLUT MEDT -C: Performed by: ORTHOPAEDIC SURGERY

## 2018-06-20 PROCEDURE — 00U20KZ SUPPLEMENT DURA MATER WITH NONAUTOLOGOUS TISSUE SUBSTITUTE, OPEN APPROACH: ICD-10-PCS | Performed by: ORTHOPAEDIC SURGERY

## 2018-06-20 PROCEDURE — 77030014647 HC SEAL FBRN TISSL BAXT -D: Performed by: ORTHOPAEDIC SURGERY

## 2018-06-20 PROCEDURE — 3E0U0GB INTRODUCTION OF RECOMBINANT BONE MORPHOGENETIC PROTEIN INTO JOINTS, OPEN APPROACH: ICD-10-PCS | Performed by: ORTHOPAEDIC SURGERY

## 2018-06-20 PROCEDURE — 77030026438 HC STYL ET INTUB CARD -A: Performed by: ANESTHESIOLOGY

## 2018-06-20 PROCEDURE — 74011000250 HC RX REV CODE- 250

## 2018-06-20 PROCEDURE — 86900 BLOOD TYPING SEROLOGIC ABO: CPT | Performed by: ORTHOPAEDIC SURGERY

## 2018-06-20 PROCEDURE — 77030014007 HC SPNG HEMSTAT J&J -B: Performed by: ORTHOPAEDIC SURGERY

## 2018-06-20 PROCEDURE — 01NB0ZZ RELEASE LUMBAR NERVE, OPEN APPROACH: ICD-10-PCS | Performed by: ORTHOPAEDIC SURGERY

## 2018-06-20 PROCEDURE — 74011250636 HC RX REV CODE- 250/636

## 2018-06-20 PROCEDURE — 77030032490 HC SLV COMPR SCD KNE COVD -B: Performed by: ORTHOPAEDIC SURGERY

## 2018-06-20 PROCEDURE — 0SG1071 FUSION OF 2 OR MORE LUMBAR VERTEBRAL JOINTS WITH AUTOLOGOUS TISSUE SUBSTITUTE, POSTERIOR APPROACH, POSTERIOR COLUMN, OPEN APPROACH: ICD-10-PCS | Performed by: ORTHOPAEDIC SURGERY

## 2018-06-20 PROCEDURE — 77030038020 HC MANFLD NEPTUNE STRY -B: Performed by: ORTHOPAEDIC SURGERY

## 2018-06-20 PROCEDURE — 74011250636 HC RX REV CODE- 250/636: Performed by: ORTHOPAEDIC SURGERY

## 2018-06-20 PROCEDURE — 77030018315 HC SEAL FBRN TISSL10ML BAXT -F: Performed by: ORTHOPAEDIC SURGERY

## 2018-06-20 PROCEDURE — 76060000040 HC ANESTHESIA 4.5 TO 5 HR: Performed by: ORTHOPAEDIC SURGERY

## 2018-06-20 PROCEDURE — 74011250637 HC RX REV CODE- 250/637: Performed by: PHYSICIAN ASSISTANT

## 2018-06-20 PROCEDURE — 77030039267 HC ADH SKN EXOFIN S2SG -B: Performed by: ORTHOPAEDIC SURGERY

## 2018-06-20 PROCEDURE — 77030019908 HC STETH ESOPH SIMS -A: Performed by: ANESTHESIOLOGY

## 2018-06-20 PROCEDURE — 77030034850: Performed by: ORTHOPAEDIC SURGERY

## 2018-06-20 PROCEDURE — 77030018836 HC SOL IRR NACL ICUM -A: Performed by: ORTHOPAEDIC SURGERY

## 2018-06-20 PROCEDURE — 77030003152 HC GRFT COLGN DURAL INLC -H: Performed by: ORTHOPAEDIC SURGERY

## 2018-06-20 PROCEDURE — 77030029099 HC BN WAX SSPC -A: Performed by: ORTHOPAEDIC SURGERY

## 2018-06-20 PROCEDURE — 0SP004Z REMOVAL OF INTERNAL FIXATION DEVICE FROM LUMBAR VERTEBRAL JOINT, OPEN APPROACH: ICD-10-PCS | Performed by: ORTHOPAEDIC SURGERY

## 2018-06-20 PROCEDURE — 36415 COLL VENOUS BLD VENIPUNCTURE: CPT | Performed by: ORTHOPAEDIC SURGERY

## 2018-06-20 PROCEDURE — 82962 GLUCOSE BLOOD TEST: CPT

## 2018-06-20 PROCEDURE — 77030037728 HC GRFT BN FBR CORT 3DEMIN 30CC BACT -I: Performed by: ORTHOPAEDIC SURGERY

## 2018-06-20 PROCEDURE — 76210000016 HC OR PH I REC 1 TO 1.5 HR: Performed by: ORTHOPAEDIC SURGERY

## 2018-06-20 PROCEDURE — 74011250636 HC RX REV CODE- 250/636: Performed by: PHYSICIAN ASSISTANT

## 2018-06-20 PROCEDURE — 77030038600 HC TU BPLR IRR DISP STRY -B: Performed by: ORTHOPAEDIC SURGERY

## 2018-06-20 PROCEDURE — 74011250636 HC RX REV CODE- 250/636: Performed by: ANESTHESIOLOGY

## 2018-06-20 PROCEDURE — 77030036946 HC GRFT BN FBR CORT 3DEMIN 10CC BACT -G: Performed by: ORTHOPAEDIC SURGERY

## 2018-06-20 PROCEDURE — 77030008684 HC TU ET CUF COVD -B: Performed by: ANESTHESIOLOGY

## 2018-06-20 PROCEDURE — 77030013079 HC BLNKT BAIR HGGR 3M -A: Performed by: ANESTHESIOLOGY

## 2018-06-20 PROCEDURE — 76010000175 HC OR TIME 4 TO 4.5 HR INTENSV-TIER 1: Performed by: ORTHOPAEDIC SURGERY

## 2018-06-20 DEVICE — CREO&REG; 5.5, 6.5 X 45MM POLYAXIAL SCREW
Type: IMPLANTABLE DEVICE | Site: SPINE LUMBAR | Status: FUNCTIONAL
Brand: CREO

## 2018-06-20 DEVICE — BONE GRAFT KIT 7510400 INFUSE MEDIUM
Type: IMPLANTABLE DEVICE | Site: SPINE LUMBAR | Status: FUNCTIONAL
Brand: INFUSE® BONE GRAFT

## 2018-06-20 DEVICE — GRAFT BNE 3D 30 CC CORTICAL FIBER: Type: IMPLANTABLE DEVICE | Site: SPINE LUMBAR | Status: FUNCTIONAL

## 2018-06-20 DEVICE — 5.5MM CURVED ROD, TITANIUM ALLOY, 125MM LENGTH
Type: IMPLANTABLE DEVICE | Site: SPINE LUMBAR | Status: FUNCTIONAL
Brand: CREO

## 2018-06-20 DEVICE — IMPLANTABLE DEVICE: Type: IMPLANTABLE DEVICE | Site: SPINE LUMBAR | Status: FUNCTIONAL

## 2018-06-20 DEVICE — DURAGEN® PLUS DURAL REGENERATION MATRIX, 2 IN X 2 IN (5 CM X 5 CM)
Type: IMPLANTABLE DEVICE | Site: SPINE LUMBAR | Status: FUNCTIONAL
Brand: DURAGEN® PLUS

## 2018-06-20 RX ORDER — FACIAL-BODY WIPES
10 EACH TOPICAL DAILY PRN
Status: DISCONTINUED | OUTPATIENT
Start: 2018-06-22 | End: 2018-06-26 | Stop reason: HOSPADM

## 2018-06-20 RX ORDER — MORPHINE SULFATE IN 0.9 % NACL 150MG/30ML
PATIENT CONTROLLED ANALGESIA SYRINGE INTRAVENOUS CONTINUOUS
Status: DISCONTINUED | OUTPATIENT
Start: 2018-06-20 | End: 2018-06-21

## 2018-06-20 RX ORDER — DEXAMETHASONE SODIUM PHOSPHATE 4 MG/ML
INJECTION, SOLUTION INTRA-ARTICULAR; INTRALESIONAL; INTRAMUSCULAR; INTRAVENOUS; SOFT TISSUE AS NEEDED
Status: DISCONTINUED | OUTPATIENT
Start: 2018-06-20 | End: 2018-06-20 | Stop reason: HOSPADM

## 2018-06-20 RX ORDER — ONDANSETRON 2 MG/ML
INJECTION INTRAMUSCULAR; INTRAVENOUS AS NEEDED
Status: DISCONTINUED | OUTPATIENT
Start: 2018-06-20 | End: 2018-06-20 | Stop reason: HOSPADM

## 2018-06-20 RX ORDER — PROPOFOL 10 MG/ML
INJECTION, EMULSION INTRAVENOUS AS NEEDED
Status: DISCONTINUED | OUTPATIENT
Start: 2018-06-20 | End: 2018-06-20 | Stop reason: HOSPADM

## 2018-06-20 RX ORDER — PANTOPRAZOLE SODIUM 40 MG/1
40 TABLET, DELAYED RELEASE ORAL DAILY
Status: DISCONTINUED | OUTPATIENT
Start: 2018-06-21 | End: 2018-06-26 | Stop reason: HOSPADM

## 2018-06-20 RX ORDER — SODIUM CHLORIDE, SODIUM LACTATE, POTASSIUM CHLORIDE, CALCIUM CHLORIDE 600; 310; 30; 20 MG/100ML; MG/100ML; MG/100ML; MG/100ML
INJECTION, SOLUTION INTRAVENOUS
Status: DISCONTINUED | OUTPATIENT
Start: 2018-06-20 | End: 2018-06-20 | Stop reason: HOSPADM

## 2018-06-20 RX ORDER — FENTANYL CITRATE 50 UG/ML
25 INJECTION, SOLUTION INTRAMUSCULAR; INTRAVENOUS
Status: DISCONTINUED | OUTPATIENT
Start: 2018-06-20 | End: 2018-06-20 | Stop reason: HOSPADM

## 2018-06-20 RX ORDER — GLYCOPYRROLATE 0.2 MG/ML
INJECTION INTRAMUSCULAR; INTRAVENOUS AS NEEDED
Status: DISCONTINUED | OUTPATIENT
Start: 2018-06-20 | End: 2018-06-20 | Stop reason: HOSPADM

## 2018-06-20 RX ORDER — HYDROCODONE BITARTRATE AND ACETAMINOPHEN 5; 325 MG/1; MG/1
1 TABLET ORAL AS NEEDED
Status: DISCONTINUED | OUTPATIENT
Start: 2018-06-20 | End: 2018-06-20 | Stop reason: HOSPADM

## 2018-06-20 RX ORDER — GLYCOPYRROLATE 0.2 MG/ML
0.2 INJECTION INTRAMUSCULAR; INTRAVENOUS
Status: DISCONTINUED | OUTPATIENT
Start: 2018-06-20 | End: 2018-06-20 | Stop reason: HOSPADM

## 2018-06-20 RX ORDER — ROCURONIUM BROMIDE 10 MG/ML
INJECTION, SOLUTION INTRAVENOUS AS NEEDED
Status: DISCONTINUED | OUTPATIENT
Start: 2018-06-20 | End: 2018-06-20 | Stop reason: HOSPADM

## 2018-06-20 RX ORDER — GABAPENTIN 100 MG/1
100 CAPSULE ORAL 3 TIMES DAILY
Status: DISCONTINUED | OUTPATIENT
Start: 2018-06-20 | End: 2018-06-25

## 2018-06-20 RX ORDER — SODIUM CHLORIDE 9 MG/ML
INJECTION, SOLUTION INTRAVENOUS
Status: DISCONTINUED | OUTPATIENT
Start: 2018-06-20 | End: 2018-06-20 | Stop reason: HOSPADM

## 2018-06-20 RX ORDER — MELATONIN
2000 DAILY
Status: DISCONTINUED | OUTPATIENT
Start: 2018-06-21 | End: 2018-06-26 | Stop reason: HOSPADM

## 2018-06-20 RX ORDER — PHENYLEPHRINE HCL IN 0.9% NACL 0.4MG/10ML
SYRINGE (ML) INTRAVENOUS AS NEEDED
Status: DISCONTINUED | OUTPATIENT
Start: 2018-06-20 | End: 2018-06-20 | Stop reason: HOSPADM

## 2018-06-20 RX ORDER — ESTRADIOL 1 MG/1
0.5 TABLET ORAL DAILY
Status: DISCONTINUED | OUTPATIENT
Start: 2018-06-21 | End: 2018-06-26 | Stop reason: HOSPADM

## 2018-06-20 RX ORDER — LIDOCAINE HYDROCHLORIDE 20 MG/ML
INJECTION, SOLUTION EPIDURAL; INFILTRATION; INTRACAUDAL; PERINEURAL AS NEEDED
Status: DISCONTINUED | OUTPATIENT
Start: 2018-06-20 | End: 2018-06-20 | Stop reason: HOSPADM

## 2018-06-20 RX ORDER — DOXYCYCLINE HYCLATE 100 MG
100 TABLET ORAL DAILY
Status: DISCONTINUED | OUTPATIENT
Start: 2018-06-21 | End: 2018-06-26 | Stop reason: HOSPADM

## 2018-06-20 RX ORDER — SODIUM CHLORIDE 9 MG/ML
125 INJECTION, SOLUTION INTRAVENOUS CONTINUOUS
Status: DISPENSED | OUTPATIENT
Start: 2018-06-20 | End: 2018-06-21

## 2018-06-20 RX ORDER — MIDAZOLAM HYDROCHLORIDE 1 MG/ML
INJECTION, SOLUTION INTRAMUSCULAR; INTRAVENOUS AS NEEDED
Status: DISCONTINUED | OUTPATIENT
Start: 2018-06-20 | End: 2018-06-20 | Stop reason: HOSPADM

## 2018-06-20 RX ORDER — SODIUM CHLORIDE 9 MG/ML
25 INJECTION, SOLUTION INTRAVENOUS CONTINUOUS
Status: DISCONTINUED | OUTPATIENT
Start: 2018-06-20 | End: 2018-06-20 | Stop reason: HOSPADM

## 2018-06-20 RX ORDER — ACETAMINOPHEN 325 MG/1
650 TABLET ORAL
Status: DISCONTINUED | OUTPATIENT
Start: 2018-06-20 | End: 2018-06-21

## 2018-06-20 RX ORDER — OXYCODONE HYDROCHLORIDE 5 MG/1
5 TABLET ORAL
Status: DISCONTINUED | OUTPATIENT
Start: 2018-06-20 | End: 2018-06-25

## 2018-06-20 RX ORDER — SODIUM CHLORIDE 0.9 % (FLUSH) 0.9 %
5-10 SYRINGE (ML) INJECTION EVERY 8 HOURS
Status: DISCONTINUED | OUTPATIENT
Start: 2018-06-21 | End: 2018-06-26 | Stop reason: SDUPTHER

## 2018-06-20 RX ORDER — SODIUM CHLORIDE 0.9 % (FLUSH) 0.9 %
5-10 SYRINGE (ML) INJECTION AS NEEDED
Status: DISCONTINUED | OUTPATIENT
Start: 2018-06-20 | End: 2018-06-26 | Stop reason: SDUPTHER

## 2018-06-20 RX ORDER — MIDAZOLAM HYDROCHLORIDE 1 MG/ML
0.5 INJECTION, SOLUTION INTRAMUSCULAR; INTRAVENOUS
Status: DISCONTINUED | OUTPATIENT
Start: 2018-06-20 | End: 2018-06-20 | Stop reason: HOSPADM

## 2018-06-20 RX ORDER — SODIUM CHLORIDE 0.9 % (FLUSH) 0.9 %
5-10 SYRINGE (ML) INJECTION AS NEEDED
Status: DISCONTINUED | OUTPATIENT
Start: 2018-06-20 | End: 2018-06-26 | Stop reason: HOSPADM

## 2018-06-20 RX ORDER — HYDROMORPHONE HYDROCHLORIDE 2 MG/ML
INJECTION, SOLUTION INTRAMUSCULAR; INTRAVENOUS; SUBCUTANEOUS AS NEEDED
Status: DISCONTINUED | OUTPATIENT
Start: 2018-06-20 | End: 2018-06-20 | Stop reason: HOSPADM

## 2018-06-20 RX ORDER — FENTANYL CITRATE 50 UG/ML
50 INJECTION, SOLUTION INTRAMUSCULAR; INTRAVENOUS AS NEEDED
Status: DISCONTINUED | OUTPATIENT
Start: 2018-06-20 | End: 2018-06-20 | Stop reason: HOSPADM

## 2018-06-20 RX ORDER — MORPHINE SULFATE 10 MG/ML
2 INJECTION, SOLUTION INTRAMUSCULAR; INTRAVENOUS
Status: DISCONTINUED | OUTPATIENT
Start: 2018-06-20 | End: 2018-06-20 | Stop reason: HOSPADM

## 2018-06-20 RX ORDER — DULOXETIN HYDROCHLORIDE 60 MG/1
60 CAPSULE, DELAYED RELEASE ORAL DAILY
Status: DISCONTINUED | OUTPATIENT
Start: 2018-06-21 | End: 2018-06-26 | Stop reason: HOSPADM

## 2018-06-20 RX ORDER — ONDANSETRON 2 MG/ML
4 INJECTION INTRAMUSCULAR; INTRAVENOUS AS NEEDED
Status: DISCONTINUED | OUTPATIENT
Start: 2018-06-20 | End: 2018-06-20 | Stop reason: HOSPADM

## 2018-06-20 RX ORDER — ROPIVACAINE HYDROCHLORIDE 5 MG/ML
30 INJECTION, SOLUTION EPIDURAL; INFILTRATION; PERINEURAL AS NEEDED
Status: DISCONTINUED | OUTPATIENT
Start: 2018-06-20 | End: 2018-06-20 | Stop reason: HOSPADM

## 2018-06-20 RX ORDER — LIDOCAINE HYDROCHLORIDE 10 MG/ML
0.1 INJECTION, SOLUTION EPIDURAL; INFILTRATION; INTRACAUDAL; PERINEURAL AS NEEDED
Status: DISCONTINUED | OUTPATIENT
Start: 2018-06-20 | End: 2018-06-20 | Stop reason: HOSPADM

## 2018-06-20 RX ORDER — POLYETHYLENE GLYCOL 3350 17 G/17G
17 POWDER, FOR SOLUTION ORAL DAILY
Status: DISCONTINUED | OUTPATIENT
Start: 2018-06-21 | End: 2018-06-21

## 2018-06-20 RX ORDER — MIDAZOLAM HYDROCHLORIDE 1 MG/ML
1 INJECTION, SOLUTION INTRAMUSCULAR; INTRAVENOUS AS NEEDED
Status: DISCONTINUED | OUTPATIENT
Start: 2018-06-20 | End: 2018-06-20 | Stop reason: HOSPADM

## 2018-06-20 RX ORDER — NEOSTIGMINE METHYLSULFATE 1 MG/ML
INJECTION INTRAVENOUS AS NEEDED
Status: DISCONTINUED | OUTPATIENT
Start: 2018-06-20 | End: 2018-06-20 | Stop reason: HOSPADM

## 2018-06-20 RX ORDER — EPHEDRINE SULFATE 50 MG/ML
INJECTION, SOLUTION INTRAVENOUS AS NEEDED
Status: DISCONTINUED | OUTPATIENT
Start: 2018-06-20 | End: 2018-06-20 | Stop reason: HOSPADM

## 2018-06-20 RX ORDER — FENTANYL CITRATE 50 UG/ML
INJECTION, SOLUTION INTRAMUSCULAR; INTRAVENOUS AS NEEDED
Status: DISCONTINUED | OUTPATIENT
Start: 2018-06-20 | End: 2018-06-20 | Stop reason: HOSPADM

## 2018-06-20 RX ORDER — ONDANSETRON 2 MG/ML
4 INJECTION INTRAMUSCULAR; INTRAVENOUS
Status: DISPENSED | OUTPATIENT
Start: 2018-06-20 | End: 2018-06-21

## 2018-06-20 RX ORDER — CARBOXYMETHYLCELLULOSE SODIUM 5 MG/ML
1 SOLUTION/ DROPS OPHTHALMIC DAILY
Status: DISCONTINUED | OUTPATIENT
Start: 2018-06-21 | End: 2018-06-26 | Stop reason: HOSPADM

## 2018-06-20 RX ORDER — CEFAZOLIN SODIUM/WATER 2 G/20 ML
2 SYRINGE (ML) INTRAVENOUS ONCE
Status: COMPLETED | OUTPATIENT
Start: 2018-06-20 | End: 2018-06-20

## 2018-06-20 RX ORDER — OXYCODONE HYDROCHLORIDE 5 MG/1
10 TABLET ORAL
Status: DISCONTINUED | OUTPATIENT
Start: 2018-06-20 | End: 2018-06-24

## 2018-06-20 RX ORDER — CEFAZOLIN SODIUM/WATER 2 G/20 ML
2 SYRINGE (ML) INTRAVENOUS EVERY 8 HOURS
Status: COMPLETED | OUTPATIENT
Start: 2018-06-20 | End: 2018-06-21

## 2018-06-20 RX ORDER — ALBUTEROL SULFATE 0.83 MG/ML
2.5 SOLUTION RESPIRATORY (INHALATION) AS NEEDED
Status: DISCONTINUED | OUTPATIENT
Start: 2018-06-20 | End: 2018-06-20 | Stop reason: HOSPADM

## 2018-06-20 RX ORDER — DEXAMETHASONE SODIUM PHOSPHATE 4 MG/ML
4 INJECTION, SOLUTION INTRA-ARTICULAR; INTRALESIONAL; INTRAMUSCULAR; INTRAVENOUS; SOFT TISSUE
Status: DISCONTINUED | OUTPATIENT
Start: 2018-06-20 | End: 2018-06-26 | Stop reason: HOSPADM

## 2018-06-20 RX ORDER — DIPHENHYDRAMINE HYDROCHLORIDE 50 MG/ML
12.5 INJECTION, SOLUTION INTRAMUSCULAR; INTRAVENOUS AS NEEDED
Status: DISCONTINUED | OUTPATIENT
Start: 2018-06-20 | End: 2018-06-20 | Stop reason: HOSPADM

## 2018-06-20 RX ORDER — NALOXONE HYDROCHLORIDE 0.4 MG/ML
0.4 INJECTION, SOLUTION INTRAMUSCULAR; INTRAVENOUS; SUBCUTANEOUS AS NEEDED
Status: DISCONTINUED | OUTPATIENT
Start: 2018-06-20 | End: 2018-06-26 | Stop reason: HOSPADM

## 2018-06-20 RX ORDER — AMOXICILLIN 250 MG
1 CAPSULE ORAL 2 TIMES DAILY
Status: DISCONTINUED | OUTPATIENT
Start: 2018-06-20 | End: 2018-06-26 | Stop reason: HOSPADM

## 2018-06-20 RX ORDER — SODIUM CHLORIDE, SODIUM LACTATE, POTASSIUM CHLORIDE, CALCIUM CHLORIDE 600; 310; 30; 20 MG/100ML; MG/100ML; MG/100ML; MG/100ML
1000 INJECTION, SOLUTION INTRAVENOUS CONTINUOUS
Status: DISCONTINUED | OUTPATIENT
Start: 2018-06-20 | End: 2018-06-20 | Stop reason: HOSPADM

## 2018-06-20 RX ORDER — POLYVINYL ALCOHOL 14 MG/ML
1 SOLUTION/ DROPS OPHTHALMIC
Status: DISCONTINUED | OUTPATIENT
Start: 2018-06-20 | End: 2018-06-26 | Stop reason: HOSPADM

## 2018-06-20 RX ORDER — DIPHENHYDRAMINE HYDROCHLORIDE 50 MG/ML
12.5 INJECTION, SOLUTION INTRAMUSCULAR; INTRAVENOUS
Status: ACTIVE | OUTPATIENT
Start: 2018-06-20 | End: 2018-06-21

## 2018-06-20 RX ORDER — SUCCINYLCHOLINE CHLORIDE 20 MG/ML
INJECTION INTRAMUSCULAR; INTRAVENOUS AS NEEDED
Status: DISCONTINUED | OUTPATIENT
Start: 2018-06-20 | End: 2018-06-20 | Stop reason: HOSPADM

## 2018-06-20 RX ORDER — SODIUM CHLORIDE 0.9 % (FLUSH) 0.9 %
5-10 SYRINGE (ML) INJECTION EVERY 8 HOURS
Status: DISCONTINUED | OUTPATIENT
Start: 2018-06-20 | End: 2018-06-26 | Stop reason: HOSPADM

## 2018-06-20 RX ORDER — EPHEDRINE SULFATE 50 MG/ML
5 INJECTION, SOLUTION INTRAVENOUS AS NEEDED
Status: DISCONTINUED | OUTPATIENT
Start: 2018-06-20 | End: 2018-06-20 | Stop reason: HOSPADM

## 2018-06-20 RX ORDER — VANCOMYCIN HYDROCHLORIDE 1 G/20ML
INJECTION, POWDER, LYOPHILIZED, FOR SOLUTION INTRAVENOUS AS NEEDED
Status: DISCONTINUED | OUTPATIENT
Start: 2018-06-20 | End: 2018-06-20 | Stop reason: HOSPADM

## 2018-06-20 RX ADMIN — NEOSTIGMINE METHYLSULFATE 1 MG: 1 INJECTION INTRAVENOUS at 16:17

## 2018-06-20 RX ADMIN — HYDROMORPHONE HYDROCHLORIDE 0.5 MG: 2 INJECTION, SOLUTION INTRAMUSCULAR; INTRAVENOUS; SUBCUTANEOUS at 17:17

## 2018-06-20 RX ADMIN — MIDAZOLAM HYDROCHLORIDE 1 MG: 1 INJECTION, SOLUTION INTRAMUSCULAR; INTRAVENOUS at 13:25

## 2018-06-20 RX ADMIN — Medication 80 MCG: at 15:07

## 2018-06-20 RX ADMIN — FENTANYL CITRATE 50 MCG: 50 INJECTION, SOLUTION INTRAMUSCULAR; INTRAVENOUS at 13:54

## 2018-06-20 RX ADMIN — SODIUM CHLORIDE 125 ML/HR: 900 INJECTION, SOLUTION INTRAVENOUS at 18:23

## 2018-06-20 RX ADMIN — FENTANYL CITRATE 25 MCG: 50 INJECTION, SOLUTION INTRAMUSCULAR; INTRAVENOUS at 18:20

## 2018-06-20 RX ADMIN — FENTANYL CITRATE 25 MCG: 50 INJECTION, SOLUTION INTRAMUSCULAR; INTRAVENOUS at 18:30

## 2018-06-20 RX ADMIN — SODIUM CHLORIDE, SODIUM LACTATE, POTASSIUM CHLORIDE, CALCIUM CHLORIDE: 600; 310; 30; 20 INJECTION, SOLUTION INTRAVENOUS at 13:20

## 2018-06-20 RX ADMIN — GABAPENTIN 100 MG: 100 CAPSULE ORAL at 22:41

## 2018-06-20 RX ADMIN — HYDROMORPHONE HYDROCHLORIDE 0.5 MG: 2 INJECTION, SOLUTION INTRAMUSCULAR; INTRAVENOUS; SUBCUTANEOUS at 13:54

## 2018-06-20 RX ADMIN — FENTANYL CITRATE 25 MCG: 50 INJECTION, SOLUTION INTRAMUSCULAR; INTRAVENOUS at 18:49

## 2018-06-20 RX ADMIN — EPHEDRINE SULFATE 5 MG: 50 INJECTION, SOLUTION INTRAVENOUS at 16:58

## 2018-06-20 RX ADMIN — PROPOFOL 30 MG: 10 INJECTION, EMULSION INTRAVENOUS at 14:53

## 2018-06-20 RX ADMIN — SENNOSIDES AND DOCUSATE SODIUM 1 TABLET: 8.6; 5 TABLET ORAL at 22:41

## 2018-06-20 RX ADMIN — GLYCOPYRROLATE 0.2 MG: 0.2 INJECTION INTRAMUSCULAR; INTRAVENOUS at 15:36

## 2018-06-20 RX ADMIN — DEXAMETHASONE SODIUM PHOSPHATE 4 MG: 4 INJECTION, SOLUTION INTRA-ARTICULAR; INTRALESIONAL; INTRAMUSCULAR; INTRAVENOUS; SOFT TISSUE at 13:40

## 2018-06-20 RX ADMIN — EPHEDRINE SULFATE 10 MG: 50 INJECTION, SOLUTION INTRAVENOUS at 16:39

## 2018-06-20 RX ADMIN — LIDOCAINE HYDROCHLORIDE 60 MG: 20 INJECTION, SOLUTION EPIDURAL; INFILTRATION; INTRACAUDAL; PERINEURAL at 13:29

## 2018-06-20 RX ADMIN — FENTANYL CITRATE 50 MCG: 50 INJECTION, SOLUTION INTRAMUSCULAR; INTRAVENOUS at 13:27

## 2018-06-20 RX ADMIN — SODIUM CHLORIDE: 9 INJECTION, SOLUTION INTRAVENOUS at 15:21

## 2018-06-20 RX ADMIN — Medication 2 G: at 13:54

## 2018-06-20 RX ADMIN — MIDAZOLAM HYDROCHLORIDE 1 MG: 1 INJECTION, SOLUTION INTRAMUSCULAR; INTRAVENOUS at 13:20

## 2018-06-20 RX ADMIN — ONDANSETRON 4 MG: 2 INJECTION INTRAMUSCULAR; INTRAVENOUS at 19:22

## 2018-06-20 RX ADMIN — SODIUM CHLORIDE, SODIUM LACTATE, POTASSIUM CHLORIDE, AND CALCIUM CHLORIDE 1000 ML: 600; 310; 30; 20 INJECTION, SOLUTION INTRAVENOUS at 11:57

## 2018-06-20 RX ADMIN — Medication 10 ML: at 22:42

## 2018-06-20 RX ADMIN — PROPOFOL 100 MG: 10 INJECTION, EMULSION INTRAVENOUS at 13:30

## 2018-06-20 RX ADMIN — Medication 80 MCG: at 16:03

## 2018-06-20 RX ADMIN — SUCCINYLCHOLINE CHLORIDE 140 MG: 20 INJECTION INTRAMUSCULAR; INTRAVENOUS at 13:31

## 2018-06-20 RX ADMIN — Medication 80 MCG: at 13:59

## 2018-06-20 RX ADMIN — Medication 80 MCG: at 14:16

## 2018-06-20 RX ADMIN — ROCURONIUM BROMIDE 25 MG: 10 INJECTION, SOLUTION INTRAVENOUS at 13:40

## 2018-06-20 RX ADMIN — ONDANSETRON 4 MG: 2 INJECTION INTRAMUSCULAR; INTRAVENOUS at 16:17

## 2018-06-20 RX ADMIN — PROPOFOL 30 MG: 10 INJECTION, EMULSION INTRAVENOUS at 14:59

## 2018-06-20 RX ADMIN — Medication 80 MCG: at 14:01

## 2018-06-20 RX ADMIN — HYDROMORPHONE HYDROCHLORIDE 0.5 MG: 2 INJECTION, SOLUTION INTRAMUSCULAR; INTRAVENOUS; SUBCUTANEOUS at 14:58

## 2018-06-20 RX ADMIN — Medication 2 G: at 22:42

## 2018-06-20 RX ADMIN — ROCURONIUM BROMIDE 5 MG: 10 INJECTION, SOLUTION INTRAVENOUS at 13:30

## 2018-06-20 RX ADMIN — Medication: at 18:53

## 2018-06-20 RX ADMIN — SODIUM CHLORIDE, SODIUM LACTATE, POTASSIUM CHLORIDE, CALCIUM CHLORIDE: 600; 310; 30; 20 INJECTION, SOLUTION INTRAVENOUS at 16:00

## 2018-06-20 RX ADMIN — ROCURONIUM BROMIDE 10 MG: 10 INJECTION, SOLUTION INTRAVENOUS at 13:56

## 2018-06-20 RX ADMIN — GLYCOPYRROLATE 0.2 MG: 0.2 INJECTION INTRAMUSCULAR; INTRAVENOUS at 16:17

## 2018-06-20 RX ADMIN — ONDANSETRON 4 MG: 2 INJECTION INTRAMUSCULAR; INTRAVENOUS at 22:53

## 2018-06-20 NOTE — IP AVS SNAPSHOT
1111 [facility] [address] [place] [address] 
[phone] Patient: Jimmy Tony MRN: [id number] :1934 About your hospitalization You were admitted on:  2018 You last received care in the:  [address] [place] [address] You were discharged on:  2018 Why you were hospitalized Your primary diagnosis was:  Not on File Your diagnoses also included:  Lumbar Spinal Stenosis Follow-up Information Follow up With Details Comments Contact Info Nery Bingham MD Call As needed, For non-orthopedic medical concerns, Follow up after Hospitalization [id number] [address] [facility] 
[phone] 914 [facility], [address]   [address] [facility] [address] 
[phone] Darrius Cantu MD Schedule an appointment as soon as possible for a visit in 2 weeks For post-operative follow up [facility] [address] [facility] 7 [address] 
[phone] Discharge Orders None A check aster indicates which time of day the medication should be taken. My Medications START taking these medications Instructions Each Dose to Equal  
 Morning Noon Evening Bedtime  
 acetaminophen 325 mg tablet Commonly known as:  TYLENOL Your last dose was: Your next dose is: Take 2 Tabs by mouth every six (6) hours. 650 mg  
    
   
   
   
  
 bisacodyl 10 mg suppository Commonly known as:  DULCOLAX Your last dose was: Your next dose is: Insert 10 mg into rectum daily as needed. 10 mg LORazepam 0.5 mg tablet Commonly known as:  ATIVAN Your last dose was: Your next dose is: Take 1 Tab by mouth every six (6) hours as needed. Max Daily Amount: 2 mg. Indications: anxiety  0.5 mg  
    
   
   
   
  
 polyethylene glycol 17 gram packet Commonly known as:  Alec Greene Your last dose was: Your next dose is:    
   
   
 Daily as needed for constipation. senna-docusate 8.6-50 mg per tablet Commonly known as:  Ramiro Eubanks Your last dose was: Your next dose is: Take 1 Tab by mouth two (2) times a day. Indications: constipation 1 Tab CHANGE how you take these medications Instructions Each Dose to Equal  
 Morning Noon Evening Bedtime  
 doxycycline 100 mg capsule Commonly known as:  VIBRAMYCIN What changed:  See the new instructions. Your last dose was: Your next dose is: Take 1 capsule by mouth two times daily DULoxetine 60 mg capsule Commonly known as:  CYMBALTA What changed:  See the new instructions. Your last dose was: Your next dose is: TAKE 1 CAPSULE BY MOUTH  EVERY DAY  
     
   
   
   
  
  
CONTINUE taking these medications Instructions Each Dose to Equal  
 Morning Noon Evening Bedtime  
 aspirin delayed-release 81 mg tablet Your last dose was: Your next dose is: Take 81 mg by mouth daily. 81 mg CALTRATE PLUS PO Your last dose was: Your next dose is: Take 1 Tab by mouth daily. Takes one po daily. 1 Tab FISH OIL PO Your last dose was: Your next dose is: Take 1 Cap by mouth daily. 1 Cap GenTeal Moderate 0.3 % Drop Generic drug:  artificial tears(hypromellose) Your last dose was: Your next dose is:    
   
   
 Administer 1 Drop to both eyes nightly. 1 Drop MULTIVITAMIN PO Your last dose was: Your next dose is: Take 1 Tab by mouth daily. Takes one po daily. 1 Tab omeprazole 40 mg capsule Commonly known as:  PRILOSEC Your last dose was: Your next dose is: TAKE 1 CAPSULE BY MOUTH  DAILY REFRESH LIQUIGEL 1 % Dlgl Generic drug:  carboxymethylcellulose sodium Your last dose was: Your next dose is:    
   
   
 Apply  to eye daily. 1 DROP BOTH EYES  
     
   
   
   
  
 VITAMIN D3 2,000 unit Tab Generic drug:  cholecalciferol (vitamin D3) Your last dose was: Your next dose is: Take 2,000 Units by mouth daily. 2000 Units STOP taking these medications   
 gabapentin 100 mg capsule Commonly known as:  NEURONTIN  
   
  
 meloxicam 15 mg tablet Commonly known as:  MOBIC Where to Get Your Medications These medications were sent to [address] [facility], [address] [facility] 610 Ector Guzmán [address] [facility] [address], [facility] [address] Phone:  [phone] DULoxetine 60 mg capsule Information on where to get these meds will be given to you by the nurse or doctor. ! Ask your nurse or doctor about these medications  
  acetaminophen 325 mg tablet  
 bisacodyl 10 mg suppository LORazepam 0.5 mg tablet  
 polyethylene glycol 17 gram packet  
 senna-docusate 8.6-50 mg per tablet Discharge Instructions After Hospital Care Plan:  Discharge Instructions Lumbar Fusion Surgery Dr. Jeremy Baer Patient Name:  Brianna Bright Date of procedure:  6/20/2018  Date of discharge: 6/26/2018 Procedure:  Procedure(s): REVISION LUMBAR LAMINECTOMY L2-3, REVISION POSTERIOR SPINAL FUSION L1-5 DURAL REPAIR    PCP: Lebron Callas, MD 
 
Follow up appointments 
-Follow up with Dr. Jeremy Baer in 2 weeks after discharge from Rehab. Call Dr. Aarti Branch at [phone], Ext [phone], once you get home from the hospital to make an appointment for your 2 week postoperative visit. Home Health Agency: ____________________   phone: _______________________ The agency will contact you to arrange dates/times for visits. Please call them if you do not hear from them within 24 hours after you are discharged Physical therapy 3 times a week for 3 weeks Nursing-initial assessment and as needed When to call your Orthopaedic Surgeon: 
-Signs of infection-if your incision is red; continues to have drainage; drainage has a foul odor or if you have a persistent fever over 101 degrees for 24 hours 
-Nausea or vomiting, severe headache 
-Loss of bowel or bladder function, inability to urinate 
-Changes in sensation in your arms or legs (numbness, tingling, loss of color) -Increased weakness-greater than before your surgery 
-Severe pain or pain not relieved by medications 
-Signs of a blood clot in your leg-calf pain, tenderness, redness, swelling of lower leg When to call your Primary Care Physician: 
-Concerns about medical conditions such as diabetes, high blood pressure, asthma, congestive heart failure 
-Call if blood sugars are elevated, persistent headache or dizziness, coughing or congestion, constipation or diarrhea, burning with urination, abnormal heart rate When to call 911 and go to the nearest emergency room: 
-Acute onset of chest pain, shortness of breath, difficulty breathing Activity 
-You are going home a well person, be as active as possible. Your only exercise should be walking. Start with short frequent walks and increase your walking distance each day. 
-Limit the amount of time you sit to 20-30 minute intervals. Sitting for prolonged periods of time will be uncomfortable for you following surgery. 
-Do NOT lift anything over 5 pounds 
-Do NOT do any straining, twisting or bending 
-When you are in bed, you may lay on your back or on either side. Do NOT lie on your stomach Brace 
-If you have a back brace, you should wear your brace at all times when you are out of bed. Do not wear the brace while in bed or showering. 
-Remember to always wear a cotton t-shirt underneath your brace. 
-Do not bend or twist when your brace is off. Diet 
-Resume usual diet; drink plenty of fluids; eat foods high in fiber. 
-It is important to have regular bowel movements. Pain medications may cause constipation. You may want to take a stool softener (such as Senokot-S or Colace) to prevent constipation. 
 -If constipation occurs, take a laxative (such as Dulcolax tablets, Milk of Magnesia, or a suppository). Laxatives should only be used if the above preventable measures have failed and you still have not had a bowel movement after three days. Driving 
-You may not drive or return to work until instructed by your physician. However, you may ride in the car for short periods of time. Incision Care 
-You may take brief showers but do not run the water run directly onto the wound. After showering or bathing, remove the wet dressing and gently blot the wound dry with a soft towel. 
-Do not rub or apply any lotions or ointments to your incision site.  
-Do not soak or scrub your wound 
-A new dressing has been applied to your incision prior to discharge. This dressing needs to stay in place for 4 days after being discharged from the hospital.  After 4 days, the dressing may be removed and the incision left open to the air. Have your caregiver wash their hands thoroughly before changing your dressing. If the dressing becomes saturated, please call the office. Showering 
-You may shower in approximately 4 days after your surgery.   
-Leave the dressing on during your shower. Do NOT allow the water to run directly onto your dressing. Once you get out of the shower, place a new dressing.  
-Reminder- your brace can be removed while showering. Remember to not bend or twist while your brace is off.   
-Do not take a tub bath. Preventing blood clots -You have been given T.E.D. stockings to wear. Continue to wear these for 7 days after your discharge. Put them on in the morning and take them off at night.   
-They are used to increase your circulation and prevent blood clots from forming in your legs 
-T. E.D. stockings can be machine washed, temperature not to exceed 160° F (71°C) and machine dried for 15 to 20 minutes, temperature not to exceed 250° F (121°C). Pain management 
-Take pain medication as prescribed; decrease the amount you use as your pain lessens. -DO not wait until you are in extreme pain to take your medication. 
-Avoid alcoholic beverages while taking pain medication Pain Medication Safety DO: 
-Read the Medication Guide  
-Take your medicine exactly as prescribed  
-Store your medicine away from children and in a safe place  
-Flush unused medicine down the toilet  
-Call your healthcare provider for medical advice about side effects. You may report side effects to FDA at 6-310-FDA-2607.  
-Please be aware that many medications contain Tylenol. We do not want you to over medicate so please read the information below as a guide. Do not take more than 4 Grams of Tylenol in a 24 hour period. (There are 1000 milligrams in one Gram) Percocet contains 325 mg of Tylenol per tablet (do not take more than 12 tablets in 24 hours) Lortab contains 500 mg of Tylenol per tablet (do not take more than 8 tablets in 24 hours) Norco contains 325 mg of Tylenol per tablet (do not take more than 12 tablets in 24 hours). DO NOT: 
-Do not give your medicine to others -Do not take medicine unless it was prescribed for you  
-Do not stop taking your medicine without talking to your healthcare provider  
-Do not break, chew, crush, dissolve, or inject your medicine. If you cannot swallow your medicine whole, talk to your healthcare provider. 
-Do not drink alcohol while taking this medicine 
-Do not take anti-inflammatory medications or aspirin unless instructed by your     physician. Patient meets criteria for BUNDLED PAYMENT  
for Care Improvement Initiative Criteria Contact Information for Orthopedic Nurse Navigator:     
VELMA Corrigan, RN-BC 
I:[phone] V:[phone] B:[phone] ACO Transitions of Care Introducing [employer] [address]z offers a voluntary care coordination program to provide high quality service and care to [facility] fee-for-service beneficiaries. Bandar Melendez was designed to help you enhance your health and well-being through the following services: ? Transitions of Care  support for individuals who are transitioning from one care setting to another (example: Hospital to home). ? Chronic and Complex Care Coordination  support for individuals and caregivers of those with serious or chronic illnesses or with more than one chronic (ongoing) condition and those who take a number of different medications. If you meet specific medical criteria, a [address] may call you directly to coordinate your care with your primary care physician and your other care providers. For questions about the [facility] programs, please, contact your physicians office. For general questions or additional information about Accountable Care Organizations: 
Please visit www.medicare.gov/acos. html or call 1-800-MEDICARE (5-667.649.5964) TTY users should call 3-300.104.6388. [employer] Announcement We are excited to announce that we are making your provider's discharge notes available to you in SurgiLight. You will see these notes when they are completed and signed by the physician that discharged you from your recent hospital stay. If you have any questions or concerns about any information you see in SurgiLight, please call the Health Information Department where you were seen or reach out to your Primary Care Provider for more information about your plan of care. Introducing [facility] [place] [facility]! [facility] introduces SurgiLight patient portal. Now you can access parts of your medical record, email your doctor's office, and request medication refills online. 1. In your internet browser, go to [email or web address] 2. Click on the First Time User? Click Here link in the Sign In box. You will see the New Member Sign Up page. 3. Enter your SurgiLight Access Code exactly as it appears below. You will not need to use this code after youve completed the sign-up process. If you do not sign up before the expiration date, you must request a new code. · SurgiLight Access Code: [email or web address] Expires: 7/29/2018  3:06 PM 
 
4. Enter the last four digits of your Social Security Number (xxxx) and Date of Birth (mm/dd/yyyy) as indicated and click Submit. You will be taken to the next sign-up page. 5. Create a SurgiLight ID. This will be your SurgiLight login ID and cannot be changed, so think of one that is secure and easy to remember. 6. Create a SurgiLight password. You can change your password at any time. 7. Enter your Password Reset Question and Answer. This can be used at a later time if you forget your password. 8. Enter your e-mail address. You will receive e-mail notification when new information is available in [address] 9. Click Sign Up. You can now view and download portions of your medical record.  
10. Click the Download Summary menu link to download a portable copy of your medical information. If you have questions, please visit the Frequently Asked Questions section of the [employer] website. Remember, [employer] is NOT to be used for urgent needs. For medical emergencies, dial 911. Now available from your iPhone and Android! Introducing Agustin Diamond As a [facility] patient, I wanted to make you aware of our electronic visit tool called Agustin Diamond. [employer] 24/7 allows you to connect within minutes with a medical provider 24 hours a day, seven days a week via a mobile device or tablet or logging into a secure website from your computer. You can access Agustin Diamond from anywhere in the [place]. A virtual visit might be right for you when you have a simple condition and feel like you just dont want to get out of bed, or cant get away from work for an appointment, when your regular [facility] provider is not available (evenings, weekends or holidays), or when youre out of town and need minor care. Electronic visits cost only $49 and if the [facility] [employer]/7 provider determines a prescription is needed to treat your condition, one can be electronically transmitted to a nearby pharmacy*. Please take a moment to enroll today if you have not already done so. The enrollment process is free and takes just a few minutes. To enroll, please download the [employer]/Portable Internet pieter to your tablet or phone, or visit [email or web address] to enroll on your computer. And, as an [address] [place] [address] patient with a [email or web address] account, the results of your visits will be scanned into your electronic medical record and your primary care provider will be able to view the scanned results. We urge you to continue to see your regular [facility] provider for your ongoing medical care.   And while your primary care provider may not be the one available when you seek a Agustin Diamond virtual visit, the peace of mind you get from getting a real diagnosis real time can be priceless. For more information on Agustin Diamond, view our Frequently Asked Questions (FAQs) at [email or web address]. Sincerely, 
 
Rohnda Garcia MD 
Chief Medical Officer [address] Lorenz *:  certain medications cannot be prescribed via Agustin Diamond Unresulted tests-please follow up with your PCP on these results Procedure/Test Authorizing Provider CULTURE, URINE Licha Verdugo MD  
 HEMOGLOBIN Maged Arvizu, [address]  
 HGB & HCT Brenda Biggs MD  
 METABOLIC PANEL, BASIC Maged Arvizu PA-C  
 METABOLIC PANEL, COMPREHENSIVE Brenda Biggs MD  
 URINALYSIS W/ REFLEX CULTURE Maged Arvizu PA-C  
 XR SPINE SNGL V (CROSS TABLE LAT) Licha Verdugo MD  
 XR SPINE SNGL V (CROSS TABLE LAT) Licha Verdugo MD  
  
Providers Seen During Your Hospitalization Provider Specialty Primary office phone Licha Verdugo MD Orthopedic Surgery [phone] Your Primary Care Physician (PCP) Primary Care Physician Office Phone Office Fax [place] [phone] You are allergic to the following Allergen Reactions Adhesive Tape-Silicones Other (comments) \"SKIN IS SO FRAGILE\" Recent Documentation OB Status Smoking Status Postmenopausal Former Smoker Emergency Contacts Name Discharge Info Relation Home Work Mobile Omar Briceno DISCHARGE CAREGIVER [3] Spouse [3] [phone] Patient Belongings The following personal items are in your possession at time of discharge: 
     Visual Aid: Glasses, At bedside      Home Medications: None   Jewelry: None  Clothing:  (clothing bag placed on pt's bed in pacu)    Other Valuables:  (specs and 2 hrg aids returned in pacu and placed appropriate) Please provide this summary of care documentation to your next provider. Signatures-by signing, you are acknowledging that this After Visit Summary has been reviewed with you and you have received a copy. Patient Signature:  ____________________________________________________________ Date:  ____________________________________________________________  
  
Gwendloyn Finger Provider Signature:  ____________________________________________________________ Date:  ____________________________________________________________

## 2018-06-20 NOTE — ANESTHESIA POSTPROCEDURE EVALUATION
Post-Anesthesia Evaluation and Assessment    Patient: Gricelda Mccarthy MRN: [id number]  SSN: [id number]    YOB: 1934  Age: 80 y.o. Sex: female       Cardiovascular Function/Vital Signs  Visit Vitals    BP 94/63    Pulse 83    Temp 36.4 °C (97.5 °F)    Resp 12    SpO2 100%       Patient is status post general anesthesia for Procedure(s):  REVISION LUMBAR LAMINECTOMY L2-3, REVISION POSTERIOR SPINAL FUSION L1-5 DURAL REPAIR. Nausea/Vomiting: None    Postoperative hydration reviewed and adequate. Pain:  Pain Scale 1: Adult Nonverbal Pain Scale (06/20/18 1805)   Managed    Neurological Status:   Neuro (WDL): Within Defined Limits (06/20/18 1805)  Neuro  LLE Motor Response: Purposeful (06/20/18 1805)  RLE Motor Response: Purposeful (06/20/18 1805)   At baseline    Mental Status and Level of Consciousness: Arousable    Pulmonary Status:   O2 Device: Nasal cannula (06/20/18 1805)   Adequate oxygenation and airway patent    Complications related to anesthesia: None    Post-anesthesia assessment completed.  No concerns    Signed By: Kaitlin Redmond MD     June 20, 2018

## 2018-06-20 NOTE — BRIEF OP NOTE
BRIEF OPERATIVE NOTE    Date of Procedure: 6/20/2018   Preoperative Diagnosis: LEFT FORAMINAL STENOSIS L2-3, L3-4, DEGENERATIVE SCOLIOSIS, STATUS POST POSTERIOR SPINAL FUSION L1-2  Postoperative Diagnosis: LEFT FORAMINAL STENOSIS L2-3, L3-4, DEGENERATIVE SCOLIOSIS, STATUS POST POSTERIOR SPINAL FUSION L1-2    Procedure(s):  REVISION LUMBAR LAMINECTOMY L2-3, REVISION POSTERIOR SPINAL FUSION L1-5  Surgeon(s) and Role:     * Yvan Burns MD - Primary         Surgical Assistant: Gladis Donaldson PA-C    Surgical Staff:  Cell Saver : Laurel Hutton  Circ-1: Sancho Carrillo  Circ-Relief: Aline Watkins RN  Physician Assistant: Gladis Donaldson PA-C  Scrub RN-1: Corey Webber RN  Surg Asst-1: Darryl Anna  Event Time In   Incision Start 1358   Incision Close 1731     Anesthesia: General   Estimated Blood Loss: 225 ml  Specimens: * No specimens in log *   Findings: LEFT FORAMINAL STENOSIS L2-3, L3-4, DEGENERATIVE SCOLIOSIS, STATUS POST POSTERIOR SPINAL FUSION S1-5    Complications: Intra Op Dural Leak Repair  Implants:   Implant Name Type Inv.  Item Serial No.  Lot No. LRB No. Used Action   GRAFT BNE RECOMB HUM INFUS MED --  - SNA  GRAFT BNE RECOMB HUM INFUS MED --  NA [employer] [id number] N/A 1 Implanted   GRAFT DURA REGEN MATRX 2X2 5PK -- DURAGEN PLUS ORDER EA 5/EA - SNA  GRAFT DURA REGEN MATRX 2X2 5PK -- DURAGEN PLUS ORDER EA 5/EA NA [employer] [id number] N/A 1 Implanted   GRAFT FIBERS BNE RAJINDER 10CC -- 3DEMIN - [id number]  GRAFT FIBERS BNE RAJINDER 10CC -- 3DEMIN [id number] [employer] NA N/A 1 Implanted   bone graft cortical fibers 30.0   [id number] [employer] NA N/A 1 Implanted   GRAFT FIBERS BNE RAJINDER 10CC -- 3DEMIN - [id number]  GRAFT FIBERS BNE RAJINDER 10CC -- 3DEMIN [id number] [employer] NA N/A 1 Implanted   cortical fibers 3 demin 30.0 Graft  [id number] [employer] NA N/A 1 Implanted   screw 7.5x50 NA [employer] NA N/A 2 Implanted   6.5x 50 screw   NA [employer] NA N/A 3 Implanted   SCR SPNE PRE-ASSBL 6.5X45MM -- CREO 5.5MM - SNA  SCR SPNE PRE-ASSBL 6.5X45MM -- CREO 5.5MM NA [employer] NA N/A 2 Implanted   RENALDO SPNE CRV TI [id number] -- CREO - SNA  RENALDO SPNE CRV TI [id number] -- CREO NA [employer] NA N/A 2 Implanted   CAP SPNE LCK CREO 5.5MM --  - SNA  CAP SPNE LCK CREO 5.5MM --  NA [employer] NA N/A 10 Implanted

## 2018-06-20 NOTE — ROUTINE PROCESS
Patient: Erika Panda MRN: [id number]  SSN: [id number]   YOB: 1934  Age: 80 y.o. Sex: female     Patient is status post Procedure(s):  REVISION LUMBAR LAMINECTOMY L2-3, REVISION POSTERIOR SPINAL FUSION L1-5 DURAL REPAIR. Surgeon(s) and Role:      Andrzej Lees MD - Primary    Local/Dose/Irrigation:  SEE [facility]                  Peripheral IV 06/20/18 Left Arm (Active)   Site Assessment Clean, dry, & intact 6/20/2018 11:57 AM   Phlebitis Assessment 0 6/20/2018 11:57 AM   Infiltration Assessment 0 6/20/2018 11:57 AM   Dressing Status Clean, dry, & intact; New 6/20/2018 11:57 AM   Dressing Type Tape;Transparent 6/20/2018 11:57 AM   Hub Color/Line Status Green; Infusing 6/20/2018 11:57 AM   Alcohol Cap Used Yes 6/20/2018 11:57 AM            Airway - Endotracheal Tube 06/20/18 Oral (Active)                   Dressing/Packing:  Wound Back-DRESSING TYPE: Other (Comment);4 x 4;Special tape (comment) (EXOFIN CLOSURE) (06/20/18 1700)  Splint/Cast:        Patient: Erika Panda MRN: [id number]  SSN: [id number]   YOB: 1934  Age: 80 y.o. Sex: female     Patient is status post                   Peripheral IV 06/20/18 Left Arm (Active)   Site Assessment Clean, dry, & intact 6/20/2018 11:57 AM   Phlebitis Assessment 0 6/20/2018 11:57 AM   Infiltration Assessment 0 6/20/2018 11:57 AM   Dressing Status Clean, dry, & intact; New 6/20/2018 11:57 AM   Dressing Type Tape;Transparent 6/20/2018 11:57 AM   Hub Color/Line Status Green; Infusing 6/20/2018 11:57 AM   Alcohol Cap Used Yes 6/20/2018 11:57 AM            Airway - Endotracheal Tube 06/20/18 Oral (Active)                   Dressing/Packing:  Wound Back-DRESSING TYPE: Other (Comment);4 x 4;Special tape (comment) (EXOFIN CLOSURE) (06/20/18 1700)  Splint/Cast:  ]    Other: PAULINO INTACT TO LEFT THIGH

## 2018-06-20 NOTE — ANESTHESIA PREPROCEDURE EVALUATION
Anesthetic History   No history of anesthetic complications            Review of Systems / Medical History  Patient summary reviewed, nursing notes reviewed and pertinent labs reviewed    Pulmonary  Within defined limits                 Neuro/Psych   Within defined limits           Cardiovascular  Within defined limits          Dysrhythmias       Exercise tolerance: >4 METS  Comments: Negative stress 2017   GI/Hepatic/Renal     GERD: well controlled           Endo/Other        Arthritis     Other Findings              Physical Exam    Airway  Mallampati: II  TM Distance: > 6 cm  Neck ROM: normal range of motion   Mouth opening: Normal     Cardiovascular  Regular rate and rhythm,  S1 and S2 normal,  no murmur, click, rub, or gallop             Dental  No notable dental hx       Pulmonary  Breath sounds clear to auscultation               Abdominal  GI exam deferred       Other Findings            Anesthetic Plan    ASA: 2  Anesthesia type: general          Induction: Intravenous  Anesthetic plan and risks discussed with: Patient

## 2018-06-20 NOTE — PROGRESS NOTES
TRANSFER - IN REPORT:    [facility] Luly Maldonado  being received from [employer]) for routine post - op      Report consisted of patients Situation, Background, Assessment and   Recommendations(SBAR). Information from the following report(s) SBAR was reviewed with the receiving nurse. Opportunity for questions and clarification was provided. Assessment completed upon patients arrival to unit and care assumed.

## 2018-06-20 NOTE — PERIOP NOTES
surgifoam ref 1974 lot [id number] exp-04-  Used topical by Dr Teresa Spivey as needed prn to lumbar supine

## 2018-06-20 NOTE — OP NOTES
[address] [place] [address] REPORT    Marina Rob  MR#: [id number]  : 1934  ACCOUNT #: [de-identified]   DATE OF SERVICE: 2018    PREOPERATIVE DIAGNOSES:  1. Lumbar spinal stenosis, L2-L5. 2.  Adult degenerative scoliosis. 3.  Status post lumbar laminectomy, L1-L5.  4.  Status post lumbar posterior spinal fusion, L1-L2. POSTOPERATIVE DIAGNOSES:  1. Lumbar spinal stenosis, L2-L5. 2.  Adult degenerative scoliosis. 3.  Status post lumbar laminectomy, L1-L5.  4.  Status post lumbar posterior spinal fusion, L1-L2. PROCEDURES PERFORMED:  1. Partial removal of segmental instrumentation, lumbar spine. 2.  Exploration spinal fusion, lumbar spine. 3.  Revision bilateral laminectomy, L2-L5 decompression of the L3, 4, and 5 nerve roots. 4.  Repair of complex dural tear on left side, L4-L5. 5.  Revision posterior spinal fusion, L1-L5 using Globus instrumentation, supplemental cancellous allograft, and bone morphogenic protein. SURGEON:  Wesly Rick MD    ASSISTANT:  RENÉ Faith.    ANESTHESIA:  General.    ESTIMATED BLOOD LOSS: see brief op note        INDICATIONS:  The patient is an 80-year-old female well known to me. I did a lumbar decompression across multiple segments and a posterior spinal fusion at the L1-2 area a couple of years ago for which she has done well. Unfortunately, has had progressive complaints of discomfort to the left side. X-rays clearly showed a rather marked advancement of a degenerative scoliosis. This collapsed to the symptomatic left side, L2-3, L3-4. She has attempted conservative measures, but despite those measures, progressively symptomatic. Given the extent of her pain, dysfunction, and the findings clinically and radiologically, a revision decompression and stabilization procedure were offered. Potential benefits and complications reviewed.     DESCRIPTION OF PROCEDURE:  The patient was brought to the operating room in the supine position. General anesthetic administered. The patient placed in the prone position on the  gel rolls. Posterior spinal region prepped and draped in a normal fashion. Preoperative antibiotics administered. Thigh high GUERDA hose and sequential pumps applied to the patient's lower extremity. Incision made extending from L1 down to the sacrum. Subcutaneous tissue then divided in line with the incision down to the level of the fascia. The fascia incised in the midline and  off the dissection completed over the spinous process and laminar surfaces. Deep retractors were placed. Good hemostasis was obtained. The locking caps were removed from L1-L2. Rhett was removed. The screw in L2 removed. Screw still had a good purchase. We then meticulously took out all of the scar tissue along the lateral gutter to the medial edge of the lamina surface out to the tips of the transverse processes, exposing L3, 4, and 5. This was completed bilaterally. I then completed a revision laminectomy from L2 down to L5. The L2, 3, 4, and 5 roots identified and decompressed. The facet joint on the left side at L2-3 was obviously fractured, and this was the source of her angulation. I did unfortunately, using a small angled curet, penetrate the dural sheath along the nerve root on the left side over the L4 nerve root. This was really not in a position for direct repair. As such, I did end up placing a dural patch on this area covered with some Tisseel. We seemed to obtain a watertight seal.  Once completed, the L2, 3, 4, and 5 roots identified and decompressed. The facet joint capsule and osteophytes about the facet joint, all segments were resected. I placed drill holes into the pedicle of L3, 4, and 5. Pedicles probed. Pins were placed into the pedicles. A marker and x-ray was taken to verify position and direction.   A large amount of cancellous allograft used with a medium kit of bone morphogenic protein. Bone graft was finger packed into the remaining facet joints, all segments, the pars interarticularis and the transverse processes as well as the old fusion mass. Screws measuring 6.5-7.5 mm in diameter and 40-45 mm in length, all levels with reasonable purchase. These were then connected with a shiva that was appropriate contour, secured using a locking cap and final tightening device. There was a slight distractive force placed on the right side, L5-S1, left side L2-3, L3-4, L4-5. Wound had been irrigated earlier. No drain was utilized. Fascia closed using #1 Vicryl. Subcutaneous tissue and skin closed with 3-0 Vicryl. Tisseel was used to complete the dural repair. Patient awoke from the anesthetic, extubated, and taken to recovery in stable condition.       MD MANDY Lorenzana / Mariana Mathew  D: 06/20/2018 16:30     T: 06/20/2018 17:07  JOB #: [id number]

## 2018-06-20 NOTE — PERIOP NOTES
TRANSFER - OUT REPORT:    Verbal report given to 5 W DEJA Watkins(name) on [employer]  being transferred to [facility](unit) for routine post - op       Report consisted of patients Situation, Background, Assessment and   Recommendations(SBAR). Time Pre op antibiotic given:1354  Anesthesia Stop time: 1802  Gilmore Present on Transfer to floor:y  Order for Gilmore on Chart:y  Discharge Prescriptions with Chart:n    Information from the following report(s) SBAR, OR Summary, Intake/Output, MAR and Cardiac Rhythm NSR was reviewed with the receiving nurse. Opportunity for questions and clarification was provided. Is the patient on 02? YES       L/Min 2       Other     Is the patient on a monitor? NO    Is the nurse transporting with the patient? NO    Surgical Waiting Area notified of patient's transfer from PACU? YES, via volunteer Emelina      Patient is wearing her eyeglasses in pacu;    Patient is wearing BOTH hearing aids in pacu;    Patient's clothing bag and cane are both on patient's bed in pacu.

## 2018-06-21 LAB
ANION GAP SERPL CALC-SCNC: 7 MMOL/L (ref 5–15)
BUN SERPL-MCNC: 13 MG/DL (ref 6–20)
BUN/CREAT SERPL: 27 (ref 12–20)
CALCIUM SERPL-MCNC: 7.1 MG/DL (ref 8.5–10.1)
CHLORIDE SERPL-SCNC: 109 MMOL/L (ref 97–108)
CO2 SERPL-SCNC: 25 MMOL/L (ref 21–32)
CREAT SERPL-MCNC: 0.49 MG/DL (ref 0.55–1.02)
GLUCOSE SERPL-MCNC: 123 MG/DL (ref 65–100)
HGB BLD-MCNC: 11.2 G/DL (ref 11.5–16)
POTASSIUM SERPL-SCNC: 3.9 MMOL/L (ref 3.5–5.1)
SODIUM SERPL-SCNC: 141 MMOL/L (ref 136–145)

## 2018-06-21 PROCEDURE — 74011250636 HC RX REV CODE- 250/636: Performed by: NURSE PRACTITIONER

## 2018-06-21 PROCEDURE — 65270000029 HC RM PRIVATE

## 2018-06-21 PROCEDURE — 74011250637 HC RX REV CODE- 250/637: Performed by: PHYSICIAN ASSISTANT

## 2018-06-21 PROCEDURE — 74011250637 HC RX REV CODE- 250/637: Performed by: NURSE PRACTITIONER

## 2018-06-21 PROCEDURE — 77010033678 HC OXYGEN DAILY

## 2018-06-21 PROCEDURE — 74011000250 HC RX REV CODE- 250: Performed by: NURSE PRACTITIONER

## 2018-06-21 PROCEDURE — 36415 COLL VENOUS BLD VENIPUNCTURE: CPT | Performed by: PHYSICIAN ASSISTANT

## 2018-06-21 PROCEDURE — 74011250636 HC RX REV CODE- 250/636: Performed by: PHYSICIAN ASSISTANT

## 2018-06-21 PROCEDURE — 80048 BASIC METABOLIC PNL TOTAL CA: CPT | Performed by: PHYSICIAN ASSISTANT

## 2018-06-21 PROCEDURE — 85018 HEMOGLOBIN: CPT | Performed by: PHYSICIAN ASSISTANT

## 2018-06-21 RX ORDER — SCOLOPAMINE TRANSDERMAL SYSTEM 1 MG/1
1 PATCH, EXTENDED RELEASE TRANSDERMAL
Status: DISCONTINUED | OUTPATIENT
Start: 2018-06-21 | End: 2018-06-26

## 2018-06-21 RX ORDER — POLYETHYLENE GLYCOL 3350 17 G/17G
17 POWDER, FOR SOLUTION ORAL 2 TIMES DAILY
Status: DISCONTINUED | OUTPATIENT
Start: 2018-06-21 | End: 2018-06-26

## 2018-06-21 RX ORDER — ACETAMINOPHEN 325 MG/1
650 TABLET ORAL EVERY 6 HOURS
Status: DISCONTINUED | OUTPATIENT
Start: 2018-06-21 | End: 2018-06-26 | Stop reason: HOSPADM

## 2018-06-21 RX ADMIN — PANTOPRAZOLE SODIUM 40 MG: 20 TABLET, DELAYED RELEASE ORAL at 08:34

## 2018-06-21 RX ADMIN — OXYCODONE HYDROCHLORIDE 10 MG: 5 TABLET ORAL at 08:32

## 2018-06-21 RX ADMIN — ACETAMINOPHEN 650 MG: 325 TABLET ORAL at 18:59

## 2018-06-21 RX ADMIN — DULOXETINE HYDROCHLORIDE 60 MG: 60 CAPSULE, DELAYED RELEASE ORAL at 08:32

## 2018-06-21 RX ADMIN — POLYETHYLENE GLYCOL 3350 17 G: 17 POWDER, FOR SOLUTION ORAL at 08:31

## 2018-06-21 RX ADMIN — GABAPENTIN 100 MG: 100 CAPSULE ORAL at 22:27

## 2018-06-21 RX ADMIN — VITAMIN D, TAB 1000IU (100/BT) 2000 UNITS: 25 TAB at 08:32

## 2018-06-21 RX ADMIN — OXYCODONE HYDROCHLORIDE 5 MG: 5 TABLET ORAL at 19:10

## 2018-06-21 RX ADMIN — Medication 10 ML: at 06:59

## 2018-06-21 RX ADMIN — Medication 10 ML: at 22:28

## 2018-06-21 RX ADMIN — SENNOSIDES AND DOCUSATE SODIUM 1 TABLET: 8.6; 5 TABLET ORAL at 08:32

## 2018-06-21 RX ADMIN — ACETAMINOPHEN 650 MG: 325 TABLET ORAL at 23:57

## 2018-06-21 RX ADMIN — CARBOXYMETHYLCELLULOSE SODIUM 1 DROP: 5 SOLUTION/ DROPS OPHTHALMIC at 08:33

## 2018-06-21 RX ADMIN — ACETAMINOPHEN 650 MG: 325 TABLET ORAL at 12:49

## 2018-06-21 RX ADMIN — GABAPENTIN 100 MG: 100 CAPSULE ORAL at 08:32

## 2018-06-21 RX ADMIN — Medication 2 G: at 06:59

## 2018-06-21 RX ADMIN — POLYETHYLENE GLYCOL 3350 17 G: 17 POWDER, FOR SOLUTION ORAL at 18:14

## 2018-06-21 RX ADMIN — SODIUM CHLORIDE 125 ML/HR: 900 INJECTION, SOLUTION INTRAVENOUS at 03:20

## 2018-06-21 RX ADMIN — SENNOSIDES AND DOCUSATE SODIUM 1 TABLET: 8.6; 5 TABLET ORAL at 18:14

## 2018-06-21 RX ADMIN — DOXYCYCLINE HYCLATE 100 MG: 100 TABLET, COATED ORAL at 10:13

## 2018-06-21 RX ADMIN — GABAPENTIN 100 MG: 100 CAPSULE ORAL at 16:42

## 2018-06-21 RX ADMIN — ONDANSETRON 4 MG: 2 INJECTION INTRAMUSCULAR; INTRAVENOUS at 07:08

## 2018-06-21 RX ADMIN — SODIUM CHLORIDE 5 MG: 9 INJECTION INTRAMUSCULAR; INTRAVENOUS; SUBCUTANEOUS at 08:33

## 2018-06-21 NOTE — PROGRESS NOTES
Ortho Spine Daily Progress Note    Date of Surgery:  2018      Patient: Jenise Martinez   YOB: 1934  Age: 80 y.o. SUBJECTIVE:   1 Day Post-Op following REVISION LUMBAR LAMINECTOMY L2-3, REVISION POSTERIOR SPINAL FUSION L1-5 DURAL REPAIR    The patient states moderate back pain this morning. The patient states that their pre-operative lower extremity symptoms appear to be improved. The patient is to have [facility] flat due to dural repair. The patient rates their current level of pain as 7/10. The patient's post operative pain is adequately controlled. The patient denies CP, SOB, denies HA. +nausea. OBJECTIVE:     Vital Signs:    Temp (24hrs), Av.1 °F (36.7 °C), Min:97.5 °F (36.4 °C), Max:98.6 °F (37 °C)    Patient Vitals for the past 24 hrs:   BP Temp Pulse Resp SpO2   18 0414 139/67 98.2 °F (36.8 °C) 83 16 100 %   18 0250 127/66 97.9 °F (36.6 °C) 95 16 97 %   18 2353 130/58 98.6 °F (37 °C) 93 12 96 %   18 2312 157/83 98.4 °F (36.9 °C) 93 12 94 %   18 2057 147/72 - 93 16 98 %   18 1953 157/78 - (!) 101 16 99 %   18 1900 141/56 - 92 15 97 %   18 1845 143/72 - 89 12 100 %   18 1832 - 97.5 °F (36.4 °C) - - -   18 1830 145/63 - 88 12 100 %   18 1815 94/63 - 83 12 100 %   18 1805 115/86 - 84 10 100 %   18 1800 120/58 97.5 °F (36.4 °C) 84 13 100 %   18 1000 139/65 98.4 °F (36.9 °C) 96 16 96 %           Physical Exam:  General: A&Ox3, NAD. Respiratory: Respirations are unlabored.   Abdomen: S/NT  Surgical site: C,D and I.  Musculoskeletal: Calves are S/NT, Will dorsi/plantar flexion/EHL/quads/hip flexion intact, Will DP 2+  Neurological:  Will LE's NVI    Laboratory Values:             Recent Labs      18   0307   HGB  11.2*   CREA  0.49*   GLU  123*     Lab Results   Component Value Date/Time    Sodium 141 2018 03:07 AM    Potassium 3.9 2018 03:07 AM    Chloride 109 (H) 2018 03:07 AM CO2 25 06/21/2018 03:07 AM    Glucose 123 (H) 06/21/2018 03:07 AM    BUN 13 06/21/2018 03:07 AM    Creatinine 0.49 (L) 06/21/2018 03:07 AM    Calcium 7.1 (L) 06/21/2018 03:07 AM           PLAN:     S/P REVISION LUMBAR LAMINECTOMY L2-3, REVISION POSTERIOR SPINAL FUSION L1-5 DURAL REPAIR Keep HOB flat until Saturday AM.      Hemodynamics Post op anemia. Tolerating well thus far. Will continue to monitor. Wound Continue dressing changes PRN. Post Operative Pain Pain Control: Adequate, continue current regimen. DVT Prophylaxis Continue with SCD'S, Encourage Ankle Pump Exercises, Mobilize. Discharge Disposition Discharge plan: Will focus on pain control and mobilizing with PT/nursing. Case Management for discharge planning. Will need rehab placement. Will continue to monitor progress closely. The patient was seen and evaluated by Dr Cathalene Goodpasture who agrees with the findings and treatment plan as outlined above.         Signed By: Elizabeth Barnes PA-C  June 21, 2018 8:08 AM

## 2018-06-21 NOTE — PROGRESS NOTES
Bedside shift change report given to [facility] SHYAM (oncoming nurse) by Sho Don (offgoing nurse). Report included the following information SBAR.

## 2018-06-21 NOTE — PROGRESS NOTES
Reason for Admission:   REVISION LUMBAR LAMINECTOMY                   RRAT Score:     12             Do you (patient/family) have any concerns for transition/discharge? No concerns were noted               Plan for utilizing home health:     PT/OT were consulted and awaiting recommendations    Likelihood of readmission? Low            Transition of Care Plan:      TBD pending patient's disposition and recommendations. Patient to be HOB flat for 48 hours due to dural repair. Offered choice. Patient selected [facility] for  rehab. Patient been to [facility] in the past. Patient lives with her , Luigi Leger [phone] in a private residence. Patient's  and extended family members were present. Patient denied use of assistance devices. Confirmed PCP is Dr. Aníbal Arriola and fills Rx at [facility]. No current case mgmt needs were idenified. CM will continue to follow patient's disposition. Care Management Interventions  PCP Verified by CM:  Yes  Palliative Care Criteria Met (RRAT>21 & CHF Dx)?: No  Mode of Transport at Discharge: BLS (Patient may need BLS transport at discharge )  Transition of Care Consult (CM Consult): Discharge Planning  Discharge Durable Medical Equipment: No  Physical Therapy Consult: Yes  Occupational Therapy Consult: Yes  Speech Therapy Consult: No  Current Support Network: Lives with Spouse, Own Home  Confirm Follow Up Transport: Self  Plan discussed with Pt/Family/Caregiver: No   Resource Information Provided?: No  Discharge Location  Discharge Placement: Rehab Unit Subacute  TAM Hutson

## 2018-06-21 NOTE — PROGRESS NOTES
Bedside, Verbal and Written shift change report given to Estefany (oncoming nurse) by Tiff Wang (offgoing nurse). Report included the following information SBAR, Kardex, OR Summary, Procedure Summary, Intake/Output, MAR and Accordion. 2000 - Assumed pt care. VSS.  Primary Nurse nErique Ross and Елена Muse RN performed a dual skin assessment on this patient Impairment noted- see wound doc flow sheet  Deejay score is 21

## 2018-06-21 NOTE — PROGRESS NOTES
Physical Therapy:    Orders received: 6/20/18 at 20:00 \"Hold for 48 hours due to dural tear repair surgery. \"  Will hold and will initiate PT evaluation on 6/23/18 AM. Thank you    Eugene Rodrigez, PT, DPT

## 2018-06-21 NOTE — PROGRESS NOTES
CM attempted to meet with patient at bedside to conduct initial assessment; however, pt was sleeping. CM will re-attempt at a later time.

## 2018-06-21 NOTE — PROGRESS NOTES
Occupational Therapy Note:     Pt post-op day 1 today and noted with dural tear. Per notes, pt to be flat in bed until Saturday June 23, 2018. Will defer today.        Jeffrey Olea, OT

## 2018-06-21 NOTE — PROGRESS NOTES
Problem: Discharge Planning  Goal: *Discharge to safe environment  Outcome: Progressing Towards Goal  See CM notes  TAM Cardona

## 2018-06-22 LAB — HGB BLD-MCNC: 10.5 G/DL (ref 11.5–16)

## 2018-06-22 PROCEDURE — 74011250637 HC RX REV CODE- 250/637: Performed by: NURSE PRACTITIONER

## 2018-06-22 PROCEDURE — 74011250637 HC RX REV CODE- 250/637: Performed by: PHYSICIAN ASSISTANT

## 2018-06-22 PROCEDURE — 36415 COLL VENOUS BLD VENIPUNCTURE: CPT | Performed by: PHYSICIAN ASSISTANT

## 2018-06-22 PROCEDURE — 85018 HEMOGLOBIN: CPT | Performed by: PHYSICIAN ASSISTANT

## 2018-06-22 PROCEDURE — 65270000029 HC RM PRIVATE

## 2018-06-22 RX ADMIN — ACETAMINOPHEN 650 MG: 325 TABLET ORAL at 05:51

## 2018-06-22 RX ADMIN — SENNOSIDES AND DOCUSATE SODIUM 1 TABLET: 8.6; 5 TABLET ORAL at 18:07

## 2018-06-22 RX ADMIN — Medication 10 ML: at 13:11

## 2018-06-22 RX ADMIN — Medication 10 ML: at 05:59

## 2018-06-22 RX ADMIN — DULOXETINE HYDROCHLORIDE 60 MG: 60 CAPSULE, DELAYED RELEASE ORAL at 08:51

## 2018-06-22 RX ADMIN — GABAPENTIN 100 MG: 100 CAPSULE ORAL at 15:11

## 2018-06-22 RX ADMIN — ACETAMINOPHEN 650 MG: 325 TABLET ORAL at 18:07

## 2018-06-22 RX ADMIN — VITAMIN D, TAB 1000IU (100/BT) 2000 UNITS: 25 TAB at 08:51

## 2018-06-22 RX ADMIN — DOXYCYCLINE HYCLATE 100 MG: 100 TABLET, COATED ORAL at 08:51

## 2018-06-22 RX ADMIN — SENNOSIDES AND DOCUSATE SODIUM 1 TABLET: 8.6; 5 TABLET ORAL at 08:52

## 2018-06-22 RX ADMIN — GABAPENTIN 100 MG: 100 CAPSULE ORAL at 08:52

## 2018-06-22 RX ADMIN — CARBOXYMETHYLCELLULOSE SODIUM 1 DROP: 5 SOLUTION/ DROPS OPHTHALMIC at 09:39

## 2018-06-22 RX ADMIN — ACETAMINOPHEN 650 MG: 325 TABLET ORAL at 13:11

## 2018-06-22 RX ADMIN — POLYETHYLENE GLYCOL 3350 17 G: 17 POWDER, FOR SOLUTION ORAL at 08:51

## 2018-06-22 RX ADMIN — PANTOPRAZOLE SODIUM 40 MG: 20 TABLET, DELAYED RELEASE ORAL at 08:51

## 2018-06-22 NOTE — PROGRESS NOTES
Ortho Spine Daily Progress Note    Date of Surgery:  2018      Patient: Melany Farooq   YOB: 1934  Age: 80 y.o. SUBJECTIVE:   2 Days Post-Op following REVISION LUMBAR LAMINECTOMY L2-3, REVISION POSTERIOR SPINAL FUSION L1-5 DURAL REPAIR    The patient states moderate back pain this morning. Denies headache. The patient states that their pre-operative lower extremity symptoms appear to be improved. The patient rates their current level of pain as 5/10. The patient's post operative pain is adequately controlled. HOB remains flat and pt on bedrest due to dura leak. The patient denies CP, SOB, N/V, dizziness, abdominal pain, HA. OBJECTIVE:     Vital Signs:    Temp (24hrs), Av.7 °F (37.1 °C), Min:98.3 °F (36.8 °C), Max:99.5 °F (37.5 °C)    Patient Vitals for the past 24 hrs:   BP Temp Pulse Resp SpO2   18 0248 131/60 98.6 °F (37 °C) 85 16 98 %   18 2033 118/65 98.3 °F (36.8 °C) 93 16 98 %   18 1433 106/48 99.5 °F (37.5 °C) 83 16 98 %   18 0800 121/74 98.4 °F (36.9 °C) 87 16 100 %           Physical Exam:  General: A&Ox3, NAD. Respiratory: Respirations are unlabored.   Abdomen: S/NT  Surgical site: C,D and I.  Musculoskeletal: Calves are S/NT, Will dorsi/plantar flexion/EHL/quads/hip flexion intact, Will DP 2+  Neurological:  Will LE's NVI    Laboratory Values:             Recent Labs      18   0252  18   0307   HGB  10.5*  11.2*   CREA   --   0.49*   GLU   --   123*     Lab Results   Component Value Date/Time    Sodium 141 2018 03:07 AM    Potassium 3.9 2018 03:07 AM    Chloride 109 (H) 2018 03:07 AM    CO2 25 2018 03:07 AM    Glucose 123 (H) 2018 03:07 AM    BUN 13 2018 03:07 AM    Creatinine 0.49 (L) 2018 03:07 AM    Calcium 7.1 (L) 2018 03:07 AM       PLAN:     S/P REVISION LUMBAR LAMINECTOMY L2-3, REVISION POSTERIOR SPINAL FUSION L1-5 DURAL REPAIR HOB flat and strict bedrest until tomorrow Am then may begin to gradually mobilize if no headache. Ortho consult for TLSO brace tomorrow afternoon or Monday. OK to mobilize without brace. Hemodynamics Post op anemia. Tolerating well thus far. Will continue to monitor. Wound Continue dressing changes PRN. Post Operative Pain Pain Control: Adequate, continue current regimen. DVT Prophylaxis Continue with SCD'S, Encourage Ankle Pump Exercises, Mobilize. Discharge Disposition Discharge plan: Bedrest with HOB flat today. Gradually mobilize tomorrow AM. Case Management for discharge planning. Will need rehab placement upon discharge, most likely on Monday. Will continue to monitor progress closely.                  Signed By: Jd Doe PA-C  June 22, 2018 7:44 AM

## 2018-06-22 NOTE — PROGRESS NOTES
Occupational Therapy: hold    Orders received, chart reviewed. Noted order for [facility] flat with strict bedrest until Saturday AM s/p dural repair. Will hold at this time and will f/u as able and appropriate.     Ruthann Flores, OTR/L

## 2018-06-22 NOTE — PROGRESS NOTES
Physical Therapy:    Chart reviewed. Patient is to be evaluated tomorrow (6/23/18) due to bedrest with HOB flat today. Will hold and complete evaluation tomorrow as appropriate. Thank you.     Sherman Bal, PT, DPT

## 2018-06-23 LAB
ALBUMIN SERPL-MCNC: 2.5 G/DL (ref 3.5–5)
ALBUMIN/GLOB SERPL: 0.8 {RATIO} (ref 1.1–2.2)
ALP SERPL-CCNC: 111 U/L (ref 45–117)
ALT SERPL-CCNC: 147 U/L (ref 12–78)
ANION GAP SERPL CALC-SCNC: 8 MMOL/L (ref 5–15)
AST SERPL-CCNC: 53 U/L (ref 15–37)
BILIRUB SERPL-MCNC: 0.9 MG/DL (ref 0.2–1)
BUN SERPL-MCNC: 8 MG/DL (ref 6–20)
BUN/CREAT SERPL: 22 (ref 12–20)
CALCIUM SERPL-MCNC: 7.9 MG/DL (ref 8.5–10.1)
CHLORIDE SERPL-SCNC: 107 MMOL/L (ref 97–108)
CO2 SERPL-SCNC: 24 MMOL/L (ref 21–32)
CREAT SERPL-MCNC: 0.36 MG/DL (ref 0.55–1.02)
GLOBULIN SER CALC-MCNC: 3.3 G/DL (ref 2–4)
GLUCOSE SERPL-MCNC: 97 MG/DL (ref 65–100)
HCT VFR BLD AUTO: 32.6 % (ref 35–47)
HGB BLD-MCNC: 11 G/DL (ref 11.5–16)
POTASSIUM SERPL-SCNC: 3.5 MMOL/L (ref 3.5–5.1)
PROT SERPL-MCNC: 5.8 G/DL (ref 6.4–8.2)
SODIUM SERPL-SCNC: 139 MMOL/L (ref 136–145)

## 2018-06-23 PROCEDURE — 74011000250 HC RX REV CODE- 250: Performed by: PHYSICIAN ASSISTANT

## 2018-06-23 PROCEDURE — 74011250637 HC RX REV CODE- 250/637: Performed by: PHYSICIAN ASSISTANT

## 2018-06-23 PROCEDURE — 80053 COMPREHEN METABOLIC PANEL: CPT | Performed by: ORTHOPAEDIC SURGERY

## 2018-06-23 PROCEDURE — 97530 THERAPEUTIC ACTIVITIES: CPT | Performed by: PHYSICAL THERAPIST

## 2018-06-23 PROCEDURE — 97116 GAIT TRAINING THERAPY: CPT | Performed by: PHYSICAL THERAPIST

## 2018-06-23 PROCEDURE — 97161 PT EVAL LOW COMPLEX 20 MIN: CPT | Performed by: PHYSICAL THERAPIST

## 2018-06-23 PROCEDURE — 65270000029 HC RM PRIVATE

## 2018-06-23 PROCEDURE — 74011250637 HC RX REV CODE- 250/637: Performed by: NURSE PRACTITIONER

## 2018-06-23 PROCEDURE — 36415 COLL VENOUS BLD VENIPUNCTURE: CPT | Performed by: ORTHOPAEDIC SURGERY

## 2018-06-23 PROCEDURE — 97535 SELF CARE MNGMENT TRAINING: CPT

## 2018-06-23 PROCEDURE — 97530 THERAPEUTIC ACTIVITIES: CPT

## 2018-06-23 PROCEDURE — 97165 OT EVAL LOW COMPLEX 30 MIN: CPT

## 2018-06-23 PROCEDURE — 85018 HEMOGLOBIN: CPT | Performed by: ORTHOPAEDIC SURGERY

## 2018-06-23 RX ADMIN — ACETAMINOPHEN 650 MG: 325 TABLET ORAL at 17:25

## 2018-06-23 RX ADMIN — SENNOSIDES AND DOCUSATE SODIUM 1 TABLET: 8.6; 5 TABLET ORAL at 17:25

## 2018-06-23 RX ADMIN — PANTOPRAZOLE SODIUM 40 MG: 20 TABLET, DELAYED RELEASE ORAL at 08:27

## 2018-06-23 RX ADMIN — POLYETHYLENE GLYCOL 3350 17 G: 17 POWDER, FOR SOLUTION ORAL at 08:09

## 2018-06-23 RX ADMIN — Medication 10 ML: at 15:20

## 2018-06-23 RX ADMIN — GABAPENTIN 100 MG: 100 CAPSULE ORAL at 15:18

## 2018-06-23 RX ADMIN — VITAMIN D, TAB 1000IU (100/BT) 2000 UNITS: 25 TAB at 08:10

## 2018-06-23 RX ADMIN — POLYETHYLENE GLYCOL 3350 17 G: 17 POWDER, FOR SOLUTION ORAL at 17:25

## 2018-06-23 RX ADMIN — DULOXETINE HYDROCHLORIDE 60 MG: 60 CAPSULE, DELAYED RELEASE ORAL at 08:10

## 2018-06-23 RX ADMIN — GABAPENTIN 100 MG: 100 CAPSULE ORAL at 08:10

## 2018-06-23 RX ADMIN — SENNOSIDES AND DOCUSATE SODIUM 1 TABLET: 8.6; 5 TABLET ORAL at 08:10

## 2018-06-23 RX ADMIN — DOXYCYCLINE HYCLATE 100 MG: 100 TABLET, COATED ORAL at 08:27

## 2018-06-23 RX ADMIN — ACETAMINOPHEN 650 MG: 325 TABLET ORAL at 11:32

## 2018-06-23 RX ADMIN — GABAPENTIN 100 MG: 100 CAPSULE ORAL at 21:28

## 2018-06-23 RX ADMIN — POLYVINYL ALCOHOL 1 DROP: 14 SOLUTION/ DROPS OPHTHALMIC at 21:29

## 2018-06-23 RX ADMIN — ACETAMINOPHEN 650 MG: 325 TABLET ORAL at 00:43

## 2018-06-23 RX ADMIN — ESTRADIOL 0.5 MG: 1 TABLET ORAL at 08:27

## 2018-06-23 RX ADMIN — CARBOXYMETHYLCELLULOSE SODIUM 1 DROP: 5 SOLUTION/ DROPS OPHTHALMIC at 09:00

## 2018-06-23 RX ADMIN — Medication 10 ML: at 06:00

## 2018-06-23 NOTE — PROGRESS NOTES
Dr Guilherme Ceja notified of pt's mental status. md informed of pt experiencing intermitten confusion and halucinations. Pt to have cmp and H&h drawn asap. He will evaluate pt when he rounds later today.

## 2018-06-23 NOTE — PROGRESS NOTES
Ortho    Patient is mostly oriented today. I reviewed her labs and there is really nothing to explain her confusion from yesterday. Continue to mobilize for now.

## 2018-06-23 NOTE — PROGRESS NOTES
Problem: Mobility Impaired (Adult and Pediatric)  Goal: *Acute Goals and Plan of Care (Insert Text)  Physical Therapy Goals  Initiated 6/23/2018    1. Patient will move from supine to sit and sit to supine  in bed with CGA within 4 days. 2. Patient will perform sit to stand with contact guard assist within 4 days. 3. Patient will ambulate with minimal assistance for 100 feet with the least restrictive device within 4 days. 4. Patient will verbalize and demonstrate understanding of spinal precautions (No bending, lifting greater than 5 lbs, or twisting; log-roll technique; frequent repositioning as instructed) within 4 days. physical Therapy EVALUATION  Patient: Ashley Galo (64 y.o. female)  Date: 6/23/2018  Primary Diagnosis: LEFT FORAMINAL STENOSIS L2-3, L3-4, DEGENERATIVE SCOLIOSIS, STATUS POST POSTERIOR SPINAL FUSION L1-2  Lumbar spinal stenosis  Procedure(s) (LRB):  REVISION LUMBAR LAMINECTOMY L2-3, REVISION POSTERIOR SPINAL FUSION L1-5 DURAL REPAIR (N/A) 3 Days Post-Op   Precautions:   Fall, Spinal, Other (comment) (brace when up)    ASSESSMENT :  Based on the objective data described below, the patient presents with decreased functional mobility from baseline level of function. Prior to admit patient was living with family. Patient currently confused and unable to provide additional home set up secondary to confusion and no family present. Currently needing modA x 2 for bed mobility and cues to maintain precautions. Sit to stand with Rosibel x 2 with cues for technique with major posterior lean in standing. Amb approx 4 sides steps to [facility] with Rosibel x 2. Returned to supine with modA x 2. Patient to be fitted for TLSO in near future and will need when out of bed. Will continue to follow for mobility progression but will likely require rehab prior to DC home. Patient will benefit from skilled intervention to address the above impairments.   Patients rehabilitation potential is considered to be Good  Factors which may influence rehabilitation potential include:   [x]         None noted  []         Mental ability/status  []         Medical condition  []         Home/family situation and support systems  []         Safety awareness  []         Pain tolerance/management  []         Other:      PLAN :  Recommendations and Planned Interventions:  [x]           Bed Mobility Training             []    Neuromuscular Re-Education  [x]           Transfer Training                   []    Orthotic/Prosthetic Training  [x]           Gait Training                         []    Modalities  [x]           Therapeutic Exercises           []    Edema Management/Control  [x]           Therapeutic Activities            [x]    Patient and Family Training/Education  []           Other (comment):    Frequency/Duration: Patient will be followed by physical therapy  twice daily to address goals. Discharge Recommendations: Rehab (inpatient vs SNF pending progress)  Further Equipment Recommendations for Discharge: TBD     SUBJECTIVE:   Patient stated I'm doing better I think.     OBJECTIVE DATA SUMMARY:   HISTORY:    Past Medical History:   Diagnosis Date    Arthritis     Biliary colic 06/86/3439    Cancer ([facility] [address]     skin cancer - basal and squamous cell, LEGS, FACE    GERD (gastroesophageal reflux disease)     Hypercholesterolemia     PATIENT DENIES    Osteopenia     PAC (premature atrial contraction) 7/9/2013    Pancreatic cyst 7/14/2015    Psychiatric disorder     anxiety    Urinary frequency     Visual loss 1/21/2015     Past Surgical History:   Procedure Laterality Date    CARDIAC SURG PROCEDURE UNLIST  2/11    normal stress test    ENDOSCOPY, COLON, DIAGNOSTIC  2008    HX APPENDECTOMY  2000    HX BACK SURGERY      KYPHOPLASTY    HX CATARACT REMOVAL Bilateral 2012    HX CHOLECYSTECTOMY  12/15/2017    Lap Rachel by Dr. Arsh Branch HX GI  2008    EGD    HX GI      COLONOSCOPY  HX GYN      UTERINE BIOPSY    HX LUMBAR FUSION  2016    HX LUMBAR LAMINECTOMY  2016    HX ORTHOPAEDIC Right 2010    knee - arthroscopy    HX ORTHOPAEDIC Right march 2011    TKR  right    HX OTHER SURGICAL  11/2017    karina. leg skin cancer surgery    HX TONSILLECTOMY      HX TUBAL LIGATION  1976     Prior Level of Function/Home Situation: Per RN patient is normally alert and oriented. Lives with elderly  but unsure of home set up. Personal factors and/or comorbidities impacting plan of care:     Home Situation  Home Environment: Private residence  Living Alone: No  Support Systems: Spouse/Significant Other/Partner    EXAMINATION/PRESENTATION/DECISION MAKING:   Critical Behavior:  Neurologic State: Alert  Orientation Level: Oriented X4  Cognition: Appropriate for age attention/concentration, Follows commands  Safety/Judgement: Fall prevention, Decreased insight into deficits, Decreased awareness of environment, Decreased awareness of need for assistance, Decreased awareness of need for safety  Hearing:   Auditory  Auditory Impairment: Hard of hearing, bilateral    Range Of Motion:  AROM: Within functional limits           PROM: Within functional limits           Strength:    Strength: Generally decreased, functional                    Tone & Sensation:   Tone: Normal              Sensation: Intact               Coordination:  Coordination: Within functional limits  Vision:   Tracking: Able to track stimulus in all quadrants w/o difficulty  Acuity: Within Defined Limits  Corrective Lenses: Glasses  Functional Mobility:  Bed Mobility:     Supine to Sit: Moderate assistance;Assist x2  Sit to Supine: Assist x2;Maximum assistance  Scooting: Minimum assistance;Assist x1;Additional time  Transfers:  Sit to Stand: Minimum assistance;Assist x2  Stand to Sit: Minimum assistance;Assist x2                       Balance:   Sitting: Impaired  Sitting - Static: Good (unsupported)  Sitting - Dynamic: Fair (occasional)  Standing: Impaired; With support  Standing - Static: Poor  Standing - Dynamic : Poor  Ambulation/Gait Training:  Distance (ft): 4 Feet (ft) (side steps to [facility])  Assistive Device: Gait belt;Walker, rolling  Ambulation - Level of Assistance: Minimal assistance;Assist x2 (major posterior lean)        Gait Abnormalities: Antalgic;Decreased step clearance;Shuffling gait        Base of Support: Narrowed     Speed/Giovana: Slow  Step Length: Left shortened;Right shortened       Functional Measure:  Barthel Index:    Bathin  Bladder: 0  Bowels: 10  Groomin  Dressin  Feedin  Mobility: 0  Stairs: 0  Toilet Use: 5  Transfer (Bed to Chair and Back): 5  Total: 30       Barthel and G-code impairment scale:  Percentage of impairment CH  0% CI  1-19% CJ  20-39% CK  40-59% CL  60-79% CM  80-99% CN  100%   Barthel Score 0-100 100 99-80 79-60 59-40 20-39 1-19   0   Barthel Score 0-20 20 17-19 13-16 9-12 5-8 1-4 0      The Barthel ADL Index: Guidelines  1. The index should be used as a record of what a patient does, not as a record of what a patient could do. 2. The main aim is to establish degree of independence from any help, physical or verbal, however minor and for whatever reason. 3. The need for supervision renders the patient not independent. 4. A patient's performance should be established using the best available evidence. Asking the patient, friends/relatives and nurses are the usual sources, but direct observation and common sense are also important. However direct testing is not needed. 5. Usually the patient's performance over the preceding 24-48 hours is important, but occasionally longer periods will be relevant. 6. Middle categories imply that the patient supplies over 50 per cent of the effort. 7. Use of aids to be independent is allowed. Talia Patel., Barthel, D.W. (0143). Functional evaluation: the Barthel Index. [address] [facility] (14)2.   Ellie Morillo torin MARIA ALEJANDRA Day, Aster Boss., Kofi Horan. (1999). Measuring the change indisability after inpatient rehabilitation; comparison of the responsiveness of the Barthel Index and Functional [place] Measure. Journal of Neurology, Neurosurgery, and Psychiatry, 66(4), 465-569. ROSSY Bangura, KIAH Galaviz, & Bao Tyler M.A. (2004.) Assessment of post-stroke quality of life in cost-effectiveness studies: The usefulness of the Barthel Index and the EuroQoL-5D. Quality of Life Research, 13, 850-55         G codes: In compliance with CMSs Claims Based Outcome Reporting, the following G-code set was chosen for this patient based on their primary functional limitation being treated: The outcome measure chosen to determine the severity of the functional limitation was the Barthel with a score of 30/100 which was correlated with the impairment scale. ? Mobility - Walking and Moving Around:     - CURRENT STATUS: CL - 60%-79% impaired, limited or restricted    - GOAL STATUS: CK - 40%-59% impaired, limited or restricted    - D/C STATUS:  ---------------To be determined---------------          Pain:  Pain Scale 1: Numeric (0 - 10)  Pain Intensity 1: 0              Activity Tolerance:   VSS  Please refer to the flowsheet for vital signs taken during this treatment. After treatment:   []         Patient left in no apparent distress sitting up in chair  [x]         Patient left in no apparent distress in bed  [x]         Call bell left within reach  [x]         Nursing notified  [x]         Sitter present  []         Bed alarm activated    COMMUNICATION/EDUCATION:   The patients plan of care was discussed with: Physical Therapist, Occupational Therapist and Registered Nurse. [x]         Fall prevention education was provided and the patient/caregiver indicated understanding. [x]         Patient/family have participated as able in goal setting and plan of care.   [x]         Patient/family agree to work toward stated goals and plan of care. []         Patient understands intent and goals of therapy, but is neutral about his/her participation. []         Patient is unable to participate in goal setting and plan of care.     Thank you for this referral.  Austen Eubanks, PT, DPT   Time Calculation: 23 mins

## 2018-06-23 NOTE — PROGRESS NOTES
Physical Therapy  Per RN patient currently having HOB raised to determine if she has HA secondary to dural tear. Requests PT check back early PM to mobilize if appropriate.   Anthony Arredondo, PT, DPT

## 2018-06-23 NOTE — PROGRESS NOTES
Problem: Falls - Risk of  Goal: *Absence of Falls  Document Felicitas Fall Risk and appropriate interventions in the flowsheet. Outcome: Progressing Towards Goal  Fall Risk Interventions:  Mobility Interventions: Patient to call before getting OOB, PT Consult for mobility concerns    Mentation Interventions: Door open when patient unattended, Family/sitter at bedside, Reorient patient    Medication Interventions: Evaluate medications/consider consulting pharmacy, Patient to call before getting OOB    Elimination Interventions: Call light in reach             Problem: Pressure Injury - Risk of  Goal: *Prevention of pressure injury  Document Deejay Scale and appropriate interventions in the flowsheet. Outcome: Progressing Towards Goal  Pressure Injury Interventions:  Sensory Interventions: Assess changes in LOC, Turn and reposition approx.  every two hours (pillows and wedges if needed)    Moisture Interventions: Absorbent underpads, Moisture barrier, Minimize layers    Activity Interventions: Assess need for specialty bed    Mobility Interventions: Pressure redistribution bed/mattress (bed type), PT/OT evaluation    Nutrition Interventions: Document food/fluid/supplement intake    Friction and Shear Interventions: Foam dressings/transparent film/skin sealants, HOB 30 degrees or less

## 2018-06-23 NOTE — PROGRESS NOTES
Problem: Self Care Deficits Care Plan (Adult)  Goal: *Acute Goals and Plan of Care (Insert Text)  Occupational Therapy Goals  Initiated 6/23/2018    1. Patient will perform lower body dressing with minimal assistance/contact guard assist using AE PRN within 7 days. 2.  Patient will perform toilet transfer with supervision/set-up using most appropriate DME within 7 days. 3.  Patient will bathing at minimal assistance/contact guard assist within 7 days. 4.  Patient will don/doff back brace at minimal assistance/contact guard assist within 7 days. 5.  Patient will verbalize/demonstrate 3/3 back precautions during ADL tasks without cues within 7 days. Occupational Therapy EVALUATION  Patient: Elida Pal (28 y.o. female)  Date: 6/23/2018  Primary Diagnosis: LEFT FORAMINAL STENOSIS L2-3, L3-4, DEGENERATIVE SCOLIOSIS, STATUS POST POSTERIOR SPINAL FUSION L1-2  Lumbar spinal stenosis  Procedure(s) (LRB):  REVISION LUMBAR LAMINECTOMY L2-3, REVISION POSTERIOR SPINAL FUSION L1-5 DURAL REPAIR (N/A) 3 Days Post-Op   Precautions: Spinal       ASSESSMENT :  Based on the objective data described below, the patient presents with overall Min-Mod A x2 for functional mobility, up to Total A for lower body ADLs, and independent-SBA for upper body ADLs s/p revision of lumbar spinal surgery POD 3. Patient with dural tear and on bedrest until this AM. RN slowly progressing HOB throughout morning, had achieved 30* asymptomatic, cleared for OOB mobility. TLSO has not yet been fitted, unable to remain OOB today. Patient overall stable, mildly confused with some hallucinations, reports of dizziness throughout session despite stable vitals. Patient unable to tailor sit. Limited by impaired standing balance for ADLs, requiring HHA x2 at this time. Patient returned to supine. Eating lunch in bed with family members present. Patient is limited in over 60% of all her ADLs, at baseline is fully independent.  Will benefit from d/c to inpatient rehab when medically stable. Vitals:  159/74 HR 90 supine  164/62 HR 99 EOB  167/80  EOB after 2 minutes with reports of dizziness  150/78  standing    Patient will benefit from skilled intervention to address the above impairments. Patients rehabilitation potential is considered to be Good  Factors which may influence rehabilitation potential include:   []             None noted  []             Mental ability/status  []             Medical condition  []             Home/family situation and support systems  []             Safety awareness  []             Pain tolerance/management  []             Other:      PLAN :  Recommendations and Planned Interventions:  [x]               Self Care Training                  [x]        Therapeutic Activities  [x]               Functional Mobility Training    [x]        Cognitive Retraining  [x]               Therapeutic Exercises           [x]        Endurance Activities  [x]               Balance Training                   []        Neuromuscular Re-Education  []               Visual/Perceptual Training     [x]   Home Safety Training  [x]               Patient Education                 [x]        Family Training/Education  []               Other (comment):    Frequency/Duration: Patient will be followed by occupational therapy 5 times a week to address goals. Discharge Recommendations: Inpatient Rehab  Further Equipment Recommendations for Discharge: TBD     SUBJECTIVE:   Patient stated No bending, no lifting, no twisting.     OBJECTIVE DATA SUMMARY:   HISTORY:   Past Medical History:   Diagnosis Date    Arthritis     Biliary colic 53/77/5428    Cancer ([facility] [address]     skin cancer - basal and squamous cell, LEGS, FACE    GERD (gastroesophageal reflux disease)     Hypercholesterolemia     PATIENT DENIES    Osteopenia     PAC (premature atrial contraction) 7/9/2013    Pancreatic cyst 7/14/2015    Psychiatric disorder     anxiety    Urinary frequency     Visual loss 1/21/2015     Past Surgical History:   Procedure Laterality Date    CARDIAC SURG PROCEDURE UNLIST  2/11    normal stress test    ENDOSCOPY, COLON, DIAGNOSTIC  2008    HX APPENDECTOMY  2000    HX BACK SURGERY      KYPHOPLASTY    HX CATARACT REMOVAL Bilateral 2012    HX CHOLECYSTECTOMY  12/15/2017    Lap Rachel by Dr. Kim Bo HX GI  2008    EGD    HX GI      COLONOSCOPY    HX GYN      UTERINE BIOPSY    HX LUMBAR FUSION  2016    HX LUMBAR LAMINECTOMY  2016    HX ORTHOPAEDIC Right 2010    knee - arthroscopy    HX ORTHOPAEDIC Right march 2011    TKR  right    HX OTHER SURGICAL  11/2017    karina. leg skin cancer surgery    HX TONSILLECTOMY      HX TUBAL LIGATION  1976       Prior Level of Function/Environment/Context: Per patient report and chart review, lives at home with .  ambulates with rollator and requires assist for ADLs. Typically independent. Normally alert and oriented x4. Unable to provide much detail today d/t AMS. Home Situation  Home Environment: Private residence  Living Alone: No  Support Systems: Spouse/Significant Other/Partner    Hand dominance: Right    EXAMINATION OF PERFORMANCE DEFICITS:  Cognitive/Behavioral Status:  Neurologic State: Alert  Orientation Level: Oriented X4  Cognition: Appropriate for age attention/concentration; Follows commands  Perception: Cues to maintain midline in standing  Perseveration: No perseveration noted  Safety/Judgement: Fall prevention;Decreased insight into deficits; Decreased awareness of environment;Decreased awareness of need for assistance;Decreased awareness of need for safety    Skin: Appears intact    Edema: None noted in BUEs    Hearing:   Auditory  Auditory Impairment: Hard of hearing, bilateral    Vision/Perceptual:    Tracking: Able to track stimulus in all quadrants w/o difficulty       Acuity: Within Defined Limits    Corrective Lenses: Glasses    Range of Motion:  In BUEs  AROM: Within functional limits  PROM: Within functional limits    Strength: In BUEs  Strength: Generally decreased, functional    Coordination:  Coordination: Within functional limits  Fine Motor Skills-Upper: Left Intact; Right Intact    Gross Motor Skills-Upper: Left Intact; Right Intact    Tone & Sensation:  In BUEs  Tone: Normal  Sensation: Intact    Balance:  Sitting: Impaired  Sitting - Static: Good (unsupported)  Sitting - Dynamic: Fair (occasional)  Standing: Impaired; With support  Standing - Static: Poor  Standing - Dynamic : Poor    Functional Mobility and Transfers for ADLs:  Bed Mobility:  Supine to Sit: Moderate assistance;Assist x2  Sit to Supine: Assist x2;Maximum assistance  Scooting: Minimum assistance;Assist x1;Additional time    Transfers:  Sit to Stand: Minimum assistance;Assist x2  Stand to Sit: Minimum assistance;Assist x2  Toilet Transfer : Minimum assistance;Assist x2 (Inferred per obs of functional mobility)    ADL Assessment:  Feeding: Supervision    Oral Facial Hygiene/Grooming: Supervision*    Bathing: Moderate assistance*    Upper Body Dressing: Minimum assistance*    Lower Body Dressing: Maximum assistance (Unable to tailor sit)    Toileting: Minimum assistance (Inferred per cognition and poor adherence to BLT pxn)*   * Inferred per obs of functional mobility, activity tolerance, cognition, spinal precautions, and safety awareness     ADL Intervention and task modifications:  Feeding  Container Management: Stand-by assistance (D/t AMS and hallucinations)  Utensil Management: Independent  Food to Mouth: Independent  Drink to Mouth: Independent    Lower Body Dressing Assistance  Pants With Elastic Waist: Maximum assistance (Unable to tailor sit, attempted simulation in standin)  Socks:  Total assistance (dependent)  Leg Crossed Method Used: No (Unable to tailor sit)  Position Performed: Seated edge of bed;Standing    Cognitive Retraining  Safety/Judgement: Fall prevention;Decreased insight into deficits; Decreased awareness of environment;Decreased awareness of need for assistance;Decreased awareness of need for safety    Therapeutic Exercise:  - Patient able to stand with min A x2 and moderate posterior lean to take steps up and down side of bed     Functional Measure:  Barthel Index:    Bathin  Bladder: 0  Bowels: 10  Groomin  Dressin  Feedin  Mobility: 0  Stairs: 0  Toilet Use: 5  Transfer (Bed to Chair and Back): 5  Total: 30       Barthel and G-code impairment scale:  Percentage of impairment CH  0% CI  1-19% CJ  20-39% CK  40-59% CL  60-79% CM  80-99% CN  100%   Barthel Score 0-100 100 99-80 79-60 59-40 20-39 1-19   0   Barthel Score 0-20 20 17-19 13-16 9-12 5-8 1-4 0      The Barthel ADL Index: Guidelines  1. The index should be used as a record of what a patient does, not as a record of what a patient could do. 2. The main aim is to establish degree of independence from any help, physical or verbal, however minor and for whatever reason. 3. The need for supervision renders the patient not independent. 4. A patient's performance should be established using the best available evidence. Asking the patient, friends/relatives and nurses are the usual sources, but direct observation and common sense are also important. However direct testing is not needed. 5. Usually the patient's performance over the preceding 24-48 hours is important, but occasionally longer periods will be relevant. 6. Middle categories imply that the patient supplies over 50 per cent of the effort. 7. Use of aids to be independent is allowed. Jesica Poon., Barthel, D.W. (). Functional evaluation: the Barthel Index. [address] [facility] (14)2. Jupiter Busing torin Barb, BRADLEYF, Janice Owens., Yasmani Sykes.Hanna, [address] (). Measuring the change indisability after inpatient rehabilitation; comparison of the responsiveness of the Barthel Index and Functional [place] Measure. Journal of Neurology, Neurosurgery, and Psychiatry, 66(4), 738-519. ROSSY Sethi, KIAH Galaviz, & Arline Ayala M.A. (2004.) Assessment of post-stroke quality of life in cost-effectiveness studies: The usefulness of the Barthel Index and the EuroQoL-5D. Quality of Life Research, 13, 176-94         G codes: In compliance with CMSs Claims Based Outcome Reporting, the following G-code set was chosen for this patient based on their primary functional limitation being treated: The outcome measure chosen to determine the severity of the functional limitation was the Barthel Index with a score of 30/100 which was correlated with the impairment scale. ? Self Care:     - CURRENT STATUS: CL - 60%-79% impaired, limited or restricted    - GOAL STATUS: CK - 40%-59% impaired, limited or restricted    - D/C STATUS:  ---------------To be determined---------------     Occupational Therapy Evaluation Charge Determination   History Examination Decision-Making   LOW Complexity : Brief history review  MEDIUM Complexity : 3-5 performance deficits relating to physical, cognitive , or psychosocial skils that result in activity limitations and / or participation restrictions LOW Complexity : No comorbidities that affect functional and no verbal or physical assistance needed to complete eval tasks       Based on the above components, the patient evaluation is determined to be of the following complexity level: LOW   Pain:  Pain Scale 1: Numeric (0 - 10)  Pain Intensity 1: 0              Activity Tolerance:   Good. Please refer to the flowsheet for vital signs taken during this treatment.   After treatment:   [] Patient left in no apparent distress sitting up in chair  [x] Patient left in no apparent distress in bed with HOB at 40*  [x] Call bell left within reach  [x] Nursing notified  [x] Caregiver present  [] Bed alarm activated    COMMUNICATION/EDUCATION:   The patients plan of care was discussed with: Physical Therapist and Registered Nurse. [x] Home safety education was provided and the patient/caregiver indicated understanding. [x] Patient/family have participated as able in goal setting and plan of care. [] Patient/family agree to work toward stated goals and plan of care. [] Patient understands intent and goals of therapy, but is neutral about his/her participation. [] Patient is unable to participate in goal setting and plan of care. This patients plan of care is appropriate for delegation to [facility].     Thank you for this referral.  Rob Wells OT  Time Calculation: 24 mins

## 2018-06-24 PROCEDURE — 74011250637 HC RX REV CODE- 250/637: Performed by: NURSE PRACTITIONER

## 2018-06-24 PROCEDURE — 97116 GAIT TRAINING THERAPY: CPT

## 2018-06-24 PROCEDURE — 51798 US URINE CAPACITY MEASURE: CPT

## 2018-06-24 PROCEDURE — 74011250637 HC RX REV CODE- 250/637: Performed by: PHYSICIAN ASSISTANT

## 2018-06-24 PROCEDURE — 74011250637 HC RX REV CODE- 250/637: Performed by: ORTHOPAEDIC SURGERY

## 2018-06-24 PROCEDURE — 65270000029 HC RM PRIVATE

## 2018-06-24 RX ORDER — LORAZEPAM 0.5 MG/1
0.5 TABLET ORAL
Status: DISCONTINUED | OUTPATIENT
Start: 2018-06-24 | End: 2018-06-26 | Stop reason: HOSPADM

## 2018-06-24 RX ADMIN — LORAZEPAM 0.5 MG: 0.5 TABLET ORAL at 21:16

## 2018-06-24 RX ADMIN — POLYETHYLENE GLYCOL 3350 17 G: 17 POWDER, FOR SOLUTION ORAL at 08:52

## 2018-06-24 RX ADMIN — GABAPENTIN 100 MG: 100 CAPSULE ORAL at 16:24

## 2018-06-24 RX ADMIN — Medication 10 ML: at 13:46

## 2018-06-24 RX ADMIN — DOXYCYCLINE HYCLATE 100 MG: 100 TABLET, COATED ORAL at 08:54

## 2018-06-24 RX ADMIN — Medication 10 ML: at 05:51

## 2018-06-24 RX ADMIN — GABAPENTIN 100 MG: 100 CAPSULE ORAL at 08:52

## 2018-06-24 RX ADMIN — PANTOPRAZOLE SODIUM 40 MG: 20 TABLET, DELAYED RELEASE ORAL at 08:54

## 2018-06-24 RX ADMIN — SENNOSIDES AND DOCUSATE SODIUM 1 TABLET: 8.6; 5 TABLET ORAL at 08:52

## 2018-06-24 RX ADMIN — ACETAMINOPHEN 650 MG: 325 TABLET ORAL at 05:51

## 2018-06-24 RX ADMIN — GABAPENTIN 100 MG: 100 CAPSULE ORAL at 21:12

## 2018-06-24 RX ADMIN — ACETAMINOPHEN 650 MG: 325 TABLET ORAL at 12:00

## 2018-06-24 RX ADMIN — POLYVINYL ALCOHOL 1 DROP: 14 SOLUTION/ DROPS OPHTHALMIC at 21:15

## 2018-06-24 RX ADMIN — DULOXETINE HYDROCHLORIDE 60 MG: 60 CAPSULE, DELAYED RELEASE ORAL at 08:52

## 2018-06-24 RX ADMIN — ACETAMINOPHEN 650 MG: 325 TABLET ORAL at 18:05

## 2018-06-24 RX ADMIN — ESTRADIOL 0.5 MG: 1 TABLET ORAL at 08:54

## 2018-06-24 RX ADMIN — VITAMIN D, TAB 1000IU (100/BT) 2000 UNITS: 25 TAB at 08:52

## 2018-06-24 RX ADMIN — POLYETHYLENE GLYCOL 3350 17 G: 17 POWDER, FOR SOLUTION ORAL at 18:05

## 2018-06-24 RX ADMIN — SENNOSIDES AND DOCUSATE SODIUM 1 TABLET: 8.6; 5 TABLET ORAL at 18:04

## 2018-06-24 RX ADMIN — CARBOXYMETHYLCELLULOSE SODIUM 1 DROP: 5 SOLUTION/ DROPS OPHTHALMIC at 09:00

## 2018-06-24 NOTE — PROGRESS NOTES
2245-Patient becoming very agitated. Stating that she wants to leave. Sitter at bedside unable to reorient/calm patient. Family called, pt talked to  and daughter with minimal success. Patient constantly rolling back and forth in bed will not stay still. Multiple attempts to sit up, RN redirects patient back down. On call ortho MD paged stating there is nothing more to do. Continue to monitor and reorient patient. 0330-Patient pulling at wray continuously. Wray had been in place due to [facility] flat for dura tear times 48 hours. No reason to continue wray.  Wray d/c at [address] am.

## 2018-06-24 NOTE — PROGRESS NOTES
Problem: Mobility Impaired (Adult and Pediatric)  Goal: *Acute Goals and Plan of Care (Insert Text)  Physical Therapy Goals  Initiated 6/23/2018    1. Patient will move from supine to sit and sit to supine  in bed with CGA within 4 days. 2. Patient will perform sit to stand with contact guard assist within 4 days. 3. Patient will ambulate with minimal assistance for 100 feet with the least restrictive device within 4 days. 4. Patient will verbalize and demonstrate understanding of spinal precautions (No bending, lifting greater than 5 lbs, or twisting; log-roll technique; frequent repositioning as instructed) within 4 days. physical Therapy TREATMENT  Patient: Kaden Rivas (11 y.o. female)  Date: 6/24/2018  Diagnosis: LEFT FORAMINAL STENOSIS L2-3, L3-4, DEGENERATIVE SCOLIOSIS, STATUS POST POSTERIOR SPINAL FUSION L1-2  Lumbar spinal stenosis <principal problem not specified>  Procedure(s) (LRB):  REVISION LUMBAR LAMINECTOMY L2-3, REVISION POSTERIOR SPINAL FUSION L1-5 DURAL REPAIR (N/A) 4 Days Post-Op  Precautions: Fall, Spinal, No bending, no lifting greater than 5 lbs, no twisting, log-roll technique, repositioning every 20-30 min except when sleeping, brace when OOB(brace to be fitted)    Chart, physical therapy assessment, plan of care and goals were reviewed.     ASSESSMENT:  Pt is making slow, steady progress, pleasantly confused but cooperative with therapy, performed log roll transfer to EOB with moderate assist x 1, sit to stand with minimum assist x 2 with posterior lean with thighs resting against the bed, pt ambulated with rolling walker x 60 feet with minimal assist x 2 with slow pace, shuffling gait, and right lean, pt assisted back to bed with moderate assist x 1 as she cannot sit in chair without her brace, pt became more aware of her surrounding once out in [place] and asking \"where am I', after reorienting pt able to shortly afterwards recall place, time, and situation, pt recalled 2 of 3 back precautions, recommend rehab to maximize strength and safe, functional mobility   Progression toward goals:  []      Improving appropriately and progressing toward goals  [x]      Improving slowly and progressing toward goals  []      Not making progress toward goals and plan of care will be adjusted     PLAN:  Patient continues to benefit from skilled intervention to address the above impairments. Continue treatment per established plan of care. Discharge Recommendations:  Inpatient Rehab versus SNF   Further Equipment Recommendations for Discharge: To be determined      SUBJECTIVE:   Patient stated I want to go look at the pictures.    Pt also stating she made her gown(hospital gown). After ambulating in hallway pt asked \"where am I\". Pt reoriented and after a few minutes oriented to place, time, and situation. The patient stated 2/3 back precautions. Reviewed all 3 with patient. OBJECTIVE DATA SUMMARY:   Critical Behavior:  Neurologic State: Alert  Orientation Level: Disoriented to situation, Oriented to person, disoiented to place initially   Cognition: Poor safety awareness, Impaired decision making  Safety/Judgement: Fall prevention, Decreased insight into deficits, Decreased awareness of environment, Decreased awareness of need for assistance, Decreased awareness of need for safety, impulsive  Functional Mobility Training:  Bed Mobility:    Supine to Sit: Assist x1; Moderate assistance; Additional time  Sit to Supine: Additional time;Assist x1; Moderate assistance  Scooting: Minimum assistance;Assist x1;Additional time          Transfers:  Sit to Stand: Assist x2;Minimum assistance; Additional time, leans posteriorly and resting thighs against the bed initially   Stand to Sit: Assist x1;Contact guard assistance; Additional time                             Balance:  Sitting: Impaired  Sitting - Static: Good (unsupported)  Sitting - Dynamic: Fair (occasional)  Standing: Impaired  Standing - Static: Fair  Standing - Dynamic : Fair (with walker support)  Ambulation/Gait Training:  Distance (ft): 60 Feet (ft)  Assistive Device: Gait belt;Walker, rolling  Ambulation - Level of Assistance: Assist x2; Additional time;Minimal assistance        Gait Abnormalities: Antalgic; Shuffling gait, leans toward right side, given assistance to maneuver walker        Base of Support: Narrowed     Speed/Giovana: Pace decreased (<100 feet/min)  Step Length: Right shortened;Left shortened             Pain:  Pain Scale 1: Numeric (0 - 10)  Pain Intensity 1: 0   denies back pain, reports soreness           Activity Tolerance:   Gradually improving     After treatment:   []  Patient left in no apparent distress sitting up in chair  [x]  Patient left in no apparent distress in bed  [x]  Call bell left within reach  [x]  Nursing notified  []  Caregiver present  []  Bed alarm activated    COMMUNICATION/COLLABORATION:   The patients plan of care was discussed with: Registered Nurse    Bridgette Goss   Time Calculation: 17 mins

## 2018-06-24 NOTE — PROGRESS NOTES
Problem: Falls - Risk of  Goal: *Absence of Falls  Document Felicitas Fall Risk and appropriate interventions in the flowsheet. Outcome: Progressing Towards Goal  Fall Risk Interventions:  Mobility Interventions: Patient to call before getting OOB    Mentation Interventions: Family/sitter at bedside    Medication Interventions: Teach patient to arise slowly, Patient to call before getting OOB    Elimination Interventions: Call light in reach, Toileting schedule/hourly rounds             Problem: Pressure Injury - Risk of  Goal: *Prevention of pressure injury  Document Deejay Scale and appropriate interventions in the flowsheet.    Outcome: Progressing Towards Goal  Pressure Injury Interventions:  Sensory Interventions: Assess changes in LOC, Assess need for specialty bed, Float heels, Check visual cues for pain, Keep linens dry and wrinkle-free    Moisture Interventions: Absorbent underpads, Moisture barrier, Minimize layers    Activity Interventions: Increase time out of bed, Pressure redistribution bed/mattress(bed type)    Mobility Interventions: Float heels, HOB 30 degrees or less, Pressure redistribution bed/mattress (bed type)    Nutrition Interventions: Document food/fluid/supplement intake    Friction and Shear Interventions: Foam dressings/transparent film/skin sealants, HOB 30 degrees or less

## 2018-06-24 NOTE — PROGRESS NOTES
Spiritual Care Partner Volunteer visited patient in [address] [place] [address] on 6/24/2018.   Documented by:  Chaplain Girard MDiv, MS, [address] ([id number])

## 2018-06-24 NOTE — PROGRESS NOTES
Patients attempting to roll back and forth in bed as well as sit up. Patient reoriented and redirected by nurse and sitter about proper positioning multiple times. Continue to monitor and reorient patient.

## 2018-06-25 LAB
APPEARANCE UR: ABNORMAL
BACTERIA URNS QL MICRO: ABNORMAL /HPF
BILIRUB UR QL: NEGATIVE
COLOR UR: ABNORMAL
EPITH CASTS URNS QL MICRO: ABNORMAL /LPF
GLUCOSE UR STRIP.AUTO-MCNC: NEGATIVE MG/DL
HGB UR QL STRIP: ABNORMAL
HYALINE CASTS URNS QL MICRO: ABNORMAL /LPF (ref 0–5)
KETONES UR QL STRIP.AUTO: NEGATIVE MG/DL
LEUKOCYTE ESTERASE UR QL STRIP.AUTO: ABNORMAL
NITRITE UR QL STRIP.AUTO: NEGATIVE
PH UR STRIP: 6.5 [PH] (ref 5–8)
PROT UR STRIP-MCNC: ABNORMAL MG/DL
RBC #/AREA URNS HPF: ABNORMAL /HPF (ref 0–5)
SP GR UR REFRACTOMETRY: 1.02 (ref 1–1.03)
UA: UC IF INDICATED,UAUC: ABNORMAL
UROBILINOGEN UR QL STRIP.AUTO: 2 EU/DL (ref 0.2–1)
WBC URNS QL MICRO: ABNORMAL /HPF (ref 0–4)

## 2018-06-25 PROCEDURE — 81001 URINALYSIS AUTO W/SCOPE: CPT | Performed by: PHYSICIAN ASSISTANT

## 2018-06-25 PROCEDURE — 74011250637 HC RX REV CODE- 250/637: Performed by: NURSE PRACTITIONER

## 2018-06-25 PROCEDURE — 77030011943

## 2018-06-25 PROCEDURE — 87186 SC STD MICRODIL/AGAR DIL: CPT | Performed by: ORTHOPAEDIC SURGERY

## 2018-06-25 PROCEDURE — 87077 CULTURE AEROBIC IDENTIFY: CPT | Performed by: ORTHOPAEDIC SURGERY

## 2018-06-25 PROCEDURE — 77030011825 HC SUPP SURG PSTOP S2SG -B

## 2018-06-25 PROCEDURE — 87086 URINE CULTURE/COLONY COUNT: CPT | Performed by: ORTHOPAEDIC SURGERY

## 2018-06-25 PROCEDURE — 97530 THERAPEUTIC ACTIVITIES: CPT

## 2018-06-25 PROCEDURE — 65270000032 HC RM SEMIPRIVATE

## 2018-06-25 PROCEDURE — 51798 US URINE CAPACITY MEASURE: CPT

## 2018-06-25 PROCEDURE — 97535 SELF CARE MNGMENT TRAINING: CPT

## 2018-06-25 PROCEDURE — 74011250637 HC RX REV CODE- 250/637: Performed by: PHYSICIAN ASSISTANT

## 2018-06-25 PROCEDURE — 97116 GAIT TRAINING THERAPY: CPT

## 2018-06-25 RX ORDER — AMOXICILLIN 250 MG
1 CAPSULE ORAL 2 TIMES DAILY
Qty: 1 TAB | Refills: 0 | Status: ON HOLD
Start: 2018-06-25 | End: 2019-07-12

## 2018-06-25 RX ORDER — DULOXETIN HYDROCHLORIDE 60 MG/1
CAPSULE, DELAYED RELEASE ORAL
Qty: 90 CAP | Refills: 1 | Status: SHIPPED | OUTPATIENT
Start: 2018-06-25 | End: 2018-12-31 | Stop reason: SDUPTHER

## 2018-06-25 RX ORDER — ACETAMINOPHEN 325 MG/1
650 TABLET ORAL EVERY 6 HOURS
Qty: 1 TAB | Refills: 0 | Status: SHIPPED
Start: 2018-06-25

## 2018-06-25 RX ORDER — POLYETHYLENE GLYCOL 3350 17 G/17G
POWDER, FOR SOLUTION ORAL
Qty: 1 EACH | Refills: 0 | Status: SHIPPED
Start: 2018-06-25 | End: 2018-06-26

## 2018-06-25 RX ORDER — FACIAL-BODY WIPES
10 EACH TOPICAL
Qty: 1 SUPPOSITORY | Refills: 0 | Status: SHIPPED
Start: 2018-06-25 | End: 2018-10-23 | Stop reason: ALTCHOICE

## 2018-06-25 RX ORDER — LORAZEPAM 0.5 MG/1
0.5 TABLET ORAL
Qty: 1 TAB | Refills: 0 | Status: SHIPPED
Start: 2018-06-25 | End: 2018-07-11 | Stop reason: CLARIF

## 2018-06-25 RX ADMIN — POLYETHYLENE GLYCOL 3350 17 G: 17 POWDER, FOR SOLUTION ORAL at 18:53

## 2018-06-25 RX ADMIN — CARBOXYMETHYLCELLULOSE SODIUM 1 DROP: 5 SOLUTION/ DROPS OPHTHALMIC at 09:00

## 2018-06-25 RX ADMIN — POLYVINYL ALCOHOL 1 DROP: 14 SOLUTION/ DROPS OPHTHALMIC at 22:00

## 2018-06-25 RX ADMIN — VITAMIN D, TAB 1000IU (100/BT) 2000 UNITS: 25 TAB at 10:15

## 2018-06-25 RX ADMIN — ESTRADIOL 0.5 MG: 1 TABLET ORAL at 10:16

## 2018-06-25 RX ADMIN — POLYETHYLENE GLYCOL 3350 17 G: 17 POWDER, FOR SOLUTION ORAL at 10:16

## 2018-06-25 RX ADMIN — Medication 10 ML: at 15:39

## 2018-06-25 RX ADMIN — SENNOSIDES AND DOCUSATE SODIUM 1 TABLET: 8.6; 5 TABLET ORAL at 10:15

## 2018-06-25 RX ADMIN — ACETAMINOPHEN 650 MG: 325 TABLET ORAL at 18:53

## 2018-06-25 RX ADMIN — Medication 10 ML: at 22:00

## 2018-06-25 RX ADMIN — PANTOPRAZOLE SODIUM 40 MG: 20 TABLET, DELAYED RELEASE ORAL at 10:16

## 2018-06-25 RX ADMIN — DOXYCYCLINE HYCLATE 100 MG: 100 TABLET, COATED ORAL at 10:16

## 2018-06-25 RX ADMIN — ACETAMINOPHEN 650 MG: 325 TABLET ORAL at 13:32

## 2018-06-25 RX ADMIN — DULOXETINE HYDROCHLORIDE 60 MG: 60 CAPSULE, DELAYED RELEASE ORAL at 10:15

## 2018-06-25 RX ADMIN — SENNOSIDES AND DOCUSATE SODIUM 1 TABLET: 8.6; 5 TABLET ORAL at 18:54

## 2018-06-25 NOTE — PROGRESS NOTES
Attempted to get patient up to bedside commode with 2 assist. Patient rolled onto left side and attempted to sit up but was resisting at trying to lean back. RN layed patient back down with assist of tech. Patient put on bedpan instead. Sitter at bedside as patient needs constant supervision not to twist/bend back. Prn ativan given   Patient has been much more calm and resting at times.  VSS

## 2018-06-25 NOTE — PROGRESS NOTES
Problem: Self Care Deficits Care Plan (Adult)  Goal: *Acute Goals and Plan of Care (Insert Text)  Occupational Therapy Goals  Initiated 6/23/2018    1. Patient will perform lower body dressing with minimal assistance/contact guard assist using AE PRN within 7 days. 2.  Patient will perform toilet transfer with supervision/set-up using most appropriate DME within 7 days. 3.  Patient will bathing at minimal assistance/contact guard assist within 7 days. 4.  Patient will don/doff back brace at minimal assistance/contact guard assist within 7 days. 5.  Patient will verbalize/demonstrate 3/3 back precautions during ADL tasks without cues within 7 days. Occupational Therapy TREATMENT  Patient: Brianna Bright (29 y.o. female)  Date: 6/25/2018  Diagnosis: LEFT FORAMINAL STENOSIS L2-3, L3-4, DEGENERATIVE SCOLIOSIS, STATUS POST POSTERIOR SPINAL FUSION L1-2  Lumbar spinal stenosis <principal problem not specified>  Procedure(s) (LRB):  REVISION LUMBAR LAMINECTOMY L2-3, REVISION POSTERIOR SPINAL FUSION L1-5 DURAL REPAIR (N/A) 5 Days Post-Op  Precautions: Fall, Spinal, Other (comment) (brace when up)  Chart, occupational therapy assessment, plan of care, and goals were reviewed. ASSESSMENT:  Pt was received in bed with several family members present. Reviewed back precautions with pt. Pt able to state 3/3 back precautions, OT reviewed functional applications of back precautions. Pt performed log roll supine <> sit EOB moderate A overall. Pt tolerated ambulation to/from bathroom with training for walker safety (pt with difficulty scanning environment/adjusting walker to prevent hitting items). Pt not oriented to date and OT re-oriented pt. Pt groomed at sink level stepping into walker for safety. Pt with one recoverable LOB standing sink level. Pt returned to bed with lunch tray set up. Pt reportedly had casting for TLSO this morning and expected arrival is tomorrow.  Continue to recommend inpatient rehabilitation next level of care. Progression toward goals:  []       Improving appropriately and progressing toward goals  [x]       Improving slowly and progressing toward goals  []       Not making progress toward goals and plan of care will be adjusted     PLAN:  Patient continues to benefit from skilled intervention to address the above impairments. Continue treatment per established plan of care. Discharge Recommendations:  Inpatient Rehab  Further Equipment Recommendations for Discharge:  TBD at rehab (? AE for LB ADL/ bathroom DME)     SUBJECTIVE:   Patient stated I am not understanding everything right now, It's silly.     OBJECTIVE DATA SUMMARY:   Cognitive/Behavioral Status:  Neurologic State: Alert  Orientation Level: Oriented to place;Oriented to person;Oriented to situation  Cognition: Decreased attention/concentration; Follows commands  Perception: Cues to maintain midline in standing  Perseveration: No perseveration noted  Safety/Judgement: Fall prevention    Functional Mobility and Transfers for ADLs:  Bed Mobility:  Rolling: Minimum assistance; Additional time (Log roll)  Supine to Sit: Moderate assistance; Additional time; Adaptive equipment  Sit to Supine: Moderate assistance; Adaptive equipment; Additional time  Scooting: Supervision;Assist x1    Transfers:  Sit to Stand: Contact guard assistance; Additional time; Adaptive equipment  Functional Transfers  Bathroom Mobility: Minimum assistance (Needs walker safety cues)  Toilet Transfer : Minimum assistance  Cues: Verbal cues provided;Visual cues provided  Adaptive Equipment: Walker (comment);Grab bars       Balance:  Sitting: Impaired  Sitting - Static: Good (unsupported)  Sitting - Dynamic: Fair (occasional)  Standing: Impaired  Standing - Static: Constant support;Good  Standing - Dynamic : Fair    ADL Intervention:  Feeding  Feeding Assistance: Supervision/set-up (Needs encouragement to continue feeding self (distractible))    Grooming  Washing Hands: Contact guard assistance (Standing sink level,One recoverable LOB standing at sink)                        Toileting  Bladder Hygiene: Contact guard assistance (Standing)  Clothing Management: Maximum assistance  Cues: Visual cues provided;Verbal cues provided  Adaptive Equipment: Walker;Grab bars    Cognitive Retraining  Orientation Retraining: Awareness of environment;Time  Problem Solving: Awareness of environment  Executive Functions: Executing cognitive plans  Organizing/Sequencing: Breaking task down  Safety/Judgement: Fall prevention               Pain:  Pain Scale 1: Numeric (0 - 10)  Pain Intensity 1: 5  Pain Location 1: Back  Pain Orientation 1: Lower  Pain Description 1: Aching  Pain Intervention(s) 1: Distraction; Rest (will give scheduled tylenol within hour)  Activity Tolerance:   Pt with no report of dizziness or dyspnea this session.    After treatment:   [] Patient left in no apparent distress sitting up in chair  [x] Patient left in no apparent distress in bed  [x] Call bell left within reach  [x] Nursing notified  [x] Caregiver present  [x] Bed alarm activated    COMMUNICATION/COLLABORATION:   The patients plan of care was discussed with: Physical Therapist and Registered Nurse    Feliciano Talbert  Time Calculation: 23 mins

## 2018-06-25 NOTE — PROGRESS NOTES
Problem: Mobility Impaired (Adult and Pediatric)  Goal: *Acute Goals and Plan of Care (Insert Text)  Physical Therapy Goals  Initiated 6/23/2018    1. Patient will move from supine to sit and sit to supine  in bed with CGA within 4 days. 2. Patient will perform sit to stand with contact guard assist within 4 days. 3. Patient will ambulate with minimal assistance for 100 feet with the least restrictive device within 4 days. 4. Patient will verbalize and demonstrate understanding of spinal precautions (No bending, lifting greater than 5 lbs, or twisting; log-roll technique; frequent repositioning as instructed) within 4 days. physical Therapy TREATMENT  Patient: Rochelle Medina (28 y.o. female)  Date: 6/25/2018  Diagnosis: LEFT FORAMINAL STENOSIS L2-3, L3-4, DEGENERATIVE SCOLIOSIS, STATUS POST POSTERIOR SPINAL FUSION L1-2  Lumbar spinal stenosis <principal problem not specified>  Procedure(s) (LRB):  REVISION LUMBAR LAMINECTOMY L2-3, REVISION POSTERIOR SPINAL FUSION L1-5 DURAL REPAIR (N/A) 5 Days Post-Op  Precautions: Fall, Spinal, No bending, no lifting greater than 5 lbs, no twisting, log-roll technique, repositioning every 20-30 min except when sleeping, brace when OOB(casted for TLSO today)    Chart, physical therapy assessment, plan of care and goals were reviewed.     ASSESSMENT:  Pt is making slow, steady gains, pleasantly confused, cooperative and following directions for mobility, pt is oriented to self, [place], and reported that she had surgery but not oriented to time and details of situation, pt later thought she was in [place], pt was reoriented to place and situation, completed  log roll transfer to EOB with moderate assistance with maximum verbal cueing for technique and hand placement, pt requested to use the bathroom, stood and side stepped with rolling walker with minimal assistance to bedside commode, gait deferred at this time and pt returned to sitting EOB as Orthotist arrived to cast pt for TLSO, recommend rehab to maximize strength and safe, functional mobility, anticipate pt will be able to tolerate 3 hours of therapy, Julien talked with pt this morning during session  Progression toward goals:  []      Improving appropriately and progressing toward goals  [x]      Improving slowly and progressing toward goals  []      Not making progress toward goals and plan of care will be adjusted     PLAN:  Patient continues to benefit from skilled intervention to address the above impairments. Continue treatment per established plan of care. Discharge Recommendations:  Rehab  Further Equipment Recommendations for Discharge: To be determined      SUBJECTIVE:   Patient stated Are we in [facility]?    Pt pleasantly confused and cooperative with therapy. The patient stated 2/3 back precautions. Reviewed all 3 with patient. OBJECTIVE DATA SUMMARY:   Critical Behavior:  Neurologic State: Alert  Orientation Level: Disoriented to place, Disoriented to situation, Disoriented to time, Oriented to person  Cognition: good command following, Impaired decision making, decreased safety awareness  Safety/Judgement:  Decreased insight into deficits, Decreased awareness of environment(pt knows she is in [facility] however later thought she was in [facility]), Decreased awareness of need for assistance, Decreased awareness of need for safety  Functional Mobility Training:  Bed Mobility:    Supine to Sit: Moderate assistance;Assist x1;Additional time, verbal cueing for log roll technique and hand placement       Scooting: Supervision;Assist x1          Transfers:  Sit to Stand: Minimum assistance;Assist x1;Additional time  Stand to Sit: Minimum assistance; Additional time;Assist x1      Pt requested to use the bathroom, assisted to bedside commode, pt used rolling walker with minimal assist to side step to bedside commode                       Balance:  Sitting: Impaired  Sitting - Static: Good (unsupported)  Sitting - Dynamic: Fair (occasional)  Standing: Impaired  Standing - Static: Fair  Standing - Dynamic : Fair  Ambulation/Gait Training:   deferred as Orthotist arrived to cast pt for TLSO                                        Pain:  Pain Scale 1: Numeric (0 - 10)  Pain Intensity 1: 0              Activity Tolerance:   good    After treatment:   []  Patient left in no apparent distress sitting up in chair  [x]  Patient left in no apparent distress in bed  [x]  Call bell left within reach  [x]  Nursing notified  []  Caregiver present  []  Bed alarm activated    COMMUNICATION/COLLABORATION:   The patients plan of care was discussed with: Registered Nurse    Bridgette Dick   Time Calculation: 25 mins

## 2018-06-25 NOTE — PROGRESS NOTES
Cm noted discharge orders for today but no definitive discharge plan yet and patient has not been fitted for her TLSO brace.  contacted [facility] to see if they were able to assess and make a decision today. Will follow. Danielle OVALLES, ACAUDREY    2:00 pm: [facility] has accepted patient for admission tomorrow. Will follow.   Frandy Correa

## 2018-06-25 NOTE — PROGRESS NOTES
Problem: Mobility Impaired (Adult and Pediatric)  Goal: *Acute Goals and Plan of Care (Insert Text)  Physical Therapy Goals  Initiated 6/23/2018    1. Patient will move from supine to sit and sit to supine  in bed with CGA within 4 days. 2. Patient will perform sit to stand with contact guard assist within 4 days. 3. Patient will ambulate with minimal assistance for 100 feet with the least restrictive device within 4 days. 4. Patient will verbalize and demonstrate understanding of spinal precautions (No bending, lifting greater than 5 lbs, or twisting; log-roll technique; frequent repositioning as instructed) within 4 days. physical Therapy TREATMENT  Patient: Danielle Montilla (9 y.o. female)  Date: 6/25/2018  Diagnosis: LEFT FORAMINAL STENOSIS L2-3, L3-4, DEGENERATIVE SCOLIOSIS, STATUS POST POSTERIOR SPINAL FUSION L1-2  Lumbar spinal stenosis <principal problem not specified>  Procedure(s) (LRB):  REVISION LUMBAR LAMINECTOMY L2-3, REVISION POSTERIOR SPINAL FUSION L1-5 DURAL REPAIR (N/A) 5 Days Post-Op  Precautions: Fall, Spinal,No bending, no lifting greater than 5 lbs, no twisting, log-roll technique, repositioning every 20-30 min except when sleeping, brace when OOB(brace to be delivered tomorrow)    Chart, physical therapy assessment, plan of care and goals were reviewed.     ASSESSMENT:  Pt is making slow, steady gains toward goals, pt had several visitors in room, agreeable to participate with therapy, alert and oriented to name, place, not fully oriented to date and situation, pt recalls 3 of 3 back precautions but does not demonstrate a good understanding of these, needs frequent reminders to avoid twisting, pt performed log roll transfer to EOB with maximum verbal cueing and moderate assistance, sit to stand with minimal assistance and verbal cueing for hand placement, pt ambulated with rolling walker x 100 feet with mildly flexed posture and noted increased right lean as she fatigued, pt assisted to bathroom at end of session, had posterior loss of balance while washing hands at sink and standing without support of walker, given minimal assistance to recover, pt assisted back to bed with moderate assist x 2, pt reports pain 2/10 at rest, 5/10 with activity, hopeful TLSO will be delivered tomorrow, recommend inpatient rehab, pt will be able to tolerate 3 hours of therapy  Progression toward goals:  []      Improving appropriately and progressing toward goals  [x]      Improving slowly and progressing toward goals  []      Not making progress toward goals and plan of care will be adjusted     PLAN:  Patient continues to benefit from skilled intervention to address the above impairments. Continue treatment per established plan of care. Discharge Recommendations:  Inpatient Rehab  Further Equipment Recommendations for Discharge:  Reacher recommended      SUBJECTIVE:   Patient agreeable to participate with therapy. The patient stated 3/3 back precautions though need occasional reminders to avoid twisting. Reviewed all 3 with patient. OBJECTIVE DATA SUMMARY:   Critical Behavior:  Neurologic State: Alert  Orientation Level: Oriented X4, Other (Comment) (oriented, though talks of odd things)  Cognition: Decreased command following, Impaired decision making, needs frequent reminders  Safety/Judgement: Fall prevention, Decreased insight into deficits, Decreased awareness of environment, Decreased awareness of need for assistance, Decreased awareness of need for safety  Functional Mobility Training:  Bed Mobility:  Pt given maximum cueing for bed mobility  Log Rolling: Minimum assistance; Additional time;Assist x1  Supine to Sit: Moderate assistance;Assist x1;Additional time     Scooting: Supervision;Assist x1       Transfers:  Sit to Stand: Minimum assistance;Assist x1;Additional time  Stand to Sit: Minimum assistance; Additional time;Assist x1                             Balance:  Sitting: Impaired  Sitting - Static: Good (unsupported)  Sitting - Dynamic: Fair (occasional)  Standing: Impaired  Standing - Static: Fair, posterior loss of balance while standing in bathroom at sink without walker support  Standing - Dynamic : Fair  Ambulation/Gait Training:  Distance (ft): 100 Feet (ft)  Assistive Device: Brace/Splint; Walker, rolling  Ambulation - Level of Assistance: Minimal assistance;Assist x1        Gait Abnormalities: Antalgic;Decreased step clearance (mild flexed posture, leans toward right side as she fatigues)        Base of Support: Narrowed     Speed/Giovana: Slow  Step Length: Left shortened;Right shortened             Pain:  Pain Scale 1: Numeric (0 - 10)  Pain Intensity 1: 5  Pain Location 1: Back  Pain Orientation 1: Lower  Pain Description 1: Aching  Pain Intervention(s) 1: Distraction; Rest,scheduled tylenol per RN  Activity Tolerance:   Gradually improving     After treatment:   []  Patient left in no apparent distress sitting up in chair  [x]  Patient left in no apparent distress in bed  [x]  Call bell left within reach  [x]  Nursing notified  []  Caregiver present  []  Bed alarm activated    COMMUNICATION/COLLABORATION:   The patients plan of care was discussed with: Registered Nurse    Bridgette Michel   Time Calculation: 27 mins

## 2018-06-25 NOTE — DISCHARGE SUMMARY
[address] [place] [address]  174 [facility], 200 [facility] SUMMARY     Patient: Danielle Montilla                             Medical Record Number: [id number]                : 1934  Age: 80 y.o. 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 She was transferred  to the recovery room in stable condition. After a brief stay the patient was then transferred to the Spine Surgery Unit at [address] [place] [address].  On postoperative day #1, the dressing was clean and dry, she was neurovascularly intact. The patient was afebrile and vital signs were stable. Calves were soft and non-tender bilaterally. The patient was kept flat and on strict bedrest x 49 hours due to dural repair. On postoperative day  # 4, the patient was noted to have post op delirium and all narcotics were discontinued. .  Hemoglobin and INR prior to discharge were     Lab Results   Component Value Date/Time    HGB 11.0 (L) 2018 08:18 AM    INR 1.1 2016 11:48 AM      Drew Kunz made satisfactory progress with physical therapy and her confusion resolved. The patient was discharged to [facility] in stable condition on postoperative day #6.   She was provided with routine postoperative instructions and advised to follow up in my office in 2 weeks following discharge from the Rehab. A list of discharge medications is provided below:    Discharge Medications:  Current Discharge Medication List      START taking these medications    Details   acetaminophen (TYLENOL) 325 mg tablet Take 2 Tabs by mouth every six (6) hours. Qty: 1 Tab, Refills: 0      bisacodyl (DULCOLAX) 10 mg suppository Insert 10 mg into rectum daily as needed. Qty: 1 Suppository, Refills: 0      LORazepam (ATIVAN) 0.5 mg tablet Take 1 Tab by mouth every six (6) hours as needed. Max Daily Amount: 2 mg. Indications: anxiety  Qty: 1 Tab, Refills: 0    Associated Diagnoses: Anxiety; Spinal stenosis of lumbar region, unspecified whether neurogenic claudication present      polyethylene glycol (MIRALAX) 17 gram packet Once daily  Qty: 1 Each, Refills: 0      senna-docusate (PERICOLACE) 8.6-50 mg per tablet Take 1 Tab by mouth two (2) times a day. Indications: constipation  Qty: 1 Tab, Refills: 0         CONTINUE these medications which have NOT CHANGED    Details   omeprazole (PRILOSEC) 40 mg capsule TAKE 1 CAPSULE BY MOUTH  DAILY  Qty: 90 Cap, Refills: 3    Associated Diagnoses: Gastroesophageal reflux disease without esophagitis      DULoxetine (CYMBALTA) 60 mg capsule TAKE 1 CAPSULE BY MOUTH  EVERY DAY  **waiting on mail order**  Qty: 15 Cap, Refills: 0      estradiol-norethindrone (AMABELZ) 0.5-0.1 mg per tablet Take 1 Tab by mouth daily. Qty: 30 Tab, Refills: 2      aspirin delayed-release 81 mg tablet Take 81 mg by mouth daily. doxycycline (VIBRAMYCIN) 100 mg capsule Take 1 capsule by mouth two times daily  Qty: 180 Cap, Refills: 0      DOCOSAHEXANOIC ACID/EPA (FISH OIL PO) Take 1 Cap by mouth daily. carboxymethylcellulose sodium (REFRESH LIQUIGEL) 1 % dlgl Apply  to eye daily. 1 DROP BOTH EYES      cholecalciferol, vitamin D3, (VITAMIN D3) 2,000 unit Tab Take 2,000 Units by mouth daily. MULTIVITAMIN PO Take 1 Tab by mouth daily. Takes one po daily. CALCIUM CARB/VIT D3/MINERALS (CALTRATE PLUS PO) Take 1 Tab by mouth daily. Takes one po daily. artificial tears,hypromellose, (GENTEAL MODERATE) 0.3 % drop Administer 1 Drop to both eyes nightly.          STOP taking these medications       gabapentin (NEURONTIN) 100 mg capsule Comments:   Reason for Stopping:         meloxicam (MOBIC) 15 mg tablet Comments:   Reason for Stopping:               Signed by: Prerna Griggs PA-C  6/25/2018

## 2018-06-25 NOTE — PROGRESS NOTES
Ortho Spine Daily Progress Note    Date of Surgery:  2018      Patient: Karen Dewitt   YOB: 1934  Age: 80 y.o. SUBJECTIVE:   5 Days Post-Op following REVISION LUMBAR LAMINECTOMY L2-3, REVISION POSTERIOR SPINAL FUSION L1-5 DURAL REPAIR    The patient states mild back pain. He been confused but seems better this Am and is conversing appropriately. Has been getting Tylenol for pain only. The patient has made steady gains with PT. The patient rates their current level of pain as 2/10. The patient's post operative pain is adequately controlled. The patient denies CP, SOB, N/V, dizziness, abdominal pain, HA. +BM. OBJECTIVE:     Vital Signs:    Temp (24hrs), Av.1 °F (36.7 °C), Min:97.6 °F (36.4 °C), Max:99 °F (37.2 °C)    Patient Vitals for the past 24 hrs:   BP Temp Pulse Resp SpO2   18 0140 154/68 99 °F (37.2 °C) 85 16 98 %   18 2302 156/69 - 97 - 97 %   18 1952 161/76 98.2 °F (36.8 °C) 83 16 96 %   18 1403 147/77 97.6 °F (36.4 °C) 96 16 96 %   18 0743 155/71 97.7 °F (36.5 °C) 84 16 95 %           Physical Exam:  General: A&Ox3, NAD. Respiratory: Respirations are unlabored. Abdomen: S/NT  Surgical site: C,D and I.  Musculoskeletal: Calves are S/NT, Will dorsi/plantar flexion/EHL/quads/hip flexion intact, Will DP 1+  Neurological:  Will LE's NVI    Laboratory Values:             Recent Labs      18   0818   HGB  11.0*   CREA  0.36*   GLU  97     Lab Results   Component Value Date/Time    Sodium 139 2018 08:18 AM    Potassium 3.5 2018 08:18 AM    Chloride 107 2018 08:18 AM    CO2 24 2018 08:18 AM    Glucose 97 2018 08:18 AM    BUN 8 2018 08:18 AM    Creatinine 0.36 (L) 2018 08:18 AM    Calcium 7.9 (L) 2018 08:18 AM           PLAN:     S/P REVISION LUMBAR LAMINECTOMY L2-3, REVISION POSTERIOR SPINAL FUSION L1-5 DURAL REPAIR Mobilize with PT/nursing until discharged. Have ordered TLSO for today. Hemodynamics Post op anemia. Tolerating well. Stable. Wound Continue dressing changes PRN. Post Operative Pain Pain Control: tylenol only until confusion improves. DVT Prophylaxis Continue with SCD'S, Encourage Ankle Pump Exercises, Mobilize. Discharge Disposition Discharge plan: Case Management for discharge planning. Plan on transfer to [address] [place] [address] if accepted and brace arrives. Follow up in office 2 weeks post discharge from [address] [place] [address].                   Signed By: Jun Walker PA-C  June 25, 2018 6:58 AM

## 2018-06-25 NOTE — DISCHARGE INSTRUCTIONS
After Hospital Care Plan:  Discharge Instructions Lumbar Fusion Surgery   Dr. Nadiya Vincent  Patient Name:  Gadiel Carlton    Date of procedure:  6/20/2018  Date of discharge: 6/26/2018    Procedure:  Procedure(s):  REVISION LUMBAR LAMINECTOMY L2-3, REVISION POSTERIOR SPINAL FUSION L1-5 DURAL REPAIR    PCP: Rosemarie Hanna MD    Follow up appointments  -Follow up with Dr. Nadiya Vincent in 2 weeks after discharge from Rehab. Call Dr. Elton Phelan at [phone], Ext [phone], once you get home from the hospital to make an appointment for your 2 week postoperative visit. Home Health Agency: ____________________   phone: _______________________  The agency will contact you to arrange dates/times for visits.   Please call them if you do not hear from them within 24 hours after you are discharged  Physical therapy 3 times a week for 3 weeks  Nursing-initial assessment and as needed    When to call your Orthopaedic Surgeon:  -Signs of infection-if your incision is red; continues to have drainage; drainage has a foul odor or if you have a persistent fever over 101 degrees for 24 hours  -Nausea or vomiting, severe headache  -Loss of bowel or bladder function, inability to urinate  -Changes in sensation in your arms or legs (numbness, tingling, loss of color)  -Increased weakness-greater than before your surgery  -Severe pain or pain not relieved by medications  -Signs of a blood clot in your leg-calf pain, tenderness, redness, swelling of lower leg    When to call your Primary Care Physician:  -Concerns about medical conditions such as diabetes, high blood pressure, asthma, congestive heart failure  -Call if blood sugars are elevated, persistent headache or dizziness, coughing or congestion, constipation or diarrhea, burning with urination, abnormal heart rate    When to call 911 and go to the nearest emergency room:  -Acute onset of chest pain, shortness of breath, difficulty breathing    Activity  -You are going home a well person, be as active as possible. Your only exercise should be walking. Start with short frequent walks and increase your walking distance each day.  -Limit the amount of time you sit to 20-30 minute intervals. Sitting for prolonged periods of time will be uncomfortable for you following surgery.  -Do NOT lift anything over 5 pounds  -Do NOT do any straining, twisting or bending  -When you are in bed, you may lay on your back or on either side. Do NOT lie on your stomach    Brace  -If you have a back brace, you should wear your brace at all times when you are out of bed. Do not wear the brace while in bed or showering.  -Remember to always wear a cotton t-shirt underneath your brace.  -Do not bend or twist when your brace is off. Diet  -Resume usual diet; drink plenty of fluids; eat foods high in fiber.  -It is important to have regular bowel movements. Pain medications may cause constipation. You may want to take a stool softener (such as Senokot-S or Colace) to prevent constipation.   -If constipation occurs, take a laxative (such as Dulcolax tablets, Milk of Magnesia, or a suppository). Laxatives should only be used if the above preventable measures have failed and you still have not had a bowel movement after three days. Driving  -You may not drive or return to work until instructed by your physician. However, you may ride in the car for short periods of time. Incision Care  -You may take brief showers but do not run the water run directly onto the wound. After showering or bathing, remove the wet dressing and gently blot the wound dry with a soft towel.  -Do not rub or apply any lotions or ointments to your incision site.   -Do not soak or scrub your wound  -A new dressing has been applied to your incision prior to discharge.  This dressing needs to stay in place for 4 days after being discharged from the hospital.  After 4 days, the dressing may be removed and the incision left open to the air. Have your caregiver wash their hands thoroughly before changing your dressing. If the dressing becomes saturated, please call the office. Showering  -You may shower in approximately 4 days after your surgery.    -Leave the dressing on during your shower. Do NOT allow the water to run directly onto your dressing. Once you get out of the shower, place a new dressing.   -Reminder- your brace can be removed while showering. Remember to not bend or twist while your brace is off.    -Do not take a tub bath. Preventing blood clots  -You have been given T.E.D. stockings to wear. Continue to wear these for 7 days after your discharge. Put them on in the morning and take them off at night.    -They are used to increase your circulation and prevent blood clots from forming in your legs  -T. E.D. stockings can be machine washed, temperature not to exceed 160° F (71°C) and machine dried for 15 to 20 minutes, temperature not to exceed 250° F (121°C). Pain management  -Take pain medication as prescribed; decrease the amount you use as your pain lessens. -DO not wait until you are in extreme pain to take your medication.  -Avoid alcoholic beverages while taking pain medication    Pain Medication Safety  DO:  -Read the Medication Guide   -Take your medicine exactly as prescribed   -Store your medicine away from children and in a safe place   -Flush unused medicine down the toilet   -Call your healthcare provider for medical advice about side effects. You may report side effects to FDA at 7-183-FDA-6000.   -Please be aware that many medications contain Tylenol. We do not want you to over medicate so please read the information below as a guide. Do not take more than 4 Grams of Tylenol in a 24 hour period.   (There are 1000 milligrams in one Gram) Percocet contains 325 mg of Tylenol per tablet (do not take more than 12 tablets in 24 hours)  Lortab contains 500 mg of Tylenol per tablet (do not take more than 8 tablets in 24 hours)  Norco contains 325 mg of Tylenol per tablet (do not take more than 12 tablets in 24 hours). DO NOT:  -Do not give your medicine to others   -Do not take medicine unless it was prescribed for you   -Do not stop taking your medicine without talking to your healthcare provider   -Do not break, chew, crush, dissolve, or inject your medicine. If you cannot swallow your medicine whole, talk to your healthcare provider.  -Do not drink alcohol while taking this medicine  -Do not take anti-inflammatory medications or aspirin unless instructed by your     physician.             Patient meets criteria for   BUNDLED PAYMENT   for Care Improvement Initiative Criteria    Contact Information for Orthopedic Nurse Navigator:      VELMA Mota, RN-BC  U:[phone]  [phone]

## 2018-06-25 NOTE — CONSULTS
Hospitalist Consult Note    NAME: Francois Lizarraga   :  1934   MRN:  [id number]     Date/Time:  2018 6:59 PM    Patient PCP: Lyn Boyd MD    I have been asked to see this patient by the attending, Dr. Alysha Urbano , for advice/opinion re: Altered mental status. My advice is:  ________________________________________________________________________   Assessment/Plan:  1. Post operative delirium: This appears to be resolving and may have be related to pain Vs Narcotics . Recommend continuation of tylenol for pain and minimize use of narcotics. Continue aggressive Rehabilitation and mobilize patient. Encourage faciooral stimulation. Expect delirium to improve with the above measures. 2. Status post revision lumbar laminectomy L2-L3, revision posterior spinal fusion L1-% dural repair.  - Management as per primary team    Thank you for this consultation and we will continue to follow with you. Subjective:   CHIEF COMPLAINT:  Altered mental status    HISTORY OF PRESENT ILLNESS:     Ariel Keller is a 80 y.o.  female with past medical history of CAD, GERD, Skin cancer, who was admitted 18. She is status post revision lumbar laminectomy L2- L3 revision posterior spinal fusion L1-5 dural repair 5 days post operative. As per daughter and son sitting at the bedside, they report that has been confused, agitated and hallucinating and trying to climb out of bed. She states this was sudden but things seems to be subsiding. Patient tells me that she thinks it was related to the oxycontin she took. She currently denies any complaints. She denies chest pain or SOB. No fever or chills. She currently rates her back pain at about 2/10 in severity and she is content with the tylenol she is on. She drinks alcohol every day but states the last time she drank was 3 weeks ago.     Past Medical History:   Diagnosis Date    Arthritis     Biliary colic     Cancer ([facility] [address]     skin cancer - basal and squamous cell, LEGS, FACE    GERD (gastroesophageal reflux disease)     Hypercholesterolemia     PATIENT DENIES    Osteopenia     PAC (premature atrial contraction) 7/9/2013    Pancreatic cyst 7/14/2015    Psychiatric disorder     anxiety    Urinary frequency     Visual loss 1/21/2015      Past Surgical History:   Procedure Laterality Date    CARDIAC SURG PROCEDURE UNLIST  2/11    normal stress test    ENDOSCOPY, COLON, DIAGNOSTIC  2008    HX APPENDECTOMY  2000    HX BACK SURGERY      KYPHOPLASTY    HX CATARACT REMOVAL Bilateral 2012    HX CHOLECYSTECTOMY  12/15/2017    Lap Rachel by Dr. Sandy Hodge HX GI  2008    EGD    HX GI      COLONOSCOPY    HX GYN      UTERINE BIOPSY    HX LUMBAR FUSION  2016    HX LUMBAR LAMINECTOMY  2016    HX ORTHOPAEDIC Right 2010    knee - arthroscopy    HX ORTHOPAEDIC Right march 2011    TKR  right    HX OTHER SURGICAL  11/2017    karina. leg skin cancer surgery    HX TONSILLECTOMY      HX TUBAL LIGATION  1976     Social History   Substance Use Topics    Smoking status: Former Smoker     Quit date: 8/5/1956    Smokeless tobacco: Never Used      Comment: former cigarette smoker    Alcohol use 7.0 oz/week     7 Glasses of wine, 7 Shots of liquor per week      Comment: 1 DRINK PER DAY USUALLY      Family History   Problem Relation Age of Onset    Stroke Mother     Hypertension Mother     Other Mother      aneurysm - aorta    Cancer Father      stomach AND ESOPHAGEAL cancer    Stroke Maternal Grandmother     Other Son      BLOOD CLOT IN AORTA AND POST OP DVT    Anesth Problems Neg Hx       Allergies   Allergen Reactions    Adhesive Tape-Silicones Other (comments)     \"SKIN IS SO [place]"        Prior to Admission medications    Medication Sig Start Date End Date Taking? Authorizing Provider   acetaminophen (TYLENOL) 325 mg tablet Take 2 Tabs by mouth every six (6) hours.  6/25/18  Yes Maged Arvizu PA-C   bisacodyl (DULCOLAX) 10 mg suppository Insert 10 mg into rectum daily as needed. 6/25/18  Yes Maged Arvizu PA-C   LORazepam (ATIVAN) 0.5 mg tablet Take 1 Tab by mouth every six (6) hours as needed. Max Daily Amount: 2 mg. Indications: anxiety 6/25/18  Yes Maged Arvizu PA-C   polyethylene glycol (MIRALAX) 17 gram packet Once daily 6/25/18  Yes Maged Arvizu PA-C   senna-docusate (PERICOLACE) 8.6-50 mg per tablet Take 1 Tab by mouth two (2) times a day. Indications: constipation 6/25/18  Yes Maged Arvizu PA-C   omeprazole (PRILOSEC) 40 mg capsule TAKE 1 CAPSULE BY MOUTH  DAILY 6/18/18  Yes Tom Gomez MD   gabapentin (NEURONTIN) 100 mg capsule Take 100 mg by mouth three (3) times daily. 5/7/18  Yes Historical Provider   meloxicam (MOBIC) 15 mg tablet Take 1 Tab by mouth daily. 4/30/18  Yes Tom Gomez MD   DULoxetine (CYMBALTA) 60 mg capsule TAKE 1 CAPSULE BY MOUTH  EVERY DAY  **waiting on mail order** 4/25/18  Yes Tom Gomez MD   estradiol-norethindrone (AMABELZ) 0.5-0.1 mg per tablet Take 1 Tab by mouth daily. 3/23/18  Yes Rand More MD   aspirin delayed-release 81 mg tablet Take 81 mg by mouth daily. Yes Historical Provider   doxycycline (VIBRAMYCIN) 100 mg capsule Take 1 capsule by mouth two times daily  Patient taking differently: Take 1 capsule by mouth two times daily. TKAES 1 TAB DAILY 5/29/17  Yes Tom Gomez MD   [facility] ACID/EPA (FISH OIL PO) Take 1 Cap by mouth daily. Yes Historical Provider   carboxymethylcellulose sodium (REFRESH LIQUIGEL) 1 % dlgl Apply  to eye daily. 1 DROP BOTH EYES   Yes Historical Provider   cholecalciferol, vitamin D3, (VITAMIN D3) 2,000 unit Tab Take 2,000 Units by mouth daily. Yes Historical Provider   MULTIVITAMIN PO Take 1 Tab by mouth daily. Takes one po daily. Yes Historical Provider   CALCIUM CARB/VIT D3/MINERALS (CALTRATE PLUS PO) Take 1 Tab by mouth daily. Takes one po daily.    Yes Historical Provider   artificial tears,hypromellose, (GENTEAL MODERATE) 0.3 % drop Administer 1 Drop to both eyes nightly.     Historical Provider     Current Facility-Administered Medications   Medication Dose Route Frequency    LORazepam (ATIVAN) tablet 0.5 mg  0.5 mg Oral Q6H PRN    scopolamine (TRANSDERM-SCOP) 1 mg over 3 days 1 Patch  1 Patch TransDERmal Q72H    prochlorperazine (COMPAZINE) with saline injection 5 mg  5 mg IntraVENous Q6H PRN    acetaminophen (TYLENOL) tablet 650 mg  650 mg Oral Q6H    polyethylene glycol (MIRALAX) packet 17 g  17 g Oral BID    polyvinyl alcohol (LIQUIFILM TEARS) 1.4 % ophthalmic solution 1 Drop  1 Drop Both Eyes QHS    carboxymethylcellulose sodium (REFRESH PLUS) 0.5 % ophthalmic solution 1 Drop  1 Drop Both Eyes DAILY    cholecalciferol (VITAMIN D3) tablet 2,000 Units  2,000 Units Oral DAILY    doxycycline (VIBRA-TABS) tablet 100 mg  100 mg Oral DAILY    DULoxetine (CYMBALTA) capsule 60 mg  60 mg Oral DAILY    estradiol (ESTRACE) tablet 0.5 mg  0.5 mg Oral DAILY    pantoprazole (PROTONIX) tablet 40 mg  40 mg Oral DAILY    sodium chloride (NS) flush 5-10 mL  5-10 mL IntraVENous Q8H    sodium chloride (NS) flush 5-10 mL  5-10 mL IntraVENous PRN    naloxone (NARCAN) injection 0.4 mg  0.4 mg IntraVENous PRN    dexamethasone (DECADRON) 4 mg/mL injection 4 mg  4 mg IntraVENous Q6H PRN    senna-docusate (PERICOLACE) 8.6-50 mg per tablet 1 Tab  1 Tab Oral BID    bisacodyl (DULCOLAX) suppository 10 mg  10 mg Rectal DAILY PRN    sodium chloride (NS) flush 5-10 mL  5-10 mL IntraVENous Q8H    sodium chloride (NS) flush 5-10 mL  5-10 mL IntraVENous PRN      REVIEW OF SYSTEMS:    General: negative for fever, chills, sweats, weakness, weight loss  Eyes: negative for blurred vision, eye pain, loss of vision, diplopia  Ear Nose and Throat: negative for rhinorrhea, pharyngitis, otalgia, tinnitus, speech or swallowing difficulties  Respiratory:  negative for cough, sputum production, SOB, wheezing, RIOS, pleuritic pain  Cardiology:  negative for chest pain, palpitations, orthopnea, PND, edema, syncope   Gastrointestinal: negative for abdominal pain, N/V, dysphagia, change in bowel habits, bleeding  Genitourinary: negative for frequency, urgency, dysuria, hematuria, incontinence  Muskuloskeletal : +ve for back pain  Hematology: negative for easy bruising, bleeding, lymphadenopathy  Dermatological: negative for rash, ulceration, mole change, new lesion  Endocrine: negative for hot flashes or polydipsia  Neurological: negative for headache, dizziness, confusion, focal weakness, paresthesia, memory loss, gait disturbance  Psychological: negative for anxiety, depression, agitation      Objective:   VITALS:    Visit Vitals    /72    Pulse 90    Temp 98.1 °F (36.7 °C)    Resp 16    SpO2 97%     PHYSICAL EXAM:     GENERAL:    WD    WN x   Cachectic    Thin    Obese    Disheveled    Ill Appearing Critically    Ill Appearing Chronically    Acute Distress    Other      HEENT:    NC/AT/EOMI x   PERRLA x   Conjunctivae Pink x   Conjunctivae Pale    Moist Mucosa x   Dry Mucosa    Hearing intact to voice    Other      NECK:    Supple x   Masses    Thyroid Tender    Other                   RESPIRATORY:    CTA bilaterally WITHOUT wheezing/rhonchi/rales or crackles x   Wheezing    Rhonchi    Crackles    Use of accessory muscles    Other      CARDIAC:    regular rate and rhythm No murmurs/rubs/gallops x   Murmur    Rubs    Gallops    Rate Regular/Irregular    Carotid Bruit Left/Right    Lower Extremity Edema    JVP     Other      ABDOMEN:    soft/non distended non tender +bowel sounds no HSM x   Rigid    Tenderness    Hepatomegaly    Splenomegaly    Distended    Normal/Hyper/Hypo Active Bowel Sounds    Other      SKIN / MUSCULOSKELETAL:    Rashes    Ecchymosis    Ulcers    Tight to palpitation    Turgor Good/Poor    Cyanosis/Clubbing    Amputation(s)    Other      NEUROLOGY:    cranial nerves II-XII grossly intact x   Cranial Nerve Deficit    Facial Droop    Slurred Speech    Aphasia    Strength Normal    Weakness    Meningismus/Kernig's Sign/ Brudzinsky    Follows Commands    Other      PSYCHIATRIC:    AAOx3 in no acute distress x   Insight Poor    Insight Good    Alert and Oriented to Person     Alert and Oriented to Place    Alert and Oriented toTime    Depressed    Anxious    Agitated    Lethargic    Stuporous    Sedated    Other      ________________________________________________________________________  Care Plan discussed with:    Comments   Patient x    Family  x    RN x    Care Manager                    Consultant:      ________________________________________________________________________  TOTAL TIME:  Greater > 30 mins    Comments   >50% of visit spent in counseling and coordination of care       Critical Care Provided     Minutes non procedure based  ________________________________________________________________________  Vince MD Luis    Procedures: see electronic medical records for all procedures/Xrays and details which were not copied into this note but were reviewed prior to creation of Plan.     LAB DATA REVIEWED:    Recent Results (from the past 24 hour(s))   URINALYSIS W/ REFLEX CULTURE    Collection Time: 06/25/18  3:04 PM   Result Value Ref Range    Color DARK YELLOW      Appearance TURBID (A) CLEAR      Specific gravity 1.016 1.003 - 1.030      pH (UA) 6.5 5.0 - 8.0      Protein TRACE (A) NEG mg/dL    Glucose NEGATIVE  NEG mg/dL    Ketone NEGATIVE  NEG mg/dL    Bilirubin NEGATIVE  NEG      Blood SMALL (A) NEG      Urobilinogen 2.0 (H) 0.2 - 1.0 EU/dL    Nitrites NEGATIVE  NEG      Leukocyte Esterase MODERATE (A) NEG      WBC 20-50 0 - 4 /hpf    RBC 5-10 0 - 5 /hpf    Epithelial cells FEW FEW /lpf    Bacteria 1+ (A) NEG /hpf    UA:UC IF INDICATED URINE CULTURE ORDERED (A) CNI      Hyaline cast 2-5 0 - 5 /lpf

## 2018-06-26 VITALS
SYSTOLIC BLOOD PRESSURE: 113 MMHG | RESPIRATION RATE: 18 BRPM | TEMPERATURE: 99.1 F | HEART RATE: 76 BPM | OXYGEN SATURATION: 98 % | DIASTOLIC BLOOD PRESSURE: 66 MMHG

## 2018-06-26 PROCEDURE — 74011250637 HC RX REV CODE- 250/637: Performed by: PHYSICIAN ASSISTANT

## 2018-06-26 PROCEDURE — 74011250637 HC RX REV CODE- 250/637: Performed by: NURSE PRACTITIONER

## 2018-06-26 PROCEDURE — 97116 GAIT TRAINING THERAPY: CPT

## 2018-06-26 PROCEDURE — 77030027138 HC INCENT SPIROMETER -A

## 2018-06-26 RX ORDER — GABAPENTIN 100 MG/1
100 CAPSULE ORAL 2 TIMES DAILY
Status: DISCONTINUED | OUTPATIENT
Start: 2018-06-26 | End: 2018-06-26 | Stop reason: HOSPADM

## 2018-06-26 RX ORDER — POLYETHYLENE GLYCOL 3350 17 G/17G
POWDER, FOR SOLUTION ORAL
Qty: 1 EACH | Refills: 0 | Status: SHIPPED
Start: 2018-06-26 | End: 2019-07-08

## 2018-06-26 RX ORDER — POLYETHYLENE GLYCOL 3350 17 G/17G
17 POWDER, FOR SOLUTION ORAL
Status: DISCONTINUED | OUTPATIENT
Start: 2018-06-26 | End: 2018-06-26 | Stop reason: HOSPADM

## 2018-06-26 RX ADMIN — PANTOPRAZOLE SODIUM 40 MG: 20 TABLET, DELAYED RELEASE ORAL at 09:29

## 2018-06-26 RX ADMIN — DULOXETINE HYDROCHLORIDE 60 MG: 60 CAPSULE, DELAYED RELEASE ORAL at 09:25

## 2018-06-26 RX ADMIN — ACETAMINOPHEN 650 MG: 325 TABLET ORAL at 06:41

## 2018-06-26 RX ADMIN — VITAMIN D, TAB 1000IU (100/BT) 2000 UNITS: 25 TAB at 09:26

## 2018-06-26 RX ADMIN — Medication 10 ML: at 06:00

## 2018-06-26 RX ADMIN — SENNOSIDES AND DOCUSATE SODIUM 1 TABLET: 8.6; 5 TABLET ORAL at 09:25

## 2018-06-26 RX ADMIN — DOXYCYCLINE HYCLATE 100 MG: 100 TABLET, COATED ORAL at 09:25

## 2018-06-26 RX ADMIN — ACETAMINOPHEN 650 MG: 325 TABLET ORAL at 12:13

## 2018-06-26 NOTE — PROGRESS NOTES
Reviewed medical chart and received orders. Patient expected to discharge today to [facility] for IP rehab. Met with patient at bedside to discuss discharge plan. Patient is agreeable to discharge plan. Sent referral to [facility] via [employer]. Transport was confirmed for 3:30PM to [facility]. Completed ambulance forms and EMTALA. Nurse to call report to [phone]. CM will be available in case needs arise. Care Management Interventions  PCP Verified by CM:  Yes  Palliative Care Criteria Met (RRAT>21 & CHF Dx)?: No  Mode of Transport at Discharge: BLS (Patient may need BLS transport at discharge )  Transition of Care Consult (CM Consult): Discharge Planning  Discharge Durable Medical Equipment: No  Physical Therapy Consult: Yes  Occupational Therapy Consult: Yes  Speech Therapy Consult: No  Current Support Network: Lives with Spouse, Own Home  Confirm Follow Up Transport: Family  Plan discussed with Pt/Family/Caregiver: No   Resource Information Provided?: No  Discharge Location  Discharge Placement: Rehab hospital/unit acute ([facility] )  TAM Zaragoza

## 2018-06-26 NOTE — PROGRESS NOTES
Reviewed medical chart and received orders. Patient expected to discharge today to [facility] for IP rehab. Met with patient at bedside to discuss discharge plan. Patient is agreeable to discharge plan. Sent referral to [facility] via [employer]. Transport was confirmed for 3:30PM to [facility]. Completed ambulance forms. Care Management Interventions  PCP Verified by CM:  Yes  Palliative Care Criteria Met (RRAT>21 & CHF Dx)?: No  Mode of Transport at Discharge: BLS (Patient may need BLS transport at discharge )  Transition of Care Consult (CM Consult): Discharge Planning  Discharge Durable Medical Equipment: No  Physical Therapy Consult: Yes  Occupational Therapy Consult: Yes  Speech Therapy Consult: No  Current Support Network: Lives with Spouse, Own Home  Confirm Follow Up Transport: Family  Plan discussed with Pt/Family/Caregiver: No  [place] Resource Information Provided?: No  Discharge Location  Discharge Placement: Rehab hospital/unit acute ([facility] )  TAM Rodrigues

## 2018-06-26 NOTE — PROGRESS NOTES
Problem: Mobility Impaired (Adult and Pediatric)  Goal: *Acute Goals and Plan of Care (Insert Text)  Physical Therapy Goals  Initiated 6/23/2018    1. Patient will move from supine to sit and sit to supine  in bed with CGA within 4 days. 2. Patient will perform sit to stand with contact guard assist within 4 days. 3. Patient will ambulate with minimal assistance for 100 feet with the least restrictive device within 4 days. 4. Patient will verbalize and demonstrate understanding of spinal precautions (No bending, lifting greater than 5 lbs, or twisting; log-roll technique; frequent repositioning as instructed) within 4 days. physical Therapy TREATMENT  Patient: David Moralez (51 y.o. female)  Date: 6/26/2018  Diagnosis: LEFT FORAMINAL STENOSIS L2-3, L3-4, DEGENERATIVE SCOLIOSIS, STATUS POST POSTERIOR SPINAL FUSION L1-2  Lumbar spinal stenosis <principal problem not specified>  Procedure(s) (LRB):  REVISION LUMBAR LAMINECTOMY L2-3, REVISION POSTERIOR SPINAL FUSION L1-5 DURAL REPAIR (N/A) 6 Days Post-Op  Precautions: Fall, Spinal, No bending, no lifting greater than 5 lbs, no twisting, log-roll technique, repositioning every 20-30 min except when sleeping, brace when OOB    Chart, physical therapy assessment, plan of care and goals were reviewed. ASSESSMENT:  Pt is making steady gains toward goals, she reports feeling better today, her son also commented that her confusion cleared yesterday evening and that she is near her baseline at this time, pt received sitting EOB with PCT after she used the bathroom, pt stood with CGA, ambulated with rolling walker x 130 feet with minimal assist, she needs occasional reminders for upright posture, endurance and strength steadily improving, pt needs occasional cueing to avoid twisting, pt assisted back to bed with minimal assistance as her TLSO has not been delivered prior to session.   Recommend inpatient rehab to maximize strength and safe, functional mobility. Progression toward goals:  []      Improving appropriately and progressing toward goals  [x]      Improving slowly and progressing toward goals  []      Not making progress toward goals and plan of care will be adjusted     PLAN:  Patient continues to benefit from skilled intervention to address the above impairments. Continue treatment per established plan of care. Discharge Recommendations:  Inpatient Rehab  Further Equipment Recommendations for Discharge: To be determined at rehab      SUBJECTIVE:   Patient stated I am doing better today.   Pt's son present and reports his mom confusion is improved and she is at or near her baseline at this time. The patient stated 3/3 back precautions. Reviewed all 3 with patient. OBJECTIVE DATA SUMMARY:   Critical Behavior:  Neurologic State: Alert  Orientation Level: Oriented X4  Cognition: Follows commands  Safety/Judgement: Fall prevention, decreased understanding of back precautions   Functional Mobility Training:  Bed Mobility:  Pt received sitting EOB with PCT after using bathroom  Log Rolling: Assist x1;Contact guard assistance; Additional time     Sit to Supine: Minimum assistance;Assist x1;Additional time  Scooting: Supervision;Assist x1;Additional time       Transfers:  Sit to Stand: Assist x1;Contact guard assistance; Additional time  Stand to Sit: Contact guard assistance;Assist x1;Additional time                             Balance:  Sitting: Intact  Standing: Impaired  Standing - Static: Good  Standing - Dynamic : Fair  Ambulation/Gait Training:  Distance (ft): 130 Feet (ft)  Assistive Device: Walker, rolling;Gait belt  Ambulation - Level of Assistance: Minimal assistance;Assist x1        Gait Abnormalities: Decreased step clearance        Base of Support: Narrowed     Speed/Giovana: Slow  Step Length: Left shortened;Right shortened                Pain:   Pt reports she is sore and denies back pain                 Activity Tolerance: good    After treatment:   []  Patient left in no apparent distress sitting up in chair  [x]  Patient left in no apparent distress in bed  [x]  Call bell left within reach  [x]  Nursing notified  []  Caregiver present  []  Bed alarm activated    COMMUNICATION/COLLABORATION:   The patients plan of care was discussed with: Registered Nurse    Bridgette Bentley   Time Calculation: 13 mins

## 2018-06-26 NOTE — DISCHARGE SUMMARY
[address] [place], [place] [id number] [address]  [phone]     Discharge Summary       PATIENT ID: Luly Maldonado  MRN: [id number]   YOB: 1934    DATE OF ADMISSION: 6/20/2018 10:45 AM    DATE OF DISCHARGE: 6/26/2018   PRIMARY CARE PROVIDER: Jackie Onofre MD     CONSULTATIONS: IP CONSULT TO HOSPITALIST    PROCEDURES/SURGERIES: Procedure(s):  REVISION LUMBAR LAMINECTOMY L2-3, REVISION POSTERIOR SPINAL FUSION L1-5 DURAL REPAIR    History of Present Illness:  Luly Maldonado is a 80 y.o. female with a history of GERD, hyperlipidemia, anxiety, osteopenia, and chronic back pain. She is known to Dr. Jon Ku from a prior lumbar laminectomy L2-5 with posterior spinal fusion L1-2 in 2016. She has progressive degenerative scoliosis with left-sided collapse below the level of her prior fusion at L2-3 and to a lesser degree L3-4. After failing conservative therapy and a discussion of the risks, benefits, alternatives, perioperative course, and potential complications of surgery, she consented to undergo a Procedure(s):  REVISION LUMBAR LAMINECTOMY L2-3, REVISION POSTERIOR SPINAL FUSION L1-5 DURAL REPAIR. Hospital Course:  [facility] tolerated the procedure well. An incidental durotomy was repaired in surgery  She was transferred  to the recovery room in stable condition. After a brief stay the patient was then transferred to the Spinal Surgery Unit at [address] [place] [address]. She remained flat in bed for 48 hours to allow her dura repair to adequately heal.  The dressing was clean and dry, she was neurovascularly intact. The patient was afebrile and vital signs were stable. Calves were soft and non-tender bilaterally. On postoeprative day #3 she tolerated raising of her head of bed without a headache. She began to mobilize with therapies. Her TLSO brace was fitted on postoperative day #5.  Her hospital course was affected by acute postoperative confusion. This resolved with the discontinuation of opioids. She was tolerating a diet, passing flatus, and had a bowel movement postoperatively. Hemoglobin prior to discharge were   Lab Results   Component Value Date/Time    HGB 11.0 (L) 06/23/2018 08:18 AM        Matty Kent was discharged to [facility] inpatient rehab in stable condition on postoperative day 6. She was provided with routine postoperative instructions and advised to follow up with Pablo Nichols MD in 2 weeks following discharge from the hospital.      FOLLOW UP APPOINTMENTS:    Follow-up Information     Follow up With Details Comments [address] Monica Medina MD   [address] [facility]   [facility]  [phone]             DISCHARGE MEDICATIONS:  Current Discharge Medication List      START taking these medications    Details   polyethylene glycol (MIRALAX) 17 gram packet Daily as needed for constipation. Qty: 1 Each, Refills: 0      acetaminophen (TYLENOL) 325 mg tablet Take 2 Tabs by mouth every six (6) hours. Qty: 1 Tab, Refills: 0      bisacodyl (DULCOLAX) 10 mg suppository Insert 10 mg into rectum daily as needed. Qty: 1 Suppository, Refills: 0      LORazepam (ATIVAN) 0.5 mg tablet Take 1 Tab by mouth every six (6) hours as needed. Max Daily Amount: 2 mg. Indications: anxiety  Qty: 1 Tab, Refills: 0    Associated Diagnoses: Anxiety; Spinal stenosis of lumbar region, unspecified whether neurogenic claudication present      senna-docusate (PERICOLACE) 8.6-50 mg per tablet Take 1 Tab by mouth two (2) times a day.  Indications: constipation  Qty: 1 Tab, Refills: 0         CONTINUE these medications which have NOT CHANGED    Details   omeprazole (PRILOSEC) 40 mg capsule TAKE 1 CAPSULE BY MOUTH  DAILY  Qty: 90 Cap, Refills: 3    Associated Diagnoses: Gastroesophageal reflux disease without esophagitis      aspirin delayed-release 81 mg tablet Take 81 mg by mouth daily. doxycycline (VIBRAMYCIN) 100 mg capsule Take 1 capsule by mouth two times daily  Qty: 180 Cap, Refills: 0      DOCOSAHEXANOIC ACID/EPA (FISH OIL PO) Take 1 Cap by mouth daily. carboxymethylcellulose sodium (REFRESH LIQUIGEL) 1 % dlgl Apply  to eye daily. 1 DROP BOTH EYES      cholecalciferol, vitamin D3, (VITAMIN D3) 2,000 unit Tab Take 2,000 Units by mouth daily. MULTIVITAMIN PO Take 1 Tab by mouth daily. Takes one po daily. CALCIUM CARB/VIT D3/MINERALS (CALTRATE PLUS PO) Take 1 Tab by mouth daily. Takes one po daily. DULoxetine (CYMBALTA) 60 mg capsule TAKE 1 CAPSULE BY MOUTH  EVERY DAY  Qty: 90 Cap, Refills: 1      artificial tears,hypromellose, (GENTEAL MODERATE) 0.3 % drop Administer 1 Drop to both eyes nightly. STOP taking these medications       gabapentin (NEURONTIN) 100 mg capsule Comments:   Reason for Stopping:         meloxicam (MOBIC) 15 mg tablet Comments:   Reason for Stopping:         estradiol-norethindrone (AMABELZ) 0.5-0.1 mg per tablet Comments:   Reason for Stopping:               PHYSICAL EXAMINATION AT DISCHARGE:  General: Pleasant, alert, cooperative, no distress. Resp: Equal chest expansion. No accessory muscle use. CV:   No edema appreciated in the extremities. Gastrointestinal:  Soft, non-tender, non-distended. Neurological: Follows commands. CRUZ. Speech clear. Sensation intact to light touch. Motor: unchanged L2-S1. Musculoskeletal:  Calves soft, non-tender upon palpation or with passive stretch. Psych: Good insight. Not anxious nor agitated. Skin:  Incision - clean, dry and intact. No significant erythema or swelling.       CHRONIC MEDICAL DIAGNOSES:  Problem List as of 6/26/2018  Date Reviewed: 6/22/2018          Codes Class Noted - Resolved    Lumbar spinal stenosis ICD-10-CM: M48.061  ICD-9-CM: 724.02 6/20/2018 - Present        Biliary colic CCO-29-YJ: K41.66  ICD-9-CM: 574.20  11/22/2017 - Present        Spinal stenosis ICD-10-CM: M48.00  ICD-9-CM: 724.00  9/23/2016 - Present        Lumbar disc disease with radiculopathy ICD-10-CM: M51.16  ICD-9-CM: 722.10  7/22/2016 - Present    Overview Signed 7/22/2016  1:55 PM by Yadira Calvillo MD     Pain management and PT  Trans foraminal injections not helping             Osteoporosis ICD-10-CM: M81.0  ICD-9-CM: 733.00  7/22/2016 - Present    Overview Addendum 5/31/2018  3:04 PM by Yadira Calvillo MD     .  DEXA Results (most recent):    Results from Hospital Encounter encounter on 08/13/15   DEXA BONE DENSITY STUDY AXIAL   Narrative **Final Report**      ICD Codes / Adm. Diagnosis: V76.12  724.2 / Disorder of bone and cartilage    Lumbago  Examination:  MM DEXA AXIAL SKELETON  - [id number] - Aug 13 2015 10:45AM  Accession No:  [id number]  Reason:  screening      REPORT:  Bone Mineral Density    Indication: Monitoring, estrogen therapy  Age: [de-identified]  Sex: Female. Menopause status:         postmenopausal.  Hormone replacement therapy: yes/no     Risk factors for fall:   None   Risk factors for osteoporosis:  Tobacco use    Current medication for osteoporosis: Calcium, vitamin D, estrogen. Comparison: 2013    Technique: Imaging was performed on the [employer].          World Health   Organization meta-analysis fracture risk calculator (FRAX) analysis was   performed for 10 year fracture risk probability assessment    Excluded sites: L2 due to fracture, L1 due to degenerative changes    Findings:    Fractures identified on Lateral scanogram:  L2    Lumbar spine: L1-L3, excluding L2  Bone mineral density (gm/cm2): 1.113  % of peak bone mass: 94  % for age matched controls:  80  T score: -0.6  Z score:  1.5    Hip: Right femoral neck  Bone mineral density (gm/cm2):  0.718  % of peak bone mass: 69  % for age matched controls:  100  T score: -2.3  Z score:  0 IMPRESSION:    This patient is osteopenic using the World Health Organization criteria  As compared to the prior study, there has been a statistically significant   decrease in bone mineral density.   10 year probability of major osteoporotic fracture:  16.3%  10 year probability of hip fracture:  5.6%             Signing/Reading Doctor: Darryl De La Torre ([id number])    Shayan Mariscal ([id number])  Aug 17 2015 10:59AM                                 Jan 2017 reclast  And annual  Not done 2018  As of june             Pancreatic cyst ICD-10-CM: K86.2  ICD-9-CM: 577.2  7/14/2015 - Present        Visual loss ICD-10-CM: H54.7  ICD-9-CM: 369.9  1/21/2015 - Present        PAC (premature atrial contraction) ICD-10-CM: I49.1  ICD-9-CM: 427.61  7/9/2013 - Present    Overview Signed 10/31/2017  4:58 PM by Yadira Calvillo MD     Normal echo and stress test for atypical chest pain             Rosacea ICD-10-CM: L71.9  ICD-9-CM: 695.3  6/27/2012 - Present        Anxiety ICD-10-CM: F41.9  ICD-9-CM: 300.00  8/16/2011 - Present        Hypercholesterolemia ICD-10-CM: E78.00  ICD-9-CM: 272.0  Unknown - Present        GERD (gastroesophageal reflux disease) ICD-10-CM: K21.9  ICD-9-CM: 530.81  Unknown - Present    Overview Signed 7/14/2015  9:44 AM by Yadira Calvillo MD     Feels better on high dose              Cancer ([facility] [address] ICD-10-CM: C80.1  ICD-9-CM: 199.1  Unknown - Present    Overview Signed 8/5/2010  9:49 AM by Yadira Calvillo MD     skin cancer             Hyperlipidemia LDL goal <130 ICD-10-CM: E78.5  ICD-9-CM: 272.4  7/20/2010 - Present        RESOLVED: Osteopenia ICD-10-CM: M85.80  ICD-9-CM: 733.90  Unknown - 7/18/2017        RESOLVED: Osteopenia ICD-10-CM: M85.80  ICD-9-CM: 733.90  7/20/2010 - 7/18/2017    Overview Addendum 3/30/2016  9:31 AM by Yadira Calvillo MD     Neg 2.3  T score with fracture seen    reclast 2015 sept  New L1 fracture                   Signed:   Sarath Shoemaker NP  6/26/2018  12:11 PM

## 2018-06-26 NOTE — PROGRESS NOTES
[address]  AR Bundled Payment Handoff     FROM:                                TO: [address]                                                      ([address] [place] [address] or Facility name)  [address]  [address] [address]  Dept: [address]: [phone]                                      Room#:  18/12                                                      Discharging Nurse:  Ashok Samuel RN  Unit VL#:[phone]         SITUATION      ASAScore: ASA 2 - Patient with mild systemic disease with no functional limitations    Admitted:  6/20/2018  Hospital Day: 7      Attending Provider:  Kingsley Belcher MD     Consultations:  IP CONSULT TO HOSPITALIST    PCP:  Verner Landry, MD   [phone]     Admitting Dx:  LEFT FORAMINAL STENOSIS L2-3, L3-4, DEGENERATIVE SCOLIOSIS, STATUS POST POSTERIOR SPINAL FUSION L1-2  Lumbar spinal stenosis       Active Problems:    Lumbar spinal stenosis (6/20/2018)      6 Days Post-Op of   Procedure(s):  REVISION LUMBAR LAMINECTOMY L2-3, REVISION POSTERIOR SPINAL FUSION L1-5 DURAL REPAIR   BY: Kingsley Belcher MD             ON: 6/20/2018                  Code Status: Full Code             Advance Directive? Yes Not W Pt (Send w/patient)     Isolation:  There are currently no Active Isolations       MDRO: No current active infections    BACKGROUND     Allergies:   Allergies   Allergen Reactions    Adhesive Tape-Silicones Other (comments)     \"SKIN IS SO FRAGILE\"       Past Medical History:   Diagnosis Date    Arthritis     Biliary colic 83/55/0571    Cancer ([facility] [address]     skin cancer - basal and squamous cell, LEGS, FACE    GERD (gastroesophageal reflux disease)     Hypercholesterolemia     PATIENT DENIES    Osteopenia     PAC (premature atrial contraction) 7/9/2013    Pancreatic cyst 7/14/2015    Psychiatric disorder     anxiety    Urinary frequency     Visual loss 1/21/2015       Past Surgical History:   Procedure Laterality Date    CARDIAC SURG PROCEDURE UNLIST  2/11    normal stress test    ENDOSCOPY, COLON, DIAGNOSTIC  2008    HX APPENDECTOMY  2000    HX BACK SURGERY      KYPHOPLASTY    HX CATARACT REMOVAL Bilateral 2012    HX CHOLECYSTECTOMY  12/15/2017    Lap Rachel by Dr. Melissa Cordova HX GI  2008    EGD    HX GI      COLONOSCOPY    HX GYN      UTERINE BIOPSY    HX LUMBAR FUSION  2016    HX LUMBAR LAMINECTOMY  2016    HX ORTHOPAEDIC Right 2010    knee - arthroscopy    HX ORTHOPAEDIC Right march 2011    TKR  right    HX OTHER SURGICAL  11/2017    karina. leg skin cancer surgery    HX TONSILLECTOMY      HX TUBAL LIGATION  1976       Prior to Admission Medications   Prescriptions Last Dose Informant Patient Reported? Taking? CALCIUM CARB/VIT D3/MINERALS (CALTRATE PLUS PO) 6/13/2018 at Unknown time Self Yes Yes   Sig: Take 1 Tab by mouth daily. Takes one po daily. DOCOSAHEXANOIC ACID/EPA (FISH OIL PO) 6/13/2018 at Unknown time Self Yes Yes   Sig: Take 1 Cap by mouth daily. DULoxetine (CYMBALTA) 60 mg capsule   No No   Sig: TAKE 1 CAPSULE BY MOUTH  EVERY DAY   MULTIVITAMIN PO 6/13/2018 at Unknown time Self Yes Yes   Sig: Take 1 Tab by mouth daily. Takes one po daily. artificial tears,hypromellose, (GENTEAL MODERATE) 0.3 % drop  Self Yes No   Sig: Administer 1 Drop to both eyes nightly. aspirin delayed-release 81 mg tablet 6/13/2018 at Unknown time Self Yes Yes   Sig: Take 81 mg by mouth daily. carboxymethylcellulose sodium (REFRESH LIQUIGEL) 1 % dlgl 6/20/2018 at Unknown time Self Yes Yes   Sig: Apply  to eye daily. 1 DROP BOTH EYES   cholecalciferol, vitamin D3, (VITAMIN D3) 2,000 unit Tab 6/19/2018 at Unknown time Self Yes Yes   Sig: Take 2,000 Units by mouth daily.      doxycycline (VIBRAMYCIN) 100 mg capsule 6/13/2018 at Unknown time Self No Yes   Sig: Take 1 capsule by mouth two times daily   Patient taking differently: Take 1 capsule by mouth two times daily. TKAES 1 TAB DAILY   gabapentin (NEURONTIN) 100 mg capsule 6/20/2018 at 0800  Yes Yes   Sig: Take 100 mg by mouth three (3) times daily. meloxicam (MOBIC) 15 mg tablet 6/13/2018 at Unknown time  No Yes   Sig: Take 1 Tab by mouth daily. omeprazole (PRILOSEC) 40 mg capsule 6/20/2018 at 0500  No Yes   Sig: TAKE 1 CAPSULE BY MOUTH  DAILY      Facility-Administered Medications: None       Vaccinations:    Immunization History   Administered Date(s) Administered    Influenza Vaccine (Quad) PF 09/27/2016    Pneumococcal Conjugate (PCV-13) 10/12/2016    TD Vaccine 05/02/2007    ZZZ-RETIRED (DO NOT USE) Pneumococcal Vaccine (Unspecified Type) 01/02/2005    Zoster 01/02/2007         ASSESSMENT   Age: 80 y.o. Gender: female        Height:                      Weight:      Patient Vitals for the past 8 hrs:   Temp Pulse Resp BP SpO2   06/26/18 0845 97.8 °F (36.6 °C) 87 16 151/76 97 %            Active Orders   Diet    DIET REGULAR       Orientation: Orientation Level: Oriented X4    Active Lines/Drains:  (Peg Tube / Gilmore / CL or S/L?):no    Urinary Status: Voiding      Last BM: Last Bowel Movement Date: 06/25/18     Skin Integrity: Incision (comment)   Wound Back-DRESSING STATUS: Clean, dry, and intact    Wound Back-DRESSING TYPE: Dry dressing    Mobility: Slightly limited   Weight Bearing Status: WBAT (Weight Bearing as Tolerated)      Gait Training  Assistive Device: Walker, rolling, Gait belt  Ambulation - Level of Assistance: Minimal assistance, Assist x1  Distance (ft): 130 Feet (ft)     On Anticoagulation?  NO                                        Last dose:      Pain Medications given:  Tylenol 650 mg                                Last dose: 6/26/2018 at  1215    Lab Results   Component Value Date/Time    Glucose 97 06/23/2018 08:18 AM    Hemoglobin A1c 5.5 09/16/2016 11:48 AM    INR 1.1 09/16/2016 11:48 AM    HGB 11.0 (L) 06/23/2018 08:18 AM    HGB 10.5 (L) 06/22/2018 02:52 AM    HGB 11.2 (L) 06/21/2018 03:07 AM    HGB 13.9 05/31/2018 03:10 PM       Readmission Risks:  Score:         RECOMMENDATION     See After Visit Summary (AVS) for:  · Discharge instructions  · After [address]   · Medication Reconciliation          [facility] Orthopaedic Nurse Navigator  VELMA Francois, RN-BC       Office  [phone]  Cell      [phone]  Fax      [phone]  [email or web address]       Verbal report given to Pablo Elias at [facility].

## 2018-06-26 NOTE — PROGRESS NOTES
Ortho Spine Daily Progress Note    Date of Surgery:  2018      Patient: Kari Laureano   YOB: 1934  Age: 80 y.o. SUBJECTIVE:   6 Days Post-Op following REVISION LUMBAR LAMINECTOMY L2-3, REVISION POSTERIOR SPINAL FUSION L1-5 DURAL REPAIR    The patient states mild/moderate back pain this morning. Mental status appears to be back to baseline. Conversing very appropriately this AM.  Some residual left hip/groin discomfort. The patient rates their current level of pain as 4-6/10. The patient's post operative pain is adequately controlled with Tylenol only. Has cony mobilizing well with PT. The patient denies CP, SOB, N/V, dizziness, abdominal pain, HA. +loose, runny stools. OBJECTIVE:     Vital Signs:    Temp (24hrs), Av.2 °F (36.8 °C), Min:98 °F (36.7 °C), Max:98.3 °F (36.8 °C)    Patient Vitals for the past 24 hrs:   BP Temp Pulse Resp SpO2   18 0254 172/75 98 °F (36.7 °C) 79 16 98 %   18 2127 116/53 98.3 °F (36.8 °C) 83 16 96 %   18 1410 145/72 98.1 °F (36.7 °C) 90 16 97 %   18 1037 (!) 174/92 - 98 - -   18 0953 170/71 98.3 °F (36.8 °C) 79 16 97 %           Physical Exam:  General: A&Ox3, NAD. Respiratory: Respirations are unlabored.   Abdomen: S/NT  Surgical site: C,D and I.  Musculoskeletal: Calves are S/NT, Will dorsi/plantar flexion/EHL/quads/hip flexion intact, Will DP 1+  Neurological:  Will LE's NVI    Laboratory Values:             Recent Labs      18   HGB  11.0*   CREA  0.36*   GLU  97     Lab Results   Component Value Date/Time    Sodium 139 2018 08:18 AM    Potassium 3.5 2018 08:18 AM    Chloride 107 2018 08:18 AM    CO2 24 2018 08:18 AM    Glucose 97 2018 08:18 AM    BUN 8 2018 08:18 AM    Creatinine 0.36 (L) 2018 08:18 AM    Calcium 7.9 (L) 2018 08:18 AM       PLAN:     S/P REVISION LUMBAR LAMINECTOMY L2-3, REVISION POSTERIOR SPINAL FUSION L1-5 DURAL REPAIR    Post Op Delerium Mobilize with PT/nursing until discharged. Appears to be resolved. Hemodynamics Post op anemia. Tolerating well. Stable. Wound Continue dressing changes PRN. Post Operative Pain Pain Control: Tylenol only, no narcotics, hold gabapentin. DVT Prophylaxis Continue with SCD'S, Encourage Ankle Pump Exercises, Mobilize. Discharge Disposition Discharge plan: Plan on transfer to [facility] today once TLSO arrives. Follow up in Dr Gaytan's office 2 weeks post discharge from Rehab.                 Signed By: Damian Granados PA-C  June 26, 2018 7:08 AM

## 2018-06-28 LAB
BACTERIA SPEC CULT: ABNORMAL
CC UR VC: ABNORMAL
SERVICE CMNT-IMP: ABNORMAL

## 2018-07-02 ENCOUNTER — PATIENT OUTREACH (OUTPATIENT)
Dept: INTERNAL MEDICINE CLINIC | Age: 83
End: 2018-07-02

## 2018-07-03 NOTE — PROGRESS NOTES
Attempted x 2 to contact [facility] rehab which is now [facility], but phone is not working.

## 2018-07-11 ENCOUNTER — OFFICE VISIT (OUTPATIENT)
Dept: INTERNAL MEDICINE CLINIC | Age: 83
End: 2018-07-11

## 2018-07-11 VITALS
DIASTOLIC BLOOD PRESSURE: 78 MMHG | BODY MASS INDEX: 22.02 KG/M2 | RESPIRATION RATE: 12 BRPM | HEART RATE: 99 BPM | OXYGEN SATURATION: 98 % | WEIGHT: 129 LBS | TEMPERATURE: 97.4 F | SYSTOLIC BLOOD PRESSURE: 126 MMHG | HEIGHT: 64 IN

## 2018-07-11 DIAGNOSIS — T50.905A DELIRIUM, DRUG-INDUCED: Primary | ICD-10-CM

## 2018-07-11 DIAGNOSIS — R41.0 DELIRIUM, DRUG-INDUCED: Primary | ICD-10-CM

## 2018-07-11 RX ORDER — GABAPENTIN 100 MG/1
CAPSULE ORAL
COMMUNITY
Start: 2018-06-13 | End: 2018-10-01 | Stop reason: ALTCHOICE

## 2018-07-11 NOTE — MR AVS SNAPSHOT
[address] [place] [address] [address] [facility] 
[phone] Patient: Evgeny Anderson MRN:  [id number] Visit Information Date & Time Provider Department Dept. Phone Encounter #  
 2018 11:15 AM Prince Lorenz MD [facility] [phone] [id number] Your Appointments 10/16/2018  1:45 PM  
Follow Up with Jennifer Ku MD  
8 [facility] ([facility]) Appt Note: 4 month follow up per dr anaya/ cp$0 18  [place] [address] [facility] 99 [address]  
  
   
 [place] [id number] [facility] Upcoming Health Maintenance Date Due DTaP/Tdap/Td series (1 - Tdap) 5/3/2007 GLAUCOMA SCREENING Q2Y 2016 MEDICARE YEARLY EXAM 2018 Influenza Age 5 to Adult 2018 Allergies as of 2018  Review Complete On: 2018 By: Prince Lorenz MD  
  
 Severity Noted Reaction Type Reactions Adhesive Tape-silicones  86/15/2969    Other (comments) \"SKIN IS SO FRAGILE\" Current Immunizations  Reviewed on 10/31/2017 Name Date Influenza Vaccine (Quad) PF 2016  1:05 PM  
 Pneumococcal Conjugate (PCV-13) 10/12/2016 TD Vaccine 2007 ZZZ-RETIRED (DO NOT USE) Pneumococcal Vaccine (Unspecified Type) 2005 Zoster 2007 Not reviewed this visit Vitals BP Pulse Temp Resp Height(growth percentile) Weight(growth percentile) 126/78 99 97.4 °F (36.3 °C) (Oral) 12 5' 4\" (1.626 m) 129 lb (58.5 kg) SpO2 BMI OB Status Smoking Status 98% 22.14 kg/m2 Postmenopausal Former Smoker Vitals History BMI and BSA Data Body Mass Index Body Surface Area  
 22.14 kg/m 2 1.63 m 2 Preferred Pharmacy Pharmacy Name Phone [address] [facility], [phone] [facility] [address] Your Updated Medication List  
  
   
 This list is accurate as of 7/11/18 12:11 PM.  Always use your most recent med list.  
  
  
  
  
 acetaminophen 325 mg tablet Commonly known as:  TYLENOL Take 2 Tabs by mouth every six (6) hours. aspirin delayed-release 81 mg tablet Take 81 mg by mouth daily. bisacodyl 10 mg suppository Commonly known as:  DULCOLAX Insert 10 mg into rectum daily as needed. CALTRATE PLUS PO Take 1 Tab by mouth daily. Takes one po daily. doxycycline 100 mg capsule Commonly known as:  VIBRAMYCIN Take 1 capsule by mouth two times daily DULoxetine 60 mg capsule Commonly known as:  CYMBALTA TAKE 1 CAPSULE BY MOUTH  EVERY DAY  
  
 FISH OIL PO Take 1 Cap by mouth daily. gabapentin 100 mg capsule Commonly known as:  NEURONTIN GenTeal Moderate 0.3 % Drop Generic drug:  artificial tears(hypromellose) Administer 1 Drop to both eyes nightly. MULTIVITAMIN PO Take 1 Tab by mouth daily. Takes one po daily. omeprazole 40 mg capsule Commonly known as:  PRILOSEC  
TAKE 1 CAPSULE BY MOUTH  DAILY polyethylene glycol 17 gram packet Commonly known as:  Nancy Rubina Daily as needed for constipation. REFRESH LIQUIGEL 1 % Dlgl Generic drug:  carboxymethylcellulose sodium Apply  to eye daily. 1 DROP BOTH EYES  
  
 senna-docusate 8.6-50 mg per tablet Commonly known as:  Kandace Lipschutz Take 1 Tab by mouth two (2) times a day. Indications: constipation VITAMIN D3 2,000 unit Tab Generic drug:  cholecalciferol (vitamin D3) Take 2,000 Units by mouth daily. Introducing [facility]! [employer] introduces [employer] patient portal. Now you can access parts of your medical record, email your doctor's office, and request medication refills online. 1. In your internet browser, go to [email or web address] 2. Click on the First Time User? Click Here link in the Sign In box. You will see the New Member Sign Up page. 3. Enter your [employer] Access Code exactly as it appears below. You will not need to use this code after youve completed the sign-up process. If you do not sign up before the expiration date, you must request a new code. · [employer] Access Code: [email or web address] Expires: 7/29/2018  3:06 PM 
 
4. Enter the last four digits of your Social Security Number (xxxx) and Date of Birth (mm/dd/yyyy) as indicated and click Submit. You will be taken to the next sign-up page. 5. Create a [employer] ID. This will be your [employer] login ID and cannot be changed, so think of one that is secure and easy to remember. 6. Create a [employer] password. You can change your password at any time. 7. Enter your Password Reset Question and Answer. This can be used at a later time if you forget your password. 8. Enter your e-mail address. You will receive e-mail notification when new information is available in [address] 9. Click Sign Up. You can now view and download portions of your medical record. 10. Click the Download Summary menu link to download a portable copy of your medical information. If you have questions, please visit the Frequently Asked Questions section of the [employer] website. Remember, [employer] is NOT to be used for urgent needs. For medical emergencies, dial 911. Now available from your iPhone and Android! Please provide this summary of care documentation to your next provider. Your primary care clinician is listed as Benny Carcamo. If you have any questions after today's visit, please call [phone].

## 2018-07-11 NOTE — PROGRESS NOTES
Chief Complaint   Patient presents with    Surgical Follow-up     Spinal Surgery follow up. Pt states would like to discuss antibotic medication. Pt states last rehab appt 7/5/2018. Pt states speech therapy, homehealth and PT for the next 3 weeks.  Epigastric Pain     [facility]  Home with PT OT nurses    Had post op delrium at [facility]    Her hospital course was affected by acute postoperative confusion. This resolved with the discontinuation of opioids. She was tolerating a diet, passing flatus, and had a bowel movement postoperatively. Patient Active Problem List    Diagnosis    Lumbar spinal stenosis    Biliary colic    Spinal stenosis    Lumbar disc disease with radiculopathy     Pain management and PT  Trans foraminal injections not helping      Osteoporosis     . DEXA Results (most recent):    Results from Hospital Encounter encounter on 08/13/15   DEXA BONE DENSITY STUDY AXIAL   Narrative **Final Report**      ICD Codes / Adm. Diagnosis: V76.12  724.2 / Disorder of bone and cartilage    Lumbago  Examination:  MM DEXA AXIAL SKELETON  - [id number] - Aug 13 2015 10:45AM  Accession No:  [id number]  Reason:  screening      REPORT:  Bone Mineral Density    Indication: Monitoring, estrogen therapy  Age: [de-identified]  Sex: Female. Menopause status:         postmenopausal.  Hormone replacement therapy: yes/no     Risk factors for fall:   None   Risk factors for osteoporosis:  Tobacco use    Current medication for osteoporosis: Calcium, vitamin D, estrogen. Comparison: 2013    Technique: Imaging was performed on the [employer].          World Health   Organization meta-analysis fracture risk calculator (FRAX) analysis was   performed for 10 year fracture risk probability assessment    Excluded sites: L2 due to fracture, L1 due to degenerative changes    Findings:    Fractures identified on Lateral scanogram:  L2    Lumbar spine: L1-L3, excluding L2  Bone mineral density (gm/cm2): 1.113  % of peak bone mass: 94  % for age matched controls:  80  T score: -0.6  Z score:  1.5    Hip: Right femoral neck  Bone mineral density (gm/cm2):  0.718  % of peak bone mass: 69  % for age matched controls:  100  T score: -2.3  Z score:  0           IMPRESSION:    This patient is osteopenic using the World Health Organization criteria  As compared to the prior study, there has been a statistically significant   decrease in bone mineral density. 10 year probability of major osteoporotic fracture:  16.3%  10 year probability of hip fracture:  5.6%             Signing/Reading Doctor: Allie Daugherty ([id number])    [facility] ([id number])  Aug 17 2015 10:59AM                                 Jan 2017 reclast  And annual  Not done 2018  As of june      Pancreatic cyst    Visual loss    PAC (premature atrial contraction)     Normal echo and stress test for atypical chest pain      Rosacea    Anxiety    Hypercholesterolemia    GERD (gastroesophageal reflux disease)     Feels better on high dose       Cancer (HCC)     skin cancer      Hyperlipidemia LDL goal <130     Tylenol for pain with nicole    Still on doxycycline      Vitals:    07/11/18 1130 07/11/18 1200   BP: 148/87 126/78   Pulse: 99    Resp: 12    Temp: 97.4 °F (36.3 °C)    TempSrc: Oral    SpO2: 98%    Weight: 129 lb (58.5 kg)    Height: 5' 4\" (1.626 m)      Doing well with brace on      1.  Delirium, drug-induced ([address]  Has resolved off all   Pain meds  She does not have dementia

## 2018-07-11 NOTE — PROGRESS NOTES
1. Have you been to the ER, urgent care clinic since your last visit? Hospitalized since your last visit? No    2. Have you seen or consulted any other health care providers outside of the [address] [place] [address] since your last visit? Include any pap smears or colon screening. No     Chief Complaint   Patient presents with    Surgical Follow-up     Spinal Surgery follow up. Pt states would like to discuss antibotic medication.      Epigastric Pain       Health Maintenance Due   Topic Date Due    DTaP/Tdap/Td series (1 - Tdap) 05/03/2007    GLAUCOMA SCREENING Q2Y  07/01/2016

## 2018-07-17 ENCOUNTER — TELEPHONE (OUTPATIENT)
Dept: INTERNAL MEDICINE CLINIC | Age: 83
End: 2018-07-17

## 2018-08-07 ENCOUNTER — PATIENT OUTREACH (OUTPATIENT)
Dept: INTERNAL MEDICINE CLINIC | Age: 83
End: 2018-08-07

## 2018-08-07 NOTE — PROGRESS NOTES
Patient has graduated from the Transitions of Care Coordination  program on 8/7/18. Unable to reach facility for NN outreach. Patient attended PCP follow up appt.       Patients upcoming visits:  Future Appointments  Date Time Provider Jose Mehta   10/10/2018 11:15 AM MD Joselo Meyer   10/16/2018 1:45 PM Stef Gutierrez MD [address] [place] [address]

## 2018-10-01 ENCOUNTER — OFFICE VISIT (OUTPATIENT)
Dept: INTERNAL MEDICINE CLINIC | Age: 83
End: 2018-10-01

## 2018-10-01 VITALS
SYSTOLIC BLOOD PRESSURE: 156 MMHG | HEART RATE: 77 BPM | HEIGHT: 64 IN | DIASTOLIC BLOOD PRESSURE: 74 MMHG | TEMPERATURE: 96.4 F | WEIGHT: 127 LBS | BODY MASS INDEX: 21.68 KG/M2 | RESPIRATION RATE: 16 BRPM | OXYGEN SATURATION: 95 %

## 2018-10-01 DIAGNOSIS — I10 HTN (HYPERTENSION), BENIGN: Primary | ICD-10-CM

## 2018-10-01 DIAGNOSIS — F41.9 ANXIETY: ICD-10-CM

## 2018-10-01 DIAGNOSIS — M48.061 SPINAL STENOSIS OF LUMBAR REGION, UNSPECIFIED WHETHER NEUROGENIC CLAUDICATION PRESENT: ICD-10-CM

## 2018-10-01 RX ORDER — AMLODIPINE BESYLATE 2.5 MG/1
2.5 TABLET ORAL DAILY
Qty: 30 TAB | Refills: 2 | Status: SHIPPED | OUTPATIENT
Start: 2018-10-01 | End: 2019-01-08

## 2018-10-01 NOTE — PROGRESS NOTES
Chief Complaint   Patient presents with    Hypertension     3 month post op lumbar fusion    Home health nurse had found 457 systolic  Many times,  age 80 has CHF and admitting to hospice this week    Lots of home stress  And bp elevated, no pain    Takes cymbalta for anxiety    Usual BP > 104 systolic    Patient Active Problem List    Diagnosis    Lumbar spinal stenosis    Biliary colic    Spinal stenosis    Lumbar disc disease with radiculopathy     Pain management and PT  Trans foraminal injections not helping      Osteoporosis     . DEXA Results (most recent):    Results from Hospital Encounter encounter on 08/13/15   DEXA BONE DENSITY STUDY AXIAL   Narrative **Final Report**      ICD Codes / Adm. Diagnosis: V76.12  724.2 / Disorder of bone and cartilage    Lumbago  Examination:  MM DEXA AXIAL SKELETON  - [id number] - Aug 13 2015 10:45AM  Accession No:  [id number]  Reason:  screening      REPORT:  Bone Mineral Density    Indication: Monitoring, estrogen therapy  Age: [de-identified]  Sex: Female. Menopause status:         postmenopausal.  Hormone replacement therapy: yes/no     Risk factors for fall:   None   Risk factors for osteoporosis:  Tobacco use    Current medication for osteoporosis: Calcium, vitamin D, estrogen. Comparison: 2013    Technique: Imaging was performed on the [employer].          World Health   Organization meta-analysis fracture risk calculator (FRAX) analysis was   performed for 10 year fracture risk probability assessment    Excluded sites: L2 due to fracture, L1 due to degenerative changes    Findings:    Fractures identified on Lateral scanogram:  L2    Lumbar spine: L1-L3, excluding L2  Bone mineral density (gm/cm2): 1.113  % of peak bone mass: 94  % for age matched controls:  80  T score: -0.6  Z score:  1.5    Hip: Right femoral neck  Bone mineral density (gm/cm2):  0.718  % of peak bone mass: 69  % for age matched controls:  100  T score: -2.3  Z score:  0 IMPRESSION:    This patient is osteopenic using the World Health Organization criteria  As compared to the prior study, there has been a statistically significant   decrease in bone mineral density. 10 year probability of major osteoporotic fracture:  16.3%  10 year probability of hip fracture:  5.6%             Signing/Reading Doctor: Lyla Bradley ([id number])    [facility] ([id number])  Aug 17 2015 10:59AM                                 Jan 2017 reclast  And annual  Not done 2018  As of june      Pancreatic cyst    Visual loss    PAC (premature atrial contraction)     Normal echo and stress test for atypical chest pain      Rosacea    Anxiety    Hypercholesterolemia    GERD (gastroesophageal reflux disease)     Feels better on high dose       Cancer (HCC)     skin cancer      Hyperlipidemia LDL goal <130   ]    Vitals:    10/01/18 1608   BP: 156/74   Pulse: 77   Resp: 16   Temp: 96.4 °F (35.8 °C)   TempSrc: Oral   SpO2: 95%   Weight: 127 lb (57.6 kg)   Height: 5' 4\" (1.626 m)     S1 and S2 normal, no murmurs, clicks, gallops or rubs. Regular rate and rhythm. Chest is clear; no wheezes or rales. No edema or JVD. Has brace on    Diagnoses and all orders for this visit:    1. HTN (hypertension), benign    2. Spinal stenosis of lumbar region, unspecified whether neurogenic claudication present    3. Anxiety    Other orders  -     amLODIPine (NORVASC) 2.5 mg tablet; Take 1 Tab by mouth daily.  Indications: hypertension      See one week after starting amlodipine

## 2018-10-01 NOTE — MR AVS SNAPSHOT
[address] [place] [address] [address] [place] [address] 
[phone] Patient: Corwin Anderson MRN:  [id number] Visit Information Date & Time Provider Department Dept. Phone Encounter #  
 10/1/2018  4:00 PM She Ross MD [facility] [phone] [id number] Your Appointments 10/8/2018 10:30 AM  
Medicare Physical with She Ross MD  
[facility]) Appt Note: R F/U; [address] [facility] [address] [place] [address]  
[phone]  
  
   
 [address] [facility] [address]  
  
    
 10/16/2018  1:45 PM  
Follow Up with Katrin Carlin MD  
[address] [facility] ([facility]) Appt Note: 4 month follow up per dr anaya/ taylor$0 [phone] ls; moved due to rebuilt schedule. [place]  [facility] [address]  
  
   
 [place] [address] [place] [address] [facility] Upcoming Health Maintenance Date Due Shingrix Vaccine Age 50> (1 of 2) 1984 DTaP/Tdap/Td series (1 - Tdap) 5/3/2007 GLAUCOMA SCREENING Q2Y 2016 MEDICARE YEARLY EXAM 2018 Influenza Age 5 to Adult 2018 Allergies as of 10/1/2018  Review Complete On: 10/1/2018 By: Paco Record, LPN Severity Noted Reaction Type Reactions Adhesive Tape-silicones      Other (comments) \"SKIN IS SO FRAGILE\" Current Immunizations  Reviewed on 10/31/2017 Name Date Influenza Vaccine (Quad) PF 2016  1:05 PM  
 Pneumococcal Conjugate (PCV-13) 10/12/2016 TD Vaccine 2007 ZZZ-RETIRED (DO NOT USE) Pneumococcal Vaccine (Unspecified Type) 2005 Zoster 2007 Not reviewed this visit Vitals BP Pulse Temp Resp Height(growth percentile) Weight(growth percentile) 156/74 (BP 1 Location: Left arm, BP Patient Position: Sitting) 77 96.4 °F (35.8 °C) (Oral) 16 5' 4\" (1.626 m) 127 lb (57.6 kg) SpO2 BMI OB Status Smoking Status 95% 21.8 kg/m2 Postmenopausal Former Smoker Vitals History BMI and BSA Data Body Mass Index Body Surface Area  
 21.8 kg/m 2 1.61 m 2 Preferred Pharmacy [facility] [address] [phone] Your Updated Medication List  
  
   
This list is accurate as of 10/1/18  4:30 PM.  Always use your most recent med list.  
  
  
  
  
 acetaminophen 325 mg tablet Commonly known as:  TYLENOL Take 2 Tabs by mouth every six (6) hours. amLODIPine 2.5 mg tablet Commonly known as:  Mejia Grand Take 1 Tab by mouth daily. Indications: hypertension  
  
 aspirin delayed-release 81 mg tablet Take 81 mg by mouth daily. bisacodyl 10 mg suppository Commonly known as:  DULCOLAX Insert 10 mg into rectum daily as needed. CALTRATE PLUS PO Take 1 Tab by mouth daily. Takes one po daily. doxycycline 100 mg capsule Commonly known as:  VIBRAMYCIN Take 1 capsule by mouth two times daily DULoxetine 60 mg capsule Commonly known as:  CYMBALTA TAKE 1 CAPSULE BY MOUTH  EVERY DAY  
  
 FISH OIL PO Take 1 Cap by mouth daily. GenTeal Moderate 0.3 % Drop Generic drug:  artificial tears(hypromellose) Administer 1 Drop to both eyes nightly. MULTIVITAMIN PO Take 1 Tab by mouth daily. Takes one po daily. omeprazole 40 mg capsule Commonly known as:  PRILOSEC  
TAKE 1 CAPSULE BY MOUTH  DAILY polyethylene glycol 17 gram packet Commonly known as:  Algis Born Daily as needed for constipation. REFRESH LIQUIGEL 1 % Dlgl Generic drug:  carboxymethylcellulose sodium Apply  to eye daily. 1 DROP BOTH EYES  
  
 senna-docusate 8.6-50 mg per tablet Commonly known as:  Faye Gutierrez  
 Take 1 Tab by mouth two (2) times a day. Indications: constipation VITAMIN D3 2,000 unit Tab Generic drug:  cholecalciferol (vitamin D3) Take 2,000 Units by mouth daily. Prescriptions Sent to Pharmacy Refills  
 amLODIPine (NORVASC) 2.5 mg tablet 2 Sig: Take 1 Tab by mouth daily. Indications: hypertension Class: Normal  
 [id number] [address] #: [phone] Route: Oral  
  
Introducing [facility]! [facility] introduces riskmethods patient portal. Now you can access parts of your medical record, email your doctor's office, and request medication refills online. 1. In your internet browser, go to [email or web address] 2. Click on the First Time User? Click Here link in the Sign In box. You will see the New Member Sign Up page. 3. Enter your riskmethods Access Code exactly as it appears below. You will not need to use this code after youve completed the sign-up process. If you do not sign up before the expiration date, you must request a new code. · riskmethods Access Code: [email or web address] Expires: 12/30/2018  4:30 PM 
 
4. Enter the last four digits of your Social Security Number (xxxx) and Date of Birth (mm/dd/yyyy) as indicated and click Submit. You will be taken to the next sign-up page. 5. Create a riskmethods ID. This will be your riskmethods login ID and cannot be changed, so think of one that is secure and easy to remember. 6. Create a riskmethods password. You can change your password at any time. 7. Enter your Password Reset Question and Answer. This can be used at a later time if you forget your password. 8. Enter your e-mail address. You will receive e-mail notification when new information is available in [address] 9. Click Sign Up. You can now view and download portions of your medical record. 10. Click the Download Summary menu link to download a portable copy of your medical information. If you have questions, please visit the Frequently Asked Questions section of the [employer] website. Remember, [employer] is NOT to be used for urgent needs. For medical emergencies, dial 911. Now available from your iPhone and Android! Please provide this summary of care documentation to your next provider. Your primary care clinician is listed as Coung [facility]ad. If you have any questions after today's visit, please call [phone].

## 2018-10-01 NOTE — PROGRESS NOTES
1. Have you been to the ER, urgent care clinic since your last visit? Hospitalized since your last visit? No    2. Have you seen or consulted any other health care providers outside of the [address] [place] [address] since your last visit? Include any pap smears or colon screening. Yes.   Dr Jennifer Fountain, [facility]

## 2018-10-08 ENCOUNTER — OFFICE VISIT (OUTPATIENT)
Dept: INTERNAL MEDICINE CLINIC | Age: 83
End: 2018-10-08

## 2018-10-08 VITALS
BODY MASS INDEX: 21.17 KG/M2 | WEIGHT: 124 LBS | HEART RATE: 81 BPM | SYSTOLIC BLOOD PRESSURE: 151 MMHG | HEIGHT: 64 IN | OXYGEN SATURATION: 97 % | DIASTOLIC BLOOD PRESSURE: 81 MMHG | RESPIRATION RATE: 16 BRPM | TEMPERATURE: 98 F

## 2018-10-08 DIAGNOSIS — I10 HTN (HYPERTENSION), BENIGN: Primary | ICD-10-CM

## 2018-10-08 DIAGNOSIS — Z23 ENCOUNTER FOR IMMUNIZATION: ICD-10-CM

## 2018-10-08 DIAGNOSIS — Z13.39 SCREENING FOR ALCOHOLISM: ICD-10-CM

## 2018-10-08 DIAGNOSIS — M81.0 OSTEOPOROSIS, UNSPECIFIED OSTEOPOROSIS TYPE, UNSPECIFIED PATHOLOGICAL FRACTURE PRESENCE: ICD-10-CM

## 2018-10-08 DIAGNOSIS — Z00.00 MEDICARE ANNUAL WELLNESS VISIT, SUBSEQUENT: ICD-10-CM

## 2018-10-08 DIAGNOSIS — Z13.31 SCREENING FOR DEPRESSION: ICD-10-CM

## 2018-10-08 RX ORDER — FAMOTIDINE 40 MG/1
40 TABLET, FILM COATED ORAL DAILY
Qty: 90 TAB | Refills: 3 | Status: SHIPPED | OUTPATIENT
Start: 2018-10-08 | End: 2019-11-27 | Stop reason: SDUPTHER

## 2018-10-08 NOTE — PROGRESS NOTES
This is the Subsequent Medicare Annual Wellness Exam, performed 12 months or more after the Initial AWV or the last Subsequent AWV I have reviewed the patient's medical history in detail and updated the computerized patient record. History Past Medical History:  
Diagnosis Date  Arthritis  Biliary colic 17/20/5579  Cancer ([address] skin cancer - basal and squamous cell, LEGS, FACE  
 GERD (gastroesophageal reflux disease)  Hypercholesterolemia PATIENT DENIES  Osteopenia  PAC (premature atrial contraction) 7/9/2013  Pancreatic cyst 7/14/2015  Psychiatric disorder   
 anxiety  Urinary frequency  Visual loss 1/21/2015 Past Surgical History:  
Procedure Laterality Date  CARDIAC SURG PROCEDURE UNLIST  2/11  
 normal stress test  
 ENDOSCOPY, COLON, DIAGNOSTIC  2008 [address]  HX BACK SURGERY    
 KYPHOPLASTY  HX CATARACT REMOVAL Bilateral 2012  HX CHOLECYSTECTOMY  12/15/2017 Lap Rachel by Dr. Roque Elias  HX DILATION AND CURETTAGE    
 HX GI  2008 EGD  HX GI    
 COLONOSCOPY  
 HX GYN    
 UTERINE BIOPSY  HX LUMBAR FUSION  2016  HX LUMBAR LAMINECTOMY  2016  HX ORTHOPAEDIC Right 2010  
 knee - arthroscopy [facility] ORTHOPAEDIC Right march 2011 TKR  right  HX OTHER SURGICAL  11/2017  
 karina. leg skin cancer surgery  HX TONSILLECTOMY [place] Current Outpatient Prescriptions Medication Sig Dispense Refill  varicella-zoster recombinant, PF, (SHINGRIX, PF,) 50 mcg/0.5 mL susr injection 0.5mL by IntraMUSCular route once now and then repeat in 2-6 months 0.5 mL 1  
 amLODIPine (NORVASC) 2.5 mg tablet Take 1 Tab by mouth daily. Indications: hypertension 30 Tab 2  polyethylene glycol (MIRALAX) 17 gram packet Daily as needed for constipation.  1 Each 0  
 DULoxetine (CYMBALTA) 60 mg capsule TAKE 1 CAPSULE BY MOUTH  EVERY DAY 90 Cap 1  
  acetaminophen (TYLENOL) 325 mg tablet Take 2 Tabs by mouth every six (6) hours. 1 Tab 0  
 senna-docusate (PERICOLACE) 8.6-50 mg per tablet Take 1 Tab by mouth two (2) times a day. Indications: constipation 1 Tab 0  
 omeprazole (PRILOSEC) 40 mg capsule TAKE 1 CAPSULE BY MOUTH  DAILY 90 Cap 3  
 aspirin delayed-release 81 mg tablet Take 81 mg by mouth daily.  doxycycline (VIBRAMYCIN) 100 mg capsule Take 1 capsule by mouth two times daily (Patient taking differently: Take 1 capsule by mouth two times daily. TKAES 1 TAB DAILY) 180 Cap 0  
 DOCOSAHEXANOIC ACID/EPA (FISH OIL PO) Take 1 Cap by mouth daily.  carboxymethylcellulose sodium (REFRESH LIQUIGEL) 1 % dlgl Apply  to eye daily. 1 DROP BOTH EYES    
 artificial tears,hypromellose, (GENTEAL MODERATE) 0.3 % drop Administer 1 Drop to both eyes nightly.  cholecalciferol, vitamin D3, (VITAMIN D3) 2,000 unit Tab Take 2,000 Units by mouth daily.  MULTIVITAMIN PO Take 1 Tab by mouth daily. Takes one po daily.  CALCIUM CARB/VIT D3/MINERALS (CALTRATE PLUS PO) Take 1 Tab by mouth daily. Takes one po daily.  bisacodyl (DULCOLAX) 10 mg suppository Insert 10 mg into rectum daily as needed. 1 Suppository 0 Allergies Allergen Reactions  Adhesive Tape-Silicones Other (comments) \"SKIN IS SO FRAGILE\" Family History Problem Relation Age of Onset  Stroke Mother  Hypertension Mother  Other Mother   
  aneurysm - aorta  Cancer Father   
  stomach AND ESOPHAGEAL cancer  Stroke Maternal Grandmother  Other Son BLOOD CLOT IN AORTA AND POST OP DVT  Anesth Problems Neg Hx Social History Substance Use Topics  Smoking status: Former Smoker Quit date: 8/5/1956  Smokeless tobacco: Never Used Comment: former cigarette smoker  Alcohol use 7.0 oz/week  
  7 Glasses of wine, 7 Shots of liquor per week Comment: 1 DRINK PER DAY USUALLY Patient Active Problem List  
Diagnosis Code  Hyperlipidemia LDL goal <130 E78.5  Hypercholesterolemia E78.00  GERD (gastroesophageal reflux disease) K21.9  Cancer ([facility] C80.1  Anxiety F41.9  Rosacea L71.9  
 PAC (premature atrial contraction) I49.1  Visual loss H54.7  Pancreatic cyst K86.2  Lumbar disc disease with radiculopathy M51.16  
 Osteoporosis M81.0  Spinal stenosis M48.00  Biliary colic F71.29  Lumbar spinal stenosis M48.061 Depression Risk Factor Screening: PHQ over the last two weeks 10/8/2018 PHQ Not Done - Little interest or pleasure in doing things Not at all Feeling down, depressed, irritable, or hopeless Not at all Total Score PHQ 2 0 Alcohol Risk Factor Screening: On any occasion in the past three months you have had more than 3 drinks containing alcohol. Functional Ability and Level of Safety:  
Hearing Loss The patient wears hearing aids. Activities of Daily Living The home contains: no safety equipment. Patient does total self care Fall Risk Fall Risk Assessment, last 12 mths 10/8/2018 Able to walk? Yes Fall in past 12 months? No  
Fall with injury? -  
Number of falls in past 12 months - Fall Risk Score -  
 
 
Abuse Screen Patient is not abused Cognitive Screening Evaluation of Cognitive Function: 
Has your family/caregiver stated any concerns about your memory: no 
Normal 
 
Patient Care Team  
Patient Care Team: 
Pierre Noguera MD as PCP - General 
Vanessa Stewart MD (Ophthalmology) Dee August MD as Surgeon (General Surgery) Lucila Reis MD (Obstetrics & Gynecology) Ashwin Grant MD (Breast Surgery) Ilda Parish RN as Ambulatory Care Navigator (Internal Medicine) Assessment/Plan Education and counseling provided: 
Are appropriate based on today's review and evaluation End-of-Life planning (with patient's consent) Pneumococcal Vaccine Diagnoses and all orders for this visit: 
 
 1. Medicare annual wellness visit, subsequent 
-     varicella-zoster recombinant, PF, (SHINGRIX, PF,) 50 mcg/0.5 mL susr injection; 0.5mL by IntraMUSCular route once now and then repeat in 2-6 months 2. Screening for alcoholism -     Annual  Alcohol Screen 15 min () 3. Screening for depression -     Depression Screen Annual 
 
4. Encounter for immunization -     Influenza Admin () -     Influenza Vaccine Inactivated (IIV)(FLUAD), Subunit, Adjuvanted, IM (27799) Health Maintenance Due Topic Date Due  Shingrix Vaccine Age 50> (1 of 2) 12/11/1984  
 DTaP/Tdap/Td series (1 - Tdap) 05/03/2007  GLAUCOMA SCREENING Q2Y  07/01/2016  Influenza Age 5 to Adult  08/01/2018 Subjective:  Two things we wanted to follow up on. One is she's been on a new antihypertensive for about a week and has had no side effects. She's taking 2.5 of Amlodipine and her blood pressure is 150/80, which is about 10 points better than it had been. Secondly, she is due for more osteoporosis treatment. It has been interrupted due to back surgery. We could address it and start her on back on Prolia in January of 2019 and get lab work at that time. She'll also be due for follow up on blood pressure. She is still wearing a brace for her lower back trouble. She is trying to wean off of the PPI and has lowered doses to improve bone strength. She is on every other day PPI now and occasionally gets reflux, but most of the time does well Plan:  Plan will be due reduce the PPI to 0, put her on 40 mg of Pepcid, one at bedtime or one daily. I'll order that for her through her mail order. We'll see if that controls her symptoms and if she can keep her alcohol intake down she'll probably have less reflux and that will be better for her for her osteoporosis. We'll follow up on osteoporosis in January. We'll follow up on the hypertension in January. Vitals:  
 10/08/18 1039 BP: 151/81 Pulse: 81 Resp: 16  
 Temp: 98 °F (36.7 °C) TempSrc: Oral  
SpO2: 97% Weight: 124 lb (56.2 kg) Height: 5' 4\" (1.626 m) S1 and S2 normal, no murmurs, clicks, gallops or rubs. Regular rate and rhythm. Chest is clear; no wheezes or rales. No edema or JVD. Brace on lowert back 1. Medicare annual wellness visit, subsequent 
 
- varicella-zoster recombinant, PF, (SHINGRIX, PF,) 50 mcg/0.5 mL susr injection; 0.5mL by IntraMUSCular route once now and then repeat in 2-6 months  Dispense: 0.5 mL; Refill: 1 2. Screening for alcoholism Too many - Annual  Alcohol Screen 15 min () 3. Screening for depression 
nonne - Depression Screen Annual 
 
4. Encounter for immunization - Influenza Admin () - Influenza Vaccine Inactivated (IIV)(FLUAD), Subunit, Adjuvanted, IM (21188) 5. HTN (hypertension), benign Stay amlodipine 2.5 6. Osteoporosis, unspecified osteoporosis type, unspecified pathological fracture presence 
restart prolia in jan 2019 Advance Care Planning (ACP) Provider [facility] Date of ACP Conversation: 10/08/18 Persons included in Conversation:  patient Length of ACP Conversation in minutes:  <16 minutes (Non-Billable) Authorized Decision Maker (if patient is incapable of making informed decisions): This person is:  
 
Working on husbands For Patients with Decision Making Capacity:  
 
 
Conversation Outcomes / Follow-Up Plan:

## 2018-10-08 NOTE — MR AVS SNAPSHOT
[address] [place] [address] [address] [facility] 
[phone] Patient: Brina Hernandez MRN:  [id number] Visit Information Date & Time Provider Department Dept. Phone Encounter #  
 10/8/2018 10:30 AM Garett Kiran MD [facility] [phone] [id number] Your Appointments 10/16/2018  1:45 PM  
Follow Up with Eugenia Azar MD  
[facility] ([facility]) Appt Note: 4 month follow up per dr anaya/ taylor$0 18 ls; moved due to rebuilt schedule. [place] [id number]  
  
    
 2019 11:00 AM  
ROUTINE CARE with Garett Kiran MD  
[facility] App[facility] Note: 3 month f/up [facility] [address] [address] Upcoming Health Maintenance Date Due Shingrix Vaccine Age 50> (1 of 2) 1984 DTaP/Tdap/Td series (1 - Tdap) 5/3/2007 GLAUCOMA SCREENING Q2Y 2016 Influenza Age 5 to Adult 2018 MEDICARE YEARLY EXAM 10/9/2019 Allergies as of 10/8/2018  Review Complete On: 10/8/2018 By: Ye Burr LPN Severity Noted Reaction Type Reactions Adhesive Tape-silicones      Other (comments) \"SKIN IS SO FRAGILE\" Current Immunizations  Reviewed on 10/31/2017 Name Date Influenza Vaccine (Quad) PF 2016  1:05 PM  
 Influenza Vaccine (Tri) Adjuvanted  Incomplete Pneumococcal Conjugate (PCV-13) 10/12/2016 TD Vaccine 2007 ZZZ-RETIRED (DO NOT USE) Pneumococcal Vaccine (Unspecified Type) 2005 Zoster 2007 Not reviewed this visit You Were Diagnosed With   
  
 Codes Comments Medicare annual wellness visit, subsequent     ICD-10-CM: Z00.00 ICD-9-CM: V70.0 Screening for alcoholism     ICD-10-CM: Z13.39 
ICD-9-CM: V79.1 Screening for depression     ICD-10-CM: Z13.31 
ICD-9-CM: V79.0 Encounter for immunization     ICD-10-CM: V69 ICD-9-CM: V03.89 Vitals BP Pulse Temp Resp Height(growth percentile) Weight(growth percentile) 151/81 (BP 1 Location: Left arm, BP Patient Position: Sitting) 81 98 °F (36.7 °C) (Oral) 16 5' 4\" (1.626 m) 124 lb (56.2 kg) SpO2 BMI OB Status Smoking Status 97% 21.28 kg/m2 Postmenopausal Former Smoker Vitals History BMI and BSA Data Body Mass Index Body Surface Area  
 21.28 kg/m 2 1.59 m 2 Preferred Pharmacy Pharmacy Name Phone [facility], [address] [address] [place] [address] [address] Your Updated Medication List  
  
   
This list is accurate as of 10/8/18 11:18 AM.  Always use your most recent med list.  
  
  
  
  
 acetaminophen 325 mg tablet Commonly known as:  TYLENOL Take 2 Tabs by mouth every six (6) hours. amLODIPine 2.5 mg tablet Commonly known as:  Beaulah Paradise Take 1 Tab by mouth daily. Indications: hypertension  
  
 aspirin delayed-release 81 mg tablet Take 81 mg by mouth daily. bisacodyl 10 mg suppository Commonly known as:  DULCOLAX Insert 10 mg into rectum daily as needed. CALTRATE PLUS PO Take 1 Tab by mouth daily. Takes one po daily. denosumab 60 mg/mL injection Commonly known as:  Erica Mcfadden 1 mL by SubCUTAneous route every 6 months. Start taking on:  1/8/2019  
  
 doxycycline 100 mg capsule Commonly known as:  VIBRAMYCIN Take 1 capsule by mouth two times daily DULoxetine 60 mg capsule Commonly known as:  CYMBALTA TAKE 1 CAPSULE BY MOUTH  EVERY DAY  
  
 famotidine 40 mg tablet Commonly known as:  PEPCID Take 1 Tab by mouth daily. Indications: gastroesophageal reflux disease FISH OIL PO Take 1 Cap by mouth daily. GenTeal Moderate 0.3 % Drop Generic drug:  artificial tears(hypromellose) Administer 1 Drop to both eyes nightly. MULTIVITAMIN PO Take 1 Tab by mouth daily. Takes one po daily. polyethylene glycol 17 gram packet Commonly known as:  Cary Frater Daily as needed for constipation. REFRESH LIQUIGEL 1 % Dlgl Generic drug:  carboxymethylcellulose sodium Apply  to eye daily. 1 DROP BOTH EYES  
  
 senna-docusate 8.6-50 mg per tablet Commonly known as:  Shelly Carlos Take 1 Tab by mouth two (2) times a day. Indications: constipation  
  
 varicella-zoster recombinant (PF) 50 mcg/0.5 mL Susr injection Commonly known as:  SHINGRIX (PF)  
0.5mL by IntraMUSCular route once now and then repeat in 2-6 months VITAMIN D3 2,000 unit Tab Generic drug:  cholecalciferol (vitamin D3) Take 2,000 Units by mouth daily. Prescriptions Printed Refills  
 varicella-zoster recombinant, PF, (SHINGRIX, PF,) 50 mcg/0.5 mL susr injection 1 Si.5mL by IntraMUSCular route once now and then repeat in 2-6 months Class: Print  
 denosumab (PROLIA) 60 mg/mL injection 3 Starting on: 2019 Si mL by SubCUTAneous route every 6 months. Class: Print Route: SubCUTAneous Prescriptions Sent to Pharmacy Refills  
 famotidine (PEPCID) 40 mg tablet 3 Sig: Take 1 Tab by mouth daily. Indications: gastroesophageal reflux disease Class: Normal  
 Pharmacy: [facility] Ph #: [phone] Route: Oral  
  
We Performed the Following ADMIN INFLUENZA VIRUS VAC [ \[facility]\""] [address] [[id number] \[facility]\""] INFLUENZA VACCINE INACTIVATED (IIV), SUBUNIT, ADJUVANTED, IM [id number] CPT(R)] ID ANNUAL ALCOHOL SCREEN 15 MIN [id number] \[facility]\""] Patient Instructions Medicare Wellness Visit, Female The best way to live healthy is to have a lifestyle where you eat a well-balanced diet, exercise regularly, limit alcohol use, and quit all forms of tobacco/nicotine, if applicable. Regular preventive services are another way to keep healthy. Preventive services (vaccines, screening tests, monitoring & exams) can help personalize your care plan, which helps you manage your own care. Screening tests can find health problems at the earliest stages, when they are easiest to treat. [facility] follows the current, evidence-based guidelines published by the [facility] Jordan Scott (RUSTSTF) when recommending preventive services for our patients. Because we follow these guidelines, sometimes recommendations change over time as research supports it. (For example, mammograms used to be recommended annually. Even though Medicare will still pay for an annual mammogram, the newer guidelines recommend a mammogram every two years for women of average risk.) Of course, you and your doctor may decide to screen more often for some diseases, based on your risk and your health status. Preventive services for you include: - Medicare offers their members a free annual wellness visit, which is time for you and your primary care provider to discuss and plan for your preventive service needs. Take advantage of this benefit every year! 
-All adults over the age of 72 should receive the recommended pneumonia vaccines. Current USPSTF guidelines recommend a series of two vaccines for the best pneumonia protection.  
-All adults should have a flu vaccine yearly and a tetanus vaccine every 10 years. All adults age 61 and older should receive a shingles vaccine once in their lifetime.   
-A bone mass density test is recommended when a woman turns 65 to screen for osteoporosis. This test is only recommended one time, as a screening. Some providers will use this same test as a disease monitoring tool if you already have osteoporosis. -All adults age 38-68 who are overweight should have a diabetes screening test once every three years.  
-Other screening tests and preventive services for persons with diabetes include: an eye exam to screen for diabetic retinopathy, a kidney function test, a foot exam, and stricter control over your cholesterol.  
-Cardiovascular screening for adults with routine risk involves an electrocardiogram (ECG) at intervals determined by your doctor.  
-Colorectal cancer screenings should be done for adults age 54-65 with no increased risk factors for colorectal cancer. There are a number of acceptable methods of screening for this type of cancer. Each test has its own benefits and drawbacks. Discuss with your doctor what is most appropriate for you during your annual wellness visit. The different tests include: colonoscopy (considered the best screening method), a fecal occult blood test, a fecal DNA test, and sigmoidoscopy. -Breast cancer screenings are recommended every other year for women of normal risk, age 54-69. 
-Cervical cancer screenings for women over age 72 are only recommended with certain risk factors.  
-All adults born between [facility] should be screened once for Hepatitis C. Here is a list of your current Health Maintenance items (your personalized list of preventive services) with a due date: 
Health Maintenance Due Topic Date Due  Shingles Vaccine (1 of 2) 12/11/1984  
 DTaP/Tdap/Td  (1 - Tdap) 05/03/2007  Glaucoma Screening   07/01/2016  Flu Vaccine  08/01/2018 Introducing [facility]! Daniel Maynard introduces [employer] patient portal. Now you can access parts of your medical record, email your doctor's office, and request medication refills online. 1. In your internet browser, go to [email or web address] 2. Click on the First Time User? Click Here link in the Sign In box. You will see the New Member Sign Up page. 3. Enter your [employer] Access Code exactly as it appears below. You will not need to use this code after youve completed the sign-up process. If you do not sign up before the expiration date, you must request a new code. · [employer] Access Code: [email or web address] Expires: 12/30/2018  4:30 PM 
 
4. Enter the last four digits of your Social Security Number (xxxx) and Date of Birth (mm/dd/yyyy) as indicated and click Submit. You will be taken to the next sign-up page. 5. Create a Every[employer] ID. This will be your Every[employer] login ID and cannot be changed, so think of one that is secure and easy to remember. 6. Create a [employer] password. You can change your password at any time. 7. Enter your Password Reset Question and Answer. This can be used at a later time if you forget your password. 8. Enter your e-mail address. You will receive e-mail notification when new information is available in [address] 9. Click Sign Up. You can now view and download portions of your medical record. 10. Click the Download Summary menu link to download a portable copy of your medical information. If you have questions, please visit the Frequently Asked Questions section of the [employer] website. Remember, [employer] is NOT to be used for urgent needs. For medical emergencies, dial 911. Now available from your iPhone and Android! Please provide this summary of care documentation to your next provider. Your primary care clinician is listed as Madhu Ramirez. If you have any questions after today's visit, please call [phone].

## 2018-10-08 NOTE — PATIENT INSTRUCTIONS
Medicare Wellness Visit, Female The best way to live healthy is to have a lifestyle where you eat a well-balanced diet, exercise regularly, limit alcohol use, and quit all forms of tobacco/nicotine, if applicable. Regular preventive services are another way to keep healthy. Preventive services (vaccines, screening tests, monitoring & exams) can help personalize your care plan, which helps you manage your own care. Screening tests can find health problems at the earliest stages, when they are easiest to treat. [facility] follows the current, evidence-based guidelines published by the [facility] Jordan Scott ([facility]) when recommending preventive services for our patients. Because we follow these guidelines, sometimes recommendations change over time as research supports it. (For example, mammograms used to be recommended annually. Even though Medicare will still pay for an annual mammogram, the newer guidelines recommend a mammogram every two years for women of average risk.) Of course, you and your doctor may decide to screen more often for some diseases, based on your risk and your health status. Preventive services for you include: - Medicare offers their members a free annual wellness visit, which is time for you and your primary care provider to discuss and plan for your preventive service needs. Take advantage of this benefit every year! 
-All adults over the age of 72 should receive the recommended pneumonia vaccines. Current USPSTF guidelines recommend a series of two vaccines for the best pneumonia protection.  
-All adults should have a flu vaccine yearly and a tetanus vaccine every 10 years. All adults age 61 and older should receive a shingles vaccine once in their lifetime.   
-A bone mass density test is recommended when a woman turns 65 to screen for osteoporosis. This test is only recommended one time, as a screening. Some providers will use this same test as a disease monitoring tool if you already have osteoporosis. -All adults age 38-68 who are overweight should have a diabetes screening test once every three years.  
-Other screening tests and preventive services for persons with diabetes include: an eye exam to screen for diabetic retinopathy, a kidney function test, a foot exam, and stricter control over your cholesterol.  
-Cardiovascular screening for adults with routine risk involves an electrocardiogram (ECG) at intervals determined by your doctor.  
-Colorectal cancer screenings should be done for adults age 54-65 with no increased risk factors for colorectal cancer. There are a number of acceptable methods of screening for this type of cancer. Each test has its own benefits and drawbacks. Discuss with your doctor what is most appropriate for you during your annual wellness visit. The different tests include: colonoscopy (considered the best screening method), a fecal occult blood test, a fecal DNA test, and sigmoidoscopy. -Breast cancer screenings are recommended every other year for women of normal risk, age 54-69. 
-Cervical cancer screenings for women over age 72 are only recommended with certain risk factors.  
-All adults born between [facility] should be screened once for Hepatitis C. Here is a list of your current Health Maintenance items (your personalized list of preventive services) with a due date: 
Health Maintenance Due Topic Date Due  Shingles Vaccine (1 of 2) 12/11/1984  
 DTaP/Tdap/Td  (1 - Tdap) 05/03/2007  Glaucoma Screening   07/01/2016  Flu Vaccine  08/01/2018

## 2018-10-23 ENCOUNTER — DOCUMENTATION ONLY (OUTPATIENT)
Dept: SURGERY | Age: 83
End: 2018-10-23

## 2018-10-23 ENCOUNTER — HOSPITAL ENCOUNTER (OUTPATIENT)
Dept: LAB | Age: 83
Discharge: HOME | End: 2018-10-23

## 2018-10-23 ENCOUNTER — OFFICE VISIT (OUTPATIENT)
Dept: SURGERY | Age: 83
End: 2018-10-23

## 2018-10-23 VITALS
WEIGHT: 124 LBS | DIASTOLIC BLOOD PRESSURE: 67 MMHG | BODY MASS INDEX: 21.17 KG/M2 | HEIGHT: 64 IN | HEART RATE: 89 BPM | SYSTOLIC BLOOD PRESSURE: 143 MMHG

## 2018-10-23 DIAGNOSIS — N64.52 DISCHARGE FROM LEFT NIPPLE: Primary | ICD-10-CM

## 2018-10-23 NOTE — PROGRESS NOTES
HISTORY OF PRESENT ILLNESS  Jean Cordero is a 80 y.o. female. HPI ESTABLISHED patient here for 4 month follow up of LEFT breast nipple inversion and crusting. The eschar was sent at that time and was not diagnostic. She is here today for nipple biopsy. She had back surgery 6/2018 and is recovering well.  is in home hospice.     No family history of breast or ovarian cancer.      Mammogram, 8/2017, BIRADS 1    ROS    Physical Exam   Pulmonary/Chest: Left breast exhibits inverted nipple, nipple discharge (crusting on nipple) and skin change. Left breast exhibits no tenderness. INCISIONAL BIOPSY  Indication : left  nipple discharge and eschar  Prep : alcohol   Anesthesia : 1% lidocaine with epinephrine, 1cc. Procedure : an elliptical incision was made in the center of the nipple. The specimen was removed and sent to pathology   Dressing : Steristrips, gauze and tape. Instructions : Remove gauze tonight. Pathology : Left nipple, biopsy:       Fragment of benign skin with mild chronic superficial folliculitis.      Fragments of detached keratin scale crust.Left nipple, biopsy:        Disposition: follow up one week for wound check    ASSESSMENT and PLAN    ICD-10-CM ICD-9-CM    1. Discharge from left nipple N64.52 611.79 SURGICAL PATHOLOGY     LEFT nipple discharge. Nipple biopsy today. Called patient  Biopsy is benign.   Follow up next week for wound check

## 2018-10-23 NOTE — PATIENT INSTRUCTIONS

## 2018-10-23 NOTE — PROGRESS NOTES
[facility]  OFFICE PROCEDURE PROGRESS NOTE        Chart reviewed for the following:   Stef Rosenberg MD, have reviewed the History, Physical and updated the Allergic reactions for [address] performed immediately prior to start of procedure:   Stef Rosenberg MD, have performed the following reviews on [facility] prior to the start of the procedure:            * Patient was identified by name and date of birth   * Agreement on procedure being performed was verified  * Risks and Benefits explained to the patient  * Procedure site verified and marked as necessary  * Patient was positioned for comfort  * Consent was signed and verified     Time: 5394      Date of procedure: 10/23/2018    Procedure performed by:  Emmie Michel MD    Provider assisted by: Tiff Buck RN    Patient assisted by: self    How tolerated by patient: tolerated the procedure well with no complications    Post Procedural Pain Scale: 0 - No Hurt    Comments: Patient tolerated the procedure well without complications. Denies pain post biopsy. Written and verbal post biopsy instructions reviewed with and given to patient with her understanding.

## 2018-10-30 ENCOUNTER — OFFICE VISIT (OUTPATIENT)
Dept: SURGERY | Age: 83
End: 2018-10-30

## 2018-10-30 VITALS
HEIGHT: 64 IN | DIASTOLIC BLOOD PRESSURE: 66 MMHG | WEIGHT: 124 LBS | BODY MASS INDEX: 21.17 KG/M2 | SYSTOLIC BLOOD PRESSURE: 149 MMHG | HEART RATE: 79 BPM

## 2018-10-30 DIAGNOSIS — Z48.89 ENCOUNTER FOR POST SURGICAL WOUND CHECK: Primary | ICD-10-CM

## 2018-10-30 NOTE — PROGRESS NOTES
HISTORY OF PRESENT ILLNESS  Harsha Castro is a 80 y.o. female. HPI ESTABLISHED patient here for wound check LEFT breast. She is 1 week s/p nipple biopsy. Has been healing well but noticed soreness and swelling 2 nights ago. Steri strips still intact. LEFT nipple biopsy 10/23/18 benign.     No family history of breast or ovarian cancer.      Mammogram, 8/2017, BIRADS 1    ROS    Physical Exam   Pulmonary/Chest: Left breast exhibits inverted nipple and skin change (biopsy scar is healed. the nipple/areolar complex is swollen and slightly red). Left breast exhibits no tenderness. ASSESSMENT and PLAN    ICD-10-CM ICD-9-CM    1. Encounter for post surgical wound check Z48.89 V58.49      Wound healed well. Swelling should resolve in 1-2 weeks. PRN follow up  Discussed screening mammogram which she may discontinue at any time.

## 2018-11-15 ENCOUNTER — HOSPITAL ENCOUNTER (EMERGENCY)
Age: 83
Discharge: HOME | End: 2018-11-16
Attending: EMERGENCY MEDICINE
Payer: MEDICARE

## 2018-11-15 DIAGNOSIS — R10.13 ABDOMINAL PAIN, EPIGASTRIC: Primary | ICD-10-CM

## 2018-11-15 DIAGNOSIS — R74.8 ELEVATED LIVER ENZYMES: ICD-10-CM

## 2018-11-15 LAB
ALBUMIN SERPL-MCNC: 3.6 G/DL (ref 3.5–5)
ALBUMIN/GLOB SERPL: 0.9 {RATIO} (ref 1.1–2.2)
ALP SERPL-CCNC: 168 U/L (ref 45–117)
ALT SERPL-CCNC: 301 U/L (ref 12–78)
AMORPH CRY URNS QL MICRO: ABNORMAL
ANION GAP SERPL CALC-SCNC: 9 MMOL/L (ref 5–15)
APPEARANCE UR: ABNORMAL
AST SERPL-CCNC: 561 U/L (ref 15–37)
BACTERIA URNS QL MICRO: ABNORMAL /HPF
BASOPHILS # BLD: 0 K/UL (ref 0–0.1)
BASOPHILS NFR BLD: 0 % (ref 0–1)
BILIRUB SERPL-MCNC: 1.1 MG/DL (ref 0.2–1)
BILIRUB UR QL: NEGATIVE
BUN SERPL-MCNC: 20 MG/DL (ref 6–20)
BUN/CREAT SERPL: 29 (ref 12–20)
CALCIUM SERPL-MCNC: 9.6 MG/DL (ref 8.5–10.1)
CHLORIDE SERPL-SCNC: 103 MMOL/L (ref 97–108)
CO2 SERPL-SCNC: 28 MMOL/L (ref 21–32)
COLOR UR: ABNORMAL
COMMENT, HOLDF: NORMAL
CREAT SERPL-MCNC: 0.68 MG/DL (ref 0.55–1.02)
DIFFERENTIAL METHOD BLD: NORMAL
EOSINOPHIL # BLD: 0.1 K/UL (ref 0–0.4)
EOSINOPHIL NFR BLD: 1 % (ref 0–7)
EPITH CASTS URNS QL MICRO: ABNORMAL /LPF
ERYTHROCYTE [DISTWIDTH] IN BLOOD BY AUTOMATED COUNT: 13.7 % (ref 11.5–14.5)
GLOBULIN SER CALC-MCNC: 3.9 G/DL (ref 2–4)
GLUCOSE SERPL-MCNC: 110 MG/DL (ref 65–100)
GLUCOSE UR STRIP.AUTO-MCNC: NEGATIVE MG/DL
HCT VFR BLD AUTO: 43.2 % (ref 35–47)
HGB BLD-MCNC: 13.7 G/DL (ref 11.5–16)
HGB UR QL STRIP: NEGATIVE
IMM GRANULOCYTES # BLD: 0 K/UL (ref 0–0.04)
IMM GRANULOCYTES NFR BLD AUTO: 0 % (ref 0–0.5)
KETONES UR QL STRIP.AUTO: 40 MG/DL
LACTATE BLD-SCNC: 0.52 MMOL/L (ref 0.4–2)
LEUKOCYTE ESTERASE UR QL STRIP.AUTO: ABNORMAL
LIPASE SERPL-CCNC: 114 U/L (ref 73–393)
LYMPHOCYTES # BLD: 1.2 K/UL (ref 0.8–3.5)
LYMPHOCYTES NFR BLD: 15 % (ref 12–49)
MCH RBC QN AUTO: 30.4 PG (ref 26–34)
MCHC RBC AUTO-ENTMCNC: 31.7 G/DL (ref 30–36.5)
MCV RBC AUTO: 96 FL (ref 80–99)
MONOCYTES # BLD: 0.8 K/UL (ref 0–1)
MONOCYTES NFR BLD: 10 % (ref 5–13)
NEUTS SEG # BLD: 6.1 K/UL (ref 1.8–8)
NEUTS SEG NFR BLD: 74 % (ref 32–75)
NITRITE UR QL STRIP.AUTO: NEGATIVE
NRBC # BLD: 0 K/UL (ref 0–0.01)
NRBC BLD-RTO: 0 PER 100 WBC
PH UR STRIP: 7 [PH] (ref 5–8)
PLATELET # BLD AUTO: 153 K/UL (ref 150–400)
PMV BLD AUTO: 11 FL (ref 8.9–12.9)
POTASSIUM SERPL-SCNC: 3.9 MMOL/L (ref 3.5–5.1)
PROT SERPL-MCNC: 7.5 G/DL (ref 6.4–8.2)
PROT UR STRIP-MCNC: NEGATIVE MG/DL
RBC # BLD AUTO: 4.5 M/UL (ref 3.8–5.2)
RBC #/AREA URNS HPF: ABNORMAL /HPF (ref 0–5)
SAMPLES BEING HELD,HOLD: NORMAL
SODIUM SERPL-SCNC: 140 MMOL/L (ref 136–145)
SP GR UR REFRACTOMETRY: 1.01 (ref 1–1.03)
TROPONIN I SERPL-MCNC: <0.05 NG/ML
UR CULT HOLD, URHOLD: NORMAL
UROBILINOGEN UR QL STRIP.AUTO: 1 EU/DL (ref 0.2–1)
WBC # BLD AUTO: 8.2 K/UL (ref 3.6–11)
WBC URNS QL MICRO: ABNORMAL /HPF (ref 0–4)

## 2018-11-15 PROCEDURE — 80053 COMPREHEN METABOLIC PANEL: CPT

## 2018-11-15 PROCEDURE — 85025 COMPLETE CBC W/AUTO DIFF WBC: CPT

## 2018-11-15 PROCEDURE — 81001 URINALYSIS AUTO W/SCOPE: CPT

## 2018-11-15 PROCEDURE — 84484 ASSAY OF TROPONIN QUANT: CPT

## 2018-11-15 PROCEDURE — 74011000250 HC RX REV CODE- 250: Performed by: EMERGENCY MEDICINE

## 2018-11-15 PROCEDURE — 96374 THER/PROPH/DIAG INJ IV PUSH: CPT

## 2018-11-15 PROCEDURE — 83605 ASSAY OF LACTIC ACID: CPT

## 2018-11-15 PROCEDURE — 99285 EMERGENCY DEPT VISIT HI MDM: CPT

## 2018-11-15 PROCEDURE — 96375 TX/PRO/DX INJ NEW DRUG ADDON: CPT

## 2018-11-15 PROCEDURE — 83690 ASSAY OF LIPASE: CPT

## 2018-11-15 PROCEDURE — 36415 COLL VENOUS BLD VENIPUNCTURE: CPT

## 2018-11-15 PROCEDURE — 74011250636 HC RX REV CODE- 250/636: Performed by: EMERGENCY MEDICINE

## 2018-11-15 PROCEDURE — 93005 ELECTROCARDIOGRAM TRACING: CPT

## 2018-11-15 RX ORDER — ONDANSETRON 2 MG/ML
4 INJECTION INTRAMUSCULAR; INTRAVENOUS
Status: COMPLETED | OUTPATIENT
Start: 2018-11-15 | End: 2018-11-15

## 2018-11-15 RX ORDER — FAMOTIDINE 10 MG/ML
20 INJECTION INTRAVENOUS
Status: DISCONTINUED | OUTPATIENT
Start: 2018-11-15 | End: 2018-11-15 | Stop reason: CLARIF

## 2018-11-15 RX ADMIN — FAMOTIDINE 20 MG: 10 INJECTION, SOLUTION INTRAVENOUS at 23:04

## 2018-11-15 RX ADMIN — ONDANSETRON 4 MG: 2 INJECTION INTRAMUSCULAR; INTRAVENOUS at 23:03

## 2018-11-16 ENCOUNTER — APPOINTMENT (OUTPATIENT)
Dept: CT IMAGING | Age: 83
End: 2018-11-16
Attending: EMERGENCY MEDICINE
Payer: MEDICARE

## 2018-11-16 VITALS
DIASTOLIC BLOOD PRESSURE: 68 MMHG | RESPIRATION RATE: 14 BRPM | BODY MASS INDEX: 20.66 KG/M2 | SYSTOLIC BLOOD PRESSURE: 166 MMHG | OXYGEN SATURATION: 99 % | HEART RATE: 62 BPM | HEIGHT: 65 IN | WEIGHT: 124 LBS | TEMPERATURE: 97.7 F

## 2018-11-16 LAB
ATRIAL RATE: 77 BPM
CALCULATED P AXIS, ECG09: 83 DEGREES
CALCULATED R AXIS, ECG10: 35 DEGREES
CALCULATED T AXIS, ECG11: 10 DEGREES
DIAGNOSIS, 93000: NORMAL
P-R INTERVAL, ECG05: 142 MS
Q-T INTERVAL, ECG07: 382 MS
QRS DURATION, ECG06: 84 MS
QTC CALCULATION (BEZET), ECG08: 432 MS
VENTRICULAR RATE, ECG03: 77 BPM

## 2018-11-16 PROCEDURE — 74177 CT ABD & PELVIS W/CONTRAST: CPT

## 2018-11-16 PROCEDURE — 74011636320 HC RX REV CODE- 636/320: Performed by: EMERGENCY MEDICINE

## 2018-11-16 PROCEDURE — 74011000258 HC RX REV CODE- 258: Performed by: EMERGENCY MEDICINE

## 2018-11-16 RX ORDER — DICYCLOMINE HYDROCHLORIDE 20 MG/1
20 TABLET ORAL
Qty: 20 TAB | Refills: 0 | Status: SHIPPED | OUTPATIENT
Start: 2018-11-16 | End: 2019-07-08

## 2018-11-16 RX ORDER — ONDANSETRON 4 MG/1
4 TABLET, ORALLY DISINTEGRATING ORAL
Qty: 9 TAB | Refills: 0 | Status: ON HOLD | OUTPATIENT
Start: 2018-11-16 | End: 2019-07-12

## 2018-11-16 RX ORDER — SODIUM CHLORIDE 0.9 % (FLUSH) 0.9 %
10 SYRINGE (ML) INJECTION
Status: COMPLETED | OUTPATIENT
Start: 2018-11-16 | End: 2018-11-16

## 2018-11-16 RX ADMIN — SODIUM CHLORIDE 100 ML: 900 INJECTION, SOLUTION INTRAVENOUS at 00:12

## 2018-11-16 RX ADMIN — IOPAMIDOL 100 ML: 755 INJECTION, SOLUTION INTRAVENOUS at 00:12

## 2018-11-16 RX ADMIN — Medication 10 ML: at 00:12

## 2018-11-16 NOTE — ED PROVIDER NOTES
80 y.o. female with past medical history significant for hypercholesterolemia, pancreatic cyst, GERD, h/o biliary colic, status post appendectomy and cholecystectomy, presents ambulatory to ED with a chief complaint of abdominal pain. Pt reports she felt the sudden onset of periumbilical abdominal pain at [address]. Since then the pain has moved to the upper abdomen. The pain is constant, but the intensity has been fluctuating; she rates her most severe pain as a 6/10. Patient attempted to take an OTC medication (two Rolaids?) at home without significant relief to her discomfort. Patient notes that she has had similar pain in the past. One year ago she had similar pain before she had her gallbladder removed, but it was more intense then. However episodes of pain a year ago would only last for ~45 minutes while her current discomfort has been present for several hours. Patient also states that she had similar pain two week ago  that resolved after vomiting. Patient reports having some nausea tonight accompanying her abdominal pain, but she denies any vomiting. She notes she had a normal BM today, and she specifically denies any dysuria, fever, chest pain, or shortness of breath. There are no other acute medical concerns at this time. PCP: Kennedy Diamond MD 
Surgery: Dr. Maddi Cottrell Note written by Paulie Kennedy. Esther Sarabia, as dictated by Radha Dexter MD 10:42 PM. The history is provided by the patient and medical records. Past Medical History:  
Diagnosis Date  Arthritis  Biliary colic 21/77/6939  Cancer ([facility] [address] skin cancer - basal and squamous cell, LEGS, FACE  
 GERD (gastroesophageal reflux disease)  Hypercholesterolemia PATIENT DENIES  Osteopenia  PAC (premature atrial contraction) 7/9/2013  Pancreatic cyst 7/14/2015  Psychiatric disorder   
 anxiety  Urinary frequency  Visual loss 1/21/2015 Past Surgical History: Procedure Laterality Date  CARDIAC SURG PROCEDURE UNLIST    
 normal stress test  
 ENDOSCOPY, COLON, DIAGNOSTIC  2008 [address]  HX BACK SURGERY    
 KYPHOPLASTY  HX CATARACT REMOVAL Bilateral 2012  HX CHOLECYSTECTOMY  12/15/2017 Lap [place] by Dr. Roque Elias  HX DILATION AND CURETTAGE    
 HX GI  2008 EGD  HX GI    
 COLONOSCOPY  
 HX GYN    
 UTERINE BIOPSY  HX LUMBAR FUSION  2016  HX LUMBAR LAMINECTOMY  2016  HX ORTHOPAEDIC Right 2010  
 knee - arthroscopy [facility] ORTHOPAEDIC Right 2011 TKR  right  HX OTHER SURGICAL  2017  
 karina. leg skin cancer surgery  HX TONSILLECTOMY [address] Family History:  
Problem Relation Age of Onset  Stroke Mother  Hypertension Mother  Other Mother   
     aneurysm - aorta  Cancer Father   
     stomach AND ESOPHAGEAL cancer  Stroke Maternal Grandmother  Other Son BLOOD CLOT IN AORTA AND POST OP DVT  Anesth Problems Neg Hx Social History Socioeconomic History  Marital status:  Spouse name: Not on file  Number of children: Not on file  Years of education: Not on file  Highest education level: Not on file Social Needs  Financial resource strain: Not on file  Food insecurity - worry: Not on file  Food insecurity - inability: Not on file  Transportation needs - medical: Not on file  Transportation needs - non-medical: Not on file Occupational History  Not on file Tobacco Use  Smoking status: Former Smoker Last attempt to quit: 1956 Years since quittin.3  Smokeless tobacco: Never Used  Tobacco comment: former cigarette smoker Substance and Sexual Activity  Alcohol use: Yes Alcohol/week: 7.0 oz Types: 7 Glasses of wine, 7 Shots of liquor per week Comment: 1 DRINK PER DAY USUALLY  Drug use: No  
 Sexual activity: Not Currently Partners: Male Other Topics Concern  Not on file Social History Narrative  Not on file ALLERGIES: Adhesive tape-silicones and Other medication Review of Systems Constitutional: Negative for appetite change and fever. HENT: Negative for congestion, nosebleeds and sore throat. Eyes: Negative for discharge. Respiratory: Negative for cough and shortness of breath. Cardiovascular: Negative for chest pain. Gastrointestinal: Positive for abdominal pain and nausea. Negative for constipation, diarrhea and vomiting. Genitourinary: Negative for dysuria. Musculoskeletal: Negative. Skin: Negative for rash. Neurological: Negative for weakness and headaches. Hematological: Negative for adenopathy. Psychiatric/Behavioral: Negative. All other systems reviewed and are negative. Vitals:  
 11/15/18 2211 11/15/18 2240 BP:  (!) 179/92 Pulse: 87 64 Resp:  16 Temp:  97.7 °F (36.5 °C) SpO2: 97% 99% Weight:  56.2 kg (124 lb) Height:  5' 5\" (1.651 m) Physical Exam  
Constitutional: She is oriented to person, place, and time. She appears well-developed and well-nourished. HENT:  
Head: Normocephalic and atraumatic. Mouth/Throat: Oropharynx is clear and moist.  
Eyes: Conjunctivae are normal.  
Neck: Normal range of motion. Neck supple. Cardiovascular: Normal rate, regular rhythm and normal heart sounds. Pulmonary/Chest: Effort normal and breath sounds normal.  
Abdominal: Soft. Bowel sounds are normal. There is tenderness (mild, diffuse) in the epigastric area. Musculoskeletal: Normal range of motion. She exhibits no edema or tenderness. Neurological: She is alert and oriented to person, place, and time. Skin: Skin is warm and dry. Psychiatric: She has a normal mood and affect. Her behavior is normal.  
Nursing note and vitals reviewed. Note written by Eber Jones. Esther Sarabia, as dictated by Tony Mckeon MD 10:42 PM. MDM Procedures ED EKG interpretation: 
Rhythm: normal sinus rhythm; and regular. Rate (approx.): 77; Axis: normal; P wave: normal; QRS interval: normal ; ST/T wave: non-specific changes. This EKG was interpreted by Linden Allred MD,ED Provider. A/P: 
1. Abdominal pain - resolved. Bentyl prn. Zofran prn. F/U with GI. 
2. Elevated LFTs- has 2 drinks daily. Advised to decrease alcohol intact and f/u with GI. Patient's results have been reviewed with them. Patient and/or family have verbally conveyed their understanding and agreement of the patient's signs, symptoms, diagnosis, treatment and prognosis and additionally agree to follow up as recommended or return to the Emergency Room should their condition change prior to follow-up. Discharge instructions have also been provided to the patient with some educational information regarding their diagnosis as well a list of reasons why they would want to return to the ER prior to their follow-up appointment should their condition change.

## 2018-11-16 NOTE — ED TRIAGE NOTES
Triage Note:   Patient presents to ED from home with abdominal pain radiating out from umbilicus. Patient denies emesis, but has had nausea today. Pain began around 1930. 
 
0121:  MD reviewed discharge instructions and options with patient and patient verbalized understanding. RN reviewed discharge instructions using teachback method. Pt ambulated to exit without difficulty and in no signs of acute distress escorted by cousin, and cousin will drive home. No complaints or needs expressed at this time. Patient was counseled on medications prescribed at discharge. VSS, verbalized relief from most intense pain. Patient to call PCP in the morning for appointment.

## 2018-11-16 NOTE — DISCHARGE INSTRUCTIONS
- Continue Pepcid. Bentyl as needed for abdominal pain. - Zofran as needed for nausea. - Please follow-up with Dr. Janet PROCTOR regarding your elevated liver enzymes. - Return to ED for any concerns. Abdominal Pain: Care Instructions  Your Care Instructions    Abdominal pain has many possible causes. Some aren't serious and get better on their own in a few days. Others need more testing and treatment. If your pain continues or gets worse, you need to be rechecked and may need more tests to find out what is wrong. You may need surgery to correct the problem. Don't ignore new symptoms, such as fever, nausea and vomiting, urination problems, pain that gets worse, and dizziness. These may be signs of a more serious problem. Your doctor may have recommended a follow-up visit in the next 8 to 12 hours. If you are not getting better, you may need more tests or treatment. The doctor has checked you carefully, but problems can develop later. If you notice any problems or new symptoms, get medical treatment right away. Follow-up care is a key part of your treatment and safety. Be sure to make and go to all appointments, and call your doctor if you are having problems. It's also a good idea to know your test results and keep a list of the medicines you take. How can you care for yourself at home? · Rest until you feel better. · To prevent dehydration, drink plenty of fluids, enough so that your urine is light yellow or clear like water. Choose water and other caffeine-free clear liquids until you feel better. If you have kidney, heart, or liver disease and have to limit fluids, talk with your doctor before you increase the amount of fluids you drink. · If your stomach is upset, eat mild foods, such as rice, dry toast or crackers, bananas, and applesauce. Try eating several small meals instead of two or three large ones.   · Wait until 48 hours after all symptoms have gone away before you have spicy foods, alcohol, and drinks that contain caffeine. · Do not eat foods that are high in fat. · Avoid anti-inflammatory medicines such as aspirin, ibuprofen (Advil, Motrin), and naproxen (Aleve). These can cause stomach upset. Talk to your doctor if you take daily aspirin for another health problem. When should you call for help? Call 911 anytime you think you may need emergency care. For example, call if:    · You passed out (lost consciousness).     · You pass maroon or very bloody stools.     · You vomit blood or what looks like coffee grounds.     · You have new, severe belly pain.    Call your doctor now or seek immediate medical care if:    · Your pain gets worse, especially if it becomes focused in one area of your belly.     · You have a new or higher fever.     · Your stools are black and look like tar, or they have streaks of blood.     · You have unexpected vaginal bleeding.     · You have symptoms of a urinary tract infection. These may include:  ? Pain when you urinate. ? Urinating more often than usual.  ? Blood in your urine.     · You are dizzy or lightheaded, or you feel like you may faint.    Watch closely for changes in your health, and be sure to contact your doctor if:    · You are not getting better after 1 day (24 hours). Where can you learn more? Go to http://lyndsey-miriam.info/. Enter A043 in the search box to learn more about \"Abdominal Pain: Care Instructions. \"  Current as of: November 20, 2017  Content Version: 11.8  © 3849-9320 [employer]. Care instructions adapted under license by [facility] (which disclaims liability or warranty for this information). If you have questions about a medical condition or this instruction, always ask your healthcare professional. [place] [address] any warranty or liability for your use of this information. Liver Function Tests: About These Tests  What is it?     Liver function tests check how well your liver is working. Some tests measure the amount of certain enzymes in your blood to check whether the liver is damaged or inflamed. Other tests measure how well the liver can make important proteins or clear waste products from the body. Your doctor will use the test results to help look for certain conditions, such as hepatitis, cirrhosis, or gallbladder problems. Test results that are not normal do not always mean there is a problem with your liver. Your doctor can determine if there is a problem based on your results. The tests may include:  · Alkaline phosphatase (ALP). This test measures the amount of the enzyme ALP in your blood. · Total protein. A total serum protein test measures the amounts of two major groups of proteins in your blood (albumin and globulin) and the total amount of protein. · Bilirubin. This test measures the amount of bilirubin in your blood. When bilirubin levels are high, the skin and whites of the eyes may appear yellow (jaundice). This may be caused by liver disease. · Aspartate aminotransferase (AST) and alanine aminotransferase (ALT). These tests measure the amount of the enzymes AST and ALT in your blood. (Aminotransferase is also known as a transaminase. High levels may be called transaminitis.)  Why is this test done? Liver function tests check how well your liver is working. Some tests help show whether your liver is damaged or inflamed. Your doctor may order liver function tests if you have symptoms of liver disease. These tests also may be done to see how well a treatment for liver disease is working. How can you prepare for the test?  In general, you do not need to prepare before having these tests. Your doctor may give you some specific instructions to prepare. What happens during the test?  A health professional takes a sample of your blood. What happens after the test?  You will probably be able to go home right away.   When should you call for help?  Watch closely for changes in your health, and be sure to contact your doctor if you have any problems. Follow-up care is a key part of your treatment and safety. Be sure to make and go to all appointments, and call your doctor if you are having problems. It's also a good idea to keep a list of the medicines you take. Ask your doctor when you can expect to have your test results. Where can you learn more? Go to http://lyndsey-miriam.info/. Enter P900 in the search box to learn more about \"Liver Function Tests: About These Tests. \"  Current as of: June 26, 2018  Content Version: 11.8  © 0539-1136 [employer] Care instructions adapted under license by [employer] (which disclaims liability or warranty for this information). If you have questions about a medical condition or this instruction, always ask your healthcare professional. [address] any warranty or liability for your use of this information.

## 2018-11-21 NOTE — PROGRESS NOTES
reviewed notes from er and labs    DR Americo Murphy is fine and I told her to go ahead and get appointment     Alternates discussed

## 2018-12-19 ENCOUNTER — HOSPITAL ENCOUNTER (OUTPATIENT)
Dept: MRI IMAGING | Age: 83
Discharge: HOME | End: 2018-12-19
Attending: INTERNAL MEDICINE
Payer: MEDICARE

## 2018-12-19 VITALS — BODY MASS INDEX: 19.97 KG/M2 | WEIGHT: 120 LBS

## 2018-12-19 DIAGNOSIS — R10.10 UPPER ABDOMINAL PAIN: ICD-10-CM

## 2018-12-19 DIAGNOSIS — K86.2 CYST OF PANCREAS: ICD-10-CM

## 2018-12-19 DIAGNOSIS — R94.5 NONSPECIFIC ABNORMAL RESULTS OF LIVER FUNCTION STUDY: ICD-10-CM

## 2018-12-19 PROCEDURE — 74183 MRI ABD W/O CNTR FLWD CNTR: CPT

## 2018-12-19 PROCEDURE — 74011250636 HC RX REV CODE- 250/636: Performed by: INTERNAL MEDICINE

## 2018-12-19 PROCEDURE — A9585 GADOBUTROL INJECTION: HCPCS | Performed by: INTERNAL MEDICINE

## 2018-12-19 RX ADMIN — GADOBUTROL 6 ML: 604.72 INJECTION INTRAVENOUS at 11:00

## 2018-12-31 RX ORDER — DULOXETIN HYDROCHLORIDE 60 MG/1
CAPSULE, DELAYED RELEASE ORAL
Qty: 90 CAP | Refills: 1 | Status: SHIPPED | OUTPATIENT
Start: 2018-12-31 | End: 2019-10-09

## 2019-01-08 ENCOUNTER — HOSPITAL ENCOUNTER (OUTPATIENT)
Dept: INFUSION THERAPY | Age: 84
Discharge: HOME | End: 2019-01-08
Payer: MEDICARE

## 2019-01-08 ENCOUNTER — OFFICE VISIT (OUTPATIENT)
Dept: INTERNAL MEDICINE CLINIC | Age: 84
End: 2019-01-08

## 2019-01-08 VITALS — TEMPERATURE: 98.3 F | DIASTOLIC BLOOD PRESSURE: 76 MMHG | HEART RATE: 68 BPM | SYSTOLIC BLOOD PRESSURE: 160 MMHG

## 2019-01-08 VITALS
WEIGHT: 123 LBS | DIASTOLIC BLOOD PRESSURE: 63 MMHG | HEIGHT: 65 IN | OXYGEN SATURATION: 97 % | TEMPERATURE: 96.2 F | HEART RATE: 68 BPM | RESPIRATION RATE: 16 BRPM | SYSTOLIC BLOOD PRESSURE: 140 MMHG | BODY MASS INDEX: 20.49 KG/M2

## 2019-01-08 DIAGNOSIS — M81.0 OSTEOPOROSIS, UNSPECIFIED OSTEOPOROSIS TYPE, UNSPECIFIED PATHOLOGICAL FRACTURE PRESENCE: ICD-10-CM

## 2019-01-08 DIAGNOSIS — I10 HTN (HYPERTENSION), BENIGN: Primary | ICD-10-CM

## 2019-01-08 PROBLEM — Z66 DNR NO CODE (DO NOT RESUSCITATE): Status: ACTIVE | Noted: 2019-01-08

## 2019-01-08 LAB
ALBUMIN SERPL-MCNC: 3.6 G/DL (ref 3.5–5)
ANION GAP SERPL CALC-SCNC: 6 MMOL/L (ref 5–15)
BUN SERPL-MCNC: 13 MG/DL (ref 6–20)
BUN/CREAT SERPL: 21 (ref 12–20)
CALCIUM SERPL-MCNC: 9.4 MG/DL (ref 8.5–10.1)
CHLORIDE SERPL-SCNC: 103 MMOL/L (ref 97–108)
CO2 SERPL-SCNC: 28 MMOL/L (ref 21–32)
CREAT SERPL-MCNC: 0.61 MG/DL (ref 0.55–1.02)
GLUCOSE SERPL-MCNC: 80 MG/DL (ref 65–100)
MAGNESIUM SERPL-MCNC: 2.2 MG/DL (ref 1.6–2.4)
PHOSPHATE SERPL-MCNC: 4 MG/DL (ref 2.6–4.7)
POTASSIUM SERPL-SCNC: 4 MMOL/L (ref 3.5–5.1)
SODIUM SERPL-SCNC: 137 MMOL/L (ref 136–145)

## 2019-01-08 PROCEDURE — 36415 COLL VENOUS BLD VENIPUNCTURE: CPT

## 2019-01-08 PROCEDURE — 74011250636 HC RX REV CODE- 250/636: Performed by: INTERNAL MEDICINE

## 2019-01-08 PROCEDURE — 96372 THER/PROPH/DIAG INJ SC/IM: CPT

## 2019-01-08 PROCEDURE — 83735 ASSAY OF MAGNESIUM: CPT

## 2019-01-08 PROCEDURE — 80069 RENAL FUNCTION PANEL: CPT

## 2019-01-08 RX ORDER — AMLODIPINE BESYLATE 2.5 MG/1
2.5 TABLET ORAL DAILY
Qty: 90 TAB | Refills: 2 | Status: SHIPPED | OUTPATIENT
Start: 2019-01-08 | End: 2019-10-28 | Stop reason: SDUPTHER

## 2019-01-08 RX ADMIN — DENOSUMAB 60 MG: 60 INJECTION SUBCUTANEOUS at 14:38

## 2019-01-08 NOTE — PROGRESS NOTES
Chief Complaint   Patient presents with    GERD    Osteoporosis    Cholesterol Problem    Anxiety     On low dose amlodipine    BP better since  passed away in dec no alcohol for a few months    Wt Readings from Last 3 Encounters:   01/08/19 123 lb (55.8 kg)   12/19/18 120 lb (54.4 kg)   11/15/18 124 lb (56.2 kg)     Subjective:  Generally feeling better. She says she feels better, more energy, balance is better since she has been on antihypertensives. Her back has healed and she has been getting around.  passed away about a month ago, but he was in hospice and so it has been somewhat of a relief. She has less stress. She is eating and drinking okay. No alcohol except for very rare alcoholic beverage. She has kept her weight stable. Denies any GI issues. No respiratory symptoms. She is getting Prolia every six months for osteoporosis. I told her that should continue. I told her that there are two alternative drugs that are used for bone building and she would probably be a candidate for those if she did not do well with the Prolia. Physical Examination:  Her blood pressure was normal and has been normal on her present med. S1, S2 regular. Lungs clear. Plan:    1. I recommended continuing taking the antihypertensive at the low dose, 2.5 mg of Amlodipine. 2. I recommended Prolia every six months as a long term prescription. Consider alternatives as needed. Six month follow up. 3. I agreed with no alcohol in general.  She does better without it. 4. She can return in six months. Patient Active Problem List    Diagnosis    DNR no code (do not resuscitate)    Lumbar spinal stenosis    Biliary colic    Spinal stenosis    Lumbar disc disease with radiculopathy     Pain management and PT  Trans foraminal injections not helping      Osteoporosis     .   DEXA Results (most recent):    Results from [place] encounter on 08/13/15   DEXA BONE DENSITY STUDY AXIAL Narrative **Final Report**      ICD Codes / Adm. Diagnosis: V76.12  724.2 / Disorder of bone and cartilage    Lumbago  Examination:  MM DEXA AXIAL SKELETON  - [id number] - Aug 13 2015 10:45AM  Accession No:  [id number]  Reason:  screening      REPORT:  Bone Mineral Density    Indication: Monitoring, estrogen therapy  Age: [de-identified]  Sex: Female. Menopause status:         postmenopausal.  Hormone replacement therapy: yes/no     Risk factors for fall:   None   Risk factors for osteoporosis:  Tobacco use    Current medication for osteoporosis: Calcium, vitamin D, estrogen. Comparison: 2013    Technique: Imaging was performed on the [address]. World Health   Organization meta-analysis fracture risk calculator (FRAX) analysis was   performed for 10 year fracture risk probability assessment    Excluded sites: L2 due to fracture, L1 due to degenerative changes    Findings:    Fractures identified on Lateral scanogram:  L2    Lumbar spine: L1-L3, excluding L2  Bone mineral density (gm/cm2): 1.113  % of peak bone mass: 94  % for age matched controls:  80  T score: -0.6  Z score:  1.5    Hip: Right femoral neck  Bone mineral density (gm/cm2):  0.718  % of peak bone mass: 69  % for age matched controls:  100  T score: -2.3  Z score:  0           IMPRESSION:    This patient is osteopenic using the World Health Organization criteria  As compared to the prior study, there has been a statistically significant   decrease in bone mineral density.   10 year probability of major osteoporotic fracture:  16.3%  10 year probability of hip fracture:  5.6%             Signing/Reading Doctor: Smiley Mccabe ([id number])    Ryann Marin ([id number])  Aug 17 2015 10:59AM                                 Jan 2017 reclast  And annual  Not done 2018  As of june      Pancreatic cyst    Visual loss    PAC (premature atrial contraction)     Normal echo and stress test for atypical chest pain      Rosacea    Anxiety    Hypercholesterolemia    GERD (gastroesophageal reflux disease)     Feels better on high dose       Cancer (HCC)     skin cancer      Hyperlipidemia LDL goal <130         Vitals:    01/08/19 1112   BP: 140/63   Pulse: 68   Resp: 16   Temp: 96.2 °F (35.7 °C)   TempSrc: Oral   SpO2: 97%   Weight: 123 lb (55.8 kg)   Height: 5' 5\" (1.651 m)     Diagnoses and all orders for this visit:    1. HTN (hypertension), benign    2. Osteoporosis, unspecified osteoporosis type, unspecified pathological fracture presence    Other orders  -     amLODIPine (NORVASC) 2.5 mg tablet; Take 1 Tab by mouth daily.

## 2019-01-08 NOTE — PROGRESS NOTES
1. Have you been to the ER, urgent care clinic since your last visit? Hospitalized since your last visit?yes. [facility] ed for abdominal pains    2. Have you seen or consulted any other health care providers outside of the [address] [place] [address] since your last visit? Include any pap smears or colon screening.  No

## 2019-01-08 NOTE — PROGRESS NOTES
Outpatient Infusion Center Progress Note  Pt admit to [facility] for Prolia ambulatory in stable condition. Assessment completed. No new concerns voiced. Labs drawn and sent. 1st dose education provided. Visit Vitals /76 Pulse 68 Temp 98.3 °F (36.8 °C) Breastfeeding? No  
 
 
Medications: 
Prolia SQ L arm  
 
1445 Pt tolerated treatment well. D/c home ambulatory in no distress. Pt aware of next appointment scheduled for 07/08/19. Recent Results (from the past 12 hour(s)) RENAL FUNCTION PANEL Collection Time: 01/08/19  1:14 PM  
Result Value Ref Range Sodium 137 136 - 145 mmol/L Potassium 4.0 3.5 - 5.1 mmol/L Chloride 103 97 - 108 mmol/L  
 CO2 28 21 - 32 mmol/L Anion gap 6 5 - 15 mmol/L Glucose 80 65 - 100 mg/dL BUN 13 6 - 20 MG/DL Creatinine 0.61 0.55 - 1.02 MG/DL  
 BUN/Creatinine ratio 21 (H) 12 - 20 GFR est AA >60 >60 ml/min/1.73m2 GFR est non-AA >60 >60 ml/min/1.73m2 Calcium 9.4 8.5 - 10.1 MG/DL Phosphorus 4.0 2.6 - 4.7 MG/DL Albumin 3.6 3.5 - 5.0 g/dL MAGNESIUM Collection Time: 01/08/19  1:14 PM  
Result Value Ref Range Magnesium 2.2 1.6 - 2.4 mg/dL

## 2019-01-17 ENCOUNTER — TELEPHONE (OUTPATIENT)
Dept: INTERNAL MEDICINE CLINIC | Age: 84
End: 2019-01-17

## 2019-01-17 ENCOUNTER — HOSPITAL ENCOUNTER (OUTPATIENT)
Dept: LAB | Age: 84
Discharge: HOME | End: 2019-01-17
Payer: MEDICARE

## 2019-01-17 ENCOUNTER — OFFICE VISIT (OUTPATIENT)
Dept: INTERNAL MEDICINE CLINIC | Age: 84
End: 2019-01-17

## 2019-01-17 VITALS
BODY MASS INDEX: 20.56 KG/M2 | DIASTOLIC BLOOD PRESSURE: 67 MMHG | OXYGEN SATURATION: 98 % | WEIGHT: 123.4 LBS | SYSTOLIC BLOOD PRESSURE: 119 MMHG | HEIGHT: 65 IN | TEMPERATURE: 97.8 F | RESPIRATION RATE: 16 BRPM | HEART RATE: 87 BPM

## 2019-01-17 DIAGNOSIS — J06.9 URI, ACUTE: Primary | ICD-10-CM

## 2019-01-17 LAB
FLUAV+FLUBV AG NOSE QL IA.RAPID: NEGATIVE POS/NEG
FLUAV+FLUBV AG NOSE QL IA.RAPID: NEGATIVE POS/NEG
S PYO AG THROAT QL: NEGATIVE
VALID INTERNAL CONTROL?: YES
VALID INTERNAL CONTROL?: YES

## 2019-01-17 PROCEDURE — 87081 CULTURE SCREEN ONLY: CPT

## 2019-01-17 RX ORDER — AZELASTINE 1 MG/ML
1 SPRAY, METERED NASAL 2 TIMES DAILY
Qty: 1 BOTTLE | Refills: 1 | Status: SHIPPED | OUTPATIENT
Start: 2019-01-17 | End: 2019-07-08

## 2019-01-17 NOTE — PROGRESS NOTES
Subjective:   Rosana Romero is a 80 y.o. female who complains of congestion, sneezing, sore throat, post nasal drip and (yellow mucous this am) for 3 days, unchanged since that time. She denies a history of fevers, shortness of breath and wheezing. Evaluation to date: none. Treatment to date: she believes she took an antihistamine, OTC products. Patient does not smoke cigarettes. Relevant PMH: No pertinent additional PMH. Patient Active Problem List    Diagnosis Date Noted    DNR no code (do not resuscitate) 01/08/2019    Lumbar spinal stenosis 73/24/4993    Biliary colic 32/46/5430    Spinal stenosis 09/23/2016    Lumbar disc disease with radiculopathy 07/22/2016    Osteoporosis 07/22/2016    Pancreatic cyst 07/14/2015    Visual loss 01/21/2015    PAC (premature atrial contraction) 07/09/2013    Rosacea 06/27/2012    Anxiety 08/16/2011    Hypercholesterolemia     GERD (gastroesophageal reflux disease)     Cancer ([facility] [place] [address]     Hyperlipidemia LDL goal <130 07/20/2010     Allergies   Allergen Reactions    Adhesive Tape-Silicones Other (comments)     \"SKIN IS SO FRAGILE\"    Other Medication Other (comments)     Developed post operative delirium from opioids         Review of Systems  Pertinent items are noted in HPI. Objective:     Visit Vitals  /67 (BP 1 Location: Left arm, BP Patient Position: At rest) Comment (BP 1 Location): l   Pulse 87   Temp 97.8 °F (36.6 °C) (Oral)   Resp 16   Ht 5' 5\" (1.651 m)   Wt 123 lb 6.4 oz (56 kg)   SpO2 98%   BMI 20.53 kg/m²     General:  alert, cooperative, no distress   Eyes: negative   Ears: normal TM's and external ear canals AU   Sinuses: tenderness over left, (mild tenderness)   Mouth:  abnormal findings: mild oropharyngeal erythema   Neck: no adenopathy. Heart: normal rate and regular rhythm, no murmurs noted.     Lungs: clear to auscultation bilaterally   Abdomen: Not examined        Assessment/Plan:   viral upper respiratory illness  RTC prn.  Diagnoses and all orders for this visit:    1. URI, acute  -     azelastine (ASTELIN) 137 mcg (0.1 %) nasal spray; 1 [place] by Both Nostrils route two (2) times a day. Use in each nostril as directed    . She will take medication as prescribed and she will get mucinex if needed. Negative rapid strep, negative influenza a and B  Advised her viral illness. If not getting better by Monday then she is to let me know. An antibiotic will be sent at that time. She will be leaving for her trip to her grand daughters wedding on Friday. All this was discussed with the patient and she understands and agrees.

## 2019-01-17 NOTE — PROGRESS NOTES
1. Have you been to the ER, urgent care clinic since your last visit? Hospitalized since your last visit? yes      2. Have you seen or consulted any other health care providers outside of the [address] [place] [address] since your last visit? Include any pap smears or colon screening. Yes    Not fasting  Abuse Screening Questionnaire 1/17/2019   Do you ever feel afraid of your partner? N   Are you in a relationship with someone who physically or mentally threatens you? N   Is it safe for you to go home? Y     Discussed fall risk assessment with patient and provided fall risk prevention reference.    PHQ over the last two weeks 1/17/2019   PHQ Not Done -   Little interest or pleasure in doing things Not at all   Feeling down, depressed, irritable, or hopeless Not at all   Total Score PHQ 2 0

## 2019-01-17 NOTE — TELEPHONE ENCOUNTER
Patient would like something called in for a cold. ...... cough, yellow mucus, sore throat. Please send script to new pharmacy. ...     [facility]  [address]   [facility]#[phone]

## 2019-01-19 LAB — S PYO THROAT QL CULT: NEGATIVE

## 2019-01-21 NOTE — PROGRESS NOTES
Writer spoke with patient and gave lab results per Beauford Glance, patient could not hear very well, but she did state feeling better. Letter sent to patient with results and instruction.

## 2019-07-08 ENCOUNTER — HOSPITAL ENCOUNTER (OUTPATIENT)
Dept: INFUSION THERAPY | Age: 84
Discharge: HOME | End: 2019-07-08
Payer: MEDICARE

## 2019-07-08 VITALS
HEART RATE: 81 BPM | RESPIRATION RATE: 18 BRPM | TEMPERATURE: 97.9 F | SYSTOLIC BLOOD PRESSURE: 128 MMHG | DIASTOLIC BLOOD PRESSURE: 76 MMHG

## 2019-07-08 LAB
ALBUMIN SERPL-MCNC: 3.5 G/DL (ref 3.5–5)
ANION GAP SERPL CALC-SCNC: 5 MMOL/L (ref 5–15)
BUN SERPL-MCNC: 18 MG/DL (ref 6–20)
BUN/CREAT SERPL: 30 (ref 12–20)
CALCIUM SERPL-MCNC: 9.2 MG/DL (ref 8.5–10.1)
CHLORIDE SERPL-SCNC: 107 MMOL/L (ref 97–108)
CO2 SERPL-SCNC: 28 MMOL/L (ref 21–32)
CREAT SERPL-MCNC: 0.61 MG/DL (ref 0.55–1.02)
GLUCOSE SERPL-MCNC: 125 MG/DL (ref 65–100)
MAGNESIUM SERPL-MCNC: 2.1 MG/DL (ref 1.6–2.4)
PHOSPHATE SERPL-MCNC: 3.9 MG/DL (ref 2.6–4.7)
PHOSPHATE SERPL-MCNC: 3.9 MG/DL (ref 2.6–4.7)
POTASSIUM SERPL-SCNC: 4.3 MMOL/L (ref 3.5–5.1)
SODIUM SERPL-SCNC: 140 MMOL/L (ref 136–145)

## 2019-07-08 PROCEDURE — 36415 COLL VENOUS BLD VENIPUNCTURE: CPT

## 2019-07-08 PROCEDURE — 74011250636 HC RX REV CODE- 250/636: Performed by: INTERNAL MEDICINE

## 2019-07-08 PROCEDURE — 80069 RENAL FUNCTION PANEL: CPT

## 2019-07-08 PROCEDURE — 96372 THER/PROPH/DIAG INJ SC/IM: CPT

## 2019-07-08 PROCEDURE — 84100 ASSAY OF PHOSPHORUS: CPT

## 2019-07-08 PROCEDURE — 83735 ASSAY OF MAGNESIUM: CPT

## 2019-07-08 RX ADMIN — DENOSUMAB 60 MG: 60 INJECTION SUBCUTANEOUS at 14:15

## 2019-07-08 NOTE — PROGRESS NOTES
[facility] Progress Note    1300 Pt admit to [facility] for Prolia ambulatory in stable condition. Assessment completed. No new concerns voiced. Labs drawn and sent. Visit Vitals  /76 (BP 1 Location: Left arm, BP Patient Position: At rest)   Pulse 81   Temp 97.9 °F (36.6 °C)   Resp 18   Breastfeeding? No       Medications:  Prolia SQ L arm     1415 Pt tolerated treatment well. D/c home ambulatory in no distress. Pt aware of next appointment scheduled for 01/14/2020.     Recent Results (from the past 12 hour(s))   RENAL FUNCTION PANEL    Collection Time: 07/08/19  1:22 PM   Result Value Ref Range    Sodium 140 136 - 145 mmol/L    Potassium 4.3 3.5 - 5.1 mmol/L    Chloride 107 97 - 108 mmol/L    CO2 28 21 - 32 mmol/L    Anion gap 5 5 - 15 mmol/L    Glucose 125 (H) 65 - 100 mg/dL    BUN 18 6 - 20 MG/DL    Creatinine 0.61 0.55 - 1.02 MG/DL    BUN/Creatinine ratio 30 (H) 12 - 20      GFR est AA >60 >60 ml/min/1.73m2    GFR est non-AA >60 >60 ml/min/1.73m2    Calcium 9.2 8.5 - 10.1 MG/DL    Phosphorus 3.9 2.6 - 4.7 MG/DL    Albumin 3.5 3.5 - 5.0 g/dL   MAGNESIUM    Collection Time: 07/08/19  1:22 PM   Result Value Ref Range    Magnesium 2.1 1.6 - 2.4 mg/dL   PHOSPHORUS    Collection Time: 07/08/19  1:22 PM   Result Value Ref Range    Phosphorus 3.9 2.6 - 4.7 MG/DL

## 2019-07-10 ENCOUNTER — OFFICE VISIT (OUTPATIENT)
Dept: INTERNAL MEDICINE CLINIC | Age: 84
End: 2019-07-10

## 2019-07-10 VITALS
OXYGEN SATURATION: 97 % | WEIGHT: 131 LBS | HEART RATE: 84 BPM | TEMPERATURE: 98.1 F | DIASTOLIC BLOOD PRESSURE: 60 MMHG | RESPIRATION RATE: 18 BRPM | BODY MASS INDEX: 21.83 KG/M2 | HEIGHT: 65 IN | SYSTOLIC BLOOD PRESSURE: 130 MMHG

## 2019-07-10 DIAGNOSIS — I10 HTN (HYPERTENSION), BENIGN: ICD-10-CM

## 2019-07-10 DIAGNOSIS — E78.00 HYPERCHOLESTEROLEMIA: ICD-10-CM

## 2019-07-10 DIAGNOSIS — I25.118 CORONARY ARTERY DISEASE OF NATIVE HEART WITH STABLE ANGINA PECTORIS, UNSPECIFIED VESSEL OR LESION TYPE (HCC): Primary | ICD-10-CM

## 2019-07-10 RX ORDER — METOPROLOL SUCCINATE 25 MG/1
25 TABLET, EXTENDED RELEASE ORAL DAILY
Qty: 30 TAB | Refills: 1 | Status: SHIPPED | OUTPATIENT
Start: 2019-07-10 | End: 2020-03-09 | Stop reason: RX

## 2019-07-10 RX ORDER — DOXYCYCLINE 100 MG/1
100 TABLET ORAL DAILY
COMMUNITY
End: 2019-10-09

## 2019-07-10 NOTE — PROGRESS NOTES
Problem follow up:  Luz Leger returns for follow up visit regarding chest pain and er visit. she was seen 5 days ago in [facility] er diagnosed with chest pain  Occurred at [facility] with sweating and nausea with exeretionb no stemi and treated with nothing had resolved. Workup was significant for normal enzymes and non specific EKG. Notes, labs, studies, imaging related to this problem during prior visit were available . Since that visit, she has improved. she has been compliant with prescribed treatment. Residual symptoms include: started walking again no recurrece  New issues associated with this problem include: none      Has called cardiology 5 times and no call back. Patient Active Problem List    Diagnosis    DNR no code (do not resuscitate)    Lumbar spinal stenosis    Biliary colic    Spinal stenosis    Lumbar disc disease with radiculopathy     Pain management and PT  Trans foraminal injections not helping      Osteoporosis     . DEXA Results (most recent):    Results from Hospital Encounter encounter on 08/13/15   DEXA BONE DENSITY STUDY AXIAL   Narrative **Final Report**      ICD Codes / Adm. Diagnosis: V76.12  724.2 / Disorder of bone and cartilage    Lumbago  Examination:  MM DEXA AXIAL SKELETON  - [id number] - Aug 13 2015 10:45AM  Accession No:  [id number]  Reason:  screening      REPORT:  Bone Mineral Density    Indication: Monitoring, estrogen therapy  Age: [de-identified]  Sex: Female. Menopause status:         postmenopausal.  Hormone replacement therapy: yes/no     Risk factors for fall:   None   Risk factors for osteoporosis:  Tobacco use    Current medication for osteoporosis: Calcium, vitamin D, estrogen. Comparison: 2013    Technique: Imaging was performed on the [employer].          World Health   Organization meta-analysis fracture risk calculator (FRAX) analysis was   performed for 10 year fracture risk probability assessment    Excluded sites: L2 due to fracture, L1 due to degenerative changes    Findings:    Fractures identified on Lateral scanogram:  L2    Lumbar spine: L1-L3, excluding L2  Bone mineral density (gm/cm2): 1.113  % of peak bone mass: 94  % for age matched controls:  80  T score: -0.6  Z score:  1.5    Hip: Right femoral neck  Bone mineral density (gm/cm2):  0.718  % of peak bone mass: 69  % for age matched controls:  100  T score: -2.3  Z score:  0           IMPRESSION:    This patient is osteopenic using the World Health Organization criteria  As compared to the prior study, there has been a statistically significant   decrease in bone mineral density.   10 year probability of major osteoporotic fracture:  16.3%  10 year probability of hip fracture:  5.6%             Signing/Reading Doctor: Kalee Patton ([id number])    Ronald Kinsey ([id number])  Aug 17 2015 10:59AM                                 Jan 2017 reclast  And annual  Not done 2018  As of june      Pancreatic cyst    Visual loss    PAC (premature atrial contraction)     Normal echo and stress test for atypical chest pain      Rosacea    Anxiety    Hypercholesterolemia    GERD (gastroesophageal reflux disease)     Feels better on high dose       Cancer (HCC)     skin cancer      Hyperlipidemia LDL goal <130     Review of Systems - General ROS: positive for  - fatigue  Psychological ROS: negative for - depression  Hematological and Lymphatic ROS: negative  Endocrine ROS: negative for - hair pattern changes, hot flashes or palpitations  Respiratory ROS: no cough, shortness of breath, or wheezing  Cardiovascular ROS  Gastrointestinal ROS: no abdominal pain, change in bowel habits, or black or bloody stools  Genito-Urinary ROS: no dysuria, trouble voiding, or hematuria  Musculoskeletal ROS: positive for - gait disturbance, joint pain, joint stiffness and joint swelling  Neurological ROS: no TIA or stroke symptoms  Dermatological ROS: negative for - mole changes, nail changes or skin lesion changes    Vitals: 07/10/19 1104 07/10/19 1129   BP: 124/59 130/60   Pulse: 84    Resp: 18    Temp: 98.1 °F (36.7 °C)    TempSrc: Oral    SpO2: 97%    Weight: 131 lb (59.4 kg)    Height: 5' 5\" (1.651 m)      S1 and S2 normal, no murmurs, clicks, gallops or rubs. Regular rate and rhythm. Chest is clear; no wheezes or rales. No edema or JVD. Reviewed notes        Has exertional angina    Pl;an beta blocker now  I personally DR Neil her cardiologist and he agrees to see asap and cardiac cath may be indicated          Diagnoses and all orders for this visit:    1. Coronary artery disease of native heart with stable angina pectoris, unspecified vessel or lesion type ([facility] [address]  -     REFERRAL TO CARDIOLOGY    2. Hypercholesterolemia    3. HTN (hypertension), benign    Other orders  -     metoprolol succinate (TOPROL-XL) 25 mg XL tablet; Take 1 Tab by mouth daily.     Prolonged visit with 15 minutes of time out  of more than a  25 minute visit spent counseling patient and family and formulation of care

## 2019-07-10 NOTE — PROGRESS NOTES
1. Have you been to the ER, urgent care clinic since your last visit? Hospitalized since your last visit?yes. ED for chest pain and throat pain [facility]    2. Have you seen or consulted any other health care providers outside of the [facility] since your last visit? Include any pap smears or colon screening.  Yes-mario garcia

## 2019-07-12 ENCOUNTER — HOSPITAL ENCOUNTER (OUTPATIENT)
Age: 84
Setting detail: OUTPATIENT SURGERY
Discharge: HOME | End: 2019-07-12
Attending: INTERNAL MEDICINE | Admitting: INTERNAL MEDICINE
Payer: MEDICARE

## 2019-07-12 VITALS
HEIGHT: 64 IN | HEART RATE: 66 BPM | OXYGEN SATURATION: 99 % | TEMPERATURE: 97.9 F | RESPIRATION RATE: 18 BRPM | WEIGHT: 130 LBS | DIASTOLIC BLOOD PRESSURE: 63 MMHG | BODY MASS INDEX: 22.2 KG/M2 | SYSTOLIC BLOOD PRESSURE: 151 MMHG

## 2019-07-12 DIAGNOSIS — R07.89 OTHER CHEST PAIN: ICD-10-CM

## 2019-07-12 PROCEDURE — 93458 L HRT ARTERY/VENTRICLE ANGIO: CPT | Performed by: INTERNAL MEDICINE

## 2019-07-12 PROCEDURE — C1760 CLOSURE DEV, VASC: HCPCS | Performed by: INTERNAL MEDICINE

## 2019-07-12 PROCEDURE — 77030004533 HC CATH ANGI DX IMP BSC -B: Performed by: INTERNAL MEDICINE

## 2019-07-12 PROCEDURE — 99152 MOD SED SAME PHYS/QHP 5/>YRS: CPT | Performed by: INTERNAL MEDICINE

## 2019-07-12 PROCEDURE — C1894 INTRO/SHEATH, NON-LASER: HCPCS | Performed by: INTERNAL MEDICINE

## 2019-07-12 PROCEDURE — 77030013744: Performed by: INTERNAL MEDICINE

## 2019-07-12 PROCEDURE — 74011250636 HC RX REV CODE- 250/636: Performed by: INTERNAL MEDICINE

## 2019-07-12 PROCEDURE — 74011250636 HC RX REV CODE- 250/636

## 2019-07-12 PROCEDURE — 74011636320 HC RX REV CODE- 636/320: Performed by: INTERNAL MEDICINE

## 2019-07-12 RX ORDER — MIDAZOLAM HYDROCHLORIDE 1 MG/ML
INJECTION, SOLUTION INTRAMUSCULAR; INTRAVENOUS AS NEEDED
Status: DISCONTINUED | OUTPATIENT
Start: 2019-07-12 | End: 2019-07-12 | Stop reason: HOSPADM

## 2019-07-12 RX ORDER — FENTANYL CITRATE 50 UG/ML
INJECTION, SOLUTION INTRAMUSCULAR; INTRAVENOUS AS NEEDED
Status: DISCONTINUED | OUTPATIENT
Start: 2019-07-12 | End: 2019-07-12 | Stop reason: HOSPADM

## 2019-07-12 RX ORDER — SODIUM CHLORIDE 9 MG/ML
3 INJECTION, SOLUTION INTRAVENOUS CONTINUOUS
Status: DISPENSED | OUTPATIENT
Start: 2019-07-12 | End: 2019-07-12

## 2019-07-12 RX ORDER — SODIUM CHLORIDE 9 MG/ML
1.5 INJECTION, SOLUTION INTRAVENOUS CONTINUOUS
Status: DISCONTINUED | OUTPATIENT
Start: 2019-07-12 | End: 2019-07-12 | Stop reason: HOSPADM

## 2019-07-12 RX ORDER — HEPARIN SODIUM 200 [USP'U]/100ML
INJECTION, SOLUTION INTRAVENOUS
Status: COMPLETED | OUTPATIENT
Start: 2019-07-12 | End: 2019-07-12

## 2019-07-12 RX ORDER — LIDOCAINE HYDROCHLORIDE 10 MG/ML
INJECTION INFILTRATION; PERINEURAL AS NEEDED
Status: DISCONTINUED | OUTPATIENT
Start: 2019-07-12 | End: 2019-07-12 | Stop reason: HOSPADM

## 2019-07-12 RX ADMIN — SODIUM CHLORIDE 3 ML/KG/HR: 900 INJECTION, SOLUTION INTRAVENOUS at 07:42

## 2019-07-12 NOTE — DISCHARGE INSTRUCTIONS
CARDIAC CATHETERIZATION/ CATH STENT PLACEMENT   DISCHARGE INSTRUCTIONS    If possible, have someone stay with you for the first night. It is normal to feel tired for the first couple of days. Take it easy and follow your physicians instructions on activity. CHECK THE CATHETER INSERTION SITE DAILY:    If bleeding at the cath site occurs, take a clean washcloth and apply direct pressure just above the puncture site for at least 15 minutes. Call 911 immediately if the bleeding is not controlled. Continue to apply direct pressure until an ambulance gets to your location. Do not try to drive yourself or have someone else drive you to the hospital.  Have the ambulance bring you to the emergency room. You may shower 24 hours after your procedure. Gently remove the bandage during showering. Wash with soap and water and pat dry. To prevent infection, keep the groin area/insertion site clean and dry. Do not apply powders, creams, lotions, or ointments to the site for 5 days. You may cover the site with a fresh Band-Aid each day until well healed. You may notice a small lump at the site. This is normal and may last up to 6 weeks. CALL THE PHYSICIAN:  ? If the site becomes red, swollen, or feels warm to the touch, or is healing poorly    ? If you note any large/extending bruise, fever >101.0 or chills  ? If your extremity has numbness, tingling, discoloration, abnormal swelling, tightness or pain   ? If you have difficulty with urination or develop nausea, vomiting, or severe abdominal pain    ACTIVITY for the first 24-48 hours, or as instructed by your physician:  ? No lifting, pushing or pulling over 10 pounds and no straining the insertion site. Do not lift grocery bags or the garbage can; do not run the vacuum  or  for 7 days. ? You may start walking short distances the day of your procedure. Gradually increase as tolerated each day.   Current activity recommendations are 30 minutes of exercise at least 5 days a week. Work up to this as you recover. ? Avoid walking outside in extremes of heat or cold. Walk inside (at home, at the mall, or at a large store) when it is cold and windy or hot and humid. THINGS TO KEEP IN MIND:   ? Do not drive, operate any machinery, or sign any legal documents for 24 hours after your procedure, or as directed by your physician. You must have someone drive you home after your procedure. ? Drink plenty of fluids for 24-48 hours after your procedure to flush the contrast dye from your kidneys. ? Limit the number of times you go up and down the stairs  ? Take rests and pace yourself with activity  ? Be careful and do not strain with bowel movements    MEDICATIONS:  ? Take all medications as prescribed  ? Call your physician if you have any questions  ? Keep an updated list of your medications with you at all times and give a list to your primary physician and pharmacist  ? You may use Tylenol 325mg 1-2 tablets every 6 hours as needed for pain or discomfort, unless otherwise instructed. If you have significant discomfort more than 48 hours after your procedure, please call your physicians office. SIGNS AND SYMPTOMS:  ? Notify your physician for new or recurrent symptoms of chest discomfort, unusual shortness of breath or fatigue. These could be signs of a problem and should be discussed with your physician. ? For significant chest pain or symptoms of angina not relieved with rest:  if you have been prescribed Nitroglycerin, take as directed (taken under the tongue, one at a time 5 minutes apart for a total of 3 doses, sitting down). If the discomfort is not relieved after the 3rd Nitroglycerin, call 911. If you have not been prescribed Nitroglycerin and your chest discomfort is significant, call 911. Take the ambulance, do not try to drive yourself or have someone else drive you to the hospital.     AFTER CARE:  ?  Follow up with your physician as instructed  ? Follow a heart healthy diet with proper portion control, daily stress management, daily exercise, blood pressure and cholesterol control, and smoking cessation. The success of your stent, if you had one placed, and the prevention of future catheterizations heavily depends on your lifestyle changes you make now! ? You may start walking short distances the day of your procedure. Gradually increase as tolerated each day. Current activity recommendations are 30 minutes of exercise at least 5 days a week. Work up to this as you recover. Walk, ride a bike, or choose any other activity you enjoy to reach this activity goal.   ? Avoid walking outside in extremes of heat or cold. Walk inside (at home, at the mall, or at a large store) when it is cold and windy or hot and humid. ? If you had a stent placed, consider [facility] as a resource to help you make the needed lifestyle changes to live a heart healthy lifestyle. Discuss your candidacy with your physician. If you have questions, call your physicians office or the Cardiac Cath Lab at [phone]. The Cath Lab is operational from 6:30 a.m. to 5:00 p.m., Monday through Friday. After hours, notify your physician. [address] [place] [address] can be reached at [phone]. [facility] is operational Monday-Thursday 8:30 a.m. to 5:00 p.m. and Friday 8:30 a.m. to 12:00 p.m.       Remember:  IN CASE OF BLEEDING: KEEP FIRM PRESSURE ON THE PROCEDURE SITE AND CALL [phone] IF NOT CONTROLLED

## 2019-07-12 NOTE — PROGRESS NOTES
46 -   Cardiac Cath Lab Procedure Area Arrival Note:    Marianna Barrera arrived to Cardiac Cath Lab, Procedure Area. Patient identifiers verified with NAME and DATE OF BIRTH. Procedure verified with patient. Consent forms verified. Allergies verified. Patient informed of procedure and plan of care. Questions answered with review. Patient voiced understanding of procedure and plan of care. Patient on cardiac monitor, non-invasive blood pressure, SPO2 monitor. Placed on O2 @ 2 lpm via NC.  IV of NS on pump at 177 ml/hr. Patient status doing well without problems. Patient is A&Ox 4. Patient reports no discomfort at this time. Patient medicated during procedure with orders obtained and verified by Dr. Allyn Gutierrez. Refer to patients Cardiac Cath Lab PROCEDURE REPORT for vital signs, assessment, status, and response during procedure, printed at end of case. Printed report on chart or scanned into chart. 7605 - TRANSFER - OUT REPORT:    Verbal report given to Mendoza FERRELL(name) on Marianna Barrera  being transferred to (unit) for routine post - op       Report consisted of patients Situation, Background, Assessment and   Recommendations(SBAR). Information from the following report(s) Procedure Summary and MAR was reviewed with the receiving nurse. Lines:   Peripheral IV 07/12/19 Right Antecubital (Active)        Opportunity for questions and clarification was provided.       Patient transported with:   Farman

## 2019-07-12 NOTE — ROUTINE PROCESS
Cardiac Cath Lab Recovery Arrival Note: 
 
 
Louis Romberg arrived to Cardiac Cath Lab, Recovery Area. Staff introduced to patient. Patient identifiers verified with NAME and DATE OF BIRTH. Procedure verified with patient. Consent forms reviewed and signed by patient or authorized representative and verified. Allergies verified. Patient informed of procedure and plan of care. Questions answered with review. Patient prepped for procedure, per orders from physician, prior to arrival. 
 
Patient on cardiac monitor, non-invasive blood pressure, SPO2 monitor. Patient is A&Ox 4. Patient reports no complaints. Patient in stretcher, in low position, with side rails up, call bell within reach, patient instructed to call of assistance as needed. Patient prep in: [facility] Recovery Area, Bed# 5. Family in: waiting room. Prep by: ASHIA Cleary

## 2019-07-12 NOTE — ROUTINE PROCESS
0815:  TRANSFER - IN REPORT: 
 
Verbal report received from Geronimo Chang on [facility] , from the Cardiac Cath lab, for routine progression of care. Report consisted of patients Situation, Background, Assessment and Recommendations(SBAR). Information from the following report(s) Procedure Summary, Intake/Output, MAR and Recent Results was reviewed with the receiving clinician. Opportunity for questions and clarification was provided. Assessment completed upon patients arrival to [address] [place] [address] and care assumed. Cardiac Cath Lab Recovery Arrival Note: 
 
 Alexey arrived to [facility] recovery area. Patient procedure= LHC. Patient on cardiac monitor, non-invasive blood pressure, Patient status doing well without problems. Patient is A&Ox 4. Patient reports no complaints. Procedure site without any bleeding and no hematoma.

## 2019-07-12 NOTE — Clinical Note
Single view of the left ventricle obtained using power injection. Total volume = 24 mL. Rate = 12 mL/sec.

## 2019-07-12 NOTE — PROGRESS NOTES
1000:  Head of bed elevated 30degrees right groin site without bleeding and no hematoma. 1029:  Hartwell Bumpers ambulated @ 8320 (time) approximately 100 feet. Patient tolerated ambulation without adverse advents. right groin (right/left, groin/arm)  without bleeding or hematoma noted. 1030:  RN called Dr. France Ramon about discharge orders. Orders received. 1050: I have reviewed discharge instructions with the patient and patient's friend. The patient and patient's friend verbalized understanding. 1100:  Patient wheeled out via wheel chair for discharge.

## 2019-10-01 ENCOUNTER — OFFICE VISIT (OUTPATIENT)
Dept: OBGYN CLINIC | Age: 84
End: 2019-10-01

## 2019-10-01 VITALS
WEIGHT: 136 LBS | SYSTOLIC BLOOD PRESSURE: 124 MMHG | HEIGHT: 64 IN | DIASTOLIC BLOOD PRESSURE: 74 MMHG | BODY MASS INDEX: 23.22 KG/M2

## 2019-10-01 DIAGNOSIS — Z01.419 ENCOUNTER FOR GYNECOLOGICAL EXAMINATION WITHOUT ABNORMAL FINDING: Primary | ICD-10-CM

## 2019-10-01 DIAGNOSIS — N32.81 OAB (OVERACTIVE BLADDER): ICD-10-CM

## 2019-10-01 DIAGNOSIS — M81.0 OSTEOPOROSIS, UNSPECIFIED OSTEOPOROSIS TYPE, UNSPECIFIED PATHOLOGICAL FRACTURE PRESENCE: ICD-10-CM

## 2019-10-01 NOTE — PROGRESS NOTES
Annual exam    Elle Mccain is a 80 y.o.  A1 postmenopausal  female presenting for annual exam. Doing well. Stopped HRT since prior visit, no issues. She had previously followed with Dr. Jazmín aPk and had been on AMABELZ combined HRT for many years due to osteoporosis. Denies any issues with VMS, PMB, or vaginal dryness. Pt with significant osteoporosis. She has had compression fx of the spine and has had spinal fusion surgery. She has done reclast infusions and is now on Prolia and takes vit D and calcium. Did not do well with bisphosphonates. Weight bearing exercise limited due to back and joint pains. Pt otherwise doing well. Only other concerns today are urge urinary incontinence. Does admit to drinking a lot of coffee, and 1 coke daily, and occasional etoh. Interested in bladder retraining/pelvic PT and referral to urogyn. Benign nipple biopsy for nipple crusting after prior visit. Pt's  passed away last Dec at age 80. Pt had cardiac cath within past year, but is otherwise doing well. Ob/Gyn Hx:   A1 - 2 vaginal deliveries, 1 miscarriage  Menopause- age 46  ? VMS- denies  ? Vag dryness- denies  ? HRT- discontinued at age 80  STI- denies  ? SA- no     Health maintenance:  Pap- many years ago, per patient no history of abnormal  Mammo- 17 B1 --> per breast specialist, no further mammo indicated, breast biopsy for nipple crusting benign following prior visit  Colonoscopy- per patient no longer needed  Dexa- 2015 osteoporosis --> on prolia, calcium, vit D    Past Medical History:   Diagnosis Date    Arthritis     Biliary colic     Cancer ([facility] [address]     skin cancer - basal and squamous cell, LEGS, FACE    GERD (gastroesophageal reflux disease)     Hypercholesterolemia     PATIENT DENIES    Osteopenia     PAC (premature atrial contraction) 2013    Pancreatic cyst 2015    Psychiatric disorder     anxiety    Urinary frequency     Visual loss 2015       Past Surgical History:   Procedure Laterality Date    CARDIAC SURG PROCEDURE UNLIST      normal stress test    ENDOSCOPY, COLON, DIAGNOSTIC      HX APPENDECTOMY      HX BACK SURGERY      KYPHOPLASTY    HX CATARACT REMOVAL Bilateral 2012    HX CHOLECYSTECTOMY  12/15/2017    Lap Rachel by Dr. Mary Goyal HX GI  2008    EGD    HX GI      COLONOSCOPY    HX GYN      UTERINE BIOPSY    HX LUMBAR FUSION  2016    HX LUMBAR LAMINECTOMY  2016    HX ORTHOPAEDIC Right 2010    knee - arthroscopy    HX ORTHOPAEDIC Right 2011    TKR  right    HX OTHER SURGICAL  2017    karina. leg skin cancer surgery    HX TONSILLECTOMY      HX TUBAL LIGATION         Family History   Problem Relation Age of Onset    Stroke Mother     Hypertension Mother     Other Mother         aneurysm - aorta    Cancer Father         stomach AND ESOPHAGEAL cancer    Stroke Maternal Grandmother     Other Son         BLOOD CLOT IN AORTA AND POST OP DVT    Anesth Problems Neg Hx        Social History     Socioeconomic History    Marital status:      Spouse name: Not on file    Number of children: Not on file    Years of education: Not on file    Highest education level: Not on file   Occupational History    Not on file   Social Needs    Financial resource strain: Not on file    Food insecurity:     Worry: Not on file     Inability: Not on file    Transportation needs:     Medical: Not on file     Non-medical: Not on file   Tobacco Use    Smoking status: Former Smoker     Last attempt to quit: 1956     Years since quittin.1    Smokeless tobacco: Never Used    Tobacco comment: former cigarette smoker   Substance and Sexual Activity    Alcohol use:  Yes     Alcohol/week: 11.7 standard drinks     Types: 7 Glasses of wine, 7 Shots of liquor per week     Comment: 1 DRINK PER DAY USUALLY    Drug use: No    Sexual activity: Not Currently Partners: Male   Lifestyle    Physical activity:     Days per week: Not on file     Minutes per session: Not on file    Stress: Not on file   Relationships    Social connections:     Talks on phone: Not on file     Gets together: Not on file     Attends Advent service: Not on file     Active member of club or organization: Not on file     Attends meetings of clubs or organizations: Not on file     Relationship status: Not on file    Intimate partner violence:     Fear of current or ex partner: Not on file     Emotionally abused: Not on file     Physically abused: Not on file     Forced sexual activity: Not on file   Other Topics Concern    Not on file   Social History Narrative    Not on file       Current Outpatient Medications   Medication Sig Dispense Refill    doxycycline (ADOXA) 100 mg tablet Take 100 mg by mouth daily.  metoprolol succinate (TOPROL-XL) 25 mg XL tablet Take 1 Tab by mouth daily. 30 Tab 1    amLODIPine (NORVASC) 2.5 mg tablet Take 1 Tab by mouth daily. 90 Tab 2    DULoxetine (CYMBALTA) 60 mg capsule TAKE 1 CAPSULE BY MOUTH  EVERY DAY 90 Cap 1    wheat dextrin/calcium no.15 (BENEFIBER PLUS CALCIUM PO) Take  by mouth.  varicella-zoster recombinant, PF, (SHINGRIX, PF,) 50 mcg/0.5 mL susr injection 0.5mL by IntraMUSCular route once now and then repeat in 2-6 months 0.5 mL 1    denosumab (PROLIA) 60 mg/mL injection 1 mL by SubCUTAneous route every 6 months. 1 Syringe 3    famotidine (PEPCID) 40 mg tablet Take 1 Tab by mouth daily. Indications: gastroesophageal reflux disease 90 Tab 3    acetaminophen (TYLENOL) 325 mg tablet Take 2 Tabs by mouth every six (6) hours. 1 Tab 0    aspirin delayed-release 81 mg tablet Take 81 mg by mouth daily.  DOCOSAHEXANOIC ACID/EPA (FISH OIL PO) Take 1 Cap by mouth two (2) times a day.  carboxymethylcellulose sodium (REFRESH LIQUIGEL) 1 % dlgl Apply  to eye daily.  1 DROP BOTH EYES      artificial tears,hypromellose, (Jennifer Bill MODERATE) 0.3 % drop Administer 1 Drop to both eyes nightly.  cholecalciferol, vitamin D3, (VITAMIN D3) 2,000 unit Tab Take 2,000 Units by mouth daily.  MULTIVITAMIN PO Take 1 Tab by mouth daily. Takes one po daily.  CALCIUM CARB/VIT D3/MINERALS (CALTRATE PLUS PO) Take 1 Tab by mouth daily. Takes one po daily.          Allergies   Allergen Reactions    Adhesive Tape-Silicones Other (comments)     \"SKIN IS SO FRAGILE\"    Other Medication Other (comments)     Developed post operative delirium from opioids        Review of Systems - History obtained from the patient  Constitutional: negative for weight loss, fever, night sweats  HEENT: negative for hearing loss, earache, congestion, snoring, sorethroat  CV: negative for chest pain, palpitations, edema  Resp: negative for cough, shortness of breath, wheezing  GI: negative for change in bowel habits, abdominal pain, black or bloody stools  : negative for frequency, dysuria, hematuria, vaginal discharge, +leakage of urine  MSK: +chronic back pain  Breast: negative for breast lumps, nipple discharge, galactorrhea, +nipple crusting  Skin :negative for itching, rash, hives  Neuro: negative for dizziness, headache, confusion, weakness  Psych: negative for anxiety, depression, change in mood  Heme/lymph: negative for bleeding, bruising, pallor    Physical Exam  Visit Vitals  /74 (BP 1 Location: Left arm, BP Patient Position: Sitting)   Ht 5' 4\" (1.626 m)   Wt 136 lb (61.7 kg)   BMI 23.34 kg/m²     Constitutional  · Appearance: well-nourished, well developed, alert, in no acute distress    HENT  · Head and Face: appears normal    Neck  · Inspection/Palpation: normal appearance, no masses or tenderness  · Lymph Nodes: no lymphadenopathy present  · Thyroid: gland size normal, nontender, no nodules or masses present on palpation    Chest  · Respiratory Effort: non-labored breathing  · Auscultation: CTAB, normal breath sounds    Cardiovascular  · Heart:  · Auscultation: regular rate and rhythm without murmur  · Extremities: no peripheral edema    Breasts  · Inspection of Breasts: breasts symmetrical, +left nipple with inversion and crusting of nipple, otherwise normal appearing breasts  · Palpation of Breasts and Axillae: no masses present on palpation, no breast tenderness  · Axillary Lymph Nodes: no lymphadenopathy present    Gastrointestinal  · Abdominal Examination: abdomen non-tender to palpation, normal bowel sounds, no masses present  · Liver and spleen: no hepatomegaly present, spleen not palpable  · Hernias: no hernias identified    Genitourinary  · External Genitalia: normal appearance for age, no discharge present, no tenderness present, no inflammatory lesions present, no masses present, +atrophy of UG mucosa  · Vagina: normal vaginal vault without central or paravaginal defects, no discharge present, no inflammatory lesions present, no masses present  · Bladder: non-tender to palpation  · Urethra: appears normal  · Cervix: normal   · Uterus: normal size, shape and consistency, small, mobile  · Adnexa: no adnexal tenderness present, no adnexal masses present  · Perineum: perineum within normal limits, no evidence of trauma, no rashes or skin lesions present    Skin  · General Inspection: no rash, no lesions identified    Neurologic/Psychiatric  · Mental Status:  · Orientation: grossly oriented to person, place and time  · Mood and Affect: mood normal, affect appropriate      Assessment/Plan:  80 y.o. postmenopausal female presenting for annual exam. Overall doing well.      Health Maintenance:  -counseled re: diet, exercise, healthy lifestyle  -pap/HPV - beyond recommended age for screening paps  -no further mammo indicated  -osteoporosis management per PCP --> prolia, ca, vit D, wt bearing exercise as tolerated  -fall precautions    OAB/urge incontinence:  -advised avoidance of bladder irritants, advised cut back on coffee, carbonated beverages, etoh etc  -referral to pelvic PT for bladder retraining  -provided urogynecology referral (Dr. Milvia Fitzpatrick) should leakage not improve with above measures    RTC: 2 years for wellness check, or sooner rajiv Leone MD  10/1/2019  4:01 PM

## 2019-10-01 NOTE — PATIENT INSTRUCTIONS

## 2019-10-04 DIAGNOSIS — N39.498 OTHER URINARY INCONTINENCE: Primary | ICD-10-CM

## 2019-10-09 ENCOUNTER — OFFICE VISIT (OUTPATIENT)
Dept: INTERNAL MEDICINE CLINIC | Age: 84
End: 2019-10-09

## 2019-10-09 VITALS
DIASTOLIC BLOOD PRESSURE: 59 MMHG | OXYGEN SATURATION: 98 % | TEMPERATURE: 96.1 F | SYSTOLIC BLOOD PRESSURE: 122 MMHG | RESPIRATION RATE: 18 BRPM | WEIGHT: 137 LBS | BODY MASS INDEX: 23.39 KG/M2 | HEIGHT: 64 IN | HEART RATE: 63 BPM

## 2019-10-09 DIAGNOSIS — Z00.00 MEDICARE ANNUAL WELLNESS VISIT, SUBSEQUENT: ICD-10-CM

## 2019-10-09 DIAGNOSIS — E78.00 HYPERCHOLESTEROLEMIA: Primary | ICD-10-CM

## 2019-10-09 DIAGNOSIS — F32.89 OTHER DEPRESSION: ICD-10-CM

## 2019-10-09 DIAGNOSIS — Z13.39 SCREENING FOR ALCOHOLISM: ICD-10-CM

## 2019-10-09 DIAGNOSIS — I10 HTN (HYPERTENSION), BENIGN: ICD-10-CM

## 2019-10-09 DIAGNOSIS — Z23 ENCOUNTER FOR IMMUNIZATION: ICD-10-CM

## 2019-10-09 DIAGNOSIS — M81.0 OSTEOPOROSIS, UNSPECIFIED OSTEOPOROSIS TYPE, UNSPECIFIED PATHOLOGICAL FRACTURE PRESENCE: ICD-10-CM

## 2019-10-09 RX ORDER — DULOXETIN HYDROCHLORIDE 30 MG/1
30 CAPSULE, DELAYED RELEASE ORAL DAILY
Qty: 90 CAP | Refills: 1 | Status: SHIPPED | OUTPATIENT
Start: 2019-10-09 | End: 2020-03-30

## 2019-10-09 RX ORDER — DOXYCYCLINE 100 MG/1
50 TABLET ORAL DAILY
Qty: 30 TAB | Refills: 5
Start: 2019-10-09 | End: 2021-07-26 | Stop reason: ALTCHOICE

## 2019-10-09 NOTE — PATIENT INSTRUCTIONS
Medicare Wellness Visit, Female The best way to live healthy is to have a lifestyle where you eat a well-balanced diet, exercise regularly, limit alcohol use, and quit all forms of tobacco/nicotine, if applicable. Regular preventive services are another way to keep healthy. Preventive services (vaccines, screening tests, monitoring & exams) can help personalize your care plan, which helps you manage your own care. Screening tests can find health problems at the earliest stages, when they are easiest to treat. [facility] follows the current, evidence-based guidelines published by the [facility] ([facility]) when recommending preventive services for our patients. Because we follow these guidelines, sometimes recommendations change over time as research supports it. (For example, mammograms used to be recommended annually. Even though Medicare will still pay for an annual mammogram, the newer guidelines recommend a mammogram every two years for women of average risk.) Of course, you and your doctor may decide to screen more often for some diseases, based on your risk and your health status. Preventive services for you include: - Medicare offers their members a free annual wellness visit, which is time for you and your primary care provider to discuss and plan for your preventive service needs. Take advantage of this benefit every year! 
-All adults over the age of 72 should receive the recommended pneumonia vaccines. Current USPSTF guidelines recommend a series of two vaccines for the best pneumonia protection.  
-All adults should have a flu vaccine yearly and a tetanus vaccine every 10 years. All adults age 61 and older should receive a shingles vaccine once in their lifetime.   
-A bone mass density test is recommended when a woman turns 65 to screen for osteoporosis. This test is only recommended one time, as a screening. Some providers will use this same test as a disease monitoring tool if you already have osteoporosis. -All adults age 38-68 who are overweight should have a diabetes screening test once every three years.  
-Other screening tests and preventive services for persons with diabetes include: an eye exam to screen for diabetic retinopathy, a kidney function test, a foot exam, and stricter control over your cholesterol.  
-Cardiovascular screening for adults with routine risk involves an electrocardiogram (ECG) at intervals determined by your doctor.  
-Colorectal cancer screenings should be done for adults age 54-65 with no increased risk factors for colorectal cancer. There are a number of acceptable methods of screening for this type of cancer. Each test has its own benefits and drawbacks. Discuss with your doctor what is most appropriate for you during your annual wellness visit. The different tests include: colonoscopy (considered the best screening method), a fecal occult blood test, a fecal DNA test, and sigmoidoscopy. -Breast cancer screenings are recommended every other year for women of normal risk, age 54-69. 
-Cervical cancer screenings for women over age 72 are only recommended with certain risk factors.  
-All adults born between [facility] should be screened once for Hepatitis C. Here is a list of your current Health Maintenance items (your personalized list of preventive services) with a due date: 
Health Maintenance Due Topic Date Due  Shingles Vaccine (1 of 2) 12/11/1984  
 DTaP/Tdap/Td  (1 - Tdap) 05/03/2007  Glaucoma Screening   07/01/2016  Pneumococcal Vaccine (2 of 2 - PPSV23) 10/12/2017  Flu Vaccine  08/01/2019 [facility] Annual Well Visit  10/09/2019

## 2019-10-09 NOTE — PROGRESS NOTES
1. Have you been to the ER, urgent care clinic since your last visit? Hospitalized since your last visit? No    2. Have you seen or consulted any other health care providers outside of the [address] [place] [address] since your last visit? Include any pap smears or colon screening. Yes.   Dr Yovany Stearns,  Dr Dulce Maria mari, dermatology,ophthamology-dr cook

## 2019-10-09 NOTE — PROGRESS NOTES
This is the Subsequent Medicare Annual Wellness Exam, performed 12 months or more after the Initial AWV or the last Subsequent AWV    I have reviewed the patient's medical history in detail and updated the computerized patient record. History     Past Medical History:   Diagnosis Date    Arthritis     Biliary colic 39/17/1093    Cancer ([address]     skin cancer - basal and squamous cell, LEGS, FACE    GERD (gastroesophageal reflux disease)     Hypercholesterolemia     PATIENT DENIES    Osteopenia     PAC (premature atrial contraction) 7/9/2013    Pancreatic cyst 7/14/2015    Psychiatric disorder     anxiety    Urinary frequency     Visual loss 1/21/2015      Past Surgical History:   Procedure Laterality Date    CARDIAC SURG PROCEDURE UNLIST  2/11    normal stress test    ENDOSCOPY, COLON, DIAGNOSTIC  2008    HX APPENDECTOMY  2000    HX BACK SURGERY      KYPHOPLASTY    HX CATARACT REMOVAL Bilateral 2012    HX CHOLECYSTECTOMY  12/15/2017    Lap Rachel by Dr. Brianna Sexton HX GI  2008    EGD    HX GI      COLONOSCOPY    HX GYN      UTERINE BIOPSY    HX LUMBAR FUSION  2016    HX LUMBAR LAMINECTOMY  2016    HX ORTHOPAEDIC Right 2010    knee - arthroscopy    HX ORTHOPAEDIC Right march 2011    TKR  right    HX OTHER SURGICAL  11/2017    karina. leg skin cancer surgery    HX TONSILLECTOMY      HX TUBAL LIGATION  1976     Current Outpatient Medications   Medication Sig Dispense Refill    DULoxetine (CYMBALTA) 30 mg capsule Take 1 Cap by mouth daily. 90 Cap 1    doxycycline (ADOXA) 100 mg tablet Take 0.5 Tabs by mouth daily. 30 Tab 5    varicella-zoster recombinant, PF, (SHINGRIX, PF,) 50 mcg/0.5 mL susr injection 0.5mL by IntraMUSCular route once now and then repeat in 2-6 months 0.5 mL 1    metoprolol succinate (TOPROL-XL) 25 mg XL tablet Take 1 Tab by mouth daily. 30 Tab 1    amLODIPine (NORVASC) 2.5 mg tablet Take 1 Tab by mouth daily.  90 Tab 2    wheat dextrin/calcium no.15 (BENEFIBER PLUS CALCIUM PO) Take  by mouth.  denosumab (PROLIA) 60 mg/mL injection 1 mL by SubCUTAneous route every 6 months. 1 Syringe 3    famotidine (PEPCID) 40 mg tablet Take 1 Tab by mouth daily. Indications: gastroesophageal reflux disease 90 Tab 3    acetaminophen (TYLENOL) 325 mg tablet Take 2 Tabs by mouth every six (6) hours. 1 Tab 0    aspirin delayed-release 81 mg tablet Take 81 mg by mouth daily.  DOCOSAHEXANOIC ACID/EPA (FISH OIL PO) Take 1 Cap by mouth two (2) times a day.  carboxymethylcellulose sodium (REFRESH LIQUIGEL) 1 % dlgl Apply  to eye daily. 1 DROP BOTH EYES      artificial tears,hypromellose, (GENTEAL MODERATE) 0.3 % drop Administer 1 Drop to both eyes nightly.  cholecalciferol, vitamin D3, (VITAMIN D3) 2,000 unit Tab Take 2,000 Units by mouth daily.  MULTIVITAMIN PO Take 1 Tab by mouth daily. Takes one po daily.  CALCIUM CARB/VIT D3/MINERALS (CALTRATE PLUS PO) Take 1 Tab by mouth daily. Takes one po daily. Allergies   Allergen Reactions    Adhesive Tape-Silicones Other (comments)     \"SKIN IS SO FRAGILE\"    Other Medication Other (comments)     Developed post operative delirium from opioids      Family History   Problem Relation Age of Onset   [facility] Stroke Mother     Hypertension Mother     Other Mother         aneurysm - aorta    Cancer Father         stomach AND ESOPHAGEAL cancer    Stroke Maternal Grandmother     Other Son         BLOOD CLOT IN AORTA AND POST OP DVT    Anesth Problems Neg Hx      Social History     Tobacco Use    Smoking status: Former Smoker     Last attempt to quit: 1956     Years since quittin.2    Smokeless tobacco: Never Used    Tobacco comment: former cigarette smoker   Substance Use Topics    Alcohol use:  Yes     Alcohol/week: 11.7 standard drinks     Types: 7 Glasses of wine, 7 Shots of liquor per week     Comment: 1 [id number] [facility] USUALLY     Patient Active Problem List   Diagnosis Code    Hyperlipidemia LDL goal <130 E78.5    Hypercholesterolemia E78.00    GERD (gastroesophageal reflux disease) K21.9    Cancer (HCC) C80.1    Anxiety F41.9    Rosacea L71.9    PAC (premature atrial contraction) I49.1    Visual loss H54.7    Pancreatic cyst K86.2    Lumbar disc disease with radiculopathy M51.16    Osteoporosis M81.0    Spinal stenosis R71.10    Biliary colic O79.93    Lumbar spinal stenosis M48.061    DNR no code (do not resuscitate) Z66       Depression Risk Factor Screening:     3 most recent PHQ Screens 1/17/2019   PHQ Not Done -   Little interest or pleasure in doing things Not at all   Feeling down, depressed, irritable, or hopeless Not at all   Total Score PHQ 2 0     Alcohol Risk Factor Screening:   About 7 per week    Functional Ability and Level of Safety:   Hearing Loss  The patient wears hearing aids. Activities of Daily Living  The home contains: no safety equipment. Patient does total self care    Fall Risk  Fall Risk Assessment, last 12 mths 1/17/2019   Able to walk? Yes   Fall in past 12 months? No   Fall with injury? -   Number of falls in past 12 months -   Fall Risk Score -       Abuse Screen  Patient is not abused    Cognitive Screening   Evaluation of Cognitive Function:  Has your family/caregiver stated any concerns about your memory: no  Normal  Word finding difficulty  Patient Care Team   Patient Care Team:  Glen Granados MD as PCP - General  Tiffanie Amin MD (Ophthalmology)  Jerzy Villalba MD as Surgeon (General Surgery)  Manuel Almanza MD (Obstetrics & Gynecology)  Tommy Mendoza MD (Breast Surgery)    Assessment/Plan   Education and counseling provided:  Are appropriate based on today's review and evaluation    Diagnoses and all orders for this visit:    1.  Medicare annual wellness visit, subsequent  -     varicella-zoster recombinant, PF, (SHINGRIX, PF,) 50 mcg/0.5 mL susr injection; 0.5mL by IntraMUSCular route once now and then repeat in 2-6 months    2. Screening for alcoholism  -     [place] ANNUAL ALCOHOL SCREEN 15 MIN    3. Encounter for immunization  -     ADMIN PNEUMOCOCCAL VACCINE  -     PNEUMOCOCCAL POLYSACCHARIDE VACCINE, 23-VALENT, ADULT OR IMMUNOSUPPRESSED PT DOSE,    Other orders  -     DULoxetine (CYMBALTA) 30 mg capsule; Take 1 Cap by mouth daily. -     doxycycline (ADOXA) 100 mg tablet; Take 0.5 Tabs by mouth daily. Health Maintenance Due   Topic Date Due    Shingrix Vaccine Age 49> (1 of 2) 12/11/1984    DTaP/Tdap/Td series (1 - Tdap) 05/03/2007    GLAUCOMA SCREENING Q2Y  07/01/2016    Pneumococcal 65+ years (2 of 2 - PPSV23) 10/12/2017    Influenza Age 5 to Adult  08/01/2019    MEDICARE YEARLY EXAM  10/09/2019     Mood good  Bowels gassy  Sob exertional at times  ( had cardiac cath which was OK)  Out of shape      Subjective: This is a hypertensive 80-year-old woman, who back in July was having significant shortness of breath. She was sent to cardiology, who recommended a cardiac cath, which really did not show a lot, showed minimal irregularities, otherwise normal coronaries, normal ejection fraction. She is still a little bit short of breath, but is not sure why. She has had no cough, wheezing or respiratory symptoms. She says her bowels are a little gassy, but they are formed. She does use a daily dose of fiber. She does admit she has gained back some weight since her  passed away and since she had her gallbladder out and she is 15 pounds heavier now, but back to her normal weight as she had abnormal weight loss. Her back has been good. She gets joint injections in her knees, occasionally gets cartilage injections, which seems to keep her going. She is pretty active, does not exercise quite as much as she used to. She is certainly not in as good of shape as she used to be. Her general wellbeing is okay. She gets some anxiety, but minimal. She is not depressed. She takes an antidepressant.   She gets some nightmares at night and is willing to think about trying a lower dose to see how she does. Physical Examination:  Her blood pressure was normal. She had regular S1, regular S2, no murmurs. Clear lungs. Soft abdomen. Looked well. Weight is good. Impression/Plan:  Generally doing great. Will reduce the dose of her antidepressant, will cut it back to 30 mg of Cymbalta daily, can titrate from there. Would recommend continue with the fiber, continue increased exercise. Told her there is not we can do about the shortness of breath other than to keep pushing. She has otherwise been pretty stable and I think she just needs to be more intact. Lab work is ordered. She did not do them in advance. Vitals:    10/09/19 1047   BP: 122/59   Pulse: 63   Resp: 18   Temp: 96.1 °F (35.6 °C)   TempSrc: Oral   SpO2: 98%   Weight: 137 lb (62.1 kg)   Height: 5' 4\" (1.626 m)     S1 and S2 normal, no murmurs, clicks, gallops or rubs. Regular rate and rhythm. Chest is clear; no wheezes or rales. No edema or JVD. no apparent distress  Alert oriented      1. Medicare annual wellness visit, subsequent  advise  - varicella-zoster recombinant, PF, (SHINGRIX, PF,) 50 mcg/0.5 mL susr injection; 0.5mL by IntraMUSCular route once now and then repeat in 2-6 months  Dispense: 0.5 mL; Refill: 1    2. Screening for alcoholism  Better now  - [place] ANNUAL ALCOHOL SCREEN 15 MIN    3. Encounter for immunization    - ADMIN PNEUMOCOCCAL VACCINE  - PNEUMOCOCCAL POLYSACCHARIDE VACCINE, 23-VALENT, ADULT OR IMMUNOSUPPRESSED PT DOSE,    4. Hypercholesterolemia  follow    5. HTN (hypertension), benign  controlled  - CBC WITH AUTOMATED DIFF  - LIPID PANEL  - METABOLIC PANEL, COMPREHENSIVE    6. Other depression  Reduce SSRI dose ok    7.  Osteoporosis, unspecified osteoporosis type, unspecified pathological fracture presence  On prolia every 6 months

## 2019-10-17 LAB
ALBUMIN SERPL-MCNC: 4.1 G/DL (ref 3.5–4.7)
ALBUMIN/GLOB SERPL: 1.8 {RATIO} (ref 1.2–2.2)
ALP SERPL-CCNC: 62 IU/L (ref 39–117)
ALT SERPL-CCNC: 16 IU/L (ref 0–32)
AST SERPL-CCNC: 21 IU/L (ref 0–40)
BASOPHILS # BLD AUTO: 0 X10E3/UL (ref 0–0.2)
BASOPHILS NFR BLD AUTO: 1 %
BILIRUB SERPL-MCNC: 0.4 MG/DL (ref 0–1.2)
BUN SERPL-MCNC: 12 MG/DL (ref 8–27)
BUN/CREAT SERPL: 20 (ref 12–28)
CALCIUM SERPL-MCNC: 9.2 MG/DL (ref 8.7–10.3)
CHLORIDE SERPL-SCNC: 103 MMOL/L (ref 96–106)
CHOLEST SERPL-MCNC: 220 MG/DL (ref 100–199)
CO2 SERPL-SCNC: 27 MMOL/L (ref 20–29)
CREAT SERPL-MCNC: 0.6 MG/DL (ref 0.57–1)
EOSINOPHIL # BLD AUTO: 0 X10E3/UL (ref 0–0.4)
EOSINOPHIL NFR BLD AUTO: 1 %
ERYTHROCYTE [DISTWIDTH] IN BLOOD BY AUTOMATED COUNT: 12.3 % (ref 12.3–15.4)
GLOBULIN SER CALC-MCNC: 2.3 G/DL (ref 1.5–4.5)
GLUCOSE SERPL-MCNC: 88 MG/DL (ref 65–99)
HCT VFR BLD AUTO: 41.4 % (ref 34–46.6)
HDLC SERPL-MCNC: 88 MG/DL
HGB BLD-MCNC: 14 G/DL (ref 11.1–15.9)
IMM GRANULOCYTES # BLD AUTO: 0 X10E3/UL (ref 0–0.1)
IMM GRANULOCYTES NFR BLD AUTO: 0 %
INTERPRETATION, 910389: NORMAL
LDLC SERPL CALC-MCNC: 118 MG/DL (ref 0–99)
LYMPHOCYTES # BLD AUTO: 1.9 X10E3/UL (ref 0.7–3.1)
LYMPHOCYTES NFR BLD AUTO: 40 %
MCH RBC QN AUTO: 31.9 PG (ref 26.6–33)
MCHC RBC AUTO-ENTMCNC: 33.8 G/DL (ref 31.5–35.7)
MCV RBC AUTO: 94 FL (ref 79–97)
MONOCYTES # BLD AUTO: 0.4 X10E3/UL (ref 0.1–0.9)
MONOCYTES NFR BLD AUTO: 10 %
NEUTROPHILS # BLD AUTO: 2.2 X10E3/UL (ref 1.4–7)
NEUTROPHILS NFR BLD AUTO: 48 %
PLATELET # BLD AUTO: 162 X10E3/UL (ref 150–450)
POTASSIUM SERPL-SCNC: 4.3 MMOL/L (ref 3.5–5.2)
PROT SERPL-MCNC: 6.4 G/DL (ref 6–8.5)
RBC # BLD AUTO: 4.39 X10E6/UL (ref 3.77–5.28)
SODIUM SERPL-SCNC: 145 MMOL/L (ref 134–144)
TRIGL SERPL-MCNC: 71 MG/DL (ref 0–149)
VLDLC SERPL CALC-MCNC: 14 MG/DL (ref 5–40)
WBC # BLD AUTO: 4.6 X10E3/UL (ref 3.4–10.8)

## 2019-10-28 RX ORDER — AMLODIPINE BESYLATE 2.5 MG/1
TABLET ORAL
Qty: 90 TAB | Refills: 2 | Status: SHIPPED | OUTPATIENT
Start: 2019-10-28 | End: 2020-07-30

## 2019-11-27 RX ORDER — FAMOTIDINE 40 MG/1
TABLET, FILM COATED ORAL
Qty: 90 TAB | Refills: 3 | Status: SHIPPED | OUTPATIENT
Start: 2019-11-27 | End: 2020-12-17 | Stop reason: SDUPTHER

## 2020-01-14 ENCOUNTER — HOSPITAL ENCOUNTER (OUTPATIENT)
Dept: INFUSION THERAPY | Age: 85
Discharge: HOME | End: 2020-01-14
Payer: MEDICARE

## 2020-01-14 VITALS
DIASTOLIC BLOOD PRESSURE: 78 MMHG | SYSTOLIC BLOOD PRESSURE: 132 MMHG | RESPIRATION RATE: 16 BRPM | TEMPERATURE: 97 F | HEART RATE: 79 BPM

## 2020-01-14 LAB
ALBUMIN SERPL-MCNC: 3.8 G/DL (ref 3.5–5)
ANION GAP SERPL CALC-SCNC: 3 MMOL/L (ref 5–15)
BUN SERPL-MCNC: 13 MG/DL (ref 6–20)
BUN/CREAT SERPL: 19 (ref 12–20)
CALCIUM SERPL-MCNC: 9.7 MG/DL (ref 8.5–10.1)
CHLORIDE SERPL-SCNC: 104 MMOL/L (ref 97–108)
CO2 SERPL-SCNC: 33 MMOL/L (ref 21–32)
CREAT SERPL-MCNC: 0.69 MG/DL (ref 0.55–1.02)
GLUCOSE SERPL-MCNC: 84 MG/DL (ref 65–100)
MAGNESIUM SERPL-MCNC: 2.2 MG/DL (ref 1.6–2.4)
PHOSPHATE SERPL-MCNC: 4.1 MG/DL (ref 2.6–4.7)
PHOSPHATE SERPL-MCNC: 4.2 MG/DL (ref 2.6–4.7)
POTASSIUM SERPL-SCNC: 4.7 MMOL/L (ref 3.5–5.1)
SODIUM SERPL-SCNC: 140 MMOL/L (ref 136–145)

## 2020-01-14 PROCEDURE — 96372 THER/PROPH/DIAG INJ SC/IM: CPT

## 2020-01-14 PROCEDURE — 83735 ASSAY OF MAGNESIUM: CPT

## 2020-01-14 PROCEDURE — 74011250636 HC RX REV CODE- 250/636: Performed by: INTERNAL MEDICINE

## 2020-01-14 PROCEDURE — 36415 COLL VENOUS BLD VENIPUNCTURE: CPT

## 2020-01-14 PROCEDURE — 80069 RENAL FUNCTION PANEL: CPT

## 2020-01-14 PROCEDURE — 84100 ASSAY OF PHOSPHORUS: CPT

## 2020-01-14 RX ADMIN — DENOSUMAB 60 MG: 60 INJECTION SUBCUTANEOUS at 14:37

## 2020-01-14 NOTE — PROGRESS NOTES
[facility] Progress Note    5104 Pt admit to [place] for Prolia ambulatory in stable condition. Assessment completed. No new concerns voiced. Labs drawn and sent. Visit Vitals  /78   Pulse 79   Temp 97 °F (36.1 °C)   Resp 16   Breastfeeding No       Medications:  Prolia SQ L arm     1440 Pt tolerated treatment well. D/c home ambulatory in no distress. Pt aware of next appointment scheduled for 07/14/2020.     Recent Results (from the past 12 hour(s))   RENAL FUNCTION PANEL    Collection Time: 01/14/20  1:19 PM   Result Value Ref Range    Sodium 140 136 - 145 mmol/L    Potassium 4.7 3.5 - 5.1 mmol/L    Chloride 104 97 - 108 mmol/L    CO2 33 (H) 21 - 32 mmol/L    Anion gap 3 (L) 5 - 15 mmol/L    Glucose 84 65 - 100 mg/dL    BUN 13 6 - 20 MG/DL    Creatinine 0.69 0.55 - 1.02 MG/DL    BUN/Creatinine ratio 19 12 - 20      GFR est AA >60 >60 ml/min/1.73m2    GFR est non-AA >60 >60 ml/min/1.73m2    Calcium 9.7 8.5 - 10.1 MG/DL    Phosphorus 4.2 2.6 - 4.7 MG/DL    Albumin 3.8 3.5 - 5.0 g/dL   MAGNESIUM    Collection Time: 01/14/20  1:19 PM   Result Value Ref Range    Magnesium 2.2 1.6 - 2.4 mg/dL   PHOSPHORUS    Collection Time: 01/14/20  1:19 PM   Result Value Ref Range    Phosphorus 4.1 2.6 - 4.7 MG/DL

## 2020-03-09 ENCOUNTER — OFFICE VISIT (OUTPATIENT)
Dept: INTERNAL MEDICINE CLINIC | Age: 85
End: 2020-03-09

## 2020-03-09 VITALS
HEART RATE: 70 BPM | OXYGEN SATURATION: 96 % | BODY MASS INDEX: 23.73 KG/M2 | HEIGHT: 64 IN | SYSTOLIC BLOOD PRESSURE: 152 MMHG | WEIGHT: 139 LBS | TEMPERATURE: 96.9 F | RESPIRATION RATE: 16 BRPM | DIASTOLIC BLOOD PRESSURE: 71 MMHG

## 2020-03-09 DIAGNOSIS — M25.571 ARTHRALGIA OF BOTH FEET: Primary | ICD-10-CM

## 2020-03-09 DIAGNOSIS — M25.572 ARTHRALGIA OF BOTH FEET: Primary | ICD-10-CM

## 2020-03-09 DIAGNOSIS — I25.118 CORONARY ARTERY DISEASE OF NATIVE HEART WITH STABLE ANGINA PECTORIS, UNSPECIFIED VESSEL OR LESION TYPE (HCC): ICD-10-CM

## 2020-03-09 PROBLEM — M17.12 ARTHRITIS OF KNEE, LEFT: Status: ACTIVE | Noted: 2020-03-09

## 2020-03-09 NOTE — PROGRESS NOTES
Chief Complaint   Patient presents with    Leg Pain    Foot Swelling     Has vague pain lower legs better with motion  Bothers her sometimes during the day    Denies burning denies claudication    [place] [employer] and  Does very low impact exercise    Seeing ortho left knee arthritis had one steroid and 3 gel shots with some improvement Dannis Bath)      Patient Active Problem List    Diagnosis    Arthritis of knee, left    DNR no code (do not resuscitate)    Lumbar spinal stenosis    Biliary colic    Spinal stenosis    Lumbar disc disease with radiculopathy     Pain management and PT  Trans foraminal injections not helping      Osteoporosis     . DEXA Results (most recent):    Results from Hospital Encounter encounter on 08/13/15   DEXA BONE DENSITY STUDY AXIAL   Narrative **Final Report**      ICD Codes / Adm. Diagnosis: V76.12  724.2 / Disorder of bone and cartilage    Lumbago  Examination:  MM DEXA AXIAL SKELETON  - [id number] - Aug 13 2015 10:45AM  Accession No:  [id number]  Reason:  screening      REPORT:  Bone Mineral Density    Indication: Monitoring, estrogen therapy  Age: [de-identified]  Sex: Female. Menopause status:         postmenopausal.  Hormone replacement therapy: yes/no     Risk factors for fall:   None   Risk factors for osteoporosis:  Tobacco use    Current medication for osteoporosis: Calcium, vitamin D, estrogen. Comparison: 2013    Technique: Imaging was performed on the [employer].          World Health   Organization meta-analysis fracture risk calculator (FRAX) analysis was   performed for 10 year fracture risk probability assessment    Excluded sites: L2 due to fracture, L1 due to degenerative changes    Findings:    Fractures identified on Lateral scanogram:  L2    Lumbar spine: L1-L3, excluding L2  Bone mineral density (gm/cm2): 1.113  % of peak bone mass: 94  % for age matched controls:  80  T score: -0.6  Z score:  1.5    Hip: Right femoral neck  Bone mineral density (gm/cm2):  0.718  % of peak bone mass: 69  % for age matched controls:  100  T score: -2.3  Z score:  0           IMPRESSION:    This patient is osteopenic using the World Health Organization criteria  As compared to the prior study, there has been a statistically significant   decrease in bone mineral density. 10 year probability of major osteoporotic fracture:  16.3%  10 year probability of hip fracture:  5.6%             Signing/Reading Doctor: Kimberly Pate ([id number])    Chintan Rondon ([id number])  Aug 17 2015 10:59AM                                 Jan 2017 reclast  And annual  Not done 2018  As of june      Pancreatic cyst    Visual loss    PAC (premature atrial contraction)     Normal echo and stress test for atypical chest pain      Rosacea    Anxiety    Hypercholesterolemia    GERD (gastroesophageal reflux disease)     Feels better on high dose       Hyperlipidemia LDL goal <130     Vitals:    03/09/20 1429 03/09/20 1444   BP: 154/75 152/71   Pulse: 70    Resp: 16    Temp: 96.9 °F (36.1 °C)    TempSrc: Oral    SpO2: 96%    Weight: 139 lb (63 kg)    Height: 5' 4\" (1.626 m)      S1 and S2 normal, no murmurs, clicks, gallops or rubs. Regular rate and rhythm. Chest is clear; no wheezes or rales. No edema or JVD. Exam of extremities: peripheral pulses normal, no pedal edema, no clubbing or cyanosis, pedal edema 2 +, Marisol's sign negative bilaterally  Neuro: Cranial nerves and fundi are normal. Neck supple. No bruits. Normal fine touch and vibratory sense in feet    Absent AJ bilat. Diagnoses and all orders for this visit:    1. Arthralgia of both feet    2.  Coronary artery disease of native heart with stable angina pectoris, unspecified vessel or lesion type [facility])      Not clear cause of pain not neuropathy  Not  Vascular    See ortho prn

## 2020-03-09 NOTE — PROGRESS NOTES
1. Have you been to the ER, urgent care clinic since your last visit? Hospitalized since your last visit? No    2. Have you seen or consulted any other health care providers outside of the [address] [place] [address] since your last visit? Include any pap smears or colon screening.  No   Chief Complaint   Patient presents with    Leg Pain    Foot Swelling

## 2020-03-30 RX ORDER — DULOXETIN HYDROCHLORIDE 30 MG/1
CAPSULE, DELAYED RELEASE ORAL
Qty: 90 CAP | Refills: 0 | Status: SHIPPED | OUTPATIENT
Start: 2020-03-30 | End: 2020-07-05

## 2020-07-05 RX ORDER — DULOXETIN HYDROCHLORIDE 30 MG/1
CAPSULE, DELAYED RELEASE ORAL
Qty: 90 CAP | Refills: 0 | Status: SHIPPED | OUTPATIENT
Start: 2020-07-05 | End: 2020-07-24 | Stop reason: DRUGHIGH

## 2020-07-14 ENCOUNTER — HOSPITAL ENCOUNTER (OUTPATIENT)
Dept: INFUSION THERAPY | Age: 85
Discharge: HOME | End: 2020-07-14
Payer: MEDICARE

## 2020-07-14 VITALS
TEMPERATURE: 97.5 F | WEIGHT: 134 LBS | SYSTOLIC BLOOD PRESSURE: 132 MMHG | DIASTOLIC BLOOD PRESSURE: 68 MMHG | BODY MASS INDEX: 23 KG/M2

## 2020-07-14 LAB
ALBUMIN SERPL-MCNC: 3.6 G/DL (ref 3.5–5)
ANION GAP SERPL CALC-SCNC: 5 MMOL/L (ref 5–15)
BUN SERPL-MCNC: 17 MG/DL (ref 6–20)
BUN/CREAT SERPL: 29 (ref 12–20)
CALCIUM SERPL-MCNC: 8.9 MG/DL (ref 8.5–10.1)
CHLORIDE SERPL-SCNC: 106 MMOL/L (ref 97–108)
CO2 SERPL-SCNC: 29 MMOL/L (ref 21–32)
CREAT SERPL-MCNC: 0.58 MG/DL (ref 0.55–1.02)
GLUCOSE SERPL-MCNC: 118 MG/DL (ref 65–100)
MAGNESIUM SERPL-MCNC: 2.2 MG/DL (ref 1.6–2.4)
PHOSPHATE SERPL-MCNC: 3.2 MG/DL (ref 2.6–4.7)
PHOSPHATE SERPL-MCNC: 3.2 MG/DL (ref 2.6–4.7)
POTASSIUM SERPL-SCNC: 4.3 MMOL/L (ref 3.5–5.1)
SODIUM SERPL-SCNC: 140 MMOL/L (ref 136–145)

## 2020-07-14 PROCEDURE — 36415 COLL VENOUS BLD VENIPUNCTURE: CPT

## 2020-07-14 PROCEDURE — 84100 ASSAY OF PHOSPHORUS: CPT

## 2020-07-14 PROCEDURE — 83735 ASSAY OF MAGNESIUM: CPT

## 2020-07-14 PROCEDURE — 74011250636 HC RX REV CODE- 250/636: Performed by: INTERNAL MEDICINE

## 2020-07-14 PROCEDURE — 96372 THER/PROPH/DIAG INJ SC/IM: CPT

## 2020-07-14 PROCEDURE — 80069 RENAL FUNCTION PANEL: CPT

## 2020-07-14 RX ADMIN — DENOSUMAB 60 MG: 60 INJECTION SUBCUTANEOUS at 14:43

## 2020-07-24 ENCOUNTER — VIRTUAL VISIT (OUTPATIENT)
Dept: INTERNAL MEDICINE CLINIC | Age: 85
End: 2020-07-24

## 2020-07-24 ENCOUNTER — TELEPHONE (OUTPATIENT)
Dept: INTERNAL MEDICINE CLINIC | Age: 85
End: 2020-07-24

## 2020-07-24 DIAGNOSIS — F41.9 ANXIETY: ICD-10-CM

## 2020-07-24 DIAGNOSIS — F32.89 OTHER DEPRESSION: ICD-10-CM

## 2020-07-24 DIAGNOSIS — Z78.0 POST-MENOPAUSAL: ICD-10-CM

## 2020-07-24 DIAGNOSIS — Z12.31 ENCOUNTER FOR SCREENING MAMMOGRAM FOR BREAST CANCER: ICD-10-CM

## 2020-07-24 DIAGNOSIS — M85.80 OSTEOPENIA, UNSPECIFIED LOCATION: Primary | ICD-10-CM

## 2020-07-24 RX ORDER — ERYTHROMYCIN 5 MG/G
OINTMENT OPHTHALMIC
COMMUNITY
Start: 2020-05-16 | End: 2021-07-26 | Stop reason: ALTCHOICE

## 2020-07-24 RX ORDER — DULOXETIN HYDROCHLORIDE 60 MG/1
60 CAPSULE, DELAYED RELEASE ORAL DAILY
Qty: 90 CAP | Refills: 0 | Status: SHIPPED | OUTPATIENT
Start: 2020-07-24 | End: 2020-11-02

## 2020-07-24 NOTE — PROGRESS NOTES
Glendy Kaur is a 80 y.o. female who was seen by synchronous (real-time) audio-video technology on 7/24/2020 for Anxiety; Medication Evaluation; and Medication Refill        Assessment & Plan:   Diagnoses and all orders for this visit:    1. Osteopenia, unspecified location  -     DEXA BONE DENSITY STUDY AXIAL; Future    2. Encounter for screening mammogram for breast cancer  -     MELINA MAMMO BI SCREENING INCL CAD; Future    3. Anxiety  -     DULoxetine (CYMBALTA) 60 mg capsule; Take 1 Cap by mouth daily. 4. Other depression  -     DULoxetine (CYMBALTA) 60 mg capsule; Take 1 Cap by mouth daily. 5. Post-menopausal  -     DEXA BONE DENSITY STUDY AXIAL; Future    The patient and I spoke at length about the different things that is currently going on around each of us. She does appear to have a very strong friends network. I would like for her to go back to the 60 mg of Cymbalta and see if this helps. I will have her to make a follow-up appointment with Dr. Moraima Levine in 2 to 3 weeks to see how she is doing as well as for her to speak with him about whether or not hewould like for her to continue the aspirin or not. I placed the order for her bone density as well as her mammogram.  The patient understands that she may contact the office at any point earlier than 2 weeks if needed. I spent at least 25 minutes on this visit with this established patient. 712  Subjective:   Patient presents for a few concerns. She reports that he has a history of anxiety and it had been well controlled. Here recently she has been noticing that she can feel herself getting worked up.   She reports that she had to see the eye doctor and the audiologist here recently and doing both of those visits she could feel some anxiety coming on where it made her feel tight in her chest.  She reports she believes it is partly due to because she is somewhat of a perfectionist and when she is asked certain questions she wants to make sure that she is given the correct answer. She also recently spoke with her son and she reports she felt as though he was talking in a manic type of way. Her friend later visit her and she felt very anxious with discussing her son. She reports that she used to be on Zoloft at one time but was switched to the Cymbalta. She was originally on Cymbalta 60 mg and was doing quite well so was decreased to Cymbalta 30. The 30 mg of Cymbalta was working until here recently. The patient reports that she has a strong network of friends and has been in contact with them each day. The patient reports that she is not sure if this state of the country is now starting to bother her. The patient is wondering if she should go back on Cymbalta 60 mg will be switched to something different. The patient is also interested today getting referrals for both a mammogram and a bone density scan. She reports she has not had either one in some time. Lastly she would like to speak with Dr. Nancey Osgood about whether or not she should continue with her aspirin daily. Prior to Admission medications    Medication Sig Start Date End Date Taking? Authorizing Provider   DULoxetine (CYMBALTA) 60 mg capsule Take 1 Cap by mouth daily. 7/24/20  Yes Andreia Torres PA-C   famotidine (PEPCID) 40 mg tablet TAKE 1 TABLET BY MOUTH  DAILY (FOR GASTROESOPHAGEAL REFLUX DISEASE) 11/27/19  Yes Hiram [place] MD   amLODIPine (NORVASC) 2.5 mg tablet TAKE 1 TABLET BY MOUTH  DAILY 10/28/19  Yes Hiram [place] MD   doxycycline (ADOXA) 100 mg tablet Take 0.5 Tabs by mouth daily. Patient taking differently: Take 100 mg by mouth daily. 10/9/19  Yes Hiram [place] MD   varicella-zoster recombinant, PF, (SHINGRIX, PF,) 50 mcg/0.5 mL susr injection 0.5mL by IntraMUSCular route once now and then repeat in 2-6 months 10/9/19  Yes Hiram [place] MD   wheat dextrin/calcium no.15 (BENEFIBER PLUS CALCIUM PO) Take  by mouth daily.    Yes Provider, Historical   denosumab (PROLIA) 60 mg/mL injection 1 mL by SubCUTAneous route every 6 months. 1/8/19  Yes Nikos Evans MD   acetaminophen (TYLENOL) 325 mg tablet Take 2 Tabs by mouth every six (6) hours. 6/25/18  Yes Maged Arvizu PA-C   aspirin delayed-release 81 mg tablet Take 81 mg by mouth daily. Yes Provider, Historical   DOCOSAHEXANOIC ACID/EPA (FISH OIL PO) Take 1 Cap by mouth two (2) times a day. Yes Provider, Historical   cholecalciferol, vitamin D3, (VITAMIN D3) 2,000 unit Tab Take 2,000 Units by mouth daily. Yes Provider, Historical   MULTIVITAMIN PO Take 1 Tab by mouth daily. Takes one po daily. Yes Provider, Historical   CALCIUM CARB/VIT D3/MINERALS (CALTRATE PLUS PO) Take 1 Tab by mouth daily. Takes one po daily. Yes Provider, Historical   erythromycin (ILOTYCIN) ophthalmic ointment  5/16/20   Provider, Historical   DULoxetine (CYMBALTA) 30 mg capsule TAKE 1 CAPSULE BY MOUTH EVERY DAY 7/5/20 7/24/20  Nikos Evans MD   carboxymethylcellulose sodium (REFRESH LIQUIGEL) 1 % dlgl Apply  to eye daily. 1 DROP BOTH EYES  7/24/20  Provider, Historical   artificial tears,hypromellose, (GENTEAL MODERATE) 0.3 % drop Administer 1 Drop to both eyes nightly.   7/24/20  Provider, Historical     Patient Active Problem List    Diagnosis Date Noted    Arthritis of knee, left 03/09/2020    DNR no code (do not resuscitate) 01/08/2019    Lumbar spinal stenosis 43/06/1529    Biliary colic 59/30/2858    Spinal stenosis 09/23/2016    Lumbar disc disease with radiculopathy 07/22/2016    Osteoporosis 07/22/2016    Pancreatic cyst 07/14/2015    Visual loss 01/21/2015    PAC (premature atrial contraction) 07/09/2013    Rosacea 06/27/2012    Anxiety 08/16/2011    Hypercholesterolemia     GERD (gastroesophageal reflux disease)     Hyperlipidemia LDL goal <130 07/20/2010     Current Outpatient Medications   Medication Sig Dispense Refill    DULoxetine (CYMBALTA) 60 mg capsule Take 1 Cap by mouth daily. 90 Cap 0    famotidine (PEPCID) 40 mg tablet TAKE 1 TABLET BY MOUTH  DAILY (FOR GASTROESOPHAGEAL REFLUX DISEASE) 90 Tab 3    amLODIPine (NORVASC) 2.5 mg tablet TAKE 1 TABLET BY MOUTH  DAILY 90 Tab 2    doxycycline (ADOXA) 100 mg tablet Take 0.5 Tabs by mouth daily. (Patient taking differently: Take 100 mg by mouth daily.) 30 Tab 5    varicella-zoster recombinant, PF, (SHINGRIX, PF,) 50 mcg/0.5 mL susr injection 0.5mL by IntraMUSCular route once now and then repeat in 2-6 months 0.5 mL 1    wheat dextrin/calcium no.15 (BENEFIBER PLUS CALCIUM PO) Take  by mouth daily.  denosumab (PROLIA) 60 mg/mL injection 1 mL by SubCUTAneous route every 6 months. 1 Syringe 3    acetaminophen (TYLENOL) 325 mg tablet Take 2 Tabs by mouth every six (6) hours. 1 Tab 0    aspirin delayed-release 81 mg tablet Take 81 mg by mouth daily.  DOCOSAHEXANOIC ACID/EPA (FISH OIL PO) Take 1 Cap by mouth two (2) times a day.  cholecalciferol, vitamin D3, (VITAMIN D3) 2,000 unit Tab Take 2,000 Units by mouth daily.  MULTIVITAMIN PO Take 1 Tab by mouth daily. Takes one po daily.  CALCIUM CARB/VIT D3/MINERALS (CALTRATE PLUS PO) Take 1 Tab by mouth daily. Takes one po daily.       erythromycin (ILOTYCIN) ophthalmic ointment        Allergies   Allergen Reactions    Adhesive Tape-Silicones Other (comments)     \"SKIN IS SO FRAGILE\"    Other Medication Other (comments)     Developed post operative delirium from opioids        ROS  See above  Objective:     Patient-Reported Vitals 7/24/2020   Patient-Reported Weight 133lb   Patient-Reported Height 5f4i      General: alert, cooperative, no distress   Mental  status: normal mood, behavior, speech, dress, motor activity, and thought processes, able to follow commands   HENT: NCAT   Neck: no visualized mass   Resp: no respiratory distress   Neuro: no gross deficits   Skin: no discoloration or lesions of concern on visible areas   Psychiatric: normal affect, consistent with stated mood, no evidence of hallucinations     Additional exam findings: We discussed the expected course, resolution and complications of the diagnosis(es) in detail. Medication risks, benefits, costs, interactions, and alternatives were discussed as indicated. I advised her to contact the office if her condition worsens, changes or fails to improve as anticipated. She expressed understanding with the diagnosis(es) and plan. Bridgette Sanders, who was evaluated through a patient-initiated, synchronous (real-time) audio-video encounter, and/or her healthcare decision maker, is aware that it is a billable service, with coverage as determined by her insurance carrier. She provided verbal consent to proceed: Yes, and patient identification was verified. It was conducted pursuant to the emergency declaration under the [address] [place] [address], [address] [place] [address] authority and the [employer] Act. A caregiver was present when appropriate. Ability to conduct physical exam was limited. I was at home. The patient was at home.       Cecilia Torres PA-C

## 2020-07-24 NOTE — TELEPHONE ENCOUNTER
Please make the patient a follow up appointment with Dr. Darlyn Barger when he is back to follow up her anxiety and increasing her medication today.  thanks

## 2020-07-30 ENCOUNTER — HOSPITAL ENCOUNTER (OUTPATIENT)
Dept: MRI IMAGING | Age: 85
Discharge: HOME | End: 2020-07-30
Attending: OTOLARYNGOLOGY
Payer: MEDICARE

## 2020-07-30 DIAGNOSIS — Z78.9 NON-SMOKER: ICD-10-CM

## 2020-07-30 DIAGNOSIS — Z00.8 OTHER SPECIFIED GENERAL MEDICAL EXAMINATION: ICD-10-CM

## 2020-07-30 PROCEDURE — 70553 MRI BRAIN STEM W/O & W/DYE: CPT

## 2020-07-30 PROCEDURE — A9575 INJ GADOTERATE MEGLUMI 0.1ML: HCPCS | Performed by: OTOLARYNGOLOGY

## 2020-07-30 PROCEDURE — 74011250636 HC RX REV CODE- 250/636: Performed by: OTOLARYNGOLOGY

## 2020-07-30 RX ORDER — AMLODIPINE BESYLATE 2.5 MG/1
TABLET ORAL
Qty: 90 TAB | Refills: 1 | Status: SHIPPED | OUTPATIENT
Start: 2020-07-30 | End: 2021-02-17

## 2020-07-30 RX ORDER — GADOTERATE MEGLUMINE 376.9 MG/ML
10 INJECTION INTRAVENOUS
Status: COMPLETED | OUTPATIENT
Start: 2020-07-30 | End: 2020-07-30

## 2020-07-30 RX ADMIN — GADOTERATE MEGLUMINE 10 ML: 376.9 INJECTION INTRAVENOUS at 15:00

## 2020-07-31 ENCOUNTER — HOSPITAL ENCOUNTER (OUTPATIENT)
Dept: MAMMOGRAPHY | Age: 85
Discharge: HOME | End: 2020-07-31
Attending: PHYSICIAN ASSISTANT
Payer: MEDICARE

## 2020-07-31 DIAGNOSIS — M85.80 OSTEOPENIA, UNSPECIFIED LOCATION: ICD-10-CM

## 2020-07-31 DIAGNOSIS — Z78.0 POST-MENOPAUSAL: ICD-10-CM

## 2020-07-31 DIAGNOSIS — Z12.31 ENCOUNTER FOR SCREENING MAMMOGRAM FOR BREAST CANCER: ICD-10-CM

## 2020-07-31 PROCEDURE — 77080 DXA BONE DENSITY AXIAL: CPT

## 2020-07-31 PROCEDURE — 77067 SCR MAMMO BI INCL CAD: CPT

## 2020-07-31 NOTE — PROGRESS NOTES
Writer contacted patient to inform of dexa bone scan results per Omar Erp, patient verbalized understanding.

## 2020-07-31 NOTE — PROGRESS NOTES
To know as compared to previous study in 2015 there is been no interval change. She may discuss these results with Dr. Sadie Rodriguez at her next visit.

## 2020-09-21 NOTE — TELEPHONE ENCOUNTER
Called pt to schedule covid screening as well as review meds and medical hx. Unable to leave message.   Scheduled the patient to see Dr Catherine Blackwell in the office today at 2:15 pm.

## 2020-10-05 RX ORDER — DULOXETIN HYDROCHLORIDE 30 MG/1
CAPSULE, DELAYED RELEASE ORAL
Qty: 90 CAP | Refills: 0 | OUTPATIENT
Start: 2020-10-05

## 2020-10-15 ENCOUNTER — OFFICE VISIT (OUTPATIENT)
Dept: INTERNAL MEDICINE CLINIC | Age: 85
End: 2020-10-15
Payer: MEDICARE

## 2020-10-15 VITALS
OXYGEN SATURATION: 98 % | RESPIRATION RATE: 18 BRPM | WEIGHT: 136.2 LBS | DIASTOLIC BLOOD PRESSURE: 63 MMHG | TEMPERATURE: 96.5 F | HEIGHT: 64 IN | HEART RATE: 80 BPM | BODY MASS INDEX: 23.25 KG/M2 | SYSTOLIC BLOOD PRESSURE: 129 MMHG

## 2020-10-15 DIAGNOSIS — Z00.00 MEDICARE ANNUAL WELLNESS VISIT, SUBSEQUENT: ICD-10-CM

## 2020-10-15 DIAGNOSIS — E78.00 HYPERCHOLESTEROLEMIA: Primary | ICD-10-CM

## 2020-10-15 DIAGNOSIS — M81.0 OSTEOPOROSIS, UNSPECIFIED OSTEOPOROSIS TYPE, UNSPECIFIED PATHOLOGICAL FRACTURE PRESENCE: ICD-10-CM

## 2020-10-15 DIAGNOSIS — K86.2 PANCREATIC CYST: ICD-10-CM

## 2020-10-15 DIAGNOSIS — I10 HTN, GOAL BELOW 130/80: ICD-10-CM

## 2020-10-15 PROCEDURE — 1090F PRES/ABSN URINE INCON ASSESS: CPT | Performed by: INTERNAL MEDICINE

## 2020-10-15 PROCEDURE — G8536 NO DOC ELDER MAL SCRN: HCPCS | Performed by: INTERNAL MEDICINE

## 2020-10-15 PROCEDURE — 99214 OFFICE O/P EST MOD 30 MIN: CPT | Performed by: INTERNAL MEDICINE

## 2020-10-15 PROCEDURE — G8427 DOCREV CUR MEDS BY ELIG CLIN: HCPCS | Performed by: INTERNAL MEDICINE

## 2020-10-15 PROCEDURE — G0463 HOSPITAL OUTPT CLINIC VISIT: HCPCS | Performed by: INTERNAL MEDICINE

## 2020-10-15 PROCEDURE — 1101F PT FALLS ASSESS-DOCD LE1/YR: CPT | Performed by: INTERNAL MEDICINE

## 2020-10-15 PROCEDURE — G8420 CALC BMI NORM PARAMETERS: HCPCS | Performed by: INTERNAL MEDICINE

## 2020-10-15 PROCEDURE — G0439 PPPS, SUBSEQ VISIT: HCPCS | Performed by: INTERNAL MEDICINE

## 2020-10-15 PROCEDURE — G8510 SCR DEP NEG, NO PLAN REQD: HCPCS | Performed by: INTERNAL MEDICINE

## 2020-10-15 RX ORDER — TROSPIUM CHLORIDE 20 MG/1
TABLET, FILM COATED ORAL
COMMUNITY
Start: 2020-10-05 | End: 2020-12-03 | Stop reason: ALTCHOICE
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
## 2020-10-15 NOTE — PROGRESS NOTES
Chief Complaint   Patient presents with   Daniela Colón Annual Wellness Visit       1. Have you been to the ER, urgent care clinic since your last visit? Hospitalized since your last visit? No    2. Have you seen or consulted any other health care providers outside of the [address] [place] [address] since your last visit? Include any pap smears or colon screening.  No

## 2020-10-15 NOTE — PATIENT INSTRUCTIONS
Medicare Wellness Visit, Female The best way to live healthy is to have a lifestyle where you eat a well-balanced diet, exercise regularly, limit alcohol use, and quit all forms of tobacco/nicotine, if applicable. Regular preventive services are another way to keep healthy. Preventive services (vaccines, screening tests, monitoring & exams) can help personalize your care plan, which helps you manage your own care. Screening tests can find health problems at the earliest stages, when they are easiest to treat. [facility] follows the current, evidence-based guidelines published by the [facility] Chavez ([facility]) when recommending preventive services for our patients. Because we follow these guidelines, sometimes recommendations change over time as research supports it. (For example, mammograms used to be recommended annually. Even though Medicare will still pay for an annual mammogram, the newer guidelines recommend a mammogram every two years for women of average risk). Of course, you and your doctor may decide to screen more often for some diseases, based on your risk and your co-morbidities (chronic disease you are already diagnosed with). Preventive services for you include: - Medicare offers their members a free annual wellness visit, which is time for you and your primary care provider to discuss and plan for your preventive service needs. Take advantage of this benefit every year! 
-All adults over the age of 72 should receive the recommended pneumonia vaccines. Current USPSTF guidelines recommend a series of two vaccines for the best pneumonia protection.  
-All adults should have a flu vaccine yearly and a tetanus vaccine every 10 years.  
-All adults age 48 and older should receive the shingles vaccines (series of two vaccines). -All adults age 38-68 who are overweight should have a diabetes screening test once every three years. -All adults born between [birth year] and 1965 should be screened once for Hepatitis C. 
-Other screening tests and preventive services for persons with diabetes include: an eye exam to screen for diabetic retinopathy, a kidney function test, a foot exam, and stricter control over your cholesterol.  
-Cardiovascular screening for adults with routine risk involves an electrocardiogram (ECG) at intervals determined by your doctor.  
-Colorectal cancer screenings should be done for adults age 54-65 with no increased risk factors for colorectal cancer. There are a number of acceptable methods of screening for this type of cancer. Each test has its own benefits and drawbacks. Discuss with your doctor what is most appropriate for you during your annual wellness visit. The different tests include: colonoscopy (considered the best screening method), a fecal occult blood test, a fecal DNA test, and sigmoidoscopy. 
 
-A bone mass density test is recommended when a woman turns 65 to screen for osteoporosis. This test is only recommended one time, as a screening. Some providers will use this same test as a disease monitoring tool if you already have osteoporosis. -Breast cancer screenings are recommended every other year for women of normal risk, age 54-69. 
-Cervical cancer screenings for women over age 72 are only recommended with certain risk factors. Here is a list of your current Health Maintenance items (your personalized list of preventive services) with a due date: 
Health Maintenance Due Topic Date Due  
 DTaP/Tdap/Td  (1 - Tdap) 12/11/1955  Glaucoma Screening   07/01/2016  Shingles Vaccine (2 of 2) 03/27/2020  Yearly Flu Vaccine (1) 09/01/2020 Milena Schulz Annual Well Visit  10/09/2020

## 2020-10-16 DIAGNOSIS — I10 HTN, GOAL BELOW 130/80: ICD-10-CM

## 2020-10-16 LAB
ALBUMIN SERPL-MCNC: 3.7 G/DL (ref 3.5–5)
ALBUMIN/GLOB SERPL: 1.3 {RATIO} (ref 1.1–2.2)
ALP SERPL-CCNC: 64 U/L (ref 45–117)
ALT SERPL-CCNC: 24 U/L (ref 12–78)
ANION GAP SERPL CALC-SCNC: 3 MMOL/L (ref 5–15)
AST SERPL-CCNC: 24 U/L (ref 15–37)
BASOPHILS # BLD: 0 K/UL (ref 0–0.1)
BASOPHILS NFR BLD: 1 % (ref 0–1)
BILIRUB SERPL-MCNC: 0.5 MG/DL (ref 0.2–1)
BUN SERPL-MCNC: 15 MG/DL (ref 6–20)
BUN/CREAT SERPL: 21 (ref 12–20)
CALCIUM SERPL-MCNC: 8.8 MG/DL (ref 8.5–10.1)
CHLORIDE SERPL-SCNC: 106 MMOL/L (ref 97–108)
CHOLEST SERPL-MCNC: 242 MG/DL
CO2 SERPL-SCNC: 33 MMOL/L (ref 21–32)
CREAT SERPL-MCNC: 0.71 MG/DL (ref 0.55–1.02)
DIFFERENTIAL METHOD BLD: ABNORMAL
EOSINOPHIL # BLD: 0.1 K/UL (ref 0–0.4)
EOSINOPHIL NFR BLD: 1 % (ref 0–7)
ERYTHROCYTE [DISTWIDTH] IN BLOOD BY AUTOMATED COUNT: 12.7 % (ref 11.5–14.5)
GLOBULIN SER CALC-MCNC: 2.9 G/DL (ref 2–4)
GLUCOSE SERPL-MCNC: 92 MG/DL (ref 65–100)
HCT VFR BLD AUTO: 42.9 % (ref 35–47)
HDLC SERPL-MCNC: 95 MG/DL
HDLC SERPL: 2.5 {RATIO} (ref 0–5)
HGB BLD-MCNC: 14 G/DL (ref 11.5–16)
IMM GRANULOCYTES # BLD AUTO: 0 K/UL (ref 0–0.04)
IMM GRANULOCYTES NFR BLD AUTO: 0 % (ref 0–0.5)
LDLC SERPL CALC-MCNC: 133.6 MG/DL (ref 0–100)
LIPID PROFILE,FLP: ABNORMAL
LYMPHOCYTES # BLD: 1.9 K/UL (ref 0.8–3.5)
LYMPHOCYTES NFR BLD: 43 % (ref 12–49)
MCH RBC QN AUTO: 32.3 PG (ref 26–34)
MCHC RBC AUTO-ENTMCNC: 32.6 G/DL (ref 30–36.5)
MCV RBC AUTO: 99.1 FL (ref 80–99)
MONOCYTES # BLD: 0.5 K/UL (ref 0–1)
MONOCYTES NFR BLD: 11 % (ref 5–13)
NEUTS SEG # BLD: 1.9 K/UL (ref 1.8–8)
NEUTS SEG NFR BLD: 44 % (ref 32–75)
NRBC # BLD: 0 K/UL (ref 0–0.01)
NRBC BLD-RTO: 0 PER 100 WBC
PLATELET # BLD AUTO: 151 K/UL (ref 150–400)
PMV BLD AUTO: 11.2 FL (ref 8.9–12.9)
POTASSIUM SERPL-SCNC: 4.1 MMOL/L (ref 3.5–5.1)
PROT SERPL-MCNC: 6.6 G/DL (ref 6.4–8.2)
RBC # BLD AUTO: 4.33 M/UL (ref 3.8–5.2)
SODIUM SERPL-SCNC: 142 MMOL/L (ref 136–145)
TRIGL SERPL-MCNC: 67 MG/DL (ref ?–150)
VLDLC SERPL CALC-MCNC: 13.4 MG/DL
WBC # BLD AUTO: 4.4 K/UL (ref 3.6–11)

## 2020-10-22 ENCOUNTER — TELEPHONE (OUTPATIENT)
Dept: INTERNAL MEDICINE CLINIC | Age: 85
End: 2020-10-22

## 2020-10-23 RX ORDER — ATORVASTATIN CALCIUM 10 MG/1
10 TABLET, FILM COATED ORAL DAILY
Qty: 90 TAB | Refills: 1 | Status: SHIPPED | OUTPATIENT
Start: 2020-10-23 | End: 2021-04-29

## 2020-11-02 DIAGNOSIS — F32.89 OTHER DEPRESSION: ICD-10-CM

## 2020-11-02 DIAGNOSIS — F41.9 ANXIETY: ICD-10-CM

## 2020-11-02 RX ORDER — DULOXETIN HYDROCHLORIDE 60 MG/1
CAPSULE, DELAYED RELEASE ORAL
Qty: 90 CAP | Refills: 0 | Status: SHIPPED | OUTPATIENT
Start: 2020-11-02 | End: 2021-01-27

## 2020-12-03 ENCOUNTER — OFFICE VISIT (OUTPATIENT)
Dept: INTERNAL MEDICINE CLINIC | Age: 85
End: 2020-12-03
Payer: MEDICARE

## 2020-12-03 VITALS
DIASTOLIC BLOOD PRESSURE: 88 MMHG | TEMPERATURE: 95.3 F | HEIGHT: 64 IN | SYSTOLIC BLOOD PRESSURE: 178 MMHG | HEART RATE: 76 BPM | RESPIRATION RATE: 20 BRPM | BODY MASS INDEX: 23.05 KG/M2 | OXYGEN SATURATION: 97 % | WEIGHT: 135 LBS

## 2020-12-03 DIAGNOSIS — Z01.818 PREOP EXAM FOR INTERNAL MEDICINE: Primary | ICD-10-CM

## 2020-12-03 DIAGNOSIS — H81.10 BENIGN PAROXYSMAL POSITIONAL VERTIGO, UNSPECIFIED LATERALITY: ICD-10-CM

## 2020-12-03 DIAGNOSIS — I10 HTN, GOAL BELOW 130/80: ICD-10-CM

## 2020-12-03 DIAGNOSIS — Z01.818 PREOP EXAM FOR INTERNAL MEDICINE: ICD-10-CM

## 2020-12-03 LAB
ANION GAP SERPL CALC-SCNC: 3 MMOL/L (ref 5–15)
APPEARANCE UR: CLEAR
BACTERIA URNS QL MICRO: NEGATIVE /HPF
BILIRUB UR QL: NEGATIVE
BUN SERPL-MCNC: 16 MG/DL (ref 6–20)
BUN/CREAT SERPL: 23 (ref 12–20)
CALCIUM SERPL-MCNC: 10.1 MG/DL (ref 8.5–10.1)
CHLORIDE SERPL-SCNC: 104 MMOL/L (ref 97–108)
CO2 SERPL-SCNC: 32 MMOL/L (ref 21–32)
COLOR UR: ABNORMAL
CREAT SERPL-MCNC: 0.69 MG/DL (ref 0.55–1.02)
EPITH CASTS URNS QL MICRO: ABNORMAL /LPF
GLUCOSE SERPL-MCNC: 86 MG/DL (ref 65–100)
GLUCOSE UR STRIP.AUTO-MCNC: NEGATIVE MG/DL
HGB UR QL STRIP: NEGATIVE
HYALINE CASTS URNS QL MICRO: ABNORMAL /LPF (ref 0–5)
KETONES UR QL STRIP.AUTO: NEGATIVE MG/DL
LEUKOCYTE ESTERASE UR QL STRIP.AUTO: ABNORMAL
NITRITE UR QL STRIP.AUTO: NEGATIVE
PH UR STRIP: 7 [PH] (ref 5–8)
POTASSIUM SERPL-SCNC: 4.5 MMOL/L (ref 3.5–5.1)
PROT UR STRIP-MCNC: NEGATIVE MG/DL
RBC #/AREA URNS HPF: ABNORMAL /HPF (ref 0–5)
SODIUM SERPL-SCNC: 139 MMOL/L (ref 136–145)
SP GR UR REFRACTOMETRY: 1.01 (ref 1–1.03)
UROBILINOGEN UR QL STRIP.AUTO: 0.2 EU/DL (ref 0.2–1)
WBC URNS QL MICRO: ABNORMAL /HPF (ref 0–4)

## 2020-12-03 PROCEDURE — 1101F PT FALLS ASSESS-DOCD LE1/YR: CPT | Performed by: INTERNAL MEDICINE

## 2020-12-03 PROCEDURE — G8510 SCR DEP NEG, NO PLAN REQD: HCPCS | Performed by: INTERNAL MEDICINE

## 2020-12-03 PROCEDURE — 1090F PRES/ABSN URINE INCON ASSESS: CPT | Performed by: INTERNAL MEDICINE

## 2020-12-03 PROCEDURE — G8427 DOCREV CUR MEDS BY ELIG CLIN: HCPCS | Performed by: INTERNAL MEDICINE

## 2020-12-03 PROCEDURE — 93010 ELECTROCARDIOGRAM REPORT: CPT | Performed by: INTERNAL MEDICINE

## 2020-12-03 PROCEDURE — 93005 ELECTROCARDIOGRAM TRACING: CPT | Performed by: INTERNAL MEDICINE

## 2020-12-03 PROCEDURE — G8536 NO DOC ELDER MAL SCRN: HCPCS | Performed by: INTERNAL MEDICINE

## 2020-12-03 PROCEDURE — G8420 CALC BMI NORM PARAMETERS: HCPCS | Performed by: INTERNAL MEDICINE

## 2020-12-03 PROCEDURE — G0463 HOSPITAL OUTPT CLINIC VISIT: HCPCS | Performed by: INTERNAL MEDICINE

## 2020-12-03 PROCEDURE — 99214 OFFICE O/P EST MOD 30 MIN: CPT | Performed by: INTERNAL MEDICINE

## 2020-12-03 NOTE — PROGRESS NOTES
Chief Complaint   Patient presents with    Dizziness    Hypertension     150s then 130s    Subjective:  Chief complaint in this 55-year-old with pretty good health and mild hypertension is that she was doing some cosmetic work on her arms a few days ago and her head was turned down and twisted, she became dizzy and had vertigo type symptoms with nausea and it persisted. Her friend, who is a retired physician, came over and her blood pressure was initially elevated. She had no focal findings. Her blood pressure was checked again the next day and was normal.  She has had some vertiginous symptoms in the distant past.  This episode cleared after a number of hours. There was no diplopia, no facial asymmetry, no speech deficit. She has had some tinnitus on and off for years and it comes and goes. She is feeling better, is able to sleep flat in bed. The other thing she told me about is she is going to have Botox injections in her bladder or at her urethral opening for incontinence done by [facility], Dr. Raz Ceballos. They want a preop EKG, urine and a BMP. Physical Examination:  Patient's blood pressure was slightly elevated today. She had a regular rhythm. Her pupils reacted to light. EOMI. No nystagmus. When I laid her down I could turn her from side to side and there were no vertiginous symptoms. When she went from lying to standing there were no vertiginous symptoms. Neurologically non focal.    Plan:  I believe she is cleared for this kind of a mild, simple procedure. Her EKG showed sinus rhythm, no acute change. Lab work will be done today and we will forward all the results to Dr. Shantal Turner office. I reassured her about the episode of vertigo she had a few days ago and told her I did not think she needed any further testing. I asked her to keep a close watch on her blood pressure readings, persistently over 564 systolic.     Patient Active Problem List    Diagnosis    Arthritis of knee, left  DNR no code (do not resuscitate)    Lumbar spinal stenosis    Biliary colic    Spinal stenosis    Lumbar disc disease with radiculopathy     Pain management and PT  Trans foraminal injections not helping      Osteoporosis     . DEXA Results (most recent):    Results from Hospital Encounter encounter on 08/13/15   DEXA BONE DENSITY STUDY AXIAL   Narrative **Final Report**      ICD Codes / Adm. Diagnosis: V76.12  724.2 / Disorder of bone and cartilage    Lumbago  Examination:  MM DEXA AXIAL SKELETON  - [id number] - Aug 13 2015 10:45AM  Accession No:  [id number]  Reason:  screening      REPORT:  Bone Mineral Density    Indication: Monitoring, estrogen therapy  Age: [de-identified]  Sex: Female. Menopause status:         postmenopausal.  Hormone replacement therapy: yes/no     Risk factors for fall:   None   Risk factors for osteoporosis:  Tobacco use    Current medication for osteoporosis: Calcium, vitamin D, estrogen. Comparison: 2013    Technique: Imaging was performed on the [employer]. World Health   Organization meta-analysis fracture risk calculator (FRAX) analysis was   performed for 10 year fracture risk probability assessment    Excluded sites: L2 due to fracture, L1 due to degenerative changes    Findings:    Fractures identified on Lateral scanogram:  L2    Lumbar spine: L1-L3, excluding L2  Bone mineral density (gm/cm2): 1.113  % of peak bone mass: 94  % for age matched controls:  80  T score: -0.6  Z score:  1.5    Hip: Right femoral neck  Bone mineral density (gm/cm2):  0.718  % of peak bone mass: 69  % for age matched controls:  100  T score: -2.3  Z score:  0           IMPRESSION:    This patient is osteopenic using the World Health Organization criteria  As compared to the prior study, there has been a statistically significant   decrease in bone mineral density.   10 year probability of major osteoporotic fracture:  16.3%  10 year probability of hip fracture:  5.6%             Signing/Reading Doctor: Sandip Danielle ([id number])    Wyatt Lundberg ([id number])  Aug 17 2015 10:59AM                                 Jan 2017 reclast  And annual  Not done 2018  As of June    prolia for 2 years   Doing well      Pancreatic cyst    Visual loss    PAC (premature atrial contraction)     Normal echo and stress test for atypical chest pain      Rosacea    Anxiety    Hypercholesterolemia    GERD (gastroesophageal reflux disease)     Feels better on high dose       Hyperlipidemia LDL goal <130     Current Outpatient Medications   Medication Sig    DULoxetine (CYMBALTA) 60 mg capsule TAKE 1 CAPSULE BY MOUTH EVERY DAY    atorvastatin (LIPITOR) 10 mg tablet Take 1 Tab by mouth daily.  amLODIPine (NORVASC) 2.5 mg tablet TAKE 1 TABLET BY MOUTH EVERY DAY    erythromycin (ILOTYCIN) ophthalmic ointment     famotidine (PEPCID) 40 mg tablet TAKE 1 TABLET BY MOUTH  DAILY (FOR GASTROESOPHAGEAL REFLUX DISEASE)    doxycycline (ADOXA) 100 mg tablet Take 0.5 Tabs by mouth daily. (Patient taking differently: Take 100 mg by mouth daily.)    wheat dextrin/calcium no.15 (BENEFIBER PLUS CALCIUM PO) Take  by mouth daily.  denosumab (PROLIA) 60 mg/mL injection 1 mL by SubCUTAneous route every 6 months.  acetaminophen (TYLENOL) 325 mg tablet Take 2 Tabs by mouth every six (6) hours.  DOCOSAHEXANOIC ACID/EPA (FISH OIL PO) Take 1 Cap by mouth two (2) times a day.  cholecalciferol, vitamin D3, (VITAMIN D3) 2,000 unit Tab Take 2,000 Units by mouth daily.  MULTIVITAMIN PO Take 1 Tab by mouth daily. Takes one po daily.  CALCIUM CARB/VIT D3/MINERALS (CALTRATE PLUS PO) Take 1 Tab by mouth daily. Takes one po daily. No current facility-administered medications for this visit.       Cardiovascular ROS: no chest pain or dyspnea on exertion  Neurological ROS: no TIA or stroke symptoms  General ROS: negative for - chills, fatigue, fever, malaise, night sweats or sleep disturbance  Endocrine ROS: negative for - hair pattern changes, hot flashes, malaise/lethargy, palpitations, polydipsia/polyuria, temperature intolerance or unexpected weight changes    Vitals:    12/03/20 1520 12/03/20 1523 12/03/20 1524   BP: (!) 181/85 (!) 168/90 (!) 178/88   Pulse: 66 71 76   Resp: 20     Temp: (!) 95.3 °F (35.2 °C)     TempSrc: Temporal     SpO2: 97%     Weight: 135 lb (61.2 kg)     Height: 5' 4\" (1.626 m)       1. Preop exam for internal medicine  Cleared for the planned surgical procedure and anesthesia based on todays findings and exam    - AMB POC EKG ROUTINE W/ 12 LEADS, INTER & REP  - METABOLIC PANEL, BASIC; Future  - URINALYSIS W/ RFLX MICROSCOPIC; Future    2. Benign paroxysmal positional vertigo, unspecified laterality  resolved    3.  HTN, goal below 130/80  Follow closely

## 2020-12-07 ENCOUNTER — TELEPHONE (OUTPATIENT)
Dept: INTERNAL MEDICINE CLINIC | Age: 85
End: 2020-12-07

## 2020-12-07 NOTE — TELEPHONE ENCOUNTER
----- Message from Abigail Jernigan MD sent at 12/6/2020  5:18 PM EST -----  I have reviewed all lab results which are normal or stable. Please inform the patient.

## 2020-12-18 RX ORDER — FAMOTIDINE 40 MG/1
TABLET, FILM COATED ORAL
Qty: 90 TAB | Refills: 2 | Status: SHIPPED | OUTPATIENT
Start: 2020-12-18 | End: 2021-07-14 | Stop reason: SDUPTHER

## 2020-12-18 RX ORDER — FAMOTIDINE 40 MG/1
TABLET, FILM COATED ORAL
Qty: 90 TAB | Refills: 3 | Status: SHIPPED | OUTPATIENT
Start: 2020-12-18 | End: 2022-01-24

## 2021-01-07 NOTE — PROGRESS NOTES
New/updated order required for patient's upcoming [facility] appointment. Faxed doctor's office on 01/07/21 at 8:17 AM.  Refer to fax documents in pharmacy for further information.     Leopoldo Bohr, RemediosD, BCPS

## 2021-01-12 ENCOUNTER — HOSPITAL ENCOUNTER (OUTPATIENT)
Dept: INFUSION THERAPY | Age: 86
Discharge: HOME | End: 2021-01-12
Payer: MEDICARE

## 2021-01-12 VITALS
TEMPERATURE: 97.1 F | DIASTOLIC BLOOD PRESSURE: 85 MMHG | HEART RATE: 82 BPM | RESPIRATION RATE: 18 BRPM | SYSTOLIC BLOOD PRESSURE: 130 MMHG

## 2021-01-12 LAB
ALBUMIN SERPL-MCNC: 3.9 G/DL (ref 3.5–5)
ANION GAP SERPL CALC-SCNC: 4 MMOL/L (ref 5–15)
BUN SERPL-MCNC: 15 MG/DL (ref 6–20)
BUN/CREAT SERPL: 23 (ref 12–20)
CALCIUM SERPL-MCNC: 9 MG/DL (ref 8.5–10.1)
CHLORIDE SERPL-SCNC: 107 MMOL/L (ref 97–108)
CO2 SERPL-SCNC: 29 MMOL/L (ref 21–32)
CREAT SERPL-MCNC: 0.65 MG/DL (ref 0.55–1.02)
GLUCOSE SERPL-MCNC: 104 MG/DL (ref 65–100)
MAGNESIUM SERPL-MCNC: 2.2 MG/DL (ref 1.6–2.4)
PHOSPHATE SERPL-MCNC: 3.6 MG/DL (ref 2.6–4.7)
PHOSPHATE SERPL-MCNC: 3.6 MG/DL (ref 2.6–4.7)
POTASSIUM SERPL-SCNC: 4.3 MMOL/L (ref 3.5–5.1)
SODIUM SERPL-SCNC: 140 MMOL/L (ref 136–145)

## 2021-01-12 PROCEDURE — 74011250636 HC RX REV CODE- 250/636: Performed by: INTERNAL MEDICINE

## 2021-01-12 PROCEDURE — 84100 ASSAY OF PHOSPHORUS: CPT

## 2021-01-12 PROCEDURE — 96372 THER/PROPH/DIAG INJ SC/IM: CPT

## 2021-01-12 PROCEDURE — 80069 RENAL FUNCTION PANEL: CPT

## 2021-01-12 PROCEDURE — 83735 ASSAY OF MAGNESIUM: CPT

## 2021-01-12 PROCEDURE — 36415 COLL VENOUS BLD VENIPUNCTURE: CPT

## 2021-01-12 RX ADMIN — DENOSUMAB 60 MG: 60 INJECTION SUBCUTANEOUS at 14:50

## 2021-01-12 NOTE — PROGRESS NOTES
Outpatient Infusion Center Progress Note    6348 Pt admit to [facility] for Prolia ambulatory in stable condition. Assessment completed. Pt had recent surgery to remove skin cancer on her left calf. Wound is healing well. Labs drawn and sent. Visit Vitals  /85 (BP 1 Location: Left arm, BP Patient Position: At rest)   Pulse 82   Temp 97.1 °F (36.2 °C)   Resp 18   Breastfeeding No       Medications:  Medications Administered     denosumab (PROLIA) injection 60 mg     Admin Date  01/12/2021 Action  Given Dose  60 mg Route  SubCUTAneous Administered By  Tesha Wallis, DEJA              Injection given SQ left arm    1450 Pt tolerated treatment well. D/c home ambulatory in no distress.  Pt aware of next appointment scheduled for 07/13/21      Recent Results (from the past 12 hour(s))   RENAL FUNCTION PANEL    Collection Time: 01/12/21  1:24 PM   Result Value Ref Range    Sodium 140 136 - 145 mmol/L    Potassium 4.3 3.5 - 5.1 mmol/L    Chloride 107 97 - 108 mmol/L    CO2 29 21 - 32 mmol/L    Anion gap 4 (L) 5 - 15 mmol/L    Glucose 104 (H) 65 - 100 mg/dL    BUN 15 6 - 20 MG/DL    Creatinine 0.65 0.55 - 1.02 MG/DL    BUN/Creatinine ratio 23 (H) 12 - 20      GFR est AA >60 >60 ml/min/1.73m2    GFR est non-AA >60 >60 ml/min/1.73m2    Calcium 9.0 8.5 - 10.1 MG/DL    Phosphorus 3.6 2.6 - 4.7 MG/DL    Albumin 3.9 3.5 - 5.0 g/dL   MAGNESIUM    Collection Time: 01/12/21  1:24 PM   Result Value Ref Range    Magnesium 2.2 1.6 - 2.4 mg/dL   PHOSPHORUS    Collection Time: 01/12/21  1:24 PM   Result Value Ref Range    Phosphorus 3.6 2.6 - 4.7 MG/DL

## 2021-01-27 DIAGNOSIS — F41.9 ANXIETY: ICD-10-CM

## 2021-01-27 DIAGNOSIS — F32.89 OTHER DEPRESSION: ICD-10-CM

## 2021-01-27 RX ORDER — DULOXETIN HYDROCHLORIDE 60 MG/1
CAPSULE, DELAYED RELEASE ORAL
Qty: 90 CAP | Refills: 0 | Status: SHIPPED | OUTPATIENT
Start: 2021-01-27 | End: 2021-06-01

## 2021-02-17 RX ORDER — AMLODIPINE BESYLATE 2.5 MG/1
TABLET ORAL
Qty: 90 TAB | Refills: 1 | Status: SHIPPED | OUTPATIENT
Start: 2021-02-17 | End: 2021-04-14

## 2021-03-10 ENCOUNTER — OFFICE VISIT (OUTPATIENT)
Dept: INTERNAL MEDICINE CLINIC | Age: 86
End: 2021-03-10
Payer: MEDICARE

## 2021-03-10 VITALS
HEART RATE: 81 BPM | SYSTOLIC BLOOD PRESSURE: 126 MMHG | HEIGHT: 64 IN | TEMPERATURE: 98 F | OXYGEN SATURATION: 96 % | BODY MASS INDEX: 22.77 KG/M2 | WEIGHT: 133.4 LBS | DIASTOLIC BLOOD PRESSURE: 73 MMHG | RESPIRATION RATE: 16 BRPM

## 2021-03-10 DIAGNOSIS — I63.81 LACUNAR CEREBROVASCULAR ACCIDENT (CVA) OF SUBTHALAMIC REGION (HCC): ICD-10-CM

## 2021-03-10 DIAGNOSIS — Z09 HOSPITAL DISCHARGE FOLLOW-UP: Primary | ICD-10-CM

## 2021-03-10 PROCEDURE — 99496 TRANSJ CARE MGMT HIGH F2F 7D: CPT | Performed by: INTERNAL MEDICINE

## 2021-03-10 PROCEDURE — 1111F DSCHRG MED/CURRENT MED MERGE: CPT | Performed by: INTERNAL MEDICINE

## 2021-03-10 PROCEDURE — G8427 DOCREV CUR MEDS BY ELIG CLIN: HCPCS | Performed by: INTERNAL MEDICINE

## 2021-03-10 PROCEDURE — 99214 OFFICE O/P EST MOD 30 MIN: CPT | Performed by: INTERNAL MEDICINE

## 2021-03-10 RX ORDER — LISINOPRIL 10 MG/1
10 TABLET ORAL
COMMUNITY
Start: 2021-03-06 | End: 2021-06-01

## 2021-03-10 RX ORDER — ASPIRIN 81 MG/1
81 TABLET ORAL DAILY
COMMUNITY

## 2021-03-10 NOTE — PROGRESS NOTES
1. Have you been to the ER, urgent care clinic since your last visit? Hospitalized since your last visit? No    2. Have you seen or consulted any other health care providers outside of the [address] [place] [address] since your last visit? Include any pap smears or colon screening.  Yes  Chief Complaint   Patient presents with   [facility] Follow Up     went to [facility] related to a stroke

## 2021-03-10 NOTE — PROGRESS NOTES
Transitional Care Management Progress Note    Patient: Pepper Abel  : 1934  PCP: Samuel Melendez MD    Date of admission: 3/4  Date of discharge: 3/6    Patient was contacted by Transitional Care Management services within two days after her discharge   By me Yes. This encounter and supporting documentation was reviewed if available. Medication reconciliation was performed today (3/10/2021). Assessment/Plan:   Diagnoses and all orders for this visit:    1. Cerebrovascular accident (CVA) due to embolism of posterior cerebral artery with infarctions of both occipital lobes (HCC)  -     REFERRAL TO PHYSICAL THERAPY  -     REFERRAL TO OCCUPATIONAL MEDICINE    2. Hospital discharge follow-up  -     [place] DISCHARGE MEDS RECONCILED W/ CURRENT OUTPATIENT MED LIST         Subjective:   Pepper Abel is a 80 y.o. female presenting today for follow-up after being discharged from [facility] [place].  The discharge summary was reviewed or requested. The main problem requiring admission was ataxia and new cva. Complications during admission: none    Interval history/Current status: This is an 80 y.o. with hypertension, dyslipidemia, and had back surgery a couple of years ago. She had an acute admission to [facility] last week when there was sudden ataxia. This was the day after getting her second COVID vaccine. She had no speech problems, but there was a questionable facial weakness. She was ataxic and had some dysmetria in her upper extremities. She eventually was seen and admitted by the ER docs. Reportedly, her CT of the brain was normal.  MRI of the brain showed an occipital stroke on the right. She had a bit of a footdrop on the left, which resolved. She was seen by PT/OT. She was seen by Dr. Ravi Ac from Neurology. She was seen by the Neurology nurse practitioner and was discharged. Her medications were reconciled. She was started back on baby aspirin one a day.   She had amlodipine doubled to 5 mg and 10 mg of lisinopril was added. She says she has improved a lot. She wants PT and OT because she still feels that she needs more help at home. She lives at home alone. She has had lots of help from her friends in the area who can help and take care of her. She has not been driving although she feels like she could. She has not been having any alcohol. She usually only has one or two drinks here and there for holidays. She is feeling pretty well. She has had vague headaches. Concurrently with this, they found some kind of a seroma or a fluid collection in the area where she had lower back surgery by Dr. Triston Rm two years ago and they wanted her to followup with Orthopedics about that. Dr. Estevan Mix has retired but she will see his spine surgery partner, Dr. Henrietta Morales. Stroke was IC and right thalamic on mri reviewed    Admitting symptoms have: significantly improved    Medications marked \"taking\" at this time:  Home Medications    Medication Sig Start Date End Date Taking? Authorizing Provider   lisinopriL (PRINIVIL, ZESTRIL) 10 mg tablet 10 mg. 3/6/21  Yes Provider, Historical   aspirin delayed-release 81 mg tablet Take 81 mg by mouth daily. Yes Provider, Historical   amLODIPine (NORVASC) 2.5 mg tablet TAKE 1 TABLET BY MOUTH EVERY DAY  Patient taking differently: 5 mg. 2/17/21  Yes King Guido MD   DULoxetine (CYMBALTA) 60 mg capsule TAKE 1 CAPSULE BY MOUTH EVERY DAY 1/27/21  Yes King Guido MD   famotidine (PEPCID) 40 mg tablet TAKE 1 TABLET BY MOUTH  DAILY (FOR GASTROESOPHAGEAL REFLUX DISEASE) 12/18/20  Yes King Guido MD   atorvastatin (LIPITOR) 10 mg tablet Take 1 Tab by mouth daily. 10/23/20  Yes King Guido MD   doxycycline (ADOXA) 100 mg tablet Take 0.5 Tabs by mouth daily. Patient taking differently: Take 100 mg by mouth daily.  10/9/19  Yes King Guido MD   wheat dextrin/calcium no.15 (BENEFIBER PLUS CALCIUM PO) Take  by mouth daily. Yes Provider, Historical   denosumab (PROLIA) 60 mg/mL injection 1 mL by SubCUTAneous route every 6 months. 1/8/19  Yes Luis Armando Gomez MD   DOCOSAHEXANOIC ACID/EPA (FISH OIL PO) Take 1 Cap by mouth two (2) times a day. Yes Provider, Historical   cholecalciferol, vitamin D3, (VITAMIN D3) 2,000 unit Tab Take 2,000 Units by mouth daily. Yes Provider, Historical   MULTIVITAMIN PO Take 1 Tab by mouth daily. Takes one po daily. Yes Provider, Historical   CALCIUM CARB/VIT D3/MINERALS (CALTRATE PLUS PO) Take 1 Tab by mouth daily. Takes one po daily. Yes Provider, Historical   famotidine (PEPCID) 40 mg tablet TAKE 1 TABLET BY MOUTH EVERY DAY FOR GASTROESOPHAGEAL DISEASE 12/18/20   Prachi Bernal MD   erythromycin (ILOTYCIN) ophthalmic ointment  5/16/20   Provider, Historical   acetaminophen (TYLENOL) 325 mg tablet Take 2 Tabs by mouth every six (6) hours.  6/25/18   Krishna Arvizu PA-C        Review of Systems:  General ROS: negative  Psychological ROS: negative  Endocrine ROS: negative  Respiratory ROS: no cough, shortness of breath, or wheezing  Cardiovascular ROS: no chest pain or dyspnea on exertion  Gastrointestinal ROS: no abdominal pain, change in bowel habits, or black or bloody stools  Genito-Urinary ROS: no dysuria, trouble voiding, or hematuria  Musculoskeletal ROS: negative  positive for - gait disturbance  Neurological ROS: positive for - gait disturbance       Patient Active Problem List    Diagnosis Date Noted    Arthritis of knee, left 03/09/2020    DNR no code (do not resuscitate) 01/08/2019    Lumbar spinal stenosis 90/41/1572    Biliary colic 82/77/0112    Spinal stenosis 09/23/2016    Lumbar disc disease with radiculopathy 07/22/2016    Osteoporosis 07/22/2016    Pancreatic cyst 07/14/2015    Visual loss 01/21/2015    PAC (premature atrial contraction) 07/09/2013    Rosacea 06/27/2012    Anxiety 08/16/2011    Hypercholesterolemia     GERD (gastroesophageal reflux disease)     Hyperlipidemia LDL goal <130 07/20/2010          Objective:     Patient-Reported Vitals 7/24/2020   Patient-Reported Weight 133lb   Patient-Reported Height 5f4i      General: alert, cooperative, no distress   Mental  status: normal mood, behavior, speech, dress, motor activity, and thought processes, able to follow commands   HENT: NCAT   Neck: no visualized mass   Resp: no respiratory distress   Neuro: no gross deficits   Skin: no discoloration or lesions of concern on visible areas   Psychiatric: normal affect, consistent with stated mood, no evidence of hallucinations     Additional exam findings: We discussed the expected course, resolution and complications of the diagnosis(es) in detail. Medication risks, benefits, costs, interactions, and alternatives were discussed as indicated. I advised her to contact the office if her condition worsens, changes or fails to improve as anticipated. She expressed understanding with the diagnosis(es) and plan.      Prolonged visit with 15 minutes of time out  of more than a  25 minute visit spent counseling patient and family and formulation of care  Watch for hypotension at home  PT arranged    Candelario Pelayo MD

## 2021-03-15 ENCOUNTER — APPOINTMENT (OUTPATIENT)
Dept: CT IMAGING | Age: 86
End: 2021-03-15
Attending: EMERGENCY MEDICINE
Payer: MEDICARE

## 2021-03-15 ENCOUNTER — HOSPITAL ENCOUNTER (EMERGENCY)
Age: 86
Discharge: HOME | End: 2021-03-15
Attending: EMERGENCY MEDICINE
Payer: MEDICARE

## 2021-03-15 VITALS
HEART RATE: 64 BPM | TEMPERATURE: 98 F | OXYGEN SATURATION: 100 % | DIASTOLIC BLOOD PRESSURE: 79 MMHG | SYSTOLIC BLOOD PRESSURE: 138 MMHG

## 2021-03-15 DIAGNOSIS — R51.9 NONINTRACTABLE HEADACHE, UNSPECIFIED CHRONICITY PATTERN, UNSPECIFIED HEADACHE TYPE: Primary | ICD-10-CM

## 2021-03-15 DIAGNOSIS — R68.84 JAW PAIN: ICD-10-CM

## 2021-03-15 LAB
ALBUMIN SERPL-MCNC: 4.1 G/DL (ref 3.5–5)
ALBUMIN/GLOB SERPL: 1.1 {RATIO} (ref 1.1–2.2)
ALP SERPL-CCNC: 69 U/L (ref 45–117)
ALT SERPL-CCNC: 32 U/L (ref 12–78)
ANION GAP SERPL CALC-SCNC: 2 MMOL/L (ref 5–15)
AST SERPL-CCNC: 27 U/L (ref 15–37)
ATRIAL RATE: 68 BPM
BASOPHILS # BLD: 0 K/UL (ref 0–0.1)
BASOPHILS NFR BLD: 1 % (ref 0–1)
BILIRUB SERPL-MCNC: 0.6 MG/DL (ref 0.2–1)
BUN SERPL-MCNC: 14 MG/DL (ref 6–20)
BUN/CREAT SERPL: 21 (ref 12–20)
CALCIUM SERPL-MCNC: 9.2 MG/DL (ref 8.5–10.1)
CALCULATED P AXIS, ECG09: 87 DEGREES
CALCULATED R AXIS, ECG10: 40 DEGREES
CALCULATED T AXIS, ECG11: 5 DEGREES
CHLORIDE SERPL-SCNC: 107 MMOL/L (ref 97–108)
CO2 SERPL-SCNC: 30 MMOL/L (ref 21–32)
COMMENT, HOLDF: NORMAL
CREAT SERPL-MCNC: 0.67 MG/DL (ref 0.55–1.02)
DIAGNOSIS, 93000: NORMAL
DIFFERENTIAL METHOD BLD: ABNORMAL
EOSINOPHIL # BLD: 0 K/UL (ref 0–0.4)
EOSINOPHIL NFR BLD: 1 % (ref 0–7)
ERYTHROCYTE [DISTWIDTH] IN BLOOD BY AUTOMATED COUNT: 12.3 % (ref 11.5–14.5)
GLOBULIN SER CALC-MCNC: 3.6 G/DL (ref 2–4)
GLUCOSE SERPL-MCNC: 87 MG/DL (ref 65–100)
HCT VFR BLD AUTO: 42.9 % (ref 35–47)
HGB BLD-MCNC: 13.9 G/DL (ref 11.5–16)
IMM GRANULOCYTES # BLD AUTO: 0 K/UL (ref 0–0.04)
IMM GRANULOCYTES NFR BLD AUTO: 0 % (ref 0–0.5)
LYMPHOCYTES # BLD: 1.8 K/UL (ref 0.8–3.5)
LYMPHOCYTES NFR BLD: 36 % (ref 12–49)
MCH RBC QN AUTO: 31.4 PG (ref 26–34)
MCHC RBC AUTO-ENTMCNC: 32.4 G/DL (ref 30–36.5)
MCV RBC AUTO: 97.1 FL (ref 80–99)
MONOCYTES # BLD: 0.5 K/UL (ref 0–1)
MONOCYTES NFR BLD: 11 % (ref 5–13)
NEUTS SEG # BLD: 2.6 K/UL (ref 1.8–8)
NEUTS SEG NFR BLD: 52 % (ref 32–75)
NRBC # BLD: 0 K/UL (ref 0–0.01)
NRBC BLD-RTO: 0 PER 100 WBC
P-R INTERVAL, ECG05: 144 MS
PLATELET # BLD AUTO: 147 K/UL (ref 150–400)
PMV BLD AUTO: 11.3 FL (ref 8.9–12.9)
POTASSIUM SERPL-SCNC: 4 MMOL/L (ref 3.5–5.1)
PROT SERPL-MCNC: 7.7 G/DL (ref 6.4–8.2)
Q-T INTERVAL, ECG07: 408 MS
QRS DURATION, ECG06: 88 MS
QTC CALCULATION (BEZET), ECG08: 433 MS
RBC # BLD AUTO: 4.42 M/UL (ref 3.8–5.2)
SAMPLES BEING HELD,HOLD: NORMAL
SODIUM SERPL-SCNC: 139 MMOL/L (ref 136–145)
TROPONIN I SERPL-MCNC: <0.05 NG/ML
TROPONIN I SERPL-MCNC: <0.05 NG/ML
VENTRICULAR RATE, ECG03: 68 BPM
WBC # BLD AUTO: 4.9 K/UL (ref 3.6–11)

## 2021-03-15 PROCEDURE — 70450 CT HEAD/BRAIN W/O DYE: CPT

## 2021-03-15 PROCEDURE — 36415 COLL VENOUS BLD VENIPUNCTURE: CPT

## 2021-03-15 PROCEDURE — 85025 COMPLETE CBC W/AUTO DIFF WBC: CPT

## 2021-03-15 PROCEDURE — 84484 ASSAY OF TROPONIN QUANT: CPT

## 2021-03-15 PROCEDURE — 99283 EMERGENCY DEPT VISIT LOW MDM: CPT

## 2021-03-15 PROCEDURE — 72125 CT NECK SPINE W/O DYE: CPT

## 2021-03-15 PROCEDURE — 93005 ELECTROCARDIOGRAM TRACING: CPT

## 2021-03-15 PROCEDURE — 80053 COMPREHEN METABOLIC PANEL: CPT

## 2021-03-15 NOTE — ED TRIAGE NOTES
Triage: Pt arrives from home via EMS with CC of jaw pain and headache that have been on and off all day. She endorses mild chest pain when asked. Denies SOB.
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
## 2021-03-15 NOTE — ED NOTES
MD reviewed discharge instructions and options with patient and patient verbalized understanding. RN reviewed discharge instructions using teachback method. Pt ambulated to exit without difficulty and in no signs of acute distress, and she is calling a ride who  will drive home. No complaints or needs expressed at this time. Patient was counseled on medications prescribed at discharge. VSS, verbalized relief from most intense pain. Patient to call her PCP in the morning for appointment.

## 2021-03-16 ENCOUNTER — PATIENT OUTREACH (OUTPATIENT)
Dept: CASE MANAGEMENT | Age: 86
End: 2021-03-16

## 2021-03-16 NOTE — PROGRESS NOTES
Patient contacted regarding recent discharge and COVID-19 risk. Discussed COVID-19 related testing which was not done at this time. Test results were not done. Patient informed of results, if available? n/a    Outreach made within 2 business days of discharge: Yes    Care Transition Nurse/ Ambulatory Care Manager/ LPN Care Coordinator contacted the patient by telephone to perform post discharge assessment. Verified name and  with patient as identifiers. Patient has following risk factors of: CAD. CTN/ACM/LPN reviewed discharge instructions, medical action plan and red flags related to discharge diagnosis. Reviewed and educated them on any new and changed medications related to discharge diagnosis. Advised obtaining a 90-day supply of all daily and as-needed medications. Advance Care Planning:   Does patient have an Advance Directive: currently not on file; patient declined education    Education provided regarding infection prevention, and signs and symptoms of COVID-19 and when to seek medical attention with patient who verbalized understanding. Discussed exposure protocols and quarantine from [address] you at higher risk for severe illness  and given an opportunity for questions and concerns. The patient agrees to contact the COVID-19 hotline [phone] or PCP office for questions related to their healthcare. CTN/ACM/LPN provided contact information for future reference. From CDC: Are you at higher risk for severe illness?  Wash your hands often.  Avoid close contact (6 feet, which is about two arm lengths) with people who are sick.  Put distance between yourself and other people if COVID-19 is spreading in your community.  Clean and disinfect frequently touched surfaces.  Avoid all cruise travel and non-essential air travel.  Call your healthcare professional if you have concerns about COVID-19 and your underlying condition or if you are sick.     For more information on steps you can take to protect yourself, see CDC's How to Protect Yourself      Patient/family/caregiver given information for [employer] Loop and agrees to enroll no  Patient's preferred e-mail:  n/a  Patient's preferred phone number: n/a  Based on Loop alert triggers, patient will be contacted by nurse care manager for worsening symptoms. Plan for follow-up call in 7-14 days based on severity of symptoms and risk factors.

## 2021-03-17 ENCOUNTER — TELEPHONE (OUTPATIENT)
Dept: INTERNAL MEDICINE CLINIC | Age: 86
End: 2021-03-17

## 2021-03-17 NOTE — TELEPHONE ENCOUNTER
Lan with [facility]       Reason for call: Needs to know if the speech therapy work order request has been received and signed         Callback required yes/no and why: yes to advise       Best contact number(s): [phone] ext [phone]       envera

## 2021-03-25 LAB — EF %, EXTERNAL: NORMAL

## 2021-03-30 ENCOUNTER — PATIENT OUTREACH (OUTPATIENT)
Dept: CASE MANAGEMENT | Age: 86
End: 2021-03-30

## 2021-03-30 NOTE — PROGRESS NOTES
Patient resolved from [address] Transitions episode on 3/30/21. Discussed COVID-19 related testing which was not done at this time. Test results were not done. Patient informed of results, if available? n/a     Patient/family has been provided the following resources and education related to COVID-19:                         Signs, symptoms and red flags related to COVID-19            [facility] exposure and quarantine guidelines            [employer] exposure contact - [phone]            Contact for their local Department of Health               Patient currently reports that the following symptoms have improved:  no new symptoms and no worsening symptoms. No further outreach scheduled with this CTN/ACM/LPN/HC/ MA. Episode of Care resolved. Patient has this CTN/ACM/LPN/HC/MA contact information if future needs arise.

## 2021-04-14 ENCOUNTER — OFFICE VISIT (OUTPATIENT)
Dept: INTERNAL MEDICINE CLINIC | Age: 86
End: 2021-04-14
Payer: MEDICARE

## 2021-04-14 VITALS
HEART RATE: 63 BPM | TEMPERATURE: 98.5 F | BODY MASS INDEX: 23.92 KG/M2 | RESPIRATION RATE: 16 BRPM | HEIGHT: 63 IN | OXYGEN SATURATION: 98 % | WEIGHT: 135 LBS | SYSTOLIC BLOOD PRESSURE: 130 MMHG | DIASTOLIC BLOOD PRESSURE: 60 MMHG

## 2021-04-14 DIAGNOSIS — I25.118 CORONARY ARTERY DISEASE OF NATIVE HEART WITH STABLE ANGINA PECTORIS, UNSPECIFIED VESSEL OR LESION TYPE (HCC): ICD-10-CM

## 2021-04-14 DIAGNOSIS — I10 HTN, GOAL BELOW 130/80: ICD-10-CM

## 2021-04-14 DIAGNOSIS — I63.81 LACUNAR STROKE OF RIGHT SUBTHALAMIC REGION (HCC): Primary | ICD-10-CM

## 2021-04-14 PROCEDURE — 1101F PT FALLS ASSESS-DOCD LE1/YR: CPT | Performed by: INTERNAL MEDICINE

## 2021-04-14 PROCEDURE — G8510 SCR DEP NEG, NO PLAN REQD: HCPCS | Performed by: INTERNAL MEDICINE

## 2021-04-14 PROCEDURE — G0463 HOSPITAL OUTPT CLINIC VISIT: HCPCS | Performed by: INTERNAL MEDICINE

## 2021-04-14 PROCEDURE — G8420 CALC BMI NORM PARAMETERS: HCPCS | Performed by: INTERNAL MEDICINE

## 2021-04-14 PROCEDURE — 99213 OFFICE O/P EST LOW 20 MIN: CPT | Performed by: INTERNAL MEDICINE

## 2021-04-14 PROCEDURE — G8427 DOCREV CUR MEDS BY ELIG CLIN: HCPCS | Performed by: INTERNAL MEDICINE

## 2021-04-14 PROCEDURE — 1090F PRES/ABSN URINE INCON ASSESS: CPT | Performed by: INTERNAL MEDICINE

## 2021-04-14 PROCEDURE — G8536 NO DOC ELDER MAL SCRN: HCPCS | Performed by: INTERNAL MEDICINE

## 2021-04-14 RX ORDER — AMLODIPINE BESYLATE 5 MG/1
5 TABLET ORAL DAILY
Qty: 90 TAB | Refills: 1 | Status: SHIPPED | OUTPATIENT
Start: 2021-04-14 | End: 2021-06-01

## 2021-04-14 NOTE — PROGRESS NOTES
1. Have you been to the ER, urgent care clinic since your last visit? Hospitalized since your last visit? No    2. Have you seen or consulted any other health care providers outside of the [address] [place] [address] since your last visit? Include any pap smears or colon screening.  No   Chief Complaint   Patient presents with    Follow-up     related to Stroke in March

## 2021-04-14 NOTE — PROGRESS NOTES
Followup visit on Mrs. Kunz. She is about six weeks out from a lacunar stroke. She has had complete resolution of her speech and balance issue and complete resolution of any weakness. She is alert and oriented. She went to Cardiology, Dr. Meera Neil's office. They did not find much. Echo showed some left ventricular sclerosis, I believe, of the aortic valve. She was in normal rhythm. He asked her to come back and see him in six months. She has almost finished with [facility] They have been coming out to the house and have really done a good job. She feels back to normal.  We felt it was a hypertensive stroke. She is on baby aspirin, antihypertensives, and a statin. Patient Active Problem List    Diagnosis    Arthritis of knee, left    DNR no code (do not resuscitate)    Lumbar spinal stenosis    Biliary colic    Spinal stenosis    Lumbar disc disease with radiculopathy     Pain management and PT  Trans foraminal injections not helping      Osteoporosis     . DEXA Results (most recent):    Results from Hospital Encounter encounter on 08/13/15   DEXA BONE DENSITY STUDY AXIAL   Narrative **Final Report**      ICD Codes / Adm. Diagnosis: V76.12  724.2 / Disorder of bone and cartilage    Lumbago  Examination:  MM DEXA AXIAL SKELETON  - [id number] - Aug 13 2015 10:45AM  Accession No:  [id number]  Reason:  screening      REPORT:  Bone Mineral Density    Indication: Monitoring, estrogen therapy  Age: [de-identified]  Sex: Female. Menopause status:         postmenopausal.  Hormone replacement therapy: yes/no     Risk factors for fall:   None   Risk factors for osteoporosis:  Tobacco use    Current medication for osteoporosis: Calcium, vitamin D, estrogen. Comparison: 2013    Technique: Imaging was performed on the [employer].          World Health   Organization meta-analysis fracture risk calculator (FRAX) analysis was   performed for 10 year fracture risk probability assessment    Excluded sites: L2 due to fracture, L1 due to degenerative changes    Findings:    Fractures identified on Lateral scanogram:  L2    Lumbar spine: L1-L3, excluding L2  Bone mineral density (gm/cm2): 1.113  % of peak bone mass: 94  % for age matched controls:  80  T score: -0.6  Z score:  1.5    Hip: Right femoral neck  Bone mineral density (gm/cm2):  0.718  % of peak bone mass: 69  % for age matched controls:  100  T score: -2.3  Z score:  0           IMPRESSION:    This patient is osteopenic using the World Health Organization criteria  As compared to the prior study, there has been a statistically significant   decrease in bone mineral density. 10 year probability of major osteoporotic fracture:  16.3%  10 year probability of hip fracture:  5.6%             Signing/Reading Doctor: Berhane Sherman ([id number])    Natividad Pearson ([id number])  Aug 17 2015 10:59AM                                 Jan 2017 reclast  And annual  Not done 2018  As of June    prolia for 2 years   Doing well      Pancreatic cyst    Visual loss    PAC (premature atrial contraction)     Normal echo and stress test for atypical chest pain      Rosacea    Anxiety    Hypercholesterolemia    GERD (gastroesophageal reflux disease)     Feels better on high dose       Hyperlipidemia LDL goal <130     Vitals:    04/14/21 1518 04/14/21 1539   BP: (!) 143/61 130/60   Pulse: 63    Resp: 16    Temp: 98.5 °F (36.9 °C)    SpO2: 98%    Weight: 135 lb (61.2 kg)    Height: 5' 3\" (1.6 m)      no apparent distress  S1 and S2 normal, no murmurs, clicks, gallops or rubs. Regular rate and rhythm. Chest is clear; no wheezes or rales. No edema or JVD. 1. Coronary artery disease of native heart with stable angina pectoris, unspecified vessel or lesion type ([address]  No angina    2. Lacunar stroke of right subthalamic region [facility])  Resolved may drive    3.  HTN, goal below 130/80  At goal    Lab Results   Component Value Date/Time    Cholesterol, total 242 (H) 10/16/2020 08:11 AM HDL Cholesterol 95 10/16/2020 08:11 AM    LDL, calculated 133.6 (H) 10/16/2020 08:11 AM    VLDL, calculated 13.4 10/16/2020 08:11 AM    Triglyceride 67 10/16/2020 08:11 AM    CHOL/HDL Ratio 2.5 10/16/2020 08:11 AM     Now on statin and will repeat labs next time

## 2021-04-29 RX ORDER — ATORVASTATIN CALCIUM 10 MG/1
TABLET, FILM COATED ORAL
Qty: 90 TAB | Refills: 1 | Status: SHIPPED | OUTPATIENT
Start: 2021-04-29 | End: 2021-11-18

## 2021-05-12 ENCOUNTER — HOSPITAL ENCOUNTER (OUTPATIENT)
Dept: RADIATION THERAPY | Age: 86
Discharge: HOME | End: 2021-05-12

## 2021-05-25 ENCOUNTER — HOSPITAL ENCOUNTER (OUTPATIENT)
Dept: RADIATION THERAPY | Age: 86
Discharge: HOME | End: 2021-05-25
Payer: MEDICARE

## 2021-05-25 PROCEDURE — 77470 SPECIAL RADIATION TREATMENT: CPT

## 2021-05-27 ENCOUNTER — TELEPHONE (OUTPATIENT)
Dept: INTERNAL MEDICINE CLINIC | Age: 86
End: 2021-05-27

## 2021-05-27 NOTE — TELEPHONE ENCOUNTER
Patient call would like for  Dr. Ehsan Stevens to give her a call. Patient states she will start radiation treatment next week for skin cancer on her leg,patient states that Milbert Blizzard is her cancer doctor.

## 2021-05-31 DIAGNOSIS — F41.9 ANXIETY: ICD-10-CM

## 2021-05-31 DIAGNOSIS — F32.89 OTHER DEPRESSION: ICD-10-CM

## 2021-06-01 RX ORDER — DULOXETIN HYDROCHLORIDE 60 MG/1
CAPSULE, DELAYED RELEASE ORAL
Qty: 90 CAPSULE | Refills: 0 | Status: SHIPPED | OUTPATIENT
Start: 2021-06-01 | End: 2021-08-26

## 2021-06-01 RX ORDER — LISINOPRIL 10 MG/1
TABLET ORAL
Qty: 90 TABLET | Refills: 1 | Status: SHIPPED | OUTPATIENT
Start: 2021-06-01 | End: 2021-11-28

## 2021-06-01 RX ORDER — AMLODIPINE BESYLATE 5 MG/1
TABLET ORAL
Qty: 90 TABLET | Refills: 1 | Status: SHIPPED | OUTPATIENT
Start: 2021-06-01 | End: 2021-11-28

## 2021-06-15 ENCOUNTER — HOSPITAL ENCOUNTER (OUTPATIENT)
Dept: RADIATION THERAPY | Age: 86
Discharge: HOME | End: 2021-06-15
Payer: MEDICARE

## 2021-06-15 PROCEDURE — 77338 DESIGN MLC DEVICE FOR IMRT: CPT

## 2021-06-17 ENCOUNTER — HOSPITAL ENCOUNTER (OUTPATIENT)
Dept: RADIATION THERAPY | Age: 86
Discharge: HOME | End: 2021-06-17
Payer: MEDICARE

## 2021-06-17 PROCEDURE — 77280 THER RAD SIMULAJ FIELD SMPL: CPT

## 2021-06-17 PROCEDURE — 77386 HC IMRT TRMT DLVR COMPL: CPT

## 2021-06-18 ENCOUNTER — HOSPITAL ENCOUNTER (OUTPATIENT)
Dept: RADIATION THERAPY | Age: 86
Discharge: HOME | End: 2021-06-18
Payer: MEDICARE

## 2021-06-18 PROCEDURE — 77386 HC IMRT TRMT DLVR COMPL: CPT

## 2021-06-21 ENCOUNTER — HOSPITAL ENCOUNTER (OUTPATIENT)
Dept: RADIATION THERAPY | Age: 86
Discharge: HOME | End: 2021-06-21
Payer: MEDICARE

## 2021-06-21 PROCEDURE — 77386 HC IMRT TRMT DLVR COMPL: CPT

## 2021-06-22 ENCOUNTER — HOSPITAL ENCOUNTER (OUTPATIENT)
Dept: RADIATION THERAPY | Age: 86
Discharge: HOME | End: 2021-06-22
Payer: MEDICARE

## 2021-06-22 PROCEDURE — 77386 HC IMRT TRMT DLVR COMPL: CPT

## 2021-06-23 ENCOUNTER — HOSPITAL ENCOUNTER (OUTPATIENT)
Dept: RADIATION THERAPY | Age: 86
Discharge: HOME | End: 2021-06-23
Payer: MEDICARE

## 2021-06-23 PROCEDURE — 77280 THER RAD SIMULAJ FIELD SMPL: CPT

## 2021-06-23 PROCEDURE — 77336 RADIATION PHYSICS CONSULT: CPT

## 2021-06-23 PROCEDURE — 77386 HC IMRT TRMT DLVR COMPL: CPT

## 2021-06-24 ENCOUNTER — HOSPITAL ENCOUNTER (OUTPATIENT)
Dept: RADIATION THERAPY | Age: 86
Discharge: HOME | End: 2021-06-24
Payer: MEDICARE

## 2021-06-24 PROCEDURE — 77386 HC IMRT TRMT DLVR COMPL: CPT

## 2021-06-25 ENCOUNTER — HOSPITAL ENCOUNTER (OUTPATIENT)
Dept: RADIATION THERAPY | Age: 86
Discharge: HOME | End: 2021-06-25
Payer: MEDICARE

## 2021-06-25 PROCEDURE — 77386 HC IMRT TRMT DLVR COMPL: CPT

## 2021-06-28 ENCOUNTER — HOSPITAL ENCOUNTER (OUTPATIENT)
Dept: RADIATION THERAPY | Age: 86
Discharge: HOME | End: 2021-06-28
Payer: MEDICARE

## 2021-06-28 PROCEDURE — 77412 RADIATION TX DELIVERY LVL 3: CPT

## 2021-06-28 PROCEDURE — 77300 RADIATION THERAPY DOSE PLAN: CPT

## 2021-06-28 PROCEDURE — 77332 RADIATION TREATMENT AID(S): CPT

## 2021-06-28 PROCEDURE — 77417 THER RADIOLOGY PORT IMAGE(S): CPT

## 2021-06-29 ENCOUNTER — HOSPITAL ENCOUNTER (OUTPATIENT)
Dept: RADIATION THERAPY | Age: 86
Discharge: HOME | End: 2021-06-29
Payer: MEDICARE

## 2021-06-29 PROCEDURE — 77386 HC IMRT TRMT DLVR COMPL: CPT

## 2021-06-30 ENCOUNTER — HOSPITAL ENCOUNTER (OUTPATIENT)
Dept: RADIATION THERAPY | Age: 86
Discharge: HOME | End: 2021-06-30
Payer: MEDICARE

## 2021-06-30 PROCEDURE — 77336 RADIATION PHYSICS CONSULT: CPT

## 2021-06-30 PROCEDURE — 77386 HC IMRT TRMT DLVR COMPL: CPT

## 2021-07-01 ENCOUNTER — HOSPITAL ENCOUNTER (OUTPATIENT)
Dept: RADIATION THERAPY | Age: 86
Discharge: HOME | End: 2021-07-01
Payer: MEDICARE

## 2021-07-01 PROCEDURE — 77386 HC IMRT TRMT DLVR COMPL: CPT

## 2021-07-02 ENCOUNTER — HOSPITAL ENCOUNTER (OUTPATIENT)
Dept: RADIATION THERAPY | Age: 86
Discharge: HOME | End: 2021-07-02
Payer: MEDICARE

## 2021-07-02 PROCEDURE — 77386 HC IMRT TRMT DLVR COMPL: CPT

## 2021-07-06 ENCOUNTER — HOSPITAL ENCOUNTER (OUTPATIENT)
Dept: RADIATION THERAPY | Age: 86
Discharge: HOME | End: 2021-07-06
Payer: MEDICARE

## 2021-07-06 PROCEDURE — 77386 HC IMRT TRMT DLVR COMPL: CPT

## 2021-07-07 ENCOUNTER — HOSPITAL ENCOUNTER (OUTPATIENT)
Dept: RADIATION THERAPY | Age: 86
Discharge: HOME | End: 2021-07-07
Payer: MEDICARE

## 2021-07-07 PROCEDURE — 77386 HC IMRT TRMT DLVR COMPL: CPT

## 2021-07-08 ENCOUNTER — HOSPITAL ENCOUNTER (OUTPATIENT)
Dept: RADIATION THERAPY | Age: 86
Discharge: HOME | End: 2021-07-08
Payer: MEDICARE

## 2021-07-08 PROCEDURE — 77386 HC IMRT TRMT DLVR COMPL: CPT

## 2021-07-08 PROCEDURE — 77336 RADIATION PHYSICS CONSULT: CPT

## 2021-07-09 PROCEDURE — 77412 RADIATION TX DELIVERY LVL 3: CPT

## 2021-07-12 ENCOUNTER — HOSPITAL ENCOUNTER (OUTPATIENT)
Dept: RADIATION THERAPY | Age: 86
Discharge: HOME | End: 2021-07-12
Payer: MEDICARE

## 2021-07-12 PROCEDURE — 77412 RADIATION TX DELIVERY LVL 3: CPT

## 2021-07-14 ENCOUNTER — OFFICE VISIT (OUTPATIENT)
Dept: INTERNAL MEDICINE CLINIC | Age: 86
End: 2021-07-14
Payer: MEDICARE

## 2021-07-14 VITALS
RESPIRATION RATE: 17 BRPM | BODY MASS INDEX: 23.57 KG/M2 | WEIGHT: 133 LBS | HEART RATE: 75 BPM | HEIGHT: 63 IN | OXYGEN SATURATION: 97 % | SYSTOLIC BLOOD PRESSURE: 134 MMHG | DIASTOLIC BLOOD PRESSURE: 78 MMHG | TEMPERATURE: 98.2 F

## 2021-07-14 DIAGNOSIS — T66.XXXA ADVERSE EFFECT OF RADIATION THERAPY, INITIAL ENCOUNTER: Primary | ICD-10-CM

## 2021-07-14 DIAGNOSIS — M81.0 OSTEOPOROSIS, UNSPECIFIED OSTEOPOROSIS TYPE, UNSPECIFIED PATHOLOGICAL FRACTURE PRESENCE: ICD-10-CM

## 2021-07-14 PROCEDURE — G8510 SCR DEP NEG, NO PLAN REQD: HCPCS | Performed by: INTERNAL MEDICINE

## 2021-07-14 PROCEDURE — G0463 HOSPITAL OUTPT CLINIC VISIT: HCPCS | Performed by: INTERNAL MEDICINE

## 2021-07-14 PROCEDURE — 1090F PRES/ABSN URINE INCON ASSESS: CPT | Performed by: INTERNAL MEDICINE

## 2021-07-14 PROCEDURE — G8536 NO DOC ELDER MAL SCRN: HCPCS | Performed by: INTERNAL MEDICINE

## 2021-07-14 PROCEDURE — G8427 DOCREV CUR MEDS BY ELIG CLIN: HCPCS | Performed by: INTERNAL MEDICINE

## 2021-07-14 PROCEDURE — 1101F PT FALLS ASSESS-DOCD LE1/YR: CPT | Performed by: INTERNAL MEDICINE

## 2021-07-14 PROCEDURE — 99213 OFFICE O/P EST LOW 20 MIN: CPT | Performed by: INTERNAL MEDICINE

## 2021-07-14 PROCEDURE — G8420 CALC BMI NORM PARAMETERS: HCPCS | Performed by: INTERNAL MEDICINE

## 2021-07-14 NOTE — PROGRESS NOTES
Ms. Keenan Sneed is an 80 y.o. who is about four months out from a lacunar stroke with total resolution of symptoms. She had bad skin cancers on her lower legs. She was referred to radiation therapy Dr. Sánchez Gibbs and she has received a bunch of treatment, she thinks fifteen mostly on the left lower leg and a couple of spots on the right. She developed some scabbing and swelling and tenderness on the left associated. Her last radiation treatment was just a few days ago. She is following up with Dr. Naomia Frankel from that. Because of the pain involved they told her to ask me what to do. She has taken two 500 mg Tylenol in the morning, two 500 mg Tylenol in the evening and sometimes she has taken one Aleve 220 middle of the day. She is still off her Prolia injection for osteoporosis and I told her those are really important and to not get off cycle with that. Her blood pressure is okay and she looks fine. Her left lower leg was seen swollen and irritated and reddened in the lower half. I advised just using Tylenol for pain. She can take up to 3000 mg a day. I advised rescheduling her Prolia for next week and she will get her BMP, magnesium and phosphorous now while here so that she will not have to have her labs done when she goes in for the Prolia injection. Patient Active Problem List    Diagnosis    Adverse effect of radiation therapy     leg      Arthritis of knee, left    DNR no code (do not resuscitate)    Lumbar spinal stenosis    Biliary colic    Spinal stenosis    Lumbar disc disease with radiculopathy     Pain management and PT  Trans foraminal injections not helping      Osteoporosis     . DEXA Results (most recent):    Results from Hospital Encounter encounter on 08/13/15   DEXA BONE DENSITY STUDY AXIAL   Narrative **Final Report**      ICD Codes / Adm. Diagnosis: V76.12  724.2 / Disorder of bone and cartilage    Lumbago  Examination:  MM DEXA AXIAL SKELETON  - [id number] - Aug 13 2015 10: 45AM  Accession No:  [id number]  Reason:  screening      REPORT:  Bone Mineral Density    Indication: Monitoring, estrogen therapy  Age: [de-identified]  Sex: Female. Menopause status:         postmenopausal.  Hormone replacement therapy: yes/no     Risk factors for fall:   None   Risk factors for osteoporosis:  Tobacco use    Current medication for osteoporosis: Calcium, vitamin D, estrogen. Comparison: 2013    Technique: Imaging was performed on the [employer]. World Health   Organization meta-analysis fracture risk calculator (FRAX) analysis was   performed for 10 year fracture risk probability assessment    Excluded sites: L2 due to fracture, L1 due to degenerative changes    Findings:    Fractures identified on Lateral scanogram:  L2    Lumbar spine: L1-L3, excluding L2  Bone mineral density (gm/cm2): 1.113  % of peak bone mass: 94  % for age matched controls:  80  T score: -0.6  Z score:  1.5    Hip: Right femoral neck  Bone mineral density (gm/cm2):  0.718  % of peak bone mass: 69  % for age matched controls:  100  T score: -2.3  Z score:  0           IMPRESSION:    This patient is osteopenic using the World Health Organization criteria  As compared to the prior study, there has been a statistically significant   decrease in bone mineral density.   10 year probability of major osteoporotic fracture:  16.3%  10 year probability of hip fracture:  5.6%             Signing/Reading Doctor: Kami Rose ([id number])    Radha Cortez ([id number])  Aug 17 2015 10:59AM                                 Jan 2017 reclast  And annual  Not done 2018  As of June    prolia for 2 years   Doing well      Pancreatic cyst    Visual loss    PAC (premature atrial contraction)     Normal echo and stress test for atypical chest pain      Rosacea    Anxiety    Hypercholesterolemia    GERD (gastroesophageal reflux disease)     Feels better on high dose       Hyperlipidemia LDL goal <130     Vitals:    07/14/21 0955 BP: 134/78   Pulse: 75   Resp: 17   Temp: 98.2 °F (36.8 °C)   TempSrc: Temporal   SpO2: 97%   Weight: 133 lb (60.3 kg)   Height: 5' 3\" (1.6 m)     1. Adverse effect of radiation therapy, initial encounter  Reviewed details    2. Osteoporosis, unspecified osteoporosis type, unspecified pathological fracture presence  Encourage to get prolia next week  - PHOSPHORUS; Future  - MAGNESIUM;  Future  - METABOLIC PANEL, BASIC; Future

## 2021-07-14 NOTE — PROGRESS NOTES
Room:     Identified pt with two pt identifiers(name and ). Reviewed record in preparation for visit and have obtained necessary documentation. All patient medications has been reviewed. Chief Complaint   Patient presents with    Follow-up     routine       Health Maintenance Due   Topic    COVID-19 Vaccine (1)    DTaP/Tdap/Td series (1 - Tdap)    Shingrix Vaccine Age 50> (2 of 2)       Vitals:    21 0955   BP: 134/78   Pulse: 75   Resp: 17   Temp: 98.2 °F (36.8 °C)   TempSrc: Temporal   SpO2: 97%   Weight: 133 lb (60.3 kg)   Height: 5' 3\" (1.6 m)   PainSc:   0 - No pain       4. Have you been to the ER, urgent care clinic since your last visit? Hospitalized since your last visit? No    5. Have you seen or consulted any other health care providers outside of the [address] [place] [address] since your last visit? Include any pap smears or colon screening. No        Patient is accompanied by self I have received verbal consent from [facility] to discuss any/all medical information while they are present in the room.

## 2021-07-20 ENCOUNTER — TELEPHONE (OUTPATIENT)
Dept: INTERNAL MEDICINE CLINIC | Age: 86
End: 2021-07-20

## 2021-07-20 DIAGNOSIS — T66.XXXA ADVERSE EFFECT OF RADIATION THERAPY, INITIAL ENCOUNTER: Primary | ICD-10-CM

## 2021-07-20 RX ORDER — ACETAMINOPHEN AND CODEINE PHOSPHATE 300; 30 MG/1; MG/1
1 TABLET ORAL
Qty: 40 TABLET | Refills: 0 | Status: SHIPPED | OUTPATIENT
Start: 2021-07-20 | End: 2021-07-23

## 2021-07-20 NOTE — TELEPHONE ENCOUNTER
Call center    Reason for call: Pain in leg where she gets radiation for skin cancer.  Wants something called in       Callback required yes/no and why: yes       Best contact number(s): [phone]

## 2021-07-26 ENCOUNTER — HOSPITAL ENCOUNTER (OUTPATIENT)
Dept: INFUSION THERAPY | Age: 86
Discharge: HOME | End: 2021-07-26
Payer: MEDICARE

## 2021-07-26 VITALS
TEMPERATURE: 97.3 F | HEART RATE: 80 BPM | DIASTOLIC BLOOD PRESSURE: 58 MMHG | SYSTOLIC BLOOD PRESSURE: 101 MMHG | RESPIRATION RATE: 18 BRPM

## 2021-07-26 PROCEDURE — 74011250636 HC RX REV CODE- 250/636: Performed by: INTERNAL MEDICINE

## 2021-07-26 PROCEDURE — 96372 THER/PROPH/DIAG INJ SC/IM: CPT

## 2021-07-26 RX ADMIN — DENOSUMAB 60 MG: 60 INJECTION SUBCUTANEOUS at 15:00

## 2021-07-26 NOTE — PROGRESS NOTES
[facility] - Chemotherapy Progress Note    1450 Pt admit to [facility] for Prolia ambulatory in stable condition. Assessment completed. No new concerns voiced. Labs drawn prior to today's visit (7/14/21); WNL. Patient denies SOB, fever, cough, general not feeling well. Patient denies recent exposure to someone who has tested positive for COVID-19. Patient  denies having contact with anyone who has a pending COVID test.     Visit Vitals  BP (!) 101/58   Pulse 80   Temp 97.3 °F (36.3 °C)   Resp 18   Breastfeeding No     Medications Administered     denosumab (PROLIA) injection 60 mg     Admin Date  07/26/2021 Action  Given Dose  60 mg Route  SubCUTAneous Administered By  Girish Rucker RN              (SC R arm)    1506 Pt tolerated treatment well. D/c home ambulatory in no distress. Pt aware of next [facility] appointment scheduled for 01/24/2022.

## 2021-07-28 ENCOUNTER — TELEPHONE (OUTPATIENT)
Dept: INTERNAL MEDICINE CLINIC | Age: 86
End: 2021-07-28

## 2021-07-28 ENCOUNTER — HOME HEALTH ADMISSION (OUTPATIENT)
Dept: HOME HEALTH SERVICES | Facility: HOME HEALTH | Age: 86
End: 2021-07-28
Payer: MEDICARE

## 2021-07-28 DIAGNOSIS — T66.XXXA ADVERSE EFFECT OF RADIATION, INITIAL ENCOUNTER: Primary | ICD-10-CM

## 2021-07-28 NOTE — TELEPHONE ENCOUNTER
Doing poorly   Radiation has caused pain and debility wound care needs skilled nurse and now weak so needs home health and home PT    Spoke to son     radiation RX needs to get involved in her care living alone

## 2021-07-28 NOTE — TELEPHONE ENCOUNTER
Ly Ruano, son       Reason for call: Speak to the provider or the nurse regarding the patient's status. Callback required yes/no and why: yes       Best contact number(s): [phone]       Details to clarify the request: Caller stated pt had radiation therapy. Caller stated he believes the patient may need home health services.        Rosie Medico

## 2021-07-28 NOTE — TELEPHONE ENCOUNTER
Kathryn from [facility] - INPATIENT       Reason for call: Solange Govind from office, needs to know if she can get a start of care date of Saturday. This is the first she has available. Callback required yes/no and why: yes, to confirm.        Best contact number(s): [phone]      SHAYNE

## 2021-07-31 ENCOUNTER — HOME CARE VISIT (OUTPATIENT)
Dept: SCHEDULING | Facility: HOME HEALTH | Age: 86
End: 2021-07-31
Payer: MEDICARE

## 2021-07-31 PROCEDURE — G0299 HHS/HOSPICE OF RN EA 15 MIN: HCPCS

## 2021-07-31 PROCEDURE — 400013 HH SOC

## 2021-07-31 PROCEDURE — 3331090002 HH PPS REVENUE DEBIT

## 2021-07-31 PROCEDURE — 400018 HH-NO PAY CLAIM PROCEDURE

## 2021-07-31 PROCEDURE — 3331090001 HH PPS REVENUE CREDIT

## 2021-08-01 PROCEDURE — 3331090001 HH PPS REVENUE CREDIT

## 2021-08-01 PROCEDURE — 3331090002 HH PPS REVENUE DEBIT

## 2021-08-01 NOTE — HOME HEALTH
Skilled Reason for admission/summary of clinical condition:PER H&P  Ms. Nu Gonzalez is an 80 y.o. who is about four months out from a lacunar stroke with total resolution of symptoms. She had bad skin cancers on her lower legs. She was referred to radiation therapy Dr. Fuentes Murguia and she has received a bunch of treatment, she thinks fifteen mostly on the left lower leg and a couple of spots on the right. She developed some scabbing and swelling and tenderness on the left associated. Her last radiation treatment was just a few days ago. She is following up with Dr. Ave Kidd from that. Because of the pain involved they told her to ask me what to do. She has taken two 500 mg Tylenol in the morning, two 500 mg Tylenol in the evening and sometimes she has taken one Aleve 220 middle of the day. She is still off her Prolia injection for osteoporosis and I told her those are really important and to not get off cycle with that. Her blood pressure is okay and she looks fine. Her left lower leg was seen swollen and irritated and reddened in the lower half. I advised just using Tylenol for pain. She can take up to 3000 mg a day. I advised rescheduling her Prolia for next week and she will get her BMP, magnesium and phosphorous now while here so that she will not have to have her labs done when she goes in for the Prolia injection. Diagnosis: Skin Ca, Adverse reaction to radiation  Disciplines involved in care/ visit frequency:  SN 1w1, 3w2, 2w3, 1w4  PT  1w1  Caregiver: Art Choi son present but does live out of town. Neighbors and friend assists with care needs as well. Medications reconciled and all medications are available in the home this visit. The following education was provided regarding medications: medication interactions, and look alike medications:  Medications are somewhat effective at this time.   High risk medication education as follows;   NARCOTIC ANALGESICS  Instructed re medication dose/ frequency. Do not exceed maximum dose. Signs of excessive opioids include dizziness, drowsiness, slow or shallow breathing. If Narcotic has Acetaminophen in it, instruct re need to monitor any acetaminophen in other meds such as Tylenol, Nyquil, etc .as excessive Acetaminophen can cause liver damage. Take bowel meds as needed as narcotics can cause constipation. notified of any discrepancies/medication interactions n/a  Home health supplies by type and quantity ordered/delivered this visit include: ns, 4x4  Patient education provided this visit to include: [address], Lengthy visit about patient status and care needs and patient decided that she would perfer to go to inpatient reb for care needs. Call to nafisa zendejas and Dr Ryann Torres and to fax required paperwork to [facility] rehab. Calls with [facility] and state after needed paperwork submitted 24 hr to admission if approved. Pain managment and use of tylenol inbetween other pain medication. Wound care and need to leevate lowe leg  Patient/caregiver degree of understanding: good  Home exercise program/Homework provided: up with walker  Pt/Caregiver instructed on plan of care and are agreeable to plan of care at this time. Discharge planning discussed with patient and caregiver. Discharge planning as follows: When goals of wound care complete, caregiver able and willing to perform wound care. Pt/Caregiver did verbalize understanding of discharge planning. Plan of care and admission to home health status called to attending physician (physician to sign POC -verified)  Dr Ryann Torres. PCP: Dr Ryann Torres  Next scheduled doctor appointment: 8/3/21 oncology  Discussed the following with patient and/or caregiver  Have one gallon of water per person for at least 3 days on hand. Have non-perishable food for at least 3 days that do not need to be cooked. Have flashlights and batteries.   Charge your cell phones and any back up lithium batteries for your cell phones. Have medication for a week in your home. Make sure you have a caregiver in the home to provide care in case your home health nurse cannot get to your house. Make sure you have all of your paperwork i.e. Identification, insurance cards, DME phone number, physician and pharmacy phone number, agency phone number, and your medications in one place for easy access and in a zip lock bag to protect them. Call agency if you relocate so we can contact you.   Patient and/or caregiver verbalized understanding unless noted above

## 2021-08-02 ENCOUNTER — HOME CARE VISIT (OUTPATIENT)
Dept: SCHEDULING | Facility: HOME HEALTH | Age: 86
End: 2021-08-02
Payer: MEDICARE

## 2021-08-02 VITALS
RESPIRATION RATE: 16 BRPM | TEMPERATURE: 98.4 F | OXYGEN SATURATION: 98 % | HEART RATE: 64 BPM | SYSTOLIC BLOOD PRESSURE: 118 MMHG | DIASTOLIC BLOOD PRESSURE: 70 MMHG

## 2021-08-02 VITALS
DIASTOLIC BLOOD PRESSURE: 76 MMHG | TEMPERATURE: 97.6 F | SYSTOLIC BLOOD PRESSURE: 125 MMHG | HEART RATE: 77 BPM | RESPIRATION RATE: 18 BRPM | OXYGEN SATURATION: 98 %

## 2021-08-02 VITALS
TEMPERATURE: 96.5 F | OXYGEN SATURATION: 97 % | HEART RATE: 70 BPM | DIASTOLIC BLOOD PRESSURE: 72 MMHG | SYSTOLIC BLOOD PRESSURE: 114 MMHG

## 2021-08-02 PROCEDURE — 3331090002 HH PPS REVENUE DEBIT

## 2021-08-02 PROCEDURE — G0299 HHS/HOSPICE OF RN EA 15 MIN: HCPCS

## 2021-08-02 PROCEDURE — G0151 HHCP-SERV OF PT,EA 15 MIN: HCPCS

## 2021-08-02 PROCEDURE — 3331090001 HH PPS REVENUE CREDIT

## 2021-08-03 PROCEDURE — 3331090002 HH PPS REVENUE DEBIT

## 2021-08-03 PROCEDURE — 3331090003 HH PPS REVENUE ADJ

## 2021-08-03 PROCEDURE — 3331090001 HH PPS REVENUE CREDIT

## 2021-08-03 NOTE — HOME HEALTH
Subjective: patient states \" im doing okay today\"  Falls since last visit (if yes include bsrifallreport): no falls   Caregiver involvement: Caregiver assists with: Medications, Meals, Bathing, ADL, Transportation and Housekeeping Caregiver unable to assists with: Wound care Caregiver is available:24 hours/day Caregiver is present at this visit and did participate with clinician. Home health supplies by type and quantity ordered/delivered this visit include: supplies ordered    Does the patient have any new or changed medications?no  If Yes, were medications reconciled? yes  Was the certifying physician notified of changes in medications? NA    Clinical assessment (what this visit means for the patient overall and need for ongoing skilled care):   FSA performed with vs. VSS. afebrile. Patient is alert and oriented x 4 and in nad. BBS are CTA. mild to moderate pain reported. good appetite and hydration noted. patient is in nad. wound assessed   on LLE which was open to air upon arrival. patient states she cannot do wound care herself and son is going back to [place].   new orders obtained for wound care, to help soften slough and remove dead skin and keep bacteria down. wound cleaned with antibacterial soap and water. rinsed and air dried. xeroform gauze applied to burn. covered by nonstick dressings, and secured with rolled gauze. patient reported no pain with care. 6 x 14 x 1. >75% dead slough tisse noted. moderate amount of old dead skin debrided with soap and water  cleansing today. lle is still edematous but redness is improving. patient finished initial cipro rx for wound infection. she started another rx fo doxycycline for another infection but adivsed patient this is used for wound infecitons as well.   son is trying to get patient into  [facility] inpatient rehab for 1 to 2 weeks to help her become more independent. patient needs more help than what was initially thought.    Progress or lack of progress toward specific goals: dr Timothy Holm office called for different wound care orders  Interdisciplinary communication: dr Patsy Hart contacted  Discharge planning as follows:  When wound is 100% healed  Specific plan for next visit: Assess caregiver for ability to teach back wound care

## 2021-08-04 ENCOUNTER — HOME CARE VISIT (OUTPATIENT)
Dept: SCHEDULING | Facility: HOME HEALTH | Age: 86
End: 2021-08-04
Payer: MEDICARE

## 2021-08-04 PROCEDURE — 3331090001 HH PPS REVENUE CREDIT

## 2021-08-04 PROCEDURE — 3331090002 HH PPS REVENUE DEBIT

## 2021-08-04 NOTE — HOME HEALTH
missed visit. [facility]    dr Manuel Hyde aware

## 2021-08-05 PROCEDURE — 3331090001 HH PPS REVENUE CREDIT

## 2021-08-05 PROCEDURE — 3331090002 HH PPS REVENUE DEBIT

## 2021-08-06 PROCEDURE — 3331090002 HH PPS REVENUE DEBIT

## 2021-08-06 PROCEDURE — 3331090001 HH PPS REVENUE CREDIT

## 2021-08-07 PROCEDURE — 3331090001 HH PPS REVENUE CREDIT

## 2021-08-07 PROCEDURE — 3331090002 HH PPS REVENUE DEBIT

## 2021-08-08 PROCEDURE — 3331090001 HH PPS REVENUE CREDIT

## 2021-08-08 PROCEDURE — 3331090002 HH PPS REVENUE DEBIT

## 2021-08-09 PROCEDURE — 3331090001 HH PPS REVENUE CREDIT

## 2021-08-09 PROCEDURE — 3331090002 HH PPS REVENUE DEBIT

## 2021-08-10 PROCEDURE — 3331090002 HH PPS REVENUE DEBIT

## 2021-08-10 PROCEDURE — 3331090001 HH PPS REVENUE CREDIT

## 2021-08-11 PROCEDURE — 3331090001 HH PPS REVENUE CREDIT

## 2021-08-11 PROCEDURE — 3331090002 HH PPS REVENUE DEBIT

## 2021-08-12 PROCEDURE — 3331090001 HH PPS REVENUE CREDIT

## 2021-08-12 PROCEDURE — 3331090002 HH PPS REVENUE DEBIT

## 2021-08-13 PROCEDURE — 3331090002 HH PPS REVENUE DEBIT

## 2021-08-13 PROCEDURE — 3331090001 HH PPS REVENUE CREDIT

## 2021-08-14 ENCOUNTER — HOME CARE VISIT (OUTPATIENT)
Dept: SCHEDULING | Facility: HOME HEALTH | Age: 86
End: 2021-08-14
Payer: MEDICARE

## 2021-08-14 VITALS
DIASTOLIC BLOOD PRESSURE: 68 MMHG | OXYGEN SATURATION: 96 % | TEMPERATURE: 98.2 F | SYSTOLIC BLOOD PRESSURE: 128 MMHG | HEART RATE: 88 BPM | RESPIRATION RATE: 18 BRPM

## 2021-08-14 PROCEDURE — 3331090002 HH PPS REVENUE DEBIT

## 2021-08-14 PROCEDURE — G0299 HHS/HOSPICE OF RN EA 15 MIN: HCPCS

## 2021-08-14 PROCEDURE — 3331090001 HH PPS REVENUE CREDIT

## 2021-08-15 ENCOUNTER — HOME CARE VISIT (OUTPATIENT)
Dept: SCHEDULING | Facility: HOME HEALTH | Age: 86
End: 2021-08-15
Payer: MEDICARE

## 2021-08-15 VITALS
SYSTOLIC BLOOD PRESSURE: 113 MMHG | HEART RATE: 84 BPM | TEMPERATURE: 98.8 F | RESPIRATION RATE: 20 BRPM | DIASTOLIC BLOOD PRESSURE: 72 MMHG | OXYGEN SATURATION: 97 %

## 2021-08-15 PROCEDURE — 3331090001 HH PPS REVENUE CREDIT

## 2021-08-15 PROCEDURE — 3331090002 HH PPS REVENUE DEBIT

## 2021-08-15 PROCEDURE — G0300 HHS/HOSPICE OF LPN EA 15 MIN: HCPCS

## 2021-08-16 ENCOUNTER — HOME CARE VISIT (OUTPATIENT)
Dept: SCHEDULING | Facility: HOME HEALTH | Age: 86
End: 2021-08-16
Payer: MEDICARE

## 2021-08-16 VITALS
OXYGEN SATURATION: 97 % | TEMPERATURE: 99.4 F | SYSTOLIC BLOOD PRESSURE: 110 MMHG | DIASTOLIC BLOOD PRESSURE: 82 MMHG | HEART RATE: 73 BPM

## 2021-08-16 VITALS
RESPIRATION RATE: 16 BRPM | OXYGEN SATURATION: 98 % | HEART RATE: 85 BPM | SYSTOLIC BLOOD PRESSURE: 124 MMHG | DIASTOLIC BLOOD PRESSURE: 66 MMHG | TEMPERATURE: 98.5 F

## 2021-08-16 PROCEDURE — G0299 HHS/HOSPICE OF RN EA 15 MIN: HCPCS

## 2021-08-16 PROCEDURE — 3331090002 HH PPS REVENUE DEBIT

## 2021-08-16 PROCEDURE — 3331090001 HH PPS REVENUE CREDIT

## 2021-08-16 PROCEDURE — G0151 HHCP-SERV OF PT,EA 15 MIN: HCPCS

## 2021-08-17 ENCOUNTER — HOME CARE VISIT (OUTPATIENT)
Dept: SCHEDULING | Facility: HOME HEALTH | Age: 86
End: 2021-08-17
Payer: MEDICARE

## 2021-08-17 VITALS
HEART RATE: 92 BPM | OXYGEN SATURATION: 98 % | DIASTOLIC BLOOD PRESSURE: 65 MMHG | RESPIRATION RATE: 18 BRPM | TEMPERATURE: 99.1 F | SYSTOLIC BLOOD PRESSURE: 110 MMHG

## 2021-08-17 PROCEDURE — 3331090002 HH PPS REVENUE DEBIT

## 2021-08-17 PROCEDURE — G0300 HHS/HOSPICE OF LPN EA 15 MIN: HCPCS

## 2021-08-17 PROCEDURE — G0152 HHCP-SERV OF OT,EA 15 MIN: HCPCS

## 2021-08-17 PROCEDURE — 3331090001 HH PPS REVENUE CREDIT

## 2021-08-18 ENCOUNTER — HOME CARE VISIT (OUTPATIENT)
Dept: SCHEDULING | Facility: HOME HEALTH | Age: 86
End: 2021-08-18
Payer: MEDICARE

## 2021-08-18 PROCEDURE — 3331090001 HH PPS REVENUE CREDIT

## 2021-08-18 PROCEDURE — 3331090002 HH PPS REVENUE DEBIT

## 2021-08-18 PROCEDURE — G0300 HHS/HOSPICE OF LPN EA 15 MIN: HCPCS

## 2021-08-19 ENCOUNTER — HOME CARE VISIT (OUTPATIENT)
Dept: SCHEDULING | Facility: HOME HEALTH | Age: 86
End: 2021-08-19
Payer: MEDICARE

## 2021-08-19 VITALS
DIASTOLIC BLOOD PRESSURE: 67 MMHG | HEART RATE: 77 BPM | OXYGEN SATURATION: 97 % | TEMPERATURE: 97.7 F | RESPIRATION RATE: 16 BRPM | SYSTOLIC BLOOD PRESSURE: 118 MMHG

## 2021-08-19 PROCEDURE — G0300 HHS/HOSPICE OF LPN EA 15 MIN: HCPCS

## 2021-08-19 PROCEDURE — 3331090001 HH PPS REVENUE CREDIT

## 2021-08-19 PROCEDURE — 3331090002 HH PPS REVENUE DEBIT

## 2021-08-20 ENCOUNTER — HOME CARE VISIT (OUTPATIENT)
Dept: SCHEDULING | Facility: HOME HEALTH | Age: 86
End: 2021-08-20
Payer: MEDICARE

## 2021-08-20 PROCEDURE — 3331090001 HH PPS REVENUE CREDIT

## 2021-08-20 PROCEDURE — 3331090002 HH PPS REVENUE DEBIT

## 2021-08-20 PROCEDURE — G0300 HHS/HOSPICE OF LPN EA 15 MIN: HCPCS

## 2021-08-21 VITALS
HEART RATE: 69 BPM | SYSTOLIC BLOOD PRESSURE: 117 MMHG | DIASTOLIC BLOOD PRESSURE: 65 MMHG | TEMPERATURE: 97.6 F | OXYGEN SATURATION: 98 % | RESPIRATION RATE: 15 BRPM

## 2021-08-21 PROCEDURE — 3331090002 HH PPS REVENUE DEBIT

## 2021-08-21 PROCEDURE — 3331090001 HH PPS REVENUE CREDIT

## 2021-08-22 VITALS
RESPIRATION RATE: 15 BRPM | TEMPERATURE: 97.6 F | HEART RATE: 69 BPM | SYSTOLIC BLOOD PRESSURE: 115 MMHG | DIASTOLIC BLOOD PRESSURE: 64 MMHG | OXYGEN SATURATION: 98 %

## 2021-08-22 VITALS
RESPIRATION RATE: 15 BRPM | DIASTOLIC BLOOD PRESSURE: 64 MMHG | HEART RATE: 64 BPM | TEMPERATURE: 97.3 F | OXYGEN SATURATION: 98 % | SYSTOLIC BLOOD PRESSURE: 118 MMHG

## 2021-08-22 PROCEDURE — 3331090002 HH PPS REVENUE DEBIT

## 2021-08-22 PROCEDURE — 3331090001 HH PPS REVENUE CREDIT

## 2021-08-23 ENCOUNTER — HOME CARE VISIT (OUTPATIENT)
Dept: SCHEDULING | Facility: HOME HEALTH | Age: 86
End: 2021-08-23
Payer: MEDICARE

## 2021-08-23 ENCOUNTER — OFFICE VISIT (OUTPATIENT)
Dept: INTERNAL MEDICINE CLINIC | Age: 86
End: 2021-08-23
Payer: MEDICARE

## 2021-08-23 VITALS
BODY MASS INDEX: 22.02 KG/M2 | HEIGHT: 64 IN | DIASTOLIC BLOOD PRESSURE: 68 MMHG | HEART RATE: 82 BPM | TEMPERATURE: 99.1 F | WEIGHT: 129 LBS | OXYGEN SATURATION: 97 % | RESPIRATION RATE: 18 BRPM | SYSTOLIC BLOOD PRESSURE: 122 MMHG

## 2021-08-23 DIAGNOSIS — T66.XXXD ADVERSE EFFECT OF RADIATION THERAPY, SUBSEQUENT ENCOUNTER: Primary | ICD-10-CM

## 2021-08-23 DIAGNOSIS — Z09 HOSPITAL DISCHARGE FOLLOW-UP: ICD-10-CM

## 2021-08-23 DIAGNOSIS — S81.809D OPEN WOUND OF LOWER LEG, UNSPECIFIED LATERALITY, SUBSEQUENT ENCOUNTER: ICD-10-CM

## 2021-08-23 PROCEDURE — G8427 DOCREV CUR MEDS BY ELIG CLIN: HCPCS | Performed by: INTERNAL MEDICINE

## 2021-08-23 PROCEDURE — G0300 HHS/HOSPICE OF LPN EA 15 MIN: HCPCS

## 2021-08-23 PROCEDURE — 99213 OFFICE O/P EST LOW 20 MIN: CPT | Performed by: INTERNAL MEDICINE

## 2021-08-23 PROCEDURE — 3331090001 HH PPS REVENUE CREDIT

## 2021-08-23 PROCEDURE — 99495 TRANSJ CARE MGMT MOD F2F 14D: CPT | Performed by: INTERNAL MEDICINE

## 2021-08-23 PROCEDURE — 3331090002 HH PPS REVENUE DEBIT

## 2021-08-23 RX ORDER — DIPHENHYDRAMINE HCL 25 MG
TABLET,DISINTEGRATING ORAL
COMMUNITY
Start: 2021-08-03

## 2021-08-23 RX ORDER — CHOLECALCIFEROL (VITAMIN D3) 50 MCG
1000 CAPSULE ORAL
COMMUNITY
Start: 2021-08-03

## 2021-08-23 RX ORDER — DOXYCYCLINE HYCLATE 100 MG
TABLET ORAL
COMMUNITY
Start: 2021-08-11

## 2021-08-23 NOTE — PROGRESS NOTES
Chief Complaint   Patient presents with    Cholesterol Problem    Abrasion     1. Have you been to the ER, urgent care clinic since your last visit? Hospitalized since your last visit? No    2. Have you seen or consulted any other health care providers outside of the [address] [place] [address] since your last visit? Include any pap smears or colon screening.  No

## 2021-08-24 VITALS
TEMPERATURE: 97.5 F | HEART RATE: 69 BPM | RESPIRATION RATE: 15 BRPM | SYSTOLIC BLOOD PRESSURE: 119 MMHG | OXYGEN SATURATION: 98 % | DIASTOLIC BLOOD PRESSURE: 68 MMHG

## 2021-08-24 PROCEDURE — 3331090001 HH PPS REVENUE CREDIT

## 2021-08-24 PROCEDURE — 3331090002 HH PPS REVENUE DEBIT

## 2021-08-24 NOTE — PROGRESS NOTES
This is a YAAKOV visit on [facility], 80 y.o. female, who received radiation therapy to both lower legs and developed an ulceration and pain and lost her ability to function at home. After a difficult time getting it arranged and ongoing discussion with the radiation therapist, radiation therapist realized that she could not be cared for at home any longer and homecare was not enough. She was transferred to inpatient rehab, but she went to [facility]. She spent nine days there. She did well. She said after about five days the pain disappeared. Her mobility has improved. She is actually back to baseline, walking without a cane, walking without a walker. She does plan on moving in the next month or so to an independent living facility that is attached to an assisted living. So, she has the ability to get more care in the future. I told her that is a good plan. She says she has no pain any longer. She still has homecare coming out on a regular basis. Dressing and following wound care instructions from radiation therapist for her lower leg. The patient's meds were reconciled by homecare, because VICK LIM has been following her carefully since discharge from [facility]. Her PT has completed. Nursing is still coming out. She says she is feeling pretty well. Her meds were reviewed. There has been no chest pain, no shortness of breath. Her appetite has been good. Her pain is resolved. Edema and swelling of her legs have also resolved. Her blood pressure was normal.  S1, S2 was regular. Abdomen soft. She has a dressing on her left lower leg that was not removed. There is no peripheral edema and she was ambulatory. I told her she is doing well. I reconciled her meds. I told her to return to see me every three month. I was given a form to fill out for homecare and for her to move to an independent living facility. We will expedite that form and have it faxed over.   I agree that she needs some more careful care and a little bit more observation. I am glad to see that she has returned to baseline. Patient Active Problem List    Diagnosis    Adverse effect of radiation therapy     leg      Arthritis of knee, left    DNR no code (do not resuscitate)    Lumbar spinal stenosis    Biliary colic    Spinal stenosis    Lumbar disc disease with radiculopathy     Pain management and PT  Trans foraminal injections not helping      Osteoporosis     . DEXA Results (most recent):    Results from Hospital Encounter encounter on 08/13/15   DEXA BONE DENSITY STUDY AXIAL   Narrative **Final Report**      ICD Codes / Adm. Diagnosis: V76.12  724.2 / Disorder of bone and cartilage    Lumbago  Examination:  MM DEXA AXIAL SKELETON  - [id number] - Aug 13 2015 10:45AM  Accession No:  [id number]  Reason:  screening      REPORT:  Bone Mineral Density    Indication: Monitoring, estrogen therapy  Age: [de-identified]  Sex: Female. Menopause status:         postmenopausal.  Hormone replacement therapy: yes/no     Risk factors for fall:   None   Risk factors for osteoporosis:  Tobacco use    Current medication for osteoporosis: Calcium, vitamin D, estrogen. Comparison: 2013    Technique: Imaging was performed on the DNA SEQve.          World Health   Organization meta-analysis fracture risk calculator (FRAX) analysis was   performed for 10 year fracture risk probability assessment    Excluded sites: L2 due to fracture, L1 due to degenerative changes    Findings:    Fractures identified on Lateral scanogram:  L2    Lumbar spine: L1-L3, excluding L2  Bone mineral density (gm/cm2): 1.113  % of peak bone mass: 94  % for age matched controls:  80  T score: -0.6  Z score:  1.5    Hip: Right femoral neck  Bone mineral density (gm/cm2):  0.718  % of peak bone mass: 69  % for age matched controls:  100  T score: -2.3  Z score:  0           IMPRESSION:    This patient is osteopenic using the [employer] criteria  As compared to the prior study, there has been a statistically significant   decrease in bone mineral density.   10 year probability of major osteoporotic fracture:  16.3%  10 year probability of hip fracture:  5.6%             Signing/Reading Doctor: Kristal Duggan ([id number])    Deepali Riggs ([id number])  Aug 17 2015 10:59AM                                 Jan 2017 reclast  And annual  Not done 2018  As of June    prolia for 2 years   Doing well      Pancreatic cyst    Visual loss    PAC (premature atrial contraction)     Normal echo and stress test for atypical chest pain      Rosacea    Anxiety    Hypercholesterolemia    GERD (gastroesophageal reflux disease)     Feels better on high dose       Hyperlipidemia LDL goal <130     [facility]charge [facility] 1 week   No records      Vitals:    08/23/21 1454   BP: 122/68   Pulse: 82   Resp: 18   Temp: 99.1 °F (37.3 °C)   TempSrc: Temporal   SpO2: 97%   Weight: 129 lb (58.5 kg)   Height: 5' 4\" (1.626 m)

## 2021-08-25 ENCOUNTER — HOME CARE VISIT (OUTPATIENT)
Dept: SCHEDULING | Facility: HOME HEALTH | Age: 86
End: 2021-08-25
Payer: MEDICARE

## 2021-08-25 PROCEDURE — 3331090001 HH PPS REVENUE CREDIT

## 2021-08-25 PROCEDURE — 3331090002 HH PPS REVENUE DEBIT

## 2021-08-25 PROCEDURE — G0300 HHS/HOSPICE OF LPN EA 15 MIN: HCPCS

## 2021-08-26 DIAGNOSIS — F32.89 OTHER DEPRESSION: ICD-10-CM

## 2021-08-26 DIAGNOSIS — F41.9 ANXIETY: ICD-10-CM

## 2021-08-26 PROCEDURE — 3331090002 HH PPS REVENUE DEBIT

## 2021-08-26 PROCEDURE — 3331090001 HH PPS REVENUE CREDIT

## 2021-08-26 RX ORDER — DULOXETIN HYDROCHLORIDE 60 MG/1
CAPSULE, DELAYED RELEASE ORAL
Qty: 90 CAPSULE | Refills: 0 | Status: SHIPPED | OUTPATIENT
Start: 2021-08-26 | End: 2022-08-31

## 2021-08-27 ENCOUNTER — HOME CARE VISIT (OUTPATIENT)
Dept: SCHEDULING | Facility: HOME HEALTH | Age: 86
End: 2021-08-27
Payer: MEDICARE

## 2021-08-27 PROCEDURE — G0300 HHS/HOSPICE OF LPN EA 15 MIN: HCPCS

## 2021-08-27 PROCEDURE — 3331090001 HH PPS REVENUE CREDIT

## 2021-08-27 PROCEDURE — 3331090002 HH PPS REVENUE DEBIT

## 2021-08-28 VITALS
TEMPERATURE: 97.6 F | RESPIRATION RATE: 15 BRPM | HEART RATE: 69 BPM | OXYGEN SATURATION: 97 % | SYSTOLIC BLOOD PRESSURE: 115 MMHG | DIASTOLIC BLOOD PRESSURE: 65 MMHG

## 2021-08-28 PROCEDURE — 3331090001 HH PPS REVENUE CREDIT

## 2021-08-28 PROCEDURE — 3331090002 HH PPS REVENUE DEBIT

## 2021-08-29 VITALS
TEMPERATURE: 97.6 F | OXYGEN SATURATION: 98 % | HEART RATE: 67 BPM | DIASTOLIC BLOOD PRESSURE: 69 MMHG | SYSTOLIC BLOOD PRESSURE: 116 MMHG | RESPIRATION RATE: 15 BRPM

## 2021-08-29 PROCEDURE — 3331090002 HH PPS REVENUE DEBIT

## 2021-08-29 PROCEDURE — 3331090001 HH PPS REVENUE CREDIT

## 2021-08-30 ENCOUNTER — HOME CARE VISIT (OUTPATIENT)
Dept: SCHEDULING | Facility: HOME HEALTH | Age: 86
End: 2021-08-30
Payer: MEDICARE

## 2021-08-30 PROCEDURE — 3331090001 HH PPS REVENUE CREDIT

## 2021-08-30 PROCEDURE — G0300 HHS/HOSPICE OF LPN EA 15 MIN: HCPCS

## 2021-08-30 PROCEDURE — 400013 HH SOC

## 2021-08-30 PROCEDURE — 3331090002 HH PPS REVENUE DEBIT

## 2021-08-30 PROCEDURE — 400018 HH-NO PAY CLAIM PROCEDURE

## 2021-08-31 VITALS
DIASTOLIC BLOOD PRESSURE: 67 MMHG | HEART RATE: 69 BPM | RESPIRATION RATE: 15 BRPM | SYSTOLIC BLOOD PRESSURE: 121 MMHG | TEMPERATURE: 97.5 F | OXYGEN SATURATION: 97 %

## 2021-08-31 PROCEDURE — 3331090001 HH PPS REVENUE CREDIT

## 2021-08-31 PROCEDURE — 3331090002 HH PPS REVENUE DEBIT

## 2021-09-01 PROCEDURE — 3331090001 HH PPS REVENUE CREDIT

## 2021-09-01 PROCEDURE — 3331090002 HH PPS REVENUE DEBIT

## 2021-09-02 ENCOUNTER — HOME CARE VISIT (OUTPATIENT)
Dept: SCHEDULING | Facility: HOME HEALTH | Age: 86
End: 2021-09-02
Payer: MEDICARE

## 2021-09-02 PROCEDURE — 3331090002 HH PPS REVENUE DEBIT

## 2021-09-02 PROCEDURE — 3331090001 HH PPS REVENUE CREDIT

## 2021-09-03 PROCEDURE — 3331090002 HH PPS REVENUE DEBIT

## 2021-09-03 PROCEDURE — 3331090001 HH PPS REVENUE CREDIT

## 2021-09-04 PROCEDURE — 3331090001 HH PPS REVENUE CREDIT

## 2021-09-04 PROCEDURE — 3331090002 HH PPS REVENUE DEBIT

## 2021-09-05 PROCEDURE — 3331090002 HH PPS REVENUE DEBIT

## 2021-09-05 PROCEDURE — 3331090001 HH PPS REVENUE CREDIT

## 2021-09-06 PROCEDURE — 3331090001 HH PPS REVENUE CREDIT

## 2021-09-06 PROCEDURE — 3331090002 HH PPS REVENUE DEBIT

## 2021-09-07 ENCOUNTER — HOME CARE VISIT (OUTPATIENT)
Dept: SCHEDULING | Facility: HOME HEALTH | Age: 86
End: 2021-09-07
Payer: MEDICARE

## 2021-09-07 VITALS
OXYGEN SATURATION: 98 % | DIASTOLIC BLOOD PRESSURE: 60 MMHG | RESPIRATION RATE: 16 BRPM | SYSTOLIC BLOOD PRESSURE: 115 MMHG | HEART RATE: 72 BPM | TEMPERATURE: 97.7 F

## 2021-09-07 PROCEDURE — 3331090002 HH PPS REVENUE DEBIT

## 2021-09-07 PROCEDURE — 3331090001 HH PPS REVENUE CREDIT

## 2021-09-07 PROCEDURE — G0300 HHS/HOSPICE OF LPN EA 15 MIN: HCPCS

## 2021-09-08 PROCEDURE — 3331090002 HH PPS REVENUE DEBIT

## 2021-09-08 PROCEDURE — 3331090001 HH PPS REVENUE CREDIT

## 2021-09-09 ENCOUNTER — HOME CARE VISIT (OUTPATIENT)
Dept: SCHEDULING | Facility: HOME HEALTH | Age: 86
End: 2021-09-09
Payer: MEDICARE

## 2021-09-09 PROCEDURE — 3331090002 HH PPS REVENUE DEBIT

## 2021-09-09 PROCEDURE — G0299 HHS/HOSPICE OF RN EA 15 MIN: HCPCS

## 2021-09-09 PROCEDURE — 3331090001 HH PPS REVENUE CREDIT

## 2021-09-10 PROCEDURE — 3331090001 HH PPS REVENUE CREDIT

## 2021-09-10 PROCEDURE — 3331090002 HH PPS REVENUE DEBIT

## 2021-09-11 PROCEDURE — 3331090001 HH PPS REVENUE CREDIT

## 2021-09-11 PROCEDURE — 3331090002 HH PPS REVENUE DEBIT

## 2021-09-12 PROCEDURE — 3331090002 HH PPS REVENUE DEBIT

## 2021-09-12 PROCEDURE — 3331090001 HH PPS REVENUE CREDIT

## 2021-09-13 VITALS
OXYGEN SATURATION: 97 % | RESPIRATION RATE: 16 BRPM | HEART RATE: 78 BPM | DIASTOLIC BLOOD PRESSURE: 70 MMHG | SYSTOLIC BLOOD PRESSURE: 124 MMHG | TEMPERATURE: 97.8 F

## 2021-09-13 PROCEDURE — 3331090002 HH PPS REVENUE DEBIT

## 2021-09-13 PROCEDURE — 3331090001 HH PPS REVENUE CREDIT

## 2021-11-18 RX ORDER — ATORVASTATIN CALCIUM 10 MG/1
TABLET, FILM COATED ORAL
Qty: 90 TABLET | Refills: 1 | Status: SHIPPED | OUTPATIENT
Start: 2021-11-18 | End: 2022-05-09

## 2021-11-22 ENCOUNTER — OFFICE VISIT (OUTPATIENT)
Dept: INTERNAL MEDICINE CLINIC | Age: 86
End: 2021-11-22
Payer: MEDICARE

## 2021-11-22 VITALS
BODY MASS INDEX: 21.51 KG/M2 | SYSTOLIC BLOOD PRESSURE: 112 MMHG | HEIGHT: 64 IN | OXYGEN SATURATION: 98 % | HEART RATE: 73 BPM | WEIGHT: 126 LBS | TEMPERATURE: 98 F | RESPIRATION RATE: 18 BRPM | DIASTOLIC BLOOD PRESSURE: 65 MMHG

## 2021-11-22 DIAGNOSIS — E78.00 HYPERCHOLESTEROLEMIA: ICD-10-CM

## 2021-11-22 DIAGNOSIS — Z00.00 MEDICARE ANNUAL WELLNESS VISIT, SUBSEQUENT: Primary | ICD-10-CM

## 2021-11-22 DIAGNOSIS — I10 HTN, GOAL BELOW 130/80: ICD-10-CM

## 2021-11-22 DIAGNOSIS — E78.5 HYPERLIPIDEMIA LDL GOAL <130: ICD-10-CM

## 2021-11-22 DIAGNOSIS — I63.81 LACUNAR CEREBROVASCULAR ACCIDENT (CVA) OF SUBTHALAMIC REGION (HCC): ICD-10-CM

## 2021-11-22 PROCEDURE — G8420 CALC BMI NORM PARAMETERS: HCPCS | Performed by: INTERNAL MEDICINE

## 2021-11-22 PROCEDURE — G8510 SCR DEP NEG, NO PLAN REQD: HCPCS | Performed by: INTERNAL MEDICINE

## 2021-11-22 PROCEDURE — G8427 DOCREV CUR MEDS BY ELIG CLIN: HCPCS | Performed by: INTERNAL MEDICINE

## 2021-11-22 PROCEDURE — G8536 NO DOC ELDER MAL SCRN: HCPCS | Performed by: INTERNAL MEDICINE

## 2021-11-22 PROCEDURE — G0439 PPPS, SUBSEQ VISIT: HCPCS | Performed by: INTERNAL MEDICINE

## 2021-11-22 PROCEDURE — 1090F PRES/ABSN URINE INCON ASSESS: CPT | Performed by: INTERNAL MEDICINE

## 2021-11-22 PROCEDURE — 1101F PT FALLS ASSESS-DOCD LE1/YR: CPT | Performed by: INTERNAL MEDICINE

## 2021-11-22 PROCEDURE — G0463 HOSPITAL OUTPT CLINIC VISIT: HCPCS | Performed by: INTERNAL MEDICINE

## 2021-11-22 PROCEDURE — 99214 OFFICE O/P EST MOD 30 MIN: CPT | Performed by: INTERNAL MEDICINE

## 2021-11-22 NOTE — PATIENT INSTRUCTIONS
Medicare Wellness Visit, Female     The best way to live healthy is to have a lifestyle where you eat a well-balanced diet, exercise regularly, limit alcohol use, and quit all forms of tobacco/nicotine, if applicable. Regular preventive services are another way to keep healthy. Preventive services (vaccines, screening tests, monitoring & exams) can help personalize your care plan, which helps you manage your own care. Screening tests can find health problems at the earliest stages, when they are easiest to treat. [facility] follows the current, evidence-based guidelines published by the [facility] Jordan Chavez ([facility]) when recommending preventive services for our patients. Because we follow these guidelines, sometimes recommendations change over time as research supports it. (For example, mammograms used to be recommended annually. Even though Medicare will still pay for an annual mammogram, the newer guidelines recommend a mammogram every two years for women of average risk). Of course, you and your doctor may decide to screen more often for some diseases, based on your risk and your co-morbidities (chronic disease you are already diagnosed with). Preventive services for you include:  - Medicare offers their members a free annual wellness visit, which is time for you and your primary care provider to discuss and plan for your preventive service needs. Take advantage of this benefit every year!  -All adults over the age of 72 should receive the recommended pneumonia vaccines. Current USPSTF guidelines recommend a series of two vaccines for the best pneumonia protection.   -All adults should have a flu vaccine yearly and a tetanus vaccine every 10 years.   -All adults age 48 and older should receive the shingles vaccines (series of two vaccines).       -All adults age 38-68 who are overweight should have a diabetes screening test once every three years.   -All adults born between [birth year] and 1965 should be screened once for Hepatitis C.  -Other screening tests and preventive services for persons with diabetes include: an eye exam to screen for diabetic retinopathy, a kidney function test, a foot exam, and stricter control over your cholesterol.   -Cardiovascular screening for adults with routine risk involves an electrocardiogram (ECG) at intervals determined by your doctor.   -Colorectal cancer screenings should be done for adults age 54-65 with no increased risk factors for colorectal cancer. There are a number of acceptable methods of screening for this type of cancer. Each test has its own benefits and drawbacks. Discuss with your doctor what is most appropriate for you during your annual wellness visit. The different tests include: colonoscopy (considered the best screening method), a fecal occult blood test, a fecal DNA test, and sigmoidoscopy.    -A bone mass density test is recommended when a woman turns 65 to screen for osteoporosis. This test is only recommended one time, as a screening. Some providers will use this same test as a disease monitoring tool if you already have osteoporosis. -Breast cancer screenings are recommended every other year for women of normal risk, age 54-69.  -Cervical cancer screenings for women over age 72 are only recommended with certain risk factors. Here is a list of your current Health Maintenance items (your personalized list of preventive services) with a due date:  Health Maintenance Due   Topic Date Due    COVID-19 Vaccine (1) Never done    DTaP/Tdap/Td  (1 - Tdap) 05/03/2007    Shingles Vaccine (2 of 2) 03/27/2020    Yearly Flu Vaccine (1) 09/01/2021    Cholesterol Test   10/16/2021         Medicare Wellness Visit, Female     The best way to live healthy is to have a lifestyle where you eat a well-balanced diet, exercise regularly, limit alcohol use, and quit all forms of tobacco/nicotine, if applicable.      Regular preventive services are another way to keep healthy. Preventive services (vaccines, screening tests, monitoring & exams) can help personalize your care plan, which helps you manage your own care. Screening tests can find health problems at the earliest stages, when they are easiest to treat. [facility] follows the current, evidence-based guidelines published by the [facility] Jordan Chavez ([facility]) when recommending preventive services for our patients. Because we follow these guidelines, sometimes recommendations change over time as research supports it. (For example, mammograms used to be recommended annually. Even though Medicare will still pay for an annual mammogram, the newer guidelines recommend a mammogram every two years for women of average risk). Of course, you and your doctor may decide to screen more often for some diseases, based on your risk and your co-morbidities (chronic disease you are already diagnosed with). Preventive services for you include:  - Medicare offers their members a free annual wellness visit, which is time for you and your primary care provider to discuss and plan for your preventive service needs. Take advantage of this benefit every year!  -All adults over the age of 72 should receive the recommended pneumonia vaccines. Current USPSTF guidelines recommend a series of two vaccines for the best pneumonia protection.   -All adults should have a flu vaccine yearly and a tetanus vaccine every 10 years.   -All adults age 48 and older should receive the shingles vaccines (series of two vaccines).       -All adults age 38-68 who are overweight should have a diabetes screening test once every three years.   -All adults born between 80 and 1965 should be screened once for Hepatitis C.  -Other screening tests and preventive services for persons with diabetes include: an eye exam to screen for diabetic retinopathy, a kidney function test, a foot exam, and stricter control over your cholesterol.   -Cardiovascular screening for adults with routine risk involves an electrocardiogram (ECG) at intervals determined by your doctor.   -Colorectal cancer screenings should be done for adults age 54-65 with no increased risk factors for colorectal cancer. There are a number of acceptable methods of screening for this type of cancer. Each test has its own benefits and drawbacks. Discuss with your doctor what is most appropriate for you during your annual wellness visit. The different tests include: colonoscopy (considered the best screening method), a fecal occult blood test, a fecal DNA test, and sigmoidoscopy.    -A bone mass density test is recommended when a woman turns 65 to screen for osteoporosis. This test is only recommended one time, as a screening. Some providers will use this same test as a disease monitoring tool if you already have osteoporosis. -Breast cancer screenings are recommended every other year for women of normal risk, age 54-69.  -Cervical cancer screenings for women over age 72 are only recommended with certain risk factors.      Here is a list of your current Health Maintenance items (your personalized list of preventive services) with a due date:  Health Maintenance Due   Topic Date Due    COVID-19 Vaccine (1) Never done    DTaP/Tdap/Td  (1 - Tdap) 05/03/2007    Shingles Vaccine (2 of 2) 03/27/2020    Yearly Flu Vaccine (1) 09/01/2021    Cholesterol Test   10/16/2021

## 2021-11-22 NOTE — PROGRESS NOTES
Chief Complaint   Patient presents with    Follow-up     1. Have you been to the ER, urgent care clinic since your last visit? Hospitalized since your last visit? No    2. Have you seen or consulted any other health care providers outside of the [address] [place] [address] since your last visit? Include any pap smears or colon screening.  No    Visit Vitals  BP (!) 131/58   Pulse 73   Temp 98 °F (36.7 °C) (Temporal)   Resp 18   Ht 5' 4\" (1.626 m)   Wt 126 lb (57.2 kg)   SpO2 98%   BMI 21.63 kg/m²
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
## 2021-11-28 RX ORDER — AMLODIPINE BESYLATE 5 MG/1
TABLET ORAL
Qty: 90 TABLET | Refills: 1 | Status: SHIPPED | OUTPATIENT
Start: 2021-11-28 | End: 2022-02-02

## 2021-11-28 RX ORDER — LISINOPRIL 10 MG/1
TABLET ORAL
Qty: 90 TABLET | Refills: 1 | Status: SHIPPED | OUTPATIENT
Start: 2021-11-28 | End: 2022-05-09

## 2022-01-21 ENCOUNTER — TELEPHONE (OUTPATIENT)
Dept: INTERNAL MEDICINE CLINIC | Age: 87
End: 2022-01-21

## 2022-01-22 DIAGNOSIS — M81.0 OSTEOPOROSIS, UNSPECIFIED OSTEOPOROSIS TYPE, UNSPECIFIED PATHOLOGICAL FRACTURE PRESENCE: Primary | ICD-10-CM

## 2022-01-22 RX ORDER — ALBUTEROL SULFATE 0.83 MG/ML
2.5 SOLUTION RESPIRATORY (INHALATION) AS NEEDED
Status: CANCELLED
Start: 2022-01-25

## 2022-01-22 RX ORDER — ACETAMINOPHEN 325 MG/1
650 TABLET ORAL AS NEEDED
Status: CANCELLED
Start: 2022-01-25

## 2022-01-22 RX ORDER — DIPHENHYDRAMINE HYDROCHLORIDE 50 MG/ML
25 INJECTION, SOLUTION INTRAMUSCULAR; INTRAVENOUS AS NEEDED
Status: CANCELLED
Start: 2022-01-25

## 2022-01-22 RX ORDER — ONDANSETRON 2 MG/ML
8 INJECTION INTRAMUSCULAR; INTRAVENOUS AS NEEDED
Status: CANCELLED | OUTPATIENT
Start: 2022-01-25

## 2022-01-22 RX ORDER — DIPHENHYDRAMINE HYDROCHLORIDE 50 MG/ML
50 INJECTION, SOLUTION INTRAMUSCULAR; INTRAVENOUS AS NEEDED
Status: CANCELLED
Start: 2022-01-25

## 2022-01-22 RX ORDER — HYDROCORTISONE SODIUM SUCCINATE 100 MG/2ML
100 INJECTION, POWDER, FOR SOLUTION INTRAMUSCULAR; INTRAVENOUS AS NEEDED
Status: CANCELLED | OUTPATIENT
Start: 2022-01-25

## 2022-01-22 RX ORDER — EPINEPHRINE 1 MG/ML
0.3 INJECTION, SOLUTION, CONCENTRATE INTRAVENOUS AS NEEDED
Status: CANCELLED | OUTPATIENT
Start: 2022-01-25

## 2022-01-24 ENCOUNTER — HOSPITAL ENCOUNTER (OUTPATIENT)
Dept: INFUSION THERAPY | Age: 87
Discharge: HOME | End: 2022-01-24
Payer: MEDICARE

## 2022-01-24 VITALS
SYSTOLIC BLOOD PRESSURE: 130 MMHG | RESPIRATION RATE: 18 BRPM | DIASTOLIC BLOOD PRESSURE: 62 MMHG | HEART RATE: 68 BPM | TEMPERATURE: 97.5 F

## 2022-01-24 DIAGNOSIS — M81.0 OSTEOPOROSIS, UNSPECIFIED OSTEOPOROSIS TYPE, UNSPECIFIED PATHOLOGICAL FRACTURE PRESENCE: Primary | ICD-10-CM

## 2022-01-24 LAB
ALBUMIN SERPL-MCNC: 3.5 G/DL (ref 3.5–5)
ALBUMIN/GLOB SERPL: 1.1 {RATIO} (ref 1.1–2.2)
ALP SERPL-CCNC: 60 U/L (ref 45–117)
ALT SERPL-CCNC: 24 U/L (ref 12–78)
ANION GAP SERPL CALC-SCNC: 4 MMOL/L (ref 5–15)
AST SERPL-CCNC: 18 U/L (ref 15–37)
BILIRUB SERPL-MCNC: 0.3 MG/DL (ref 0.2–1)
BUN SERPL-MCNC: 17 MG/DL (ref 6–20)
BUN/CREAT SERPL: 22 (ref 12–20)
CALCIUM SERPL-MCNC: 9.6 MG/DL (ref 8.5–10.1)
CHLORIDE SERPL-SCNC: 104 MMOL/L (ref 97–108)
CO2 SERPL-SCNC: 30 MMOL/L (ref 21–32)
CREAT SERPL-MCNC: 0.78 MG/DL (ref 0.55–1.02)
GLOBULIN SER CALC-MCNC: 3.3 G/DL (ref 2–4)
GLUCOSE SERPL-MCNC: 137 MG/DL (ref 65–100)
POTASSIUM SERPL-SCNC: 3.8 MMOL/L (ref 3.5–5.1)
PROT SERPL-MCNC: 6.8 G/DL (ref 6.4–8.2)
SODIUM SERPL-SCNC: 138 MMOL/L (ref 136–145)

## 2022-01-24 PROCEDURE — 36415 COLL VENOUS BLD VENIPUNCTURE: CPT

## 2022-01-24 PROCEDURE — 96372 THER/PROPH/DIAG INJ SC/IM: CPT

## 2022-01-24 PROCEDURE — 80053 COMPREHEN METABOLIC PANEL: CPT

## 2022-01-24 PROCEDURE — 74011250636 HC RX REV CODE- 250/636: Performed by: INTERNAL MEDICINE

## 2022-01-24 RX ORDER — EPINEPHRINE 1 MG/ML
0.3 INJECTION, SOLUTION, CONCENTRATE INTRAVENOUS AS NEEDED
Status: CANCELLED | OUTPATIENT
Start: 2022-07-24

## 2022-01-24 RX ORDER — FAMOTIDINE 40 MG/1
TABLET, FILM COATED ORAL
Qty: 90 TABLET | Refills: 3 | Status: SHIPPED | OUTPATIENT
Start: 2022-01-24

## 2022-01-24 RX ORDER — ONDANSETRON 2 MG/ML
8 INJECTION INTRAMUSCULAR; INTRAVENOUS AS NEEDED
Status: CANCELLED | OUTPATIENT
Start: 2022-07-24

## 2022-01-24 RX ORDER — ACETAMINOPHEN 325 MG/1
650 TABLET ORAL AS NEEDED
Status: CANCELLED
Start: 2022-07-24

## 2022-01-24 RX ORDER — HYDROCORTISONE SODIUM SUCCINATE 100 MG/2ML
100 INJECTION, POWDER, FOR SOLUTION INTRAMUSCULAR; INTRAVENOUS AS NEEDED
Status: CANCELLED | OUTPATIENT
Start: 2022-07-24

## 2022-01-24 RX ORDER — DIPHENHYDRAMINE HYDROCHLORIDE 50 MG/ML
50 INJECTION, SOLUTION INTRAMUSCULAR; INTRAVENOUS AS NEEDED
Status: CANCELLED
Start: 2022-07-24

## 2022-01-24 RX ORDER — DIPHENHYDRAMINE HYDROCHLORIDE 50 MG/ML
25 INJECTION, SOLUTION INTRAMUSCULAR; INTRAVENOUS AS NEEDED
Status: CANCELLED
Start: 2022-07-24

## 2022-01-24 RX ORDER — ALBUTEROL SULFATE 0.83 MG/ML
2.5 SOLUTION RESPIRATORY (INHALATION) AS NEEDED
Status: CANCELLED
Start: 2022-07-24

## 2022-01-24 RX ADMIN — DENOSUMAB 60 MG: 60 INJECTION SUBCUTANEOUS at 16:10

## 2022-02-01 ENCOUNTER — NURSE TRIAGE (OUTPATIENT)
Dept: OTHER | Facility: CLINIC | Age: 87
End: 2022-02-01

## 2022-02-01 NOTE — TELEPHONE ENCOUNTER
Reason for Disposition   MILD swelling of both ankles (i.e., pedal edema) AND new-onset or worsening    Protocols used: LEG SWELLING AND EDEMA-ADULT-OH    Received call from Bridgette at [facility] with Red Flag Complaint. Subjective: Caller states \"my legs are a little bit swollen\"     Current Symptoms: Bilateral lower leg edema. Hx of past radiation to L leg and receives chemo injections into the legs every other Thursday. Denies any pain or sob. Able to ambulate. Rates the edema as mild     Onset: 4-5 days    Associated Symptoms: Current cancer treatment for skin cancer in the legs    Pain Severity: No pain    Temperature: Denies    What has been tried: Elevation    Recommended disposition: See in next 3 days    Care advice provided, patient verbalizes understanding; denies any other questions or concerns; instructed to call back for any new or worsening symptoms. Transferred caller to [facility]    Attention Provider: Thank you for allowing me to participate in the care of your patient. The patient was connected to triage in response to information provided to the [facility]. Please do not respond through this encounter as the response is not directed to a shared pool.

## 2022-02-02 ENCOUNTER — OFFICE VISIT (OUTPATIENT)
Dept: INTERNAL MEDICINE CLINIC | Age: 87
End: 2022-02-02
Payer: MEDICARE

## 2022-02-02 VITALS
HEART RATE: 73 BPM | BODY MASS INDEX: 21.75 KG/M2 | WEIGHT: 127.4 LBS | SYSTOLIC BLOOD PRESSURE: 120 MMHG | DIASTOLIC BLOOD PRESSURE: 66 MMHG | HEIGHT: 64 IN | TEMPERATURE: 98.1 F | OXYGEN SATURATION: 100 % | RESPIRATION RATE: 20 BRPM

## 2022-02-02 DIAGNOSIS — I10 HTN, GOAL BELOW 130/80: ICD-10-CM

## 2022-02-02 DIAGNOSIS — T66.XXXD ADVERSE EFFECT OF RADIATION THERAPY, SUBSEQUENT ENCOUNTER: ICD-10-CM

## 2022-02-02 DIAGNOSIS — I25.118 CORONARY ARTERY DISEASE OF NATIVE HEART WITH STABLE ANGINA PECTORIS, UNSPECIFIED VESSEL OR LESION TYPE (HCC): ICD-10-CM

## 2022-02-02 DIAGNOSIS — R23.2 HOT FLASH DUE TO MEDICATION: Primary | ICD-10-CM

## 2022-02-02 DIAGNOSIS — R60.9 EDEMA, UNSPECIFIED TYPE: ICD-10-CM

## 2022-02-02 DIAGNOSIS — T50.905A HOT FLASH DUE TO MEDICATION: Primary | ICD-10-CM

## 2022-02-02 PROCEDURE — G8427 DOCREV CUR MEDS BY ELIG CLIN: HCPCS | Performed by: INTERNAL MEDICINE

## 2022-02-02 PROCEDURE — 1090F PRES/ABSN URINE INCON ASSESS: CPT | Performed by: INTERNAL MEDICINE

## 2022-02-02 PROCEDURE — G8536 NO DOC ELDER MAL SCRN: HCPCS | Performed by: INTERNAL MEDICINE

## 2022-02-02 PROCEDURE — 99213 OFFICE O/P EST LOW 20 MIN: CPT | Performed by: INTERNAL MEDICINE

## 2022-02-02 PROCEDURE — 1101F PT FALLS ASSESS-DOCD LE1/YR: CPT | Performed by: INTERNAL MEDICINE

## 2022-02-02 PROCEDURE — G8420 CALC BMI NORM PARAMETERS: HCPCS | Performed by: INTERNAL MEDICINE

## 2022-02-02 PROCEDURE — G8510 SCR DEP NEG, NO PLAN REQD: HCPCS | Performed by: INTERNAL MEDICINE

## 2022-02-02 PROCEDURE — G0463 HOSPITAL OUTPT CLINIC VISIT: HCPCS | Performed by: INTERNAL MEDICINE

## 2022-02-02 RX ORDER — AMLODIPINE BESYLATE 5 MG/1
5 TABLET ORAL
Qty: 90 TABLET | Refills: 1
Start: 2022-02-02 | End: 2022-05-09

## 2022-02-02 NOTE — PROGRESS NOTES
Room:     Identified pt with two pt identifiers(name and ). Reviewed record in preparation for visit and have obtained necessary documentation. All patient medications has been reviewed. Chief Complaint   Patient presents with    Leg Swelling     bilaterial       Health Maintenance Due   Topic    COVID-19 Vaccine (1)    DTaP/Tdap/Td series (1 - Tdap)    Shingrix Vaccine Age 50> (2 of 2)    Flu Vaccine (1)       Vitals:    22 1056   BP: 133/73   Pulse: 73   Resp: 20   Temp: 98.1 °F (36.7 °C)   TempSrc: Temporal   SpO2: 100%   Weight: 127 lb 6.4 oz (57.8 kg)   Height: 5' 4\" (1.626 m)   PainSc:   0 - No pain       4. Have you been to the ER, urgent care clinic since your last visit? Hospitalized since your last visit? No    5. Have you seen or consulted any other health care providers outside of the [address] [place] [address] since your last visit? Include any pap smears or colon screening. No    6. Would you like to receive your flu shot today? No    8. Do you have an Advanced Directive/ Living Will in place? yes  If yes, do we have a copy on file Pt not sure  If no, would you like information No    Patient is accompanied by self I have received verbal consent from [facility] to discuss any/all medical information while they are present in the room.

## 2022-02-03 NOTE — PROGRESS NOTES
Chief Complaint   Patient presents with    Leg Swelling     bilaterial   ros hot flash  This is a pretty independent 80 y.o. female who is followed for hypertension and history of a prior stroke. She has many skin cancers and she has had to see Dermatology on a regular basis. She had attempts at radiation therapy of her lower legs last year, left more than right. Had severe pain and debility and had to stop. She has many lesions on her legs. Lately she has got a little bit of swelling in the left leg and the right leg, nothing terribly serious. Her BP meds were noted. She takes amlodipine every morning. The other meds were continued. Her blood pressure today was normal.  Lungs were clear. Regular rhythm. Alert and oriented. Extremities, many changes in her lower extremities. The skin has many hyperpigmented areas, has many areas of erythema. Has many areas of some slightly worrisome cutaneous lesions. She has some edema. The edema likely is related to medications or related to the prior radiation therapy six or seven months ago. I recommended changing her amlodipine to the evening instead of the morning and I would not change any other meds. She will continue to follow up with Dermatology. Patient Active Problem List    Diagnosis    Adverse effect of radiation therapy     leg      Arthritis of knee, left    DNR no code (do not resuscitate)    Lumbar spinal stenosis    Biliary colic    Spinal stenosis    Lumbar disc disease with radiculopathy     Pain management and PT  Trans foraminal injections not helping      Osteoporosis     . DEXA Results (most recent):    Results from Hospital Encounter encounter on 08/13/15   DEXA BONE DENSITY STUDY AXIAL   Narrative **Final Report**      ICD Codes / Adm. Diagnosis: V76.12  724.2 / Disorder of bone and cartilage    Lumbago  Examination:  MM DEXA AXIAL SKELETON  - [id number] - Aug 13 2015 10:45AM  Accession No:  [id number]  Reason: screening      REPORT:  Bone Mineral Density    Indication: Monitoring, estrogen therapy  Age: [de-identified]  Sex: Female. Menopause status:         postmenopausal.  Hormone replacement therapy: yes/no     Risk factors for fall:   None   Risk factors for osteoporosis:  Tobacco use    Current medication for osteoporosis: Calcium, vitamin D, estrogen. Comparison: 2013    Technique: Imaging was performed on the [employer]. World Health   Organization meta-analysis fracture risk calculator (FRAX) analysis was   performed for 10 year fracture risk probability assessment    Excluded sites: L2 due to fracture, L1 due to degenerative changes    Findings:    Fractures identified on Lateral scanogram:  L2    Lumbar spine: L1-L3, excluding L2  Bone mineral density (gm/cm2): 1.113  % of peak bone mass: 94  % for age matched controls:  80  T score: -0.6  Z score:  1.5    Hip: Right femoral neck  Bone mineral density (gm/cm2):  0.718  % of peak bone mass: 69  % for age matched controls:  100  T score: -2.3  Z score:  0           IMPRESSION:    This patient is osteopenic using the World Health Organization criteria  As compared to the prior study, there has been a statistically significant   decrease in bone mineral density.   10 year probability of major osteoporotic fracture:  16.3%  10 year probability of hip fracture:  5.6%             Signing/Reading Doctor: Blanca Munoz ([id number])    Dina Sepulveda ([id number])  Aug 17 2015 10:59AM                                 Jan 2017 reclast  And annual  Not done 2018  As of June    prolia for 2 years   Doing well      Pancreatic cyst    Visual loss    PAC (premature atrial contraction)     Normal echo and stress test for atypical chest pain      Rosacea    Anxiety    Hypercholesterolemia    GERD (gastroesophageal reflux disease)     Feels better on high dose       Hyperlipidemia LDL goal <130     Vitals:    02/02/22 1056 02/02/22 1122   BP: 133/73 120/66   Pulse: 73    Resp: 20    Temp: 98.1 °F (36.7 °C)    TempSrc: Temporal    SpO2: 100%    Weight: 127 lb 6.4 oz (57.8 kg)    Height: 5' 4\" (1.626 m)    ,  Diagnoses and all orders for this visit:    1. Hot flash due to medication  -     TSH 3RD GENERATION; Future    2. Coronary artery disease of native heart with stable angina pectoris, unspecified vessel or lesion type ([facility] [address]  -     TSH 3RD GENERATION; Future    3. HTN, goal below 130/80  -     TSH 3RD GENERATION; Future    4. Adverse effect of radiation therapy, subsequent encounter    5. Edema, unspecified type    Other orders  -     amLODIPine (NORVASC) 5 mg tablet; Take 1 Tablet by mouth nightly.       Low Na diet  Check TSH doubt hyperthyroid

## 2022-03-07 RX ORDER — DULOXETIN HYDROCHLORIDE 30 MG/1
CAPSULE, DELAYED RELEASE ORAL
Qty: 90 CAPSULE | Refills: 0 | Status: SHIPPED | OUTPATIENT
Start: 2022-03-07 | End: 2022-06-07

## 2022-03-19 PROBLEM — T66.XXXA ADVERSE EFFECT OF RADIATION THERAPY: Status: ACTIVE | Noted: 2021-07-14

## 2022-03-19 PROBLEM — K80.50 BILIARY COLIC: Status: ACTIVE | Noted: 2017-11-22

## 2022-03-19 PROBLEM — M48.061 LUMBAR SPINAL STENOSIS: Status: ACTIVE | Noted: 2018-06-20

## 2022-03-19 PROBLEM — Z66 DNR NO CODE (DO NOT RESUSCITATE): Status: ACTIVE | Noted: 2019-01-08

## 2022-03-20 PROBLEM — M17.12 ARTHRITIS OF KNEE, LEFT: Status: ACTIVE | Noted: 2020-03-09

## 2022-03-22 ENCOUNTER — OFFICE VISIT (OUTPATIENT)
Dept: INTERNAL MEDICINE CLINIC | Age: 87
End: 2022-03-22
Payer: MEDICARE

## 2022-03-22 VITALS
HEIGHT: 64 IN | WEIGHT: 129.6 LBS | HEART RATE: 67 BPM | DIASTOLIC BLOOD PRESSURE: 70 MMHG | TEMPERATURE: 98.6 F | RESPIRATION RATE: 14 BRPM | BODY MASS INDEX: 22.13 KG/M2 | OXYGEN SATURATION: 99 % | SYSTOLIC BLOOD PRESSURE: 120 MMHG

## 2022-03-22 DIAGNOSIS — I10 HTN, GOAL BELOW 130/80: ICD-10-CM

## 2022-03-22 DIAGNOSIS — E78.5 HYPERLIPIDEMIA LDL GOAL <130: Primary | ICD-10-CM

## 2022-03-22 PROCEDURE — G8427 DOCREV CUR MEDS BY ELIG CLIN: HCPCS | Performed by: INTERNAL MEDICINE

## 2022-03-22 PROCEDURE — G8510 SCR DEP NEG, NO PLAN REQD: HCPCS | Performed by: INTERNAL MEDICINE

## 2022-03-22 PROCEDURE — G8420 CALC BMI NORM PARAMETERS: HCPCS | Performed by: INTERNAL MEDICINE

## 2022-03-22 PROCEDURE — 99213 OFFICE O/P EST LOW 20 MIN: CPT | Performed by: INTERNAL MEDICINE

## 2022-03-22 PROCEDURE — 1090F PRES/ABSN URINE INCON ASSESS: CPT | Performed by: INTERNAL MEDICINE

## 2022-03-22 PROCEDURE — 1101F PT FALLS ASSESS-DOCD LE1/YR: CPT | Performed by: INTERNAL MEDICINE

## 2022-03-22 PROCEDURE — G8536 NO DOC ELDER MAL SCRN: HCPCS | Performed by: INTERNAL MEDICINE

## 2022-03-22 PROCEDURE — G0463 HOSPITAL OUTPT CLINIC VISIT: HCPCS | Performed by: INTERNAL MEDICINE

## 2022-03-22 NOTE — PROGRESS NOTES
Reviewed record in preparation for visit and have obtained necessary documentation. Identified pt with two pt identifiers(name and ). Chief Complaint   Patient presents with    Follow-up     Vitals:    22 1047   BP: 120/70   Pulse: 67   Resp: 14   Temp: 98.6 °F (37 °C)   TempSrc: Temporal   SpO2: 99%   Weight: 129 lb 9.6 oz (58.8 kg)   Height: 5' 4\" (1.626 m)        Health Maintenance Due   Topic Date Due    COVID-19 Vaccine (1) Never done    DTaP/Tdap/Td  (1 - Tdap) 2007    Shingles Vaccine (2 of 2) 2020    Yearly Flu Vaccine (1) 2021       Ms. Tina Salguero has a reminder for a \"due or due soon\" health maintenance. I have asked that she discuss this further with her primary care provider for follow-up on this health maintenance. Coordination of Care Questionnaire:  :     1) Have you been to an emergency room, urgent care clinic since your last visit? no   Hospitalized since your last visit? no             2) Have you seen or consulted any other health care providers outside of [address] [place] [address] since your last visit? yes  (Include any pap smears or colon screenings in this section.)    3. For patients aged 39-70: Has the patient had a colonoscopy / FIT/ Cologuard? NA - based on age      If the patient is female:  4. For patients aged 41-77: Has the patient had a mammogram within the past 2 years? NA - based on age or sex       11. For patients aged 21-65: Has the patient had a pap smear? NA - based on age or sex      In the event something were to happen to you and you were unable to speak on your behalf, do you have an Advance Directive/ Living Will in place stating your wishes? YES    Do you have an Advance Directive on file? no    6) Are you interested in receiving information on Advance Directives? no    Patient is accompanied by self I have received verbal consent from [facility] to discuss any/all medical information while they are present in the room.

## 2022-03-23 NOTE — PROGRESS NOTES
Chief Complaint   Patient presents with    Follow-up   ros hot flash  This is a pretty independent 80 y.o. female who is followed for hypertension and history of a prior stroke. She has many skin cancers and she has had to see Dermatology on a regular basis. She had attempts at radiation therapy of her lower legs last year, left more than right. Had severe pain and debility and had to stop. She has many lesions on her legs. Lately she has got a little bit of swelling in the left leg and the right leg, nothing terribly serious. Her BP meds were noted. She takes amlodipine every morning. The other meds were continued. Her blood pressure today was normal.  Lungs were clear. Regular rhythm. Alert and oriented. Extremities, many changes in her lower extremities. The skin has many hyperpigmented areas, has many areas of erythema. Has many areas of some slightly worrisome cutaneous lesions. She has some edema. The edema likely is related to medications or related to the prior radiation therapy six or seven months ago. I recommended changing her amlodipine to the evening instead of the morning and I would not change any other meds. She will continue to follow up with Dermatology. She has improved and edema has mostly resolved  Legs better with new OTC skin cream        Patient Active Problem List    Diagnosis    Adverse effect of radiation therapy     leg      Arthritis of knee, left    DNR no code (do not resuscitate)    Lumbar spinal stenosis    Biliary colic    Spinal stenosis    Lumbar disc disease with radiculopathy     Pain management and PT  Trans foraminal injections not helping      Osteoporosis     . DEXA Results (most recent):    Results from Hospital Encounter encounter on 08/13/15   DEXA BONE DENSITY STUDY AXIAL   Narrative **Final Report**      ICD Codes / Adm. Diagnosis: V76.12  724.2 / Disorder of bone and cartilage    Lumbago  Examination:  MM DEXA AXIAL SKELETON  - [id number] - Aug 13 2015 10:45AM  Accession No:  [id number]  Reason:  screening      REPORT:  Bone Mineral Density    Indication: Monitoring, estrogen therapy  Age: [de-identified]  Sex: Female. Menopause status:         postmenopausal.  Hormone replacement therapy: yes/no     Risk factors for fall:   None   Risk factors for osteoporosis:  Tobacco use    Current medication for osteoporosis: Calcium, vitamin D, estrogen. Comparison: 2013    Technique: Imaging was performed on the [employer]. World Health   Organization meta-analysis fracture risk calculator (FRAX) analysis was   performed for 10 year fracture risk probability assessment    Excluded sites: L2 due to fracture, L1 due to degenerative changes    Findings:    Fractures identified on Lateral scanogram:  L2    Lumbar spine: L1-L3, excluding L2  Bone mineral density (gm/cm2): 1.113  % of peak bone mass: 94  % for age matched controls:  80  T score: -0.6  Z score:  1.5    Hip: Right femoral neck  Bone mineral density (gm/cm2):  0.718  % of peak bone mass: 69  % for age matched controls:  100  T score: -2.3  Z score:  0           IMPRESSION:    This patient is osteopenic using the World Health Organization criteria  As compared to the prior study, there has been a statistically significant   decrease in bone mineral density.   10 year probability of major osteoporotic fracture:  16.3%  10 year probability of hip fracture:  5.6%             Signing/Reading Doctor: Gabino Cabral ([id number])    Piero Guadalupe ([id number])  Aug 17 2015 10:59AM                                 Jan 2017 reclast  And annual  Not done 2018  As of June    prolia for 2 years   Doing well      Pancreatic cyst    Visual loss    PAC (premature atrial contraction)     Normal echo and stress test for atypical chest pain      Rosacea    Anxiety    Hypercholesterolemia    GERD (gastroesophageal reflux disease)     Feels better on high dose       Hyperlipidemia LDL goal <130 Vitals:    03/22/22 1047   BP: 120/70   Pulse: 67   Resp: 14   Temp: 98.6 °F (37 °C)   TempSrc: Temporal   SpO2: 99%   Weight: 129 lb 9.6 oz (58.8 kg)   Height: 5' 4\" (1.626 m)   ,  S1 and S2 normal, no murmurs, clicks, gallops or rubs. Regular rate and rhythm. Chest is clear; no wheezes or rales. No edema or JVD. Looks well  Walking better   no edema now  1. Hyperlipidemia LDL goal <130  stable    2.  HTN, goal below 130/80  Controlled no edema now

## 2022-05-09 ENCOUNTER — TELEPHONE (OUTPATIENT)
Dept: INTERNAL MEDICINE CLINIC | Age: 87
End: 2022-05-09

## 2022-05-09 DIAGNOSIS — U07.1 COVID-19: Primary | ICD-10-CM

## 2022-06-07 RX ORDER — DULOXETIN HYDROCHLORIDE 30 MG/1
CAPSULE, DELAYED RELEASE ORAL
Qty: 90 CAPSULE | Refills: 0 | Status: SHIPPED | OUTPATIENT
Start: 2022-06-07 | End: 2022-08-31

## 2022-07-27 ENCOUNTER — HOSPITAL ENCOUNTER (OUTPATIENT)
Dept: INFUSION THERAPY | Age: 87
Discharge: HOME | End: 2022-07-27
Payer: MEDICARE

## 2022-07-27 VITALS
TEMPERATURE: 98.3 F | HEART RATE: 68 BPM | SYSTOLIC BLOOD PRESSURE: 109 MMHG | DIASTOLIC BLOOD PRESSURE: 62 MMHG | RESPIRATION RATE: 18 BRPM

## 2022-07-27 DIAGNOSIS — M81.0 OSTEOPOROSIS, UNSPECIFIED OSTEOPOROSIS TYPE, UNSPECIFIED PATHOLOGICAL FRACTURE PRESENCE: Primary | ICD-10-CM

## 2022-07-27 LAB
ALBUMIN SERPL-MCNC: 3.3 G/DL (ref 3.5–5)
ALBUMIN/GLOB SERPL: 1 {RATIO} (ref 1.1–2.2)
ALP SERPL-CCNC: 73 U/L (ref 45–117)
ALT SERPL-CCNC: 23 U/L (ref 12–78)
ANION GAP SERPL CALC-SCNC: 7 MMOL/L (ref 5–15)
AST SERPL-CCNC: 16 U/L (ref 15–37)
BILIRUB SERPL-MCNC: 0.3 MG/DL (ref 0.2–1)
BUN SERPL-MCNC: 20 MG/DL (ref 6–20)
BUN/CREAT SERPL: 26 (ref 12–20)
CALCIUM SERPL-MCNC: 9.2 MG/DL (ref 8.5–10.1)
CHLORIDE SERPL-SCNC: 105 MMOL/L (ref 97–108)
CO2 SERPL-SCNC: 28 MMOL/L (ref 21–32)
CREAT SERPL-MCNC: 0.78 MG/DL (ref 0.55–1.02)
GLOBULIN SER CALC-MCNC: 3.3 G/DL (ref 2–4)
GLUCOSE SERPL-MCNC: 118 MG/DL (ref 65–100)
POTASSIUM SERPL-SCNC: 4.2 MMOL/L (ref 3.5–5.1)
PROT SERPL-MCNC: 6.6 G/DL (ref 6.4–8.2)
SODIUM SERPL-SCNC: 140 MMOL/L (ref 136–145)

## 2022-07-27 PROCEDURE — 36415 COLL VENOUS BLD VENIPUNCTURE: CPT

## 2022-07-27 PROCEDURE — 74011250636 HC RX REV CODE- 250/636: Performed by: INTERNAL MEDICINE

## 2022-07-27 PROCEDURE — 96372 THER/PROPH/DIAG INJ SC/IM: CPT

## 2022-07-27 PROCEDURE — 80053 COMPREHEN METABOLIC PANEL: CPT

## 2022-07-27 RX ORDER — ACETAMINOPHEN 325 MG/1
650 TABLET ORAL AS NEEDED
Start: 2022-07-28

## 2022-07-27 RX ORDER — DIPHENHYDRAMINE HYDROCHLORIDE 50 MG/ML
25 INJECTION, SOLUTION INTRAMUSCULAR; INTRAVENOUS AS NEEDED
Start: 2022-07-28

## 2022-07-27 RX ORDER — ALBUTEROL SULFATE 0.83 MG/ML
2.5 SOLUTION RESPIRATORY (INHALATION) AS NEEDED
Start: 2022-07-28

## 2022-07-27 RX ORDER — DIPHENHYDRAMINE HYDROCHLORIDE 50 MG/ML
50 INJECTION, SOLUTION INTRAMUSCULAR; INTRAVENOUS AS NEEDED
Start: 2022-07-28

## 2022-07-27 RX ORDER — EPINEPHRINE 1 MG/ML
0.3 INJECTION, SOLUTION, CONCENTRATE INTRAVENOUS AS NEEDED
OUTPATIENT
Start: 2022-07-28

## 2022-07-27 RX ORDER — HYDROCORTISONE SODIUM SUCCINATE 100 MG/2ML
100 INJECTION, POWDER, FOR SOLUTION INTRAMUSCULAR; INTRAVENOUS AS NEEDED
OUTPATIENT
Start: 2022-07-28

## 2022-07-27 RX ORDER — ONDANSETRON 2 MG/ML
8 INJECTION INTRAMUSCULAR; INTRAVENOUS AS NEEDED
OUTPATIENT
Start: 2022-07-28

## 2022-07-27 RX ADMIN — DENOSUMAB 60 MG: 60 INJECTION SUBCUTANEOUS at 16:28

## 2022-07-27 NOTE — PROGRESS NOTES
\[facility] [place] [facility]\"" VISIT NOTE    1525  Pt arrived at [facility] ambulatory and in no distress for Prolia injection. Assessment completed, no new complaints at this time. Pt denies recent or upcoming invasive dental procedures. Peripheral labs drawn with no difficulty. Recent Results (from the past 12 hour(s))   METABOLIC PANEL, COMPREHENSIVE    Collection Time: 07/27/22  3:42 PM   Result Value Ref Range    Sodium 140 136 - 145 mmol/L    Potassium 4.2 3.5 - 5.1 mmol/L    Chloride 105 97 - 108 mmol/L    CO2 28 21 - 32 mmol/L    Anion gap 7 5 - 15 mmol/L    Glucose 118 (H) 65 - 100 mg/dL    BUN 20 6 - 20 MG/DL    Creatinine 0.78 0.55 - 1.02 MG/DL    BUN/Creatinine ratio 26 (H) 12 - 20      GFR est AA >60 >60 ml/min/1.73m2    GFR est non-AA >60 >60 ml/min/1.73m2    Calcium 9.2 8.5 - 10.1 MG/DL    Bilirubin, total 0.3 0.2 - 1.0 MG/DL    ALT (SGPT) 23 12 - 78 U/L    AST (SGOT) 16 15 - 37 U/L    Alk. phosphatase 73 45 - 117 U/L    Protein, total 6.6 6.4 - 8.2 g/dL    Albumin 3.3 (L) 3.5 - 5.0 g/dL    Globulin 3.3 2.0 - 4.0 g/dL    A-G Ratio 1.0 (L) 1.1 - 2.2       Medications received:  Prolia SQ in right arm    Patient Vitals for the past 12 hrs:   Temp Pulse Resp BP   07/27/22 1527 98.3 °F (36.8 °C) 68 18 109/62     Tolerated treatment well, no adverse reaction noted. 1630  D/C'd from [facility] ambulatory and in no distress. Next appointment is 1/23/23.

## 2022-08-29 ENCOUNTER — NURSE TRIAGE (OUTPATIENT)
Dept: OTHER | Facility: CLINIC | Age: 87
End: 2022-08-29

## 2022-08-29 NOTE — TELEPHONE ENCOUNTER
Received call from Aby at [facility] with Red Flag Complaint. Subjective: Caller states \"Been having swelling in ankles and feet, and left side slightly worse for past week. Had this last year from when I had radiation treatment for treatment. I did get more radiation in the left leg, but most of it went away. \"     Current Symptoms: bilateral ankle and feet swelling, mildly worse with left leg, denies chest pain/SOB    Onset: 1 week ago; gradual, worsening    Associated Symptoms: reduced activity    Pain Severity: 0/10; N/A; none    Temperature: Denies      What has been tried: elevation last night, uncomfortable. LMP: NA Pregnant: NA    Recommended disposition: See in Office Today    Care advice provided, patient verbalizes understanding; denies any other questions or concerns; instructed to call back for any new or worsening symptoms. Patient/caller agrees with recommended disposition. Writer left message with [facility] for return call to patient for scheduling. Attention Provider: Thank you for allowing me to participate in the care of your patient. The patient was connected to triage in response to information provided to the [facility]. Please do not respond through this encounter as the response is not directed to a shared pool.       Reason for Disposition   MODERATE swelling of both ankles (e.g., swelling extends up to the knees) AND new-onset or worsening    Protocols used: Leg Swelling and Edema-ADULT-[place]

## 2022-08-31 ENCOUNTER — OFFICE VISIT (OUTPATIENT)
Dept: INTERNAL MEDICINE CLINIC | Age: 87
End: 2022-08-31
Payer: MEDICARE

## 2022-08-31 VITALS
WEIGHT: 131.8 LBS | RESPIRATION RATE: 18 BRPM | OXYGEN SATURATION: 98 % | TEMPERATURE: 98 F | DIASTOLIC BLOOD PRESSURE: 77 MMHG | BODY MASS INDEX: 22.5 KG/M2 | HEIGHT: 64 IN | SYSTOLIC BLOOD PRESSURE: 131 MMHG | HEART RATE: 69 BPM

## 2022-08-31 DIAGNOSIS — I89.0 LYMPHEDEMA: ICD-10-CM

## 2022-08-31 DIAGNOSIS — R60.9 EDEMA, UNSPECIFIED TYPE: Primary | ICD-10-CM

## 2022-08-31 DIAGNOSIS — T66.XXXD ADVERSE EFFECT OF RADIATION THERAPY, SUBSEQUENT ENCOUNTER: ICD-10-CM

## 2022-08-31 DIAGNOSIS — R45.89 DEPRESSED MOOD: ICD-10-CM

## 2022-08-31 PROCEDURE — 99213 OFFICE O/P EST LOW 20 MIN: CPT | Performed by: INTERNAL MEDICINE

## 2022-08-31 PROCEDURE — G8536 NO DOC ELDER MAL SCRN: HCPCS | Performed by: INTERNAL MEDICINE

## 2022-08-31 PROCEDURE — 1101F PT FALLS ASSESS-DOCD LE1/YR: CPT | Performed by: INTERNAL MEDICINE

## 2022-08-31 PROCEDURE — 1090F PRES/ABSN URINE INCON ASSESS: CPT | Performed by: INTERNAL MEDICINE

## 2022-08-31 PROCEDURE — G8427 DOCREV CUR MEDS BY ELIG CLIN: HCPCS | Performed by: INTERNAL MEDICINE

## 2022-08-31 PROCEDURE — G0463 HOSPITAL OUTPT CLINIC VISIT: HCPCS | Performed by: INTERNAL MEDICINE

## 2022-08-31 PROCEDURE — G8420 CALC BMI NORM PARAMETERS: HCPCS | Performed by: INTERNAL MEDICINE

## 2022-08-31 PROCEDURE — G8510 SCR DEP NEG, NO PLAN REQD: HCPCS | Performed by: INTERNAL MEDICINE

## 2022-08-31 RX ORDER — DULOXETIN HYDROCHLORIDE 20 MG/1
20 CAPSULE, DELAYED RELEASE ORAL DAILY
Qty: 30 CAPSULE | Refills: 1 | Status: SHIPPED | OUTPATIENT
Start: 2022-08-31 | End: 2022-09-26 | Stop reason: SDUPTHER

## 2022-08-31 RX ORDER — AMLODIPINE BESYLATE 2.5 MG/1
2.5 TABLET ORAL DAILY
Qty: 90 TABLET | Refills: 1 | Status: SHIPPED | OUTPATIENT
Start: 2022-08-31

## 2022-08-31 NOTE — PROGRESS NOTES
Chief Complaint   Patient presents with    Ankle swelling     Has lymphedema from the RT to lower lwegs last year  Not depressed on 30 cymbalta    Patient Active Problem List    Diagnosis    Adverse effect of radiation therapy     leg      Arthritis of knee, left    DNR no code (do not resuscitate)    Lumbar spinal stenosis    Biliary colic    Spinal stenosis    Lumbar disc disease with radiculopathy     Pain management and PT  Trans foraminal injections not helping      Osteoporosis     . DEXA Results (most recent):    Results from Hospital Encounter encounter on 08/13/15   DEXA BONE DENSITY STUDY AXIAL   Narrative **Final Report**      ICD Codes / Adm. Diagnosis: V76.12  724.2 / Disorder of bone and cartilage    Lumbago  Examination:  MM DEXA AXIAL SKELETON  - [id number] - Aug 13 2015 10:45AM  Accession No:  [id number]  Reason:  screening      REPORT:  Bone Mineral Density    Indication: Monitoring, estrogen therapy  Age: [de-identified]  Sex: Female. Menopause status:         postmenopausal.  Hormone replacement therapy: yes/no     Risk factors for fall:   None   Risk factors for osteoporosis:  Tobacco use    Current medication for osteoporosis: Calcium, vitamin D, estrogen. Comparison: 2013    Technique: Imaging was performed on the [employer].          World Health   Organization meta-analysis fracture risk calculator (FRAX) analysis was   performed for 10 year fracture risk probability assessment    Excluded sites: L2 due to fracture, L1 due to degenerative changes    Findings:    Fractures identified on Lateral scanogram:  L2    Lumbar spine: L1-L3, excluding L2  Bone mineral density (gm/cm2): 1.113  % of peak bone mass: 94  % for age matched controls:  80  T score: -0.6  Z score:  1.5    Hip: Right femoral neck  Bone mineral density (gm/cm2):  0.718  % of peak bone mass: 69  % for age matched controls:  100  T score: -2.3  Z score:  0           IMPRESSION:    This patient is osteopenic using the [address] [employer] [employer] criteria  As compared to the prior study, there has been a statistically significant   decrease in bone mineral density. 10 year probability of major osteoporotic fracture:  16.3%  10 year probability of hip fracture:  5.6%             Signing/Reading Doctor: Gilberto Langston ([id number])    Niall Maria ([id number])  Aug 17 2015 10:59AM                                 Jan 2017 reclast  And annual  Not done 2018  As of June    prolia for 2 years   Doing well      Pancreatic cyst    Visual loss    PAC (premature atrial contraction)     Normal echo and stress test for atypical chest pain      Rosacea    Anxiety    Hypercholesterolemia    GERD (gastroesophageal reflux disease)     Feels better on high dose       Hyperlipidemia LDL goal <130     Vitals:    08/31/22 1556   BP: 131/77   Pulse: 69   Resp: 18   Temp: 98 °F (36.7 °C)   TempSrc: Temporal   SpO2: 98%   Weight: 131 lb 12.8 oz (59.8 kg)   Height: 5' 4\" (1.626 m)     Has radiation scars lower legs with ankle edema  left > r  Mood good      1. Edema, unspecified type  Elevate lower dose ca blocker    2. Lymphedema  From RT    3. Adverse effect of radiation therapy, subsequent encounter  Stockings hard  Not bad enough for lymphedema wraps    4.  Depressed mood  Better lower SSRI    See 2 months

## 2022-08-31 NOTE — PROGRESS NOTES
1. Have you been to the ER, urgent care clinic since your last visit? Hospitalized since your last visit? No    2. Have you seen or consulted any other health care providers outside of the [address] [place] [address] since your last visit? Include any pap smears or colon screening.  No     Chief Complaint   Patient presents with    Ankle swelling

## 2022-09-26 RX ORDER — DULOXETIN HYDROCHLORIDE 20 MG/1
20 CAPSULE, DELAYED RELEASE ORAL DAILY
Qty: 30 CAPSULE | Refills: 1 | Status: SHIPPED | OUTPATIENT
Start: 2022-09-26

## 2022-09-27 ENCOUNTER — OFFICE VISIT (OUTPATIENT)
Dept: INTERNAL MEDICINE CLINIC | Age: 87
End: 2022-09-27
Payer: MEDICARE

## 2022-09-27 VITALS
BODY MASS INDEX: 22.39 KG/M2 | RESPIRATION RATE: 18 BRPM | WEIGHT: 131.13 LBS | HEART RATE: 60 BPM | SYSTOLIC BLOOD PRESSURE: 124 MMHG | HEIGHT: 64 IN | OXYGEN SATURATION: 98 % | TEMPERATURE: 98.3 F | DIASTOLIC BLOOD PRESSURE: 74 MMHG

## 2022-09-27 DIAGNOSIS — I10 HTN, GOAL BELOW 130/80: ICD-10-CM

## 2022-09-27 DIAGNOSIS — R60.9 EDEMA, UNSPECIFIED TYPE: ICD-10-CM

## 2022-09-27 DIAGNOSIS — Z23 NEEDS FLU SHOT: Primary | ICD-10-CM

## 2022-09-27 PROCEDURE — G8536 NO DOC ELDER MAL SCRN: HCPCS | Performed by: INTERNAL MEDICINE

## 2022-09-27 PROCEDURE — 90694 VACC AIIV4 NO PRSRV 0.5ML IM: CPT | Performed by: INTERNAL MEDICINE

## 2022-09-27 PROCEDURE — G8432 DEP SCR NOT DOC, RNG: HCPCS | Performed by: INTERNAL MEDICINE

## 2022-09-27 PROCEDURE — G8420 CALC BMI NORM PARAMETERS: HCPCS | Performed by: INTERNAL MEDICINE

## 2022-09-27 PROCEDURE — 1090F PRES/ABSN URINE INCON ASSESS: CPT | Performed by: INTERNAL MEDICINE

## 2022-09-27 PROCEDURE — G0463 HOSPITAL OUTPT CLINIC VISIT: HCPCS | Performed by: INTERNAL MEDICINE

## 2022-09-27 PROCEDURE — G8427 DOCREV CUR MEDS BY ELIG CLIN: HCPCS | Performed by: INTERNAL MEDICINE

## 2022-09-27 PROCEDURE — 99213 OFFICE O/P EST LOW 20 MIN: CPT | Performed by: INTERNAL MEDICINE

## 2022-09-27 PROCEDURE — 1101F PT FALLS ASSESS-DOCD LE1/YR: CPT | Performed by: INTERNAL MEDICINE

## 2022-09-27 NOTE — PROGRESS NOTES
Chief Complaint   Patient presents with    Cholesterol Problem    Leg Swelling     radiation     Has lymphedema from the RT to lower lwegs last year  Not depressed on 30 cymbalta  Has less edema on lower dose amlodipine  Patient Active Problem List    Diagnosis    Adverse effect of radiation therapy     leg      Arthritis of knee, left    DNR no code (do not resuscitate)    Lumbar spinal stenosis    Biliary colic    Spinal stenosis    Lumbar disc disease with radiculopathy     Pain management and PT  Trans foraminal injections not helping      Osteoporosis     . DEXA Results (most recent):    Results from Hospital Encounter encounter on 08/13/15   DEXA BONE DENSITY STUDY AXIAL   Narrative **Final Report**      ICD Codes / Adm. Diagnosis: V76.12  724.2 / Disorder of bone and cartilage    Lumbago  Examination:  MM DEXA AXIAL SKELETON  - [id number] - Aug 13 2015 10:45AM  Accession No:  [id number]  Reason:  screening      REPORT:  Bone Mineral Density    Indication: Monitoring, estrogen therapy  Age: [de-identified]  Sex: Female. Menopause status:         postmenopausal.  Hormone replacement therapy: yes/no     Risk factors for fall:   None   Risk factors for osteoporosis:  Tobacco use    Current medication for osteoporosis: Calcium, vitamin D, estrogen. Comparison: 2013    Technique: Imaging was performed on the [employer].          World Health   Organization meta-analysis fracture risk calculator (FRAX) analysis was   performed for 10 year fracture risk probability assessment    Excluded sites: L2 due to fracture, L1 due to degenerative changes    Findings:    Fractures identified on Lateral scanogram:  L2    Lumbar spine: L1-L3, excluding L2  Bone mineral density (gm/cm2): 1.113  % of peak bone mass: 94  % for age matched controls:  80  T score: -0.6  Z score:  1.5    Hip: Right femoral neck  Bone mineral density (gm/cm2):  0.718  % of peak bone mass: 69  % for age matched controls:  100  T score: -2.3  Z score:  0 IMPRESSION:    This patient is osteopenic using the World Health Organization criteria  As compared to the prior study, there has been a statistically significant   decrease in bone mineral density. 10 year probability of major osteoporotic fracture:  16.3%  10 year probability of hip fracture:  5.6%             Signing/Reading Doctor: Gilberto Langston ([id number])    Niall Maria ([id number])  Aug 17 2015 10:59AM                                 Jan 2017 reclast  And annual  Not done 2018  As of June    prolia for 2 years   Doing well      Pancreatic cyst    Visual loss    PAC (premature atrial contraction)     Normal echo and stress test for atypical chest pain      Rosacea    Anxiety    Hypercholesterolemia    GERD (gastroesophageal reflux disease)     Feels better on high dose       Hyperlipidemia LDL goal <130     Vitals:    09/27/22 1037   BP: 124/74   Pulse: 60   Resp: 18   Temp: 98.3 °F (36.8 °C)   TempSrc: Temporal   SpO2: 98%   Weight: 131 lb 2 oz (59.5 kg)   Height: 5' 4\" (1.626 m)     Has radiation scars lower legs puffy   but no foot or ankle edema    Mood good    1. Needs flu shot    - INFLUENZA, FLUAD, (AGE 65 Y+), IM, PF, 0.5 ML    2. Edema, unspecified type  Much less    3.  HTN, goal below 130/80  stable      See 2 months

## 2022-09-27 NOTE — PROGRESS NOTES
After obtaining consent, and per orders of Dr. Joe Rai, injection of Influenza, FLUAD 0.5 ml in left deltoid given by Kit Barnett LPN. LOT: [id number], EXP: 05/31/2023. Patient instructed to remain in clinic for 15 minutes afterwards, and to report any adverse reaction to me immediately.

## 2022-09-27 NOTE — PROGRESS NOTES
Chief Complaint   Patient presents with    Cholesterol Problem    Leg Swelling     radiation       Vitals:    09/27/22 1037   BP: 124/74   Pulse: 60   Resp: 18   Temp: 98.3 °F (36.8 °C)   TempSrc: Temporal   SpO2: 98%   Weight: 131 lb 2 oz (59.5 kg)   Height: 5' 4\" (1.626 m)   PainSc:   0 - No pain         Health Maintenance will be followed up by PCP. 1. Have you been to the ER, urgent care clinic since your last visit? Hospitalized since your last visit? No    2. Have you seen or consulted any other health care providers outside of the [address] [place] [address] since your last visit? Include any pap smears or colon screening.  No

## 2022-10-04 ENCOUNTER — TELEPHONE (OUTPATIENT)
Dept: INTERNAL MEDICINE CLINIC | Age: 87
End: 2022-10-04

## 2022-10-04 NOTE — TELEPHONE ENCOUNTER
Patient calling in to check on status of DULoxetine (CYMBALTA) 20 mg capsule [[id number]]. Informed patient that it had been sent to pharmacy.

## 2022-11-18 RX ORDER — DULOXETIN HYDROCHLORIDE 20 MG/1
20 CAPSULE, DELAYED RELEASE ORAL DAILY
Qty: 90 CAPSULE | Refills: 0 | Status: SHIPPED | OUTPATIENT
Start: 2022-11-18

## 2023-01-09 ENCOUNTER — TELEPHONE (OUTPATIENT)
Dept: INTERNAL MEDICINE CLINIC | Age: 88
End: 2023-01-09

## 2023-01-11 DIAGNOSIS — M81.0 OSTEOPOROSIS, UNSPECIFIED OSTEOPOROSIS TYPE, UNSPECIFIED PATHOLOGICAL FRACTURE PRESENCE: Primary | ICD-10-CM

## 2023-01-23 ENCOUNTER — HOSPITAL ENCOUNTER (OUTPATIENT)
Dept: INFUSION THERAPY | Age: 88
Discharge: HOME | End: 2023-01-23
Payer: MEDICARE

## 2023-01-23 VITALS
TEMPERATURE: 97.9 F | HEART RATE: 75 BPM | DIASTOLIC BLOOD PRESSURE: 66 MMHG | RESPIRATION RATE: 18 BRPM | SYSTOLIC BLOOD PRESSURE: 138 MMHG

## 2023-01-23 DIAGNOSIS — M81.0 OSTEOPOROSIS, UNSPECIFIED OSTEOPOROSIS TYPE, UNSPECIFIED PATHOLOGICAL FRACTURE PRESENCE: Primary | ICD-10-CM

## 2023-01-23 LAB
ALBUMIN SERPL-MCNC: 3.8 G/DL (ref 3.5–5)
ALBUMIN/GLOB SERPL: 1.2 (ref 1.1–2.2)
ALP SERPL-CCNC: 70 U/L (ref 45–117)
ALT SERPL-CCNC: 25 U/L (ref 12–78)
ANION GAP BLD CALC-SCNC: 11 MMOL/L (ref 10–20)
ANION GAP SERPL CALC-SCNC: 5 MMOL/L (ref 5–15)
AST SERPL-CCNC: 19 U/L (ref 15–37)
BILIRUB SERPL-MCNC: 0.5 MG/DL (ref 0.2–1)
BUN SERPL-MCNC: 15 MG/DL (ref 6–20)
BUN/CREAT SERPL: 21 (ref 12–20)
CA-I BLD-MCNC: 1.13 MMOL/L (ref 1.12–1.32)
CALCIUM SERPL-MCNC: 9 MG/DL (ref 8.5–10.1)
CHLORIDE BLD-SCNC: 102 MMOL/L (ref 98–107)
CHLORIDE SERPL-SCNC: 106 MMOL/L (ref 97–108)
CO2 BLD-SCNC: 27.6 MMOL/L (ref 21–32)
CO2 SERPL-SCNC: 30 MMOL/L (ref 21–32)
CREAT BLD-MCNC: 0.73 MG/DL (ref 0.6–1.3)
CREAT SERPL-MCNC: 0.7 MG/DL (ref 0.55–1.02)
GLOBULIN SER CALC-MCNC: 3.1 G/DL (ref 2–4)
GLUCOSE BLD-MCNC: 90 MG/DL (ref 65–100)
GLUCOSE SERPL-MCNC: 83 MG/DL (ref 65–100)
POTASSIUM BLD-SCNC: 4 MMOL/L (ref 3.5–5.1)
POTASSIUM SERPL-SCNC: 4.1 MMOL/L (ref 3.5–5.1)
PROT SERPL-MCNC: 6.9 G/DL (ref 6.4–8.2)
SERVICE CMNT-IMP: NORMAL
SODIUM BLD-SCNC: 140 MMOL/L (ref 136–145)
SODIUM SERPL-SCNC: 141 MMOL/L (ref 136–145)

## 2023-01-23 PROCEDURE — 80053 COMPREHEN METABOLIC PANEL: CPT

## 2023-01-23 PROCEDURE — 96372 THER/PROPH/DIAG INJ SC/IM: CPT

## 2023-01-23 PROCEDURE — 36415 COLL VENOUS BLD VENIPUNCTURE: CPT

## 2023-01-23 PROCEDURE — 80047 BASIC METABLC PNL IONIZED CA: CPT

## 2023-01-23 PROCEDURE — 74011250636 HC RX REV CODE- 250/636: Performed by: INTERNAL MEDICINE

## 2023-01-23 RX ORDER — ALBUTEROL SULFATE 0.83 MG/ML
2.5 SOLUTION RESPIRATORY (INHALATION) AS NEEDED
Start: 2023-07-24

## 2023-01-23 RX ORDER — ONDANSETRON 2 MG/ML
8 INJECTION INTRAMUSCULAR; INTRAVENOUS AS NEEDED
Status: ACTIVE | OUTPATIENT
Start: 2023-01-23 | End: 2023-01-23

## 2023-01-23 RX ORDER — ACETAMINOPHEN 325 MG/1
650 TABLET ORAL AS NEEDED
Status: ACTIVE | OUTPATIENT
Start: 2023-01-23 | End: 2023-01-23

## 2023-01-23 RX ORDER — DIPHENHYDRAMINE HYDROCHLORIDE 50 MG/ML
25 INJECTION, SOLUTION INTRAMUSCULAR; INTRAVENOUS AS NEEDED
Status: ACTIVE | OUTPATIENT
Start: 2023-01-23 | End: 2023-01-23

## 2023-01-23 RX ORDER — DIPHENHYDRAMINE HYDROCHLORIDE 50 MG/ML
25 INJECTION, SOLUTION INTRAMUSCULAR; INTRAVENOUS AS NEEDED
Start: 2023-07-24

## 2023-01-23 RX ORDER — ACETAMINOPHEN 325 MG/1
650 TABLET ORAL AS NEEDED
Start: 2023-07-24

## 2023-01-23 RX ORDER — ONDANSETRON 2 MG/ML
8 INJECTION INTRAMUSCULAR; INTRAVENOUS AS NEEDED
OUTPATIENT
Start: 2023-07-24

## 2023-01-23 RX ORDER — DIPHENHYDRAMINE HYDROCHLORIDE 50 MG/ML
50 INJECTION, SOLUTION INTRAMUSCULAR; INTRAVENOUS AS NEEDED
Start: 2023-07-24

## 2023-01-23 RX ORDER — HYDROCORTISONE SODIUM SUCCINATE 100 MG/2ML
100 INJECTION, POWDER, FOR SOLUTION INTRAMUSCULAR; INTRAVENOUS AS NEEDED
OUTPATIENT
Start: 2023-07-24

## 2023-01-23 RX ORDER — EPINEPHRINE 1 MG/ML
0.3 INJECTION, SOLUTION, CONCENTRATE INTRAVENOUS AS NEEDED
OUTPATIENT
Start: 2023-07-24

## 2023-01-23 RX ADMIN — DENOSUMAB 60 MG: 60 INJECTION SUBCUTANEOUS at 11:23

## 2023-01-23 NOTE — PROGRESS NOTES
[facility] Progress Note    Date: January 23, 2023        1030: Pt arrived ambulatory to [facility] for Prolia in stable condition. Assessment completed. Labs drawn peripherally from [facility]. and sent for processing. Labs reviewed. Criteria for treatment was met. Recent Results (from the past 12 hour(s))   METABOLIC PANEL, COMPREHENSIVE    Collection Time: 01/23/23 10:42 AM   Result Value Ref Range    Sodium 141 136 - 145 mmol/L    Potassium 4.1 3.5 - 5.1 mmol/L    Chloride 106 97 - 108 mmol/L    CO2 30 21 - 32 mmol/L    Anion gap 5 5 - 15 mmol/L    Glucose 83 65 - 100 mg/dL    BUN 15 6 - 20 MG/DL    Creatinine 0.70 0.55 - 1.02 MG/DL    BUN/Creatinine ratio 21 (H) 12 - 20      eGFR >60 >60 ml/min/1.73m2    Calcium 9.0 8.5 - 10.1 MG/DL    Bilirubin, total 0.5 0.2 - 1.0 MG/DL    ALT (SGPT) 25 12 - 78 U/L    AST (SGOT) 19 15 - 37 U/L    Alk. phosphatase 70 45 - 117 U/L    Protein, total 6.9 6.4 - 8.2 g/dL    Albumin 3.8 3.5 - 5.0 g/dL    Globulin 3.1 2.0 - 4.0 g/dL    A-G Ratio 1.2 1.1 - 2.2     POC CHEM8    Collection Time: 01/23/23 10:46 AM   Result Value Ref Range    Calcium, ionized (POC) 1.13 1.12 - 1.32 mmol/L    Sodium (POC) 140 136 - 145 mmol/L    Potassium (POC) 4.0 3.5 - 5.1 mmol/L    Chloride (POC) 102 98 - 107 mmol/L    CO2 (POC) 27.6 21 - 32 mmol/L    Anion gap (POC) 11 10 - 20 mmol/L    Glucose (POC) 90 65 - 100 mg/dL    Creatinine (POC) 0.73 0.6 - 1.3 mg/dL    eGFR (POC) >60 >60 ml/min/1.73m2    Comment Comment Not Indicated. Visit Vitals  /66 (BP 1 Location: Left upper arm, BP Patient Position: At rest)   Pulse 75   Temp 97.9 °F (36.6 °C)   Resp 18   Breastfeeding No        Medications Administered       denosumab (PROLIA) injection 60 mg       Admin Date  01/23/2023 Action  Given Dose  60 mg Route  SubCUTAneous Administered By  Bertha Stevens RN                    Injection given in left arm. Ms. Kya Bhatti tolerated the treatment well, and had no complaints.     Ms. Kya Bhatti was discharged from [address] in stable condition. Patient is aware of next scheduled [facility] appointment.     Future Appointments   Date Time Provider Jose Mehta   2/2/2023  2:30 PM MD ANGELINE Cruz BS [facility]   7/24/2023 10:30 AM  ALEXANDRO [id number] Tico Medina, RN, RN  January 23, 2023

## 2023-02-01 RX ORDER — FAMOTIDINE 40 MG/1
TABLET, FILM COATED ORAL
Qty: 90 TABLET | Refills: 3 | Status: SHIPPED | OUTPATIENT
Start: 2023-02-01

## 2023-02-02 ENCOUNTER — TELEPHONE (OUTPATIENT)
Dept: INTERNAL MEDICINE CLINIC | Age: 88
End: 2023-02-02

## 2023-02-02 ENCOUNTER — OFFICE VISIT (OUTPATIENT)
Dept: INTERNAL MEDICINE CLINIC | Age: 88
End: 2023-02-02
Payer: MEDICARE

## 2023-02-02 VITALS
DIASTOLIC BLOOD PRESSURE: 69 MMHG | BODY MASS INDEX: 22.88 KG/M2 | OXYGEN SATURATION: 98 % | HEIGHT: 64 IN | HEART RATE: 68 BPM | SYSTOLIC BLOOD PRESSURE: 137 MMHG | TEMPERATURE: 98.2 F | RESPIRATION RATE: 18 BRPM | WEIGHT: 134 LBS

## 2023-02-02 DIAGNOSIS — H35.3131 EARLY DRY STAGE NONEXUDATIVE AGE-RELATED MACULAR DEGENERATION OF BOTH EYES: ICD-10-CM

## 2023-02-02 DIAGNOSIS — M48.061 SPINAL STENOSIS OF LUMBAR REGION, UNSPECIFIED WHETHER NEUROGENIC CLAUDICATION PRESENT: ICD-10-CM

## 2023-02-02 DIAGNOSIS — M81.0 OSTEOPOROSIS WITHOUT CURRENT PATHOLOGICAL FRACTURE, UNSPECIFIED OSTEOPOROSIS TYPE: ICD-10-CM

## 2023-02-02 DIAGNOSIS — Z12.31 BREAST CANCER SCREENING BY MAMMOGRAM: Primary | ICD-10-CM

## 2023-02-02 DIAGNOSIS — Z00.00 MEDICARE ANNUAL WELLNESS VISIT, SUBSEQUENT: ICD-10-CM

## 2023-02-02 DIAGNOSIS — M62.81 MUSCLE WEAKNESS (GENERALIZED): ICD-10-CM

## 2023-02-02 DIAGNOSIS — Z13.39 SCREENING FOR ALCOHOLISM: ICD-10-CM

## 2023-02-02 DIAGNOSIS — I10 HTN, GOAL BELOW 130/80: ICD-10-CM

## 2023-02-02 DIAGNOSIS — I25.118 CORONARY ARTERY DISEASE OF NATIVE HEART WITH STABLE ANGINA PECTORIS, UNSPECIFIED VESSEL OR LESION TYPE (HCC): Primary | ICD-10-CM

## 2023-02-02 DIAGNOSIS — I25.118 CORONARY ARTERY DISEASE OF NATIVE HEART WITH STABLE ANGINA PECTORIS, UNSPECIFIED VESSEL OR LESION TYPE (HCC): ICD-10-CM

## 2023-02-02 PROCEDURE — G8427 DOCREV CUR MEDS BY ELIG CLIN: HCPCS | Performed by: INTERNAL MEDICINE

## 2023-02-02 PROCEDURE — G0439 PPPS, SUBSEQ VISIT: HCPCS | Performed by: INTERNAL MEDICINE

## 2023-02-02 PROCEDURE — G8536 NO DOC ELDER MAL SCRN: HCPCS | Performed by: INTERNAL MEDICINE

## 2023-02-02 PROCEDURE — G0463 HOSPITAL OUTPT CLINIC VISIT: HCPCS | Performed by: INTERNAL MEDICINE

## 2023-02-02 PROCEDURE — 99214 OFFICE O/P EST MOD 30 MIN: CPT | Performed by: INTERNAL MEDICINE

## 2023-02-02 PROCEDURE — G8510 SCR DEP NEG, NO PLAN REQD: HCPCS | Performed by: INTERNAL MEDICINE

## 2023-02-02 PROCEDURE — 1090F PRES/ABSN URINE INCON ASSESS: CPT | Performed by: INTERNAL MEDICINE

## 2023-02-02 PROCEDURE — G0442 ANNUAL ALCOHOL SCREEN 15 MIN: HCPCS | Performed by: INTERNAL MEDICINE

## 2023-02-02 PROCEDURE — 1101F PT FALLS ASSESS-DOCD LE1/YR: CPT | Performed by: INTERNAL MEDICINE

## 2023-02-02 PROCEDURE — G8420 CALC BMI NORM PARAMETERS: HCPCS | Performed by: INTERNAL MEDICINE

## 2023-02-02 RX ORDER — OMEGA-3S/DHA/EPA/FISH OIL/D3 1150-1000
LIQUID (ML) ORAL
COMMUNITY
End: 2023-02-06 | Stop reason: ALTCHOICE
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
## 2023-02-02 NOTE — TELEPHONE ENCOUNTER
Pt is requesting a mammogram order from Dr. Leon Ou is requesting a call when this is completed. Please advise.

## 2023-02-02 NOTE — PROGRESS NOTES
Juan Kaur is a 80 y.o. female    Chief Complaint   Patient presents with    Annual Wellness Visit    Sleep Problem     Trouble falling asleep and staying asleep x 6 months    Allergies    Dysphagia     And esophageal spasms       Visit Vitals  /69   Pulse 68   Temp 98.2 °F (36.8 °C) (Temporal)   Resp 18   Ht 5' 4\" (1.626 m)   Wt 134 lb (60.8 kg)   SpO2 98%   BMI 23.00 kg/m²       3 most recent PHQ Screens 2/2/2023   PHQ Not Done -   Little interest or pleasure in doing things Not at all   Feeling down, depressed, irritable, or hopeless Not at all   Total Score PHQ 2 0   Trouble falling or staying asleep, or sleeping too much -   Feeling tired or having little energy -   Poor appetite, weight loss, or overeating -   Feeling bad about yourself - or that you are a failure or have let yourself or your family down -   Trouble concentrating on things such as school, work, reading, or watching TV -   Moving or speaking so slowly that other people could have noticed; or the opposite being so fidgety that others notice -   Thoughts of being better off dead, or hurting yourself in some way -   PHQ 9 Score -   How difficult have these problems made it for you to do your work, take care of your home and get along with others -       Fall Risk Assessment, last 12 mths 2/2/2023   Able to walk? Yes   Fall in past 12 months? 0   Do you feel unsteady? -   Are you worried about falling -   Is TUG test greater than 12 seconds? -   Is the gait abnormal? -   Number of falls in past 12 months -   Fall with injury? -       Abuse Screening Questionnaire 2/2/2023   Do you ever feel afraid of your partner? N   Are you in a relationship with someone who physically or mentally threatens you? N   Is it safe for you to go home? Y       1. Have you been to the ER, urgent care clinic since your last visit? Hospitalized since your last visit? No     2.  Have you seen or consulted any other health care providers outside of the [facility] [address] since your last visit? Include any pap smears or colon screening.  No

## 2023-02-03 LAB
ALBUMIN SERPL-MCNC: 3.8 G/DL (ref 3.5–5)
ALBUMIN/GLOB SERPL: 1.2 (ref 1.1–2.2)
ALP SERPL-CCNC: 83 U/L (ref 45–117)
ALT SERPL-CCNC: 27 U/L (ref 12–78)
ANION GAP SERPL CALC-SCNC: 3 MMOL/L (ref 5–15)
AST SERPL-CCNC: 21 U/L (ref 15–37)
BASOPHILS # BLD: 0 K/UL (ref 0–0.1)
BASOPHILS NFR BLD: 1 % (ref 0–1)
BILIRUB SERPL-MCNC: 0.3 MG/DL (ref 0.2–1)
BUN SERPL-MCNC: 20 MG/DL (ref 6–20)
BUN/CREAT SERPL: 28 (ref 12–20)
CALCIUM SERPL-MCNC: 9.1 MG/DL (ref 8.5–10.1)
CHLORIDE SERPL-SCNC: 107 MMOL/L (ref 97–108)
CHOLEST SERPL-MCNC: 191 MG/DL
CO2 SERPL-SCNC: 30 MMOL/L (ref 21–32)
CREAT SERPL-MCNC: 0.72 MG/DL (ref 0.55–1.02)
DIFFERENTIAL METHOD BLD: ABNORMAL
EOSINOPHIL # BLD: 0 K/UL (ref 0–0.4)
EOSINOPHIL NFR BLD: 1 % (ref 0–7)
ERYTHROCYTE [DISTWIDTH] IN BLOOD BY AUTOMATED COUNT: 13 % (ref 11.5–14.5)
GLOBULIN SER CALC-MCNC: 3.2 G/DL (ref 2–4)
GLUCOSE SERPL-MCNC: 108 MG/DL (ref 65–100)
HCT VFR BLD AUTO: 42.5 % (ref 35–47)
HDLC SERPL-MCNC: 109 MG/DL
HDLC SERPL: 1.8 (ref 0–5)
HGB BLD-MCNC: 13.3 G/DL (ref 11.5–16)
IMM GRANULOCYTES # BLD AUTO: 0 K/UL (ref 0–0.04)
IMM GRANULOCYTES NFR BLD AUTO: 0 % (ref 0–0.5)
LDLC SERPL CALC-MCNC: 68.6 MG/DL (ref 0–100)
LYMPHOCYTES # BLD: 1.9 K/UL (ref 0.8–3.5)
LYMPHOCYTES NFR BLD: 34 % (ref 12–49)
MAGNESIUM SERPL-MCNC: 2.5 MG/DL (ref 1.6–2.4)
MCH RBC QN AUTO: 31.4 PG (ref 26–34)
MCHC RBC AUTO-ENTMCNC: 31.3 G/DL (ref 30–36.5)
MCV RBC AUTO: 100.5 FL (ref 80–99)
MONOCYTES # BLD: 0.7 K/UL (ref 0–1)
MONOCYTES NFR BLD: 12 % (ref 5–13)
NEUTS SEG # BLD: 2.9 K/UL (ref 1.8–8)
NEUTS SEG NFR BLD: 52 % (ref 32–75)
NRBC # BLD: 0 K/UL (ref 0–0.01)
NRBC BLD-RTO: 0 PER 100 WBC
PLATELET # BLD AUTO: 128 K/UL (ref 150–400)
PMV BLD AUTO: 11.6 FL (ref 8.9–12.9)
POTASSIUM SERPL-SCNC: 4.1 MMOL/L (ref 3.5–5.1)
PROT SERPL-MCNC: 7 G/DL (ref 6.4–8.2)
RBC # BLD AUTO: 4.23 M/UL (ref 3.8–5.2)
SODIUM SERPL-SCNC: 140 MMOL/L (ref 136–145)
TRIGL SERPL-MCNC: 67 MG/DL (ref ?–150)
VLDLC SERPL CALC-MCNC: 13.4 MG/DL
WBC # BLD AUTO: 5.6 K/UL (ref 3.6–11)

## 2023-02-06 NOTE — PATIENT INSTRUCTIONS
Medicare Wellness Visit, Female     The best way to live healthy is to have a lifestyle where you eat a well-balanced diet, exercise regularly, limit alcohol use, and quit all forms of tobacco/nicotine, if applicable. Regular preventive services are another way to keep healthy. Preventive services (vaccines, screening tests, monitoring & exams) can help personalize your care plan, which helps you manage your own care. Screening tests can find health problems at the earliest stages, when they are easiest to treat. [facility] follows the current, evidence-based guidelines published by the [facility] Chavez ([facility]) when recommending preventive services for our patients. Because we follow these guidelines, sometimes recommendations change over time as research supports it. (For example, mammograms used to be recommended annually. Even though Medicare will still pay for an annual mammogram, the newer guidelines recommend a mammogram every two years for women of average risk). Of course, you and your doctor may decide to screen more often for some diseases, based on your risk and your co-morbidities (chronic disease you are already diagnosed with). Preventive services for you include:  - Medicare offers their members a free annual wellness visit, which is time for you and your primary care provider to discuss and plan for your preventive service needs.  Take advantage of this benefit every year!    -Over the age of 72 should receive the recommended pneumonia vaccines.    -All adults should have a flu vaccine yearly.  -All adults should have a tetanus vaccine every 10 years.   -Over the age 48 should receive the shingles vaccines.        -All adults should be screened once for Hepatitis C.  -All adults age 38-68 who are overweight should have a diabetes screening test once every three years.   -Other screening tests and preventive services for persons with diabetes include: an eye exam to screen for diabetic retinopathy, a kidney function test, a foot exam, and stricter control over your cholesterol.   -Cardiovascular screening for adults with routine risk involves an electrocardiogram (ECG) at intervals determined by your doctor.     -Colorectal cancer screenings should be done for adults age 39-70 with no increased risk factors for colorectal cancer. There are a number of acceptable methods of screening for this type of cancer. Each test has its own benefits and drawbacks. Discuss with your doctor what is most appropriate for you during your annual wellness visit. The different tests include: colonoscopy (considered the best screening method), a fecal occult blood test, a fecal DNA test, and sigmoidoscopy.    -Lung cancer screening is recommended annually with a low dose CT scan for adults between age 54 and 68, who have smoked at least 30 pack years (equivalent of 1 pack per day for 30 days), and who is a current smoker or quit less than 15 years ago.    -A bone mass density test is recommended when a woman turns 65 to screen for osteoporosis. This test is only recommended one time, as a screening. Some providers will use this same test as a disease monitoring tool if you already have osteoporosis. -Breast cancer screenings are recommended every other year for women of normal risk, age 54-69.    -Cervical cancer screenings for women over age 72 are only recommended with certain risk factors.      Here is a list of your current Health Maintenance items (your personalized list of preventive services) with a due date:  Health Maintenance Due   Topic Date Due    COVID-19 Vaccine (1) Never done    DTaP/Tdap/Td  (1 - Tdap) 05/03/2007    Shingles Vaccine (2 of 2) 03/27/2020

## 2023-02-14 RX ORDER — DULOXETIN HYDROCHLORIDE 20 MG/1
CAPSULE, DELAYED RELEASE ORAL
Qty: 90 CAPSULE | Refills: 0 | Status: SHIPPED | OUTPATIENT
Start: 2023-02-14

## 2023-02-17 ENCOUNTER — HOSPITAL ENCOUNTER (OUTPATIENT)
Dept: MAMMOGRAPHY | Age: 88
Discharge: HOME | End: 2023-02-17
Attending: INTERNAL MEDICINE
Payer: MEDICARE

## 2023-02-17 DIAGNOSIS — Z12.31 BREAST CANCER SCREENING BY MAMMOGRAM: ICD-10-CM

## 2023-02-17 PROCEDURE — 77067 SCR MAMMO BI INCL CAD: CPT

## 2023-02-23 ENCOUNTER — OFFICE VISIT (OUTPATIENT)
Dept: INTERNAL MEDICINE CLINIC | Age: 88
End: 2023-02-23
Payer: MEDICARE

## 2023-02-23 VITALS
BODY MASS INDEX: 22.61 KG/M2 | HEART RATE: 71 BPM | TEMPERATURE: 98.6 F | WEIGHT: 132.4 LBS | OXYGEN SATURATION: 97 % | RESPIRATION RATE: 20 BRPM | HEIGHT: 64 IN | DIASTOLIC BLOOD PRESSURE: 69 MMHG | SYSTOLIC BLOOD PRESSURE: 115 MMHG

## 2023-02-23 DIAGNOSIS — K21.9 GASTROESOPHAGEAL REFLUX DISEASE WITHOUT ESOPHAGITIS: Primary | ICD-10-CM

## 2023-02-23 PROCEDURE — G8510 SCR DEP NEG, NO PLAN REQD: HCPCS | Performed by: INTERNAL MEDICINE

## 2023-02-23 PROCEDURE — G0463 HOSPITAL OUTPT CLINIC VISIT: HCPCS | Performed by: INTERNAL MEDICINE

## 2023-02-23 RX ORDER — ERYTHROMYCIN 5 MG/G
OINTMENT OPHTHALMIC
COMMUNITY

## 2023-02-23 RX ORDER — OMEGA-3S/DHA/EPA/FISH OIL/D3 1150-1000
LIQUID (ML) ORAL
COMMUNITY

## 2023-02-23 RX ORDER — AMLODIPINE BESYLATE 2.5 MG/1
TABLET ORAL
Qty: 90 TABLET | Refills: 1 | Status: SHIPPED | OUTPATIENT
Start: 2023-02-23

## 2023-02-23 RX ORDER — OMEPRAZOLE 40 MG/1
40 CAPSULE, DELAYED RELEASE ORAL DAILY
Qty: 90 CAPSULE | Refills: 1 | Status: SHIPPED | OUTPATIENT
Start: 2023-02-23

## 2023-02-23 NOTE — PROGRESS NOTES
Patient c/o reflux, trouble swallowing on Sunday and spit out some blood, patient does not have a GI doctor. No chief complaint on file. Vitals:    02/23/23 1055   Temp: 98.6 °F (37 °C)   TempSrc: Temporal   Weight: 132 lb 6.4 oz (60.1 kg)   PainSc:   4   PainLoc: Back       Current Outpatient Medications on File Prior to Visit   Medication Sig Dispense Refill    omega-3s-dha-epa-fish oil-D3 ([facility] [place] Benefits) 667-250 mg-unit cap 2 capsule by mouth daily Oral for 0      mineral oil/petrolatum,white (REFRESH P.M. OP) 1 drop NIGHTLY in both eyes for 0      erythromycin (ILOTYCIN) ophthalmic ointment 1 application into the lower eyelid of both eyes Ophthalmic at bedtime for 30 days      DULoxetine (CYMBALTA) 20 mg capsule TAKE 1 CAPSULE BY MOUTH EVERY DAY 90 Capsule 0    antiox #8/om3/dha/epa/lut/zeax (PRESERVISION AREDS 2, OMEGA-3, PO) Take 2 Tablets by mouth daily. famotidine (PEPCID) 40 mg tablet TAKE 1 TABLET BY MOUTH EVERY DAY FOR GASTROESOPHAGEAL REFLUX DISEASE 90 Tablet 3    atorvastatin (LIPITOR) 10 mg tablet TAKE 1 TABLET BY MOUTH EVERY DAY 90 Tablet 1    lisinopriL (PRINIVIL, ZESTRIL) 10 mg tablet TAKE 1 TABLET BY MOUTH EVERY DAY FOR BLOOD PRESSURE 90 Tablet 1    amLODIPine (NORVASC) 2.5 mg tablet Take 1 Tablet by mouth daily. 90 Tablet 1    wheat dextrin/calc gluc,lact (BENEFIBER PLUS CALCIUM PO) Take 1 Applicatorful by mouth daily. acetaminophen (TYLENOL) 500 mg tablet Take 500 mg by mouth every six (6) hours as needed for Pain. inbetween doses of the Tylenol #3      biotin (VITAMIN B7) 5 mg tablet Take 5 mg by mouth daily. aspirin delayed-release 81 mg tablet Take 81 mg by mouth daily. denosumab (PROLIA) 60 mg/mL injection 1 mL by SubCUTAneous route every 6 months. 1 Syringe 3    cholecalciferol, vitamin D3, 50 mcg (2,000 unit) tab Take 2,000 Units by mouth daily. MULTIVITAMIN PO Take 1 Tab by mouth daily. Takes one po daily.        CALCIUM CARB/VIT D3/MINERALS (CALTRATE PLUS PO) Take 1 Tab by mouth daily. Takes one po daily. polyethylene glycol 3350 (MIRALAX PO) 17 g. (Patient not taking: No sig reported)      acetaminophen (TYLENOL) 325 mg tablet Take 2 Tabs by mouth every six (6) hours. (Patient not taking: Reported on 2/23/2023) 1 Tab 0     No current facility-administered medications on file prior to visit. Health Maintenance Due   Topic    COVID-19 Vaccine (1)    DTaP/Tdap/Td series (1 - Tdap)    Shingles Vaccine (2 of 2)       1. \"Have you been to the ER, urgent care clinic since your last visit? Hospitalized since your last visit? \" No    2. \"Have you seen or consulted any other health care providers outside of the [address] [place] [address] since your last visit? \" No     3. For patients aged 39-70: Has the patient had a colonoscopy / FIT/ Cologuard? NA - based on age      If the patient is female:    4. For patients aged 41-77: Has the patient had a mammogram within the past 2 years? Yes - no Care Gap present [address] [place] [address]      5. For patients aged 21-65: Has the patient had a pap smear?  NA - based on age or sex

## 2023-02-23 NOTE — PROGRESS NOTES
Chief Complaint   Patient presents with    Esophageal Reflux     SUBJECTIVE:  Unknown Jaz is a 80 y.o. female who complains of GERD type symptoms. She has been experiencing fullness after meals, belching and eructation, abdominal bloating, cough, dysphagia, on  for many week(s), recurrent over time. ROS: patient denies abdominal pain or chest pain. Social history: alcohol intake is 1    Has had gerd before and has esophagus stretched before DR Anisa Melendez now retired. Current Outpatient Medications   Medication Sig Dispense Refill    omega-3s-dha-epa-fish oil-D3 ([facility] [place] Benefits) 741-263 mg-unit cap 2 capsule by mouth daily Oral for 0      mineral oil/petrolatum,white (REFRESH P.M. OP) 1 drop NIGHTLY in both eyes for 0      erythromycin (ILOTYCIN) ophthalmic ointment 1 application into the lower eyelid of both eyes Ophthalmic at bedtime for 30 days      DULoxetine (CYMBALTA) 20 mg capsule TAKE 1 CAPSULE BY MOUTH EVERY DAY 90 Capsule 0    antiox #8/om3/dha/epa/lut/zeax (PRESERVISION AREDS 2, OMEGA-3, PO) Take 2 Tablets by mouth daily. famotidine (PEPCID) 40 mg tablet TAKE 1 TABLET BY MOUTH EVERY DAY FOR GASTROESOPHAGEAL REFLUX DISEASE 90 Tablet 3    atorvastatin (LIPITOR) 10 mg tablet TAKE 1 TABLET BY MOUTH EVERY DAY 90 Tablet 1    lisinopriL (PRINIVIL, ZESTRIL) 10 mg tablet TAKE 1 TABLET BY MOUTH EVERY DAY FOR BLOOD PRESSURE 90 Tablet 1    amLODIPine (NORVASC) 2.5 mg tablet Take 1 Tablet by mouth daily. 90 Tablet 1    wheat dextrin/calc gluc,lact (BENEFIBER PLUS CALCIUM PO) Take 1 Applicatorful by mouth daily. acetaminophen (TYLENOL) 500 mg tablet Take 500 mg by mouth every six (6) hours as needed for Pain. inbetween doses of the Tylenol #3      biotin (VITAMIN B7) 5 mg tablet Take 5 mg by mouth daily. aspirin delayed-release 81 mg tablet Take 81 mg by mouth daily. denosumab (PROLIA) 60 mg/mL injection 1 mL by SubCUTAneous route every 6 months.  1 Syringe 3    cholecalciferol, vitamin D3, 50 mcg (2,000 unit) tab Take 2,000 Units by mouth daily. MULTIVITAMIN PO Take 1 Tab by mouth daily. Takes one po daily. CALCIUM CARB/VIT D3/MINERALS (CALTRATE PLUS PO) Take 1 Tab by mouth daily. Takes one po daily. polyethylene glycol 3350 (MIRALAX PO) 17 g. (Patient not taking: No sig reported)      acetaminophen (TYLENOL) 325 mg tablet Take 2 Tabs by mouth every six (6) hours. (Patient not taking: Reported on 2/23/2023) 1 Tab 0     Wt Readings from Last 3 Encounters:   02/23/23 132 lb 6.4 oz (60.1 kg)   02/02/23 134 lb (60.8 kg)   09/27/22 131 lb 2 oz (59.5 kg)       OBJECTIVE:  Appears well, alert and oriented x 3, pleasant cooperative in NAD. Anicteric. Vitals as noted. Neck free of lymphadenopathy or mass. Abdomen - abdomen is soft without significant tenderness, masses, organomegaly or guarding. .    ASSESSMENT:  GERD     PLAN:  The pathophysiology of reflux is discussed. Anti-reflux measures such as raising the head of the bed, avoiding tight clothing or belts, avoiding eating late at night and not lying down shortly after mealtime and achieving weight loss are discussed. Avoid ASA, NSAID's, caffeine, peppermints, alcohol and tobacco. OTC H2 blockers and/or antacids are often very helpful for PRN use. However, for persisting chronic or daily symptoms, prescription strength H2 blockers or a trial of PPI's are often used. Further recommendations to her: Rx for PPI is written. She should alert me if there are persistent symptoms, dysphagia, weight loss or GI bleeding. Yue Cain is scheduled. Diagnoses and all orders for this visit:    1. Gastroesophageal reflux disease without esophagitis  -     omeprazole (PRILOSEC) 40 mg capsule; Take 1 Capsule by mouth daily.  Indications: gastroesophageal reflux disease  -     REFERRAL TO GASTROENTEROLOGY    If a lot better can skip GI consult  See prn

## 2023-04-20 RX ORDER — ATORVASTATIN CALCIUM 10 MG/1
TABLET, FILM COATED ORAL
Qty: 90 TABLET | Refills: 1 | Status: SHIPPED | OUTPATIENT
Start: 2023-04-20

## 2023-04-20 RX ORDER — LISINOPRIL 10 MG/1
TABLET ORAL
Qty: 90 TABLET | Refills: 1 | Status: SHIPPED | OUTPATIENT
Start: 2023-04-20

## 2023-05-22 RX ORDER — DULOXETIN HYDROCHLORIDE 20 MG/1
CAPSULE, DELAYED RELEASE ORAL
Qty: 90 CAPSULE | Refills: 1 | Status: SHIPPED | OUTPATIENT
Start: 2023-05-22

## 2023-05-30 ENCOUNTER — OFFICE VISIT (OUTPATIENT)
Age: 88
End: 2023-05-30
Payer: MEDICARE

## 2023-05-30 VITALS
RESPIRATION RATE: 14 BRPM | BODY MASS INDEX: 24.29 KG/M2 | HEIGHT: 62 IN | WEIGHT: 132 LBS | SYSTOLIC BLOOD PRESSURE: 105 MMHG | OXYGEN SATURATION: 96 % | HEART RATE: 76 BPM | TEMPERATURE: 98.4 F | DIASTOLIC BLOOD PRESSURE: 61 MMHG

## 2023-05-30 DIAGNOSIS — T66.XXXS ADVERSE EFFECT OF RADIATION THERAPY, SEQUELA: ICD-10-CM

## 2023-05-30 DIAGNOSIS — T14.8XXA AVULSION, SKIN: Primary | ICD-10-CM

## 2023-05-30 PROCEDURE — 1123F ACP DISCUSS/DSCN MKR DOCD: CPT | Performed by: PHYSICIAN ASSISTANT

## 2023-05-30 PROCEDURE — 99213 OFFICE O/P EST LOW 20 MIN: CPT | Performed by: PHYSICIAN ASSISTANT

## 2023-05-30 PROCEDURE — G8428 CUR MEDS NOT DOCUMENT: HCPCS | Performed by: PHYSICIAN ASSISTANT

## 2023-05-30 PROCEDURE — G8420 CALC BMI NORM PARAMETERS: HCPCS | Performed by: PHYSICIAN ASSISTANT

## 2023-05-30 PROCEDURE — 1090F PRES/ABSN URINE INCON ASSESS: CPT | Performed by: PHYSICIAN ASSISTANT

## 2023-05-30 PROCEDURE — 1036F TOBACCO NON-USER: CPT | Performed by: PHYSICIAN ASSISTANT

## 2023-05-30 SDOH — ECONOMIC STABILITY: HOUSING INSECURITY
IN THE LAST 12 MONTHS, WAS THERE A TIME WHEN YOU DID NOT HAVE A STEADY PLACE TO SLEEP OR SLEPT IN A SHELTER (INCLUDING NOW)?: NO

## 2023-05-30 SDOH — ECONOMIC STABILITY: FOOD INSECURITY: WITHIN THE PAST 12 MONTHS, THE FOOD YOU BOUGHT JUST DIDN'T LAST AND YOU DIDN'T HAVE MONEY TO GET MORE.: NEVER TRUE

## 2023-05-30 SDOH — ECONOMIC STABILITY: FOOD INSECURITY: WITHIN THE PAST 12 MONTHS, YOU WORRIED THAT YOUR FOOD WOULD RUN OUT BEFORE YOU GOT MONEY TO BUY MORE.: NEVER TRUE

## 2023-05-30 SDOH — ECONOMIC STABILITY: INCOME INSECURITY: HOW HARD IS IT FOR YOU TO PAY FOR THE VERY BASICS LIKE FOOD, HOUSING, MEDICAL CARE, AND HEATING?: NOT HARD AT ALL

## 2023-05-30 ASSESSMENT — ENCOUNTER SYMPTOMS
COUGH: 0
SHORTNESS OF BREATH: 0

## 2023-05-30 NOTE — PATIENT INSTRUCTIONS
Wash Gently with soap and water once daily and then dress the wound:   Apply Mupirocin (prescription ointment), Then apply non stick bandage, then wrap with Ace Wrap or Self-Adhesive bandage. If you decide that you need help dressing this wound, then one of these wound clinics would be helpful:     [facility]    [address] [facility] [address]  [address] Wound Care and [address]  [facility]. [address] [place] [address], [address] [place] [address]  Office: [phone] Fax: [phone]     [address]spino[address]. [address], [address] [place] [address]  P# [phone]  F# [phone]     [facility] and [facility]   [address]   Tel: [phone]

## 2023-05-30 NOTE — PROGRESS NOTES
focal deficit present. Mental Status: She is alert and oriented to person, place, and time. Psychiatric:         Mood and Affect: Mood normal.         Behavior: Behavior normal.         Thought Content: Thought content normal.         Judgment: Judgment normal.     ASSESSMENT/PLAN    ICD-10-CM    1. Avulsion, skin  T14. 8XXA mupirocin (BACTROBAN) 2 % ointment      2. Adverse effect of radiation therapy, sequela  T66. XXXS          Bandaging instructions given to pt and instructions to call Wound Care if she feels that she needs additional help changing bandages. Pt demonstrates understanding in office today.

## 2023-06-06 NOTE — PROGRESS NOTES
All health maintenance and other pertinent information has been reviewed in preparation for today's office visit. Patient presents in the office today for: Chief Complaint Patient presents with [facility] Annual Wellness Visit 1. Have you been to the ER, urgent care clinic since your last visit? Hospitalized since your last visit? No 
 
2. Have you seen or consulted any other health care providers outside of the [address] [place] [address] since your last visit? Include any pap smears or colon screening. No 
 
Per provider order,  INFLUENZA VACCINE INACTIVATED (IIV), SUBUNIT, ADJUVANTED, IM (0.5) mL was administered to the patient in the left deltoid via IM route. Patient tolerated vaccine/injection well. Patient waited for 20 minutes post injection for observation. No adverse reaction noted. All documentation completed. normal...

## 2023-06-14 ENCOUNTER — TELEPHONE (OUTPATIENT)
Age: 88
End: 2023-06-14

## 2023-07-17 DIAGNOSIS — M81.0 OSTEOPOROSIS, UNSPECIFIED OSTEOPOROSIS TYPE, UNSPECIFIED PATHOLOGICAL FRACTURE PRESENCE: Primary | ICD-10-CM

## 2023-07-17 RX ORDER — EPINEPHRINE 1 MG/ML
0.3 INJECTION, SOLUTION, CONCENTRATE INTRAVENOUS PRN
OUTPATIENT
Start: 2023-07-24

## 2023-07-17 RX ORDER — ONDANSETRON 2 MG/ML
8 INJECTION INTRAMUSCULAR; INTRAVENOUS
OUTPATIENT
Start: 2023-07-24

## 2023-07-17 RX ORDER — ALBUTEROL SULFATE 90 UG/1
4 AEROSOL, METERED RESPIRATORY (INHALATION) PRN
OUTPATIENT
Start: 2023-07-24

## 2023-07-17 RX ORDER — DIPHENHYDRAMINE HYDROCHLORIDE 50 MG/ML
50 INJECTION INTRAMUSCULAR; INTRAVENOUS
OUTPATIENT
Start: 2023-07-24

## 2023-07-17 RX ORDER — FAMOTIDINE 10 MG/ML
20 INJECTION, SOLUTION INTRAVENOUS
OUTPATIENT
Start: 2023-07-24

## 2023-07-17 RX ORDER — ACETAMINOPHEN 325 MG/1
650 TABLET ORAL
OUTPATIENT
Start: 2023-07-24

## 2023-07-17 RX ORDER — SODIUM CHLORIDE 9 MG/ML
INJECTION, SOLUTION INTRAVENOUS CONTINUOUS
OUTPATIENT
Start: 2023-07-24

## 2023-07-24 ENCOUNTER — HOSPITAL ENCOUNTER (OUTPATIENT)
Facility: HOSPITAL | Age: 88
Setting detail: INFUSION SERIES
End: 2023-07-24
Payer: MEDICARE

## 2023-07-24 ENCOUNTER — APPOINTMENT (OUTPATIENT)
Dept: INFUSION THERAPY | Age: 88
End: 2023-07-24

## 2023-07-24 VITALS
TEMPERATURE: 98.2 F | RESPIRATION RATE: 18 BRPM | SYSTOLIC BLOOD PRESSURE: 116 MMHG | DIASTOLIC BLOOD PRESSURE: 65 MMHG | HEART RATE: 81 BPM

## 2023-07-24 DIAGNOSIS — M81.0 OSTEOPOROSIS, UNSPECIFIED OSTEOPOROSIS TYPE, UNSPECIFIED PATHOLOGICAL FRACTURE PRESENCE: Primary | ICD-10-CM

## 2023-07-24 LAB
ANION GAP BLD CALC-SCNC: 11 MMOL/L (ref 10–20)
CA-I BLD-MCNC: 1.13 MMOL/L (ref 1.12–1.32)
CHLORIDE BLD-SCNC: 104 MMOL/L (ref 98–107)
CO2 BLD-SCNC: 25.7 MMOL/L (ref 21–32)
CREAT BLD-MCNC: 0.61 MG/DL (ref 0.6–1.3)
GLUCOSE BLD-MCNC: 98 MG/DL (ref 65–100)
MAGNESIUM SERPL-MCNC: 2.2 MG/DL (ref 1.6–2.4)
PHOSPHATE SERPL-MCNC: 3.7 MG/DL (ref 2.6–4.7)
POTASSIUM BLD-SCNC: 4.2 MMOL/L (ref 3.5–5.1)
SERVICE CMNT-IMP: NORMAL
SODIUM BLD-SCNC: 140 MMOL/L (ref 136–145)

## 2023-07-24 PROCEDURE — 83735 ASSAY OF MAGNESIUM: CPT

## 2023-07-24 PROCEDURE — 36415 COLL VENOUS BLD VENIPUNCTURE: CPT

## 2023-07-24 PROCEDURE — 80047 BASIC METABLC PNL IONIZED CA: CPT

## 2023-07-24 PROCEDURE — 6360000002 HC RX W HCPCS: Performed by: INTERNAL MEDICINE

## 2023-07-24 PROCEDURE — 84100 ASSAY OF PHOSPHORUS: CPT

## 2023-07-24 PROCEDURE — 96372 THER/PROPH/DIAG INJ SC/IM: CPT

## 2023-07-24 RX ORDER — DIPHENHYDRAMINE HYDROCHLORIDE 50 MG/ML
50 INJECTION INTRAMUSCULAR; INTRAVENOUS
OUTPATIENT
Start: 2024-01-22

## 2023-07-24 RX ORDER — EPINEPHRINE 1 MG/ML
0.3 INJECTION, SOLUTION, CONCENTRATE INTRAVENOUS PRN
OUTPATIENT
Start: 2024-01-22

## 2023-07-24 RX ORDER — ALBUTEROL SULFATE 90 UG/1
4 AEROSOL, METERED RESPIRATORY (INHALATION) PRN
OUTPATIENT
Start: 2024-01-22

## 2023-07-24 RX ORDER — SODIUM CHLORIDE 9 MG/ML
INJECTION, SOLUTION INTRAVENOUS CONTINUOUS
OUTPATIENT
Start: 2024-01-22

## 2023-07-24 RX ORDER — ONDANSETRON 2 MG/ML
8 INJECTION INTRAMUSCULAR; INTRAVENOUS
OUTPATIENT
Start: 2024-01-22

## 2023-07-24 RX ORDER — ACETAMINOPHEN 325 MG/1
650 TABLET ORAL
OUTPATIENT
Start: 2024-01-22

## 2023-07-24 RX ADMIN — DENOSUMAB 60 MG: 60 INJECTION SUBCUTANEOUS at 11:19

## 2023-07-24 ASSESSMENT — PAIN SCALES - GENERAL: PAINLEVEL_OUTOF10: 0

## 2023-08-03 ENCOUNTER — OFFICE VISIT (OUTPATIENT)
Age: 88
End: 2023-08-03
Payer: MEDICARE

## 2023-08-03 VITALS
DIASTOLIC BLOOD PRESSURE: 60 MMHG | TEMPERATURE: 98.4 F | OXYGEN SATURATION: 97 % | SYSTOLIC BLOOD PRESSURE: 110 MMHG | BODY MASS INDEX: 22.5 KG/M2 | RESPIRATION RATE: 18 BRPM | HEART RATE: 81 BPM | WEIGHT: 127 LBS | HEIGHT: 63 IN

## 2023-08-03 DIAGNOSIS — I10 ESSENTIAL (PRIMARY) HYPERTENSION: Primary | ICD-10-CM

## 2023-08-03 DIAGNOSIS — E78.00 PURE HYPERCHOLESTEROLEMIA, UNSPECIFIED: ICD-10-CM

## 2023-08-03 DIAGNOSIS — I63.81 OTHER CEREBRAL INFARCTION DUE TO OCCLUSION OR STENOSIS OF SMALL ARTERY (HCC): ICD-10-CM

## 2023-08-03 DIAGNOSIS — I25.118 CORONARY ARTERY DISEASE OF NATIVE HEART WITH STABLE ANGINA PECTORIS, UNSPECIFIED VESSEL OR LESION TYPE (HCC): ICD-10-CM

## 2023-08-03 PROBLEM — R94.5 ABNORMAL RESULTS OF LIVER FUNCTION STUDIES: Status: ACTIVE | Noted: 2023-08-03

## 2023-08-03 PROCEDURE — G8420 CALC BMI NORM PARAMETERS: HCPCS | Performed by: INTERNAL MEDICINE

## 2023-08-03 PROCEDURE — 99214 OFFICE O/P EST MOD 30 MIN: CPT | Performed by: INTERNAL MEDICINE

## 2023-08-03 PROCEDURE — 1090F PRES/ABSN URINE INCON ASSESS: CPT | Performed by: INTERNAL MEDICINE

## 2023-08-03 PROCEDURE — 1036F TOBACCO NON-USER: CPT | Performed by: INTERNAL MEDICINE

## 2023-08-03 PROCEDURE — 1123F ACP DISCUSS/DSCN MKR DOCD: CPT | Performed by: INTERNAL MEDICINE

## 2023-08-03 PROCEDURE — G8427 DOCREV CUR MEDS BY ELIG CLIN: HCPCS | Performed by: INTERNAL MEDICINE

## 2023-08-03 RX ORDER — ANTIOX #8/OM3/DHA/EPA/LUT/ZEAX 250-2.5 MG
2 CAPSULE ORAL DAILY
COMMUNITY

## 2023-08-10 ENCOUNTER — HOSPITAL ENCOUNTER (OUTPATIENT)
Facility: HOSPITAL | Age: 88
Discharge: HOME | End: 2023-08-10
Payer: MEDICARE

## 2023-08-10 DIAGNOSIS — R63.4 WEIGHT LOSS: ICD-10-CM

## 2023-08-10 DIAGNOSIS — K86.2 CYST OF PANCREAS: ICD-10-CM

## 2023-08-10 DIAGNOSIS — K21.9 CHALASIA OF LOWER ESOPHAGEAL SPHINCTER: ICD-10-CM

## 2023-08-10 DIAGNOSIS — R13.10 DYSPHAGIA, UNSPECIFIED TYPE: ICD-10-CM

## 2023-08-10 DIAGNOSIS — R19.4 BOWEL HABIT CHANGES: ICD-10-CM

## 2023-08-10 DIAGNOSIS — R19.7 DIARRHEA, UNSPECIFIED TYPE: ICD-10-CM

## 2023-08-10 PROCEDURE — 74230 X-RAY XM SWLNG FUNCJ C+: CPT

## 2023-08-10 PROCEDURE — 92611 MOTION FLUOROSCOPY/SWALLOW: CPT

## 2023-08-10 NOTE — PROGRESS NOTES
stress test    TONSILLECTOMY      TUBAL LIGATION  1976     Current Dietary Status:  regular, thins  Type of Study: Modified barium swallow  Film Views: Lateral     Patient Position: upright  standing      Trial 1: Trial 2:         How Presented: Self-fed/presented;Spoon;Straw;Successive Swallows  ORAL PHASE:      Bolus Acceptance: No impairment     Bolus Formation/Control: No impairment       Propulsion: No impairment       Oral Residue: None      PHARYNGEAL PHASE:      Timing: No impairment     Penetration: None     Aspiration/Timing: No evidence of aspiration     Pharyngeal Clearance: No residue                   Trial 3: Trial 4:          ESOPHAGEAL PHASE:   Noted some \"cork-screw like\" transit in lower esophagus, which left mild residuals on shelves of \"corkscrew\" with thins after peristalsis complete                                                                             Swallowing Physiology  Decreased Tongue Base Retraction?: No  Laryngeal Elevation: WFL (within functional limits)  Penetration-Aspiration Scale (PAS): 1 - Material does not enter the airway  Pharyngeal Symmetry: Not assessed       ASSESSMENT :  Based on the objective data described above, the patient presents with normal oral-pharyngeal swallow at age 80. Noted possible cork-screw appearance in lower esophagus. Defer to GI. PLAN/RECOMMENDATIONS :  Continue diet as tolerated. Defer to GI for analysis of esophagus-see pictures in PACS system. May benefit from barium esophagram.      COMMUNICATION/EDUCATION:   The above findings and recommendations were discussed with:  patient  who verbalized understanding.     Thank you for this referral.  CHINEDU [facility]  Minutes: 21

## 2023-08-23 DIAGNOSIS — K21.9 GASTRO-ESOPHAGEAL REFLUX DISEASE WITHOUT ESOPHAGITIS: ICD-10-CM

## 2023-08-23 RX ORDER — OMEPRAZOLE 40 MG/1
CAPSULE, DELAYED RELEASE ORAL
Qty: 90 CAPSULE | Refills: 1 | Status: SHIPPED | OUTPATIENT
Start: 2023-08-23

## 2023-08-23 RX ORDER — AMLODIPINE BESYLATE 2.5 MG/1
TABLET ORAL
Qty: 90 TABLET | Refills: 1 | Status: SHIPPED | OUTPATIENT
Start: 2023-08-23

## 2023-09-11 ENCOUNTER — TRANSCRIBE ORDERS (OUTPATIENT)
Facility: HOSPITAL | Age: 88
End: 2023-09-11

## 2023-09-11 ENCOUNTER — TELEPHONE (OUTPATIENT)
Age: 88
End: 2023-09-11

## 2023-09-11 DIAGNOSIS — R13.10 DYSPHAGIA, UNSPECIFIED TYPE: Primary | ICD-10-CM

## 2023-09-11 NOTE — TELEPHONE ENCOUNTER
----- Message from Alonso Thrasher sent at 9/7/2023  1:31 PM EDT -----  Subject: Referral Request    Reason for referral request? Annual labs  Provider patient wants to be referred to(if known):     Provider Phone Number(if known): Additional Information for Provider? Patient has AWV on 10/24/23 and would   like labs completed the week prior so that she may have her results the   day of AWV.  Please call when available and provide instructions.   ---------------------------------------------------------------------------  --------------  Bindu Raman INFO    [phone]; OK to leave message on voicemail  ---------------------------------------------------------------------------  --------------

## 2023-09-19 ENCOUNTER — HOSPITAL ENCOUNTER (OUTPATIENT)
Facility: HOSPITAL | Age: 88
Discharge: HOME | End: 2023-09-22
Payer: MEDICARE

## 2023-09-19 DIAGNOSIS — R13.10 DYSPHAGIA, UNSPECIFIED TYPE: ICD-10-CM

## 2023-09-19 PROCEDURE — 74221 X-RAY XM ESOPHAGUS 2CNTRST: CPT

## 2023-10-24 ENCOUNTER — OFFICE VISIT (OUTPATIENT)
Age: 88
End: 2023-10-24
Payer: MEDICARE

## 2023-10-24 VITALS
BODY MASS INDEX: 22.5 KG/M2 | HEIGHT: 63 IN | RESPIRATION RATE: 20 BRPM | TEMPERATURE: 97 F | OXYGEN SATURATION: 99 % | HEART RATE: 78 BPM | WEIGHT: 127 LBS | SYSTOLIC BLOOD PRESSURE: 108 MMHG | DIASTOLIC BLOOD PRESSURE: 71 MMHG

## 2023-10-24 DIAGNOSIS — I10 ESSENTIAL (PRIMARY) HYPERTENSION: ICD-10-CM

## 2023-10-24 DIAGNOSIS — D69.6 THROMBOCYTOPENIA, UNSPECIFIED (HCC): ICD-10-CM

## 2023-10-24 DIAGNOSIS — M81.0 AGE-RELATED OSTEOPOROSIS WITHOUT CURRENT PATHOLOGICAL FRACTURE: ICD-10-CM

## 2023-10-24 DIAGNOSIS — K22.4 DIFFUSE ESOPHAGEAL SPASM: ICD-10-CM

## 2023-10-24 DIAGNOSIS — K86.2 PANCREATIC CYST: ICD-10-CM

## 2023-10-24 DIAGNOSIS — E78.00 PURE HYPERCHOLESTEROLEMIA, UNSPECIFIED: ICD-10-CM

## 2023-10-24 DIAGNOSIS — Z00.00 MEDICARE ANNUAL WELLNESS VISIT, SUBSEQUENT: Primary | ICD-10-CM

## 2023-10-24 PROCEDURE — 1090F PRES/ABSN URINE INCON ASSESS: CPT | Performed by: INTERNAL MEDICINE

## 2023-10-24 PROCEDURE — G8484 FLU IMMUNIZE NO ADMIN: HCPCS | Performed by: INTERNAL MEDICINE

## 2023-10-24 PROCEDURE — G8420 CALC BMI NORM PARAMETERS: HCPCS | Performed by: INTERNAL MEDICINE

## 2023-10-24 PROCEDURE — 99214 OFFICE O/P EST MOD 30 MIN: CPT | Performed by: INTERNAL MEDICINE

## 2023-10-24 PROCEDURE — 1123F ACP DISCUSS/DSCN MKR DOCD: CPT | Performed by: INTERNAL MEDICINE

## 2023-10-24 PROCEDURE — G0439 PPPS, SUBSEQ VISIT: HCPCS | Performed by: INTERNAL MEDICINE

## 2023-10-24 PROCEDURE — G8427 DOCREV CUR MEDS BY ELIG CLIN: HCPCS | Performed by: INTERNAL MEDICINE

## 2023-10-24 PROCEDURE — 1036F TOBACCO NON-USER: CPT | Performed by: INTERNAL MEDICINE

## 2023-10-24 RX ORDER — TROSPIUM CHLORIDE 20 MG/1
TABLET, FILM COATED ORAL
COMMUNITY
Start: 2020-11-02

## 2023-10-24 ASSESSMENT — PATIENT HEALTH QUESTIONNAIRE - PHQ9
SUM OF ALL RESPONSES TO PHQ QUESTIONS 1-9: 0
1. LITTLE INTEREST OR PLEASURE IN DOING THINGS: 0
2. FEELING DOWN, DEPRESSED OR HOPELESS: 0
SUM OF ALL RESPONSES TO PHQ QUESTIONS 1-9: 0
SUM OF ALL RESPONSES TO PHQ QUESTIONS 1-9: 0
SUM OF ALL RESPONSES TO PHQ9 QUESTIONS 1 & 2: 0
SUM OF ALL RESPONSES TO PHQ QUESTIONS 1-9: 0

## 2023-10-24 ASSESSMENT — LIFESTYLE VARIABLES
HOW OFTEN DO YOU HAVE A DRINK CONTAINING ALCOHOL: NEVER
HOW MANY STANDARD DRINKS CONTAINING ALCOHOL DO YOU HAVE ON A TYPICAL DAY: 1 OR 2

## 2023-11-10 RX ORDER — LISINOPRIL 10 MG/1
TABLET ORAL
Qty: 90 TABLET | Refills: 1 | Status: SHIPPED | OUTPATIENT
Start: 2023-11-10

## 2023-11-10 RX ORDER — ATORVASTATIN CALCIUM 10 MG/1
TABLET, FILM COATED ORAL
Qty: 90 TABLET | Refills: 1 | Status: SHIPPED | OUTPATIENT
Start: 2023-11-10

## 2023-11-10 NOTE — TELEPHONE ENCOUNTER
Refill request received from [facility] for   Requested Prescriptions     Pending Prescriptions Disp Refills    atorvastatin (LIPITOR) 10 MG tablet [Pharmacy Med Name: ATORVASTATIN 10 MG TABLET] 90 tablet 1     Sig: TAKE 1 TABLET BY MOUTH EVERY DAY    lisinopril (PRINIVIL;ZESTRIL) 10 MG tablet [Pharmacy Med Name: LISINOPRIL 10 MG TABLET] 90 tablet 1     Sig: TAKE 1 TABLET BY MOUTH EVERY DAY FOR BLOOD PRESSURE     Last appointment: 10/24/2023   Next appointment: 4/25/2024     Routed to Dr Maya Bustamante for review.

## 2023-11-21 RX ORDER — DULOXETIN HYDROCHLORIDE 20 MG/1
CAPSULE, DELAYED RELEASE ORAL
Qty: 90 CAPSULE | Refills: 1 | Status: SHIPPED | OUTPATIENT
Start: 2023-11-21

## 2024-02-02 ENCOUNTER — HOSPITAL ENCOUNTER (OUTPATIENT)
Facility: HOSPITAL | Age: 89
Setting detail: INFUSION SERIES
Discharge: HOME | End: 2024-02-02
Payer: MEDICARE

## 2024-02-02 VITALS
HEART RATE: 78 BPM | TEMPERATURE: 97.7 F | SYSTOLIC BLOOD PRESSURE: 121 MMHG | DIASTOLIC BLOOD PRESSURE: 67 MMHG | RESPIRATION RATE: 16 BRPM

## 2024-02-02 DIAGNOSIS — M81.0 OSTEOPOROSIS, UNSPECIFIED OSTEOPOROSIS TYPE, UNSPECIFIED PATHOLOGICAL FRACTURE PRESENCE: Primary | ICD-10-CM

## 2024-02-02 LAB
ANION GAP BLD CALC-SCNC: 8.1 MMOL/L (ref 10–20)
CA-I BLD-MCNC: 1.23 MMOL/L (ref 1.12–1.32)
CHLORIDE BLD-SCNC: 103 MMOL/L (ref 98–107)
CO2 BLD-SCNC: 28.9 MMOL/L (ref 21–32)
CREAT BLD-MCNC: 0.6 MG/DL (ref 0.6–1.3)
GLUCOSE BLD-MCNC: 107 MG/DL (ref 65–100)
MAGNESIUM SERPL-MCNC: 2.2 MG/DL (ref 1.6–2.4)
PHOSPHATE SERPL-MCNC: 4.3 MG/DL (ref 2.6–4.7)
POTASSIUM BLD-SCNC: 4.3 MMOL/L (ref 3.5–5.1)
SERVICE CMNT-IMP: ABNORMAL
SODIUM BLD-SCNC: 140 MMOL/L (ref 136–145)

## 2024-02-02 PROCEDURE — 6360000002 HC RX W HCPCS: Performed by: INTERNAL MEDICINE

## 2024-02-02 PROCEDURE — 84100 ASSAY OF PHOSPHORUS: CPT

## 2024-02-02 PROCEDURE — 83735 ASSAY OF MAGNESIUM: CPT

## 2024-02-02 PROCEDURE — 36415 COLL VENOUS BLD VENIPUNCTURE: CPT

## 2024-02-02 PROCEDURE — 96372 THER/PROPH/DIAG INJ SC/IM: CPT

## 2024-02-02 PROCEDURE — 80047 BASIC METABLC PNL IONIZED CA: CPT

## 2024-02-02 RX ORDER — DIPHENHYDRAMINE HYDROCHLORIDE 50 MG/ML
50 INJECTION INTRAMUSCULAR; INTRAVENOUS
OUTPATIENT
Start: 2024-04-23

## 2024-02-02 RX ORDER — ACETAMINOPHEN 325 MG/1
650 TABLET ORAL
OUTPATIENT
Start: 2024-04-23

## 2024-02-02 RX ORDER — EPINEPHRINE 1 MG/ML
0.3 INJECTION, SOLUTION INTRAMUSCULAR; SUBCUTANEOUS PRN
OUTPATIENT
Start: 2024-04-23

## 2024-02-02 RX ORDER — ALBUTEROL SULFATE 90 UG/1
4 AEROSOL, METERED RESPIRATORY (INHALATION) PRN
OUTPATIENT
Start: 2024-04-23

## 2024-02-02 RX ORDER — ONDANSETRON 2 MG/ML
8 INJECTION INTRAMUSCULAR; INTRAVENOUS
OUTPATIENT
Start: 2024-04-23

## 2024-02-02 RX ORDER — SODIUM CHLORIDE 9 MG/ML
INJECTION, SOLUTION INTRAVENOUS CONTINUOUS
OUTPATIENT
Start: 2024-04-23

## 2024-02-02 RX ADMIN — DENOSUMAB 60 MG: 60 INJECTION SUBCUTANEOUS at 16:09

## 2024-02-02 ASSESSMENT — PAIN SCALES - GENERAL
PAINLEVEL_OUTOF10: 0
PAINLEVEL_OUTOF10: 0

## 2024-02-02 NOTE — PROGRESS NOTES
[facility] Short Note                       Date: 2024    Name: Esteban Gipson    MRN: [id number]         : 1934      1530 Pt admit to [facility] ambulatory in stable condition. Assessment completed. No new concerns voiced.  Labs drawn peripherally from L AC  - performed on istat      Ms. Gipson's vitals were reviewed prior to and after treatment.   Patient Vitals for the past 12 hrs:   Temp Pulse Resp BP   24 1600 97.7 °F (36.5 °C) 78 16 121/67         Lab results were obtained and reviewed.  Recent Results (from the past 12 hour(s))   POC CHEM 8    Collection Time: 24  3:49 PM   Result Value Ref Range    POC Ionized Calcium 1.23 1.12 - 1.32 mmol/L    POC Sodium 140 136 - 145 mmol/L    POC Potassium 4.3 3.5 - 5.1 mmol/L    POC Chloride 103 98 - 107 mmol/L    POC TCO2 28.9 21 - 32 mmol/L    Anion Gap, POC 8.1 (L) 10 - 20 mmol/L    POC Glucose 107 (H) 65 - 100 mg/dL    POC Creatinine 0.60 0.6 - 1.3 mg/dL    eGFR, POC >60 >60 ml/min/1.73m2    UA Comment Comment Not Indicated.         Medications given:   Medications Administered         denosumab (PROLIA) SC injection 60 mg Admin Date  2024 Action  Given Dose  60 mg Route  SubCUTAneous Administered By  Idania Elizalde, OMAIRA            Ms. Gipson tolerated the injection L upper arm, and had no complaints.    [facility].     Future Appointments   Date Time Provider Department Center   2024  8:45 AM Delon Monge MD [facility]   2024 10:30 AM G1 SANAM CORTEZ [facility]       Idania Elizalde RN  2024  4:12 PM

## 2024-02-15 ENCOUNTER — OFFICE VISIT (OUTPATIENT)
Age: 89
End: 2024-02-15
Payer: MEDICARE

## 2024-02-15 VITALS
HEART RATE: 76 BPM | DIASTOLIC BLOOD PRESSURE: 67 MMHG | WEIGHT: 127.2 LBS | OXYGEN SATURATION: 95 % | TEMPERATURE: 98.6 F | SYSTOLIC BLOOD PRESSURE: 108 MMHG | HEIGHT: 64 IN | RESPIRATION RATE: 18 BRPM | BODY MASS INDEX: 21.72 KG/M2

## 2024-02-15 DIAGNOSIS — I89.0 LYMPHEDEMA OF LEFT LOWER EXTREMITY: ICD-10-CM

## 2024-02-15 DIAGNOSIS — H35.032 HYPERTENSIVE RETINOPATHY OF LEFT EYE: Primary | ICD-10-CM

## 2024-02-15 DIAGNOSIS — I25.118 CORONARY ARTERY DISEASE OF NATIVE HEART WITH STABLE ANGINA PECTORIS, UNSPECIFIED VESSEL OR LESION TYPE (HCC): ICD-10-CM

## 2024-02-15 DIAGNOSIS — Z12.31 SCREENING MAMMOGRAM FOR BREAST CANCER: ICD-10-CM

## 2024-02-15 PROBLEM — D69.6 THROMBOCYTOPENIA, UNSPECIFIED (HCC): Status: RESOLVED | Noted: 2023-10-24 | Resolved: 2024-02-15

## 2024-02-15 PROCEDURE — 1090F PRES/ABSN URINE INCON ASSESS: CPT | Performed by: INTERNAL MEDICINE

## 2024-02-15 PROCEDURE — G8484 FLU IMMUNIZE NO ADMIN: HCPCS | Performed by: INTERNAL MEDICINE

## 2024-02-15 PROCEDURE — G8427 DOCREV CUR MEDS BY ELIG CLIN: HCPCS | Performed by: INTERNAL MEDICINE

## 2024-02-15 PROCEDURE — 1123F ACP DISCUSS/DSCN MKR DOCD: CPT | Performed by: INTERNAL MEDICINE

## 2024-02-15 PROCEDURE — G8420 CALC BMI NORM PARAMETERS: HCPCS | Performed by: INTERNAL MEDICINE

## 2024-02-15 PROCEDURE — 1036F TOBACCO NON-USER: CPT | Performed by: INTERNAL MEDICINE

## 2024-02-15 PROCEDURE — 99214 OFFICE O/P EST MOD 30 MIN: CPT | Performed by: INTERNAL MEDICINE

## 2024-02-17 NOTE — PROGRESS NOTES
I have reviewed all needed documentation in preparation for visit. Verified patient by name and date of birth  Chief Complaint   Patient presents with    Swelling     Left leg, ankle and foot swelling that has significantly gotten worse over the last five years. Foot are has gotten bad over the last two weeks.       There were no vitals filed for this visit.    Health Maintenance Due   Topic Date Due    Respiratory Syncytial Virus (RSV) Pregnant or age 60 yrs+ (1 - 1-dose 60+ series) Never done    DTaP/Tdap/Td vaccine (1 - Tdap) 05/03/2007    Shingles vaccine (3 of 3) 03/27/2020    Flu vaccine (1) 08/01/2023    COVID-19 Vaccine (2 - 2023-24 season) 09/01/2023       1. \"Have you been to the ER, urgent care clinic since your last visit?  Hospitalized since your last visit?\" No    2. \"Have you seen or consulted any other health care providers outside of the [facility] since your last visit?\" Yes, Eye Doctor, Retinaologist    3. For patients aged 45-75: Has the patient had a colonoscopy / FIT/ Cologuard? NA      If the patient is female:    4. For patients aged 40-74: Has the patient had a mammogram within the past 2 years? NA      5. For patients aged 21-65: Has the patient had a pap smear? NA        SHY Guevara   
FUSION  2016    LUMBAR LAMINECTOMY  2016    ORTHOPEDIC SURGERY Right 2010    knee - arthroscopy    ORTHOPEDIC SURGERY Right march 2011    TKR  right    OTHER SURGICAL HISTORY  11/2017    ava. leg skin cancer surgery    MO UNLISTED PROCEDURE CARDIAC SURGERY  07/2019    cardiac cath minimal irregularity normal ef    MO UNLISTED PROCEDURE CARDIAC SURGERY  2/11    normal stress test    TONSILLECTOMY      TUBAL LIGATION  1976   . She  is accompanied by patient. She lives as follows: alone and  have Advanced Directives.     New Complaints today include: Swelling (Left leg, ankle and foot swelling that has significantly gotten worse over the last five years. 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 have a new small cancer removed recently by dermatology and has it wrapped with an Ace type wrap she gets a little edema in that area and some discomfort she understands that that area will always be a problem the other significant new finding is that she rather ophthalmologist for routine and they told her that she had a retinal hemorrhage in the left eye but there was hypertensive she was then seen by the retina specialist they confirmed the diagnosis is that it seems to be a hypertensive retinopathy the patient has a history of hypertension her blood pressure has been well-controlled for the last 3 years at least on low-dose calcium blocker and ace  she did have other endorgan damage from hypertension and that she had a stroke in the past she has been doing well otherwise    Denies cardiac symptoms denies angina denies orthopnea or PND the left leg does bother her some but she maintains her activity level still lives in a independent living facility still extremely active gets around with a cane and/or walker still drives a car visual acuity she says has not been affected much by this

## 2024-02-18 DIAGNOSIS — K21.9 GASTRO-ESOPHAGEAL REFLUX DISEASE WITHOUT ESOPHAGITIS: ICD-10-CM

## 2024-02-19 RX ORDER — OMEPRAZOLE 40 MG/1
CAPSULE, DELAYED RELEASE ORAL
Qty: 90 CAPSULE | Refills: 1 | Status: SHIPPED | OUTPATIENT
Start: 2024-02-19

## 2024-02-19 RX ORDER — AMLODIPINE BESYLATE 2.5 MG/1
TABLET ORAL
Qty: 90 TABLET | Refills: 1 | Status: SHIPPED | OUTPATIENT
Start: 2024-02-19

## 2024-02-19 NOTE — TELEPHONE ENCOUNTER
Refill request received from [facility] for   Requested Prescriptions     Pending Prescriptions Disp Refills    amLODIPine (NORVASC) 2.5 MG tablet [Pharmacy Med Name: AMLODIPINE BESYLATE 2.5 MG TAB] 90 tablet 1     Sig: TAKE 1 TABLET BY MOUTH EVERY DAY    omeprazole (PRILOSEC) 40 MG delayed release capsule [Pharmacy Med Name: OMEPRAZOLE DR 40 MG CAPSULE] 90 capsule 1     Sig: TAKE 1 CAPSULE BY MOUTH DAILY. INDICATIONS: GASTROESOPHAGEAL REFLUX DISEASE     Last office visit: 2/15/2024   Next office visit: 4/25/2024     Routed to Dr Parrish Zapata for review.

## 2024-02-27 ENCOUNTER — HOSPITAL ENCOUNTER (OUTPATIENT)
Facility: HOSPITAL | Age: 89
Discharge: HOME | End: 2024-03-01
Attending: INTERNAL MEDICINE
Payer: MEDICARE

## 2024-02-27 VITALS — WEIGHT: 126 LBS | BODY MASS INDEX: 21.51 KG/M2 | HEIGHT: 64 IN

## 2024-02-27 DIAGNOSIS — Z12.31 SCREENING MAMMOGRAM FOR BREAST CANCER: ICD-10-CM

## 2024-02-27 PROCEDURE — 77067 SCR MAMMO BI INCL CAD: CPT

## 2024-04-25 ENCOUNTER — OFFICE VISIT (OUTPATIENT)
Age: 89
End: 2024-04-25
Payer: MEDICARE

## 2024-04-25 VITALS
TEMPERATURE: 98 F | DIASTOLIC BLOOD PRESSURE: 78 MMHG | WEIGHT: 127 LBS | OXYGEN SATURATION: 98 % | HEIGHT: 63 IN | HEART RATE: 72 BPM | SYSTOLIC BLOOD PRESSURE: 126 MMHG | BODY MASS INDEX: 22.5 KG/M2

## 2024-04-25 DIAGNOSIS — H35.032 HYPERTENSIVE RETINOPATHY OF LEFT EYE: ICD-10-CM

## 2024-04-25 DIAGNOSIS — R68.89 HEAT INTOLERANCE: ICD-10-CM

## 2024-04-25 DIAGNOSIS — I25.118 ATHEROSCLEROTIC HEART DISEASE OF NATIVE CORONARY ARTERY WITH OTHER FORMS OF ANGINA PECTORIS (HCC): ICD-10-CM

## 2024-04-25 DIAGNOSIS — M81.0 OSTEOPOROSIS, UNSPECIFIED OSTEOPOROSIS TYPE, UNSPECIFIED PATHOLOGICAL FRACTURE PRESENCE: Primary | ICD-10-CM

## 2024-04-25 DIAGNOSIS — D69.6 THROMBOCYTOPENIA, UNSPECIFIED (HCC): ICD-10-CM

## 2024-04-25 LAB
ALBUMIN SERPL-MCNC: 3.8 G/DL (ref 3.5–5)
ALBUMIN/GLOB SERPL: 1.2 (ref 1.1–2.2)
ALP SERPL-CCNC: 77 U/L (ref 45–117)
ALT SERPL-CCNC: 24 U/L (ref 12–78)
ANION GAP SERPL CALC-SCNC: 6 MMOL/L (ref 5–15)
AST SERPL-CCNC: 22 U/L (ref 15–37)
BASOPHILS # BLD: 0 K/UL (ref 0–0.1)
BASOPHILS NFR BLD: 0 % (ref 0–1)
BILIRUB SERPL-MCNC: 0.5 MG/DL (ref 0.2–1)
BUN SERPL-MCNC: 16 MG/DL (ref 6–20)
BUN/CREAT SERPL: 23 (ref 12–20)
CALCIUM SERPL-MCNC: 9.2 MG/DL (ref 8.5–10.1)
CHLORIDE SERPL-SCNC: 104 MMOL/L (ref 97–108)
CHOLEST SERPL-MCNC: 182 MG/DL
CO2 SERPL-SCNC: 29 MMOL/L (ref 21–32)
CREAT SERPL-MCNC: 0.71 MG/DL (ref 0.55–1.02)
DIFFERENTIAL METHOD BLD: ABNORMAL
EOSINOPHIL # BLD: 0 K/UL (ref 0–0.4)
EOSINOPHIL NFR BLD: 1 % (ref 0–7)
ERYTHROCYTE [DISTWIDTH] IN BLOOD BY AUTOMATED COUNT: 13.2 % (ref 11.5–14.5)
GLOBULIN SER CALC-MCNC: 3.1 G/DL (ref 2–4)
GLUCOSE SERPL-MCNC: 97 MG/DL (ref 65–100)
HCT VFR BLD AUTO: 39.7 % (ref 35–47)
HDLC SERPL-MCNC: 100 MG/DL
HDLC SERPL: 1.8 (ref 0–5)
HGB BLD-MCNC: 13 G/DL (ref 11.5–16)
IMM GRANULOCYTES # BLD AUTO: 0 K/UL (ref 0–0.04)
IMM GRANULOCYTES NFR BLD AUTO: 0 % (ref 0–0.5)
LDLC SERPL CALC-MCNC: 71.4 MG/DL (ref 0–100)
LYMPHOCYTES # BLD: 1.6 K/UL (ref 0.8–3.5)
LYMPHOCYTES NFR BLD: 39 % (ref 12–49)
MCH RBC QN AUTO: 31.6 PG (ref 26–34)
MCHC RBC AUTO-ENTMCNC: 32.7 G/DL (ref 30–36.5)
MCV RBC AUTO: 96.4 FL (ref 80–99)
MONOCYTES # BLD: 0.6 K/UL (ref 0–1)
MONOCYTES NFR BLD: 14 % (ref 5–13)
NEUTS SEG # BLD: 1.9 K/UL (ref 1.8–8)
NEUTS SEG NFR BLD: 46 % (ref 32–75)
NRBC # BLD: 0 K/UL (ref 0–0.01)
NRBC BLD-RTO: 0 PER 100 WBC
PLATELET # BLD AUTO: 124 K/UL (ref 150–400)
PMV BLD AUTO: 11.3 FL (ref 8.9–12.9)
POTASSIUM SERPL-SCNC: 4.2 MMOL/L (ref 3.5–5.1)
PROT SERPL-MCNC: 6.9 G/DL (ref 6.4–8.2)
RBC # BLD AUTO: 4.12 M/UL (ref 3.8–5.2)
SODIUM SERPL-SCNC: 139 MMOL/L (ref 136–145)
TRIGL SERPL-MCNC: 53 MG/DL
TSH SERPL DL<=0.05 MIU/L-ACNC: 1.29 UIU/ML (ref 0.36–3.74)
VLDLC SERPL CALC-MCNC: 10.6 MG/DL
WBC # BLD AUTO: 4.2 K/UL (ref 3.6–11)

## 2024-04-25 PROCEDURE — 1036F TOBACCO NON-USER: CPT | Performed by: INTERNAL MEDICINE

## 2024-04-25 PROCEDURE — 99214 OFFICE O/P EST MOD 30 MIN: CPT | Performed by: INTERNAL MEDICINE

## 2024-04-25 PROCEDURE — 1090F PRES/ABSN URINE INCON ASSESS: CPT | Performed by: INTERNAL MEDICINE

## 2024-04-25 PROCEDURE — G8428 CUR MEDS NOT DOCUMENT: HCPCS | Performed by: INTERNAL MEDICINE

## 2024-04-25 PROCEDURE — 1123F ACP DISCUSS/DSCN MKR DOCD: CPT | Performed by: INTERNAL MEDICINE

## 2024-04-25 PROCEDURE — G8420 CALC BMI NORM PARAMETERS: HCPCS | Performed by: INTERNAL MEDICINE

## 2024-04-25 ASSESSMENT — PATIENT HEALTH QUESTIONNAIRE - PHQ9
SUM OF ALL RESPONSES TO PHQ QUESTIONS 1-9: 0
2. FEELING DOWN, DEPRESSED OR HOPELESS: NOT AT ALL
SUM OF ALL RESPONSES TO PHQ9 QUESTIONS 1 & 2: 0
SUM OF ALL RESPONSES TO PHQ QUESTIONS 1-9: 0
1. LITTLE INTEREST OR PLEASURE IN DOING THINGS: NOT AT ALL

## 2024-04-26 NOTE — PROGRESS NOTES
Identified pt with two pt identifiers(name and ). Reviewed record in preparation for visit and have obtained necessary documentation. All patient medications has been reviewed.  Chief Complaint   Patient presents with    6 Month Follow-Up    Hypertension       Vitals:    24 0852   BP: 126/78   Pulse: 72   Temp: 98 °F (36.7 °C)   SpO2: 98%                   Coordination of Care Questionnaire:   1) Have you been to an emergency room, urgent care, or hospitalized since your last visit?   no    2. Have seen or consulted any other health care provider since your last visit? no          
Future      High risk medications reviewed  Discussed gi issues records not available    Seems ok now      Esteban was seen today for 6 month follow-up and hypertension.    Diagnoses and all orders for this visit:    Osteoporosis, unspecified osteoporosis type, unspecified pathological fracture presence    Thrombocytopenia, unspecified (HCC)    Heat intolerance  -     TSH; Future    Atherosclerotic heart disease of native coronary artery with other forms of angina pectoris (HCC)    Hypertensive retinopathy of left eye  -     CBC with Auto Differential; Future  -     Comprehensive Metabolic Panel; Future  -     Lipid Panel; Future      No change BP meds  Discussed HTN effects on eye are cumulative  No new treatment left lower leg other than potential stockings  She is only using 1 per day alcoholic beverage which is better           follow up

## 2024-05-03 RX ORDER — LISINOPRIL 10 MG/1
TABLET ORAL
Qty: 90 TABLET | Refills: 1 | Status: SHIPPED | OUTPATIENT
Start: 2024-05-03

## 2024-05-06 RX ORDER — ATORVASTATIN CALCIUM 10 MG/1
TABLET, FILM COATED ORAL
Qty: 90 TABLET | Refills: 1 | Status: SHIPPED | OUTPATIENT
Start: 2024-05-06

## 2024-05-13 RX ORDER — DULOXETIN HYDROCHLORIDE 20 MG/1
CAPSULE, DELAYED RELEASE ORAL
Qty: 90 CAPSULE | Refills: 1 | Status: SHIPPED | OUTPATIENT
Start: 2024-05-13

## 2024-07-15 ENCOUNTER — TELEPHONE (OUTPATIENT)
Age: 89
End: 2024-07-15

## 2024-07-15 NOTE — TELEPHONE ENCOUNTER
Called patient to check on status of COVID test. Patient tested positive for COVID via an at home test. I informed patient that appointment for 07/18 has to be cancelled and she understood. However, patient would like call from clinical staff to discuss care advice/quarantine.

## 2024-07-16 NOTE — TELEPHONE ENCOUNTER
Called and spoke with pt.  Verified identity x2.    As per norman, advised pt of 5 day COVID quarantine, allowing for masked encounters if necessary    Advised pt to treat like the flu, staying well hydrated, rest, OTC meds for aches and fever    Advised pt to go to ER if she experiences severe symptoms such as fever over 104.9, difficulty breathing, etc..    Pt verbalized understanding    Katlin Solorio LPN

## 2024-07-24 ENCOUNTER — APPOINTMENT (OUTPATIENT)
Facility: HOSPITAL | Age: 89
End: 2024-07-24
Payer: MEDICARE

## 2024-07-29 DIAGNOSIS — M81.0 OSTEOPOROSIS, UNSPECIFIED OSTEOPOROSIS TYPE, UNSPECIFIED PATHOLOGICAL FRACTURE PRESENCE: Primary | ICD-10-CM

## 2024-07-29 RX ORDER — ACETAMINOPHEN 325 MG/1
650 TABLET ORAL
Status: CANCELLED | OUTPATIENT
Start: 2024-08-02

## 2024-07-29 RX ORDER — EPINEPHRINE 1 MG/ML
0.3 INJECTION, SOLUTION, CONCENTRATE INTRAVENOUS PRN
Status: CANCELLED | OUTPATIENT
Start: 2024-08-02

## 2024-07-29 RX ORDER — ONDANSETRON 2 MG/ML
8 INJECTION INTRAMUSCULAR; INTRAVENOUS
Status: CANCELLED | OUTPATIENT
Start: 2024-08-02

## 2024-07-29 RX ORDER — SODIUM CHLORIDE 9 MG/ML
INJECTION, SOLUTION INTRAVENOUS CONTINUOUS
Status: CANCELLED | OUTPATIENT
Start: 2024-08-02

## 2024-07-29 RX ORDER — ALBUTEROL SULFATE 90 UG/1
4 AEROSOL, METERED RESPIRATORY (INHALATION) PRN
Status: CANCELLED | OUTPATIENT
Start: 2024-08-02

## 2024-07-29 RX ORDER — FAMOTIDINE 10 MG/ML
20 INJECTION, SOLUTION INTRAVENOUS
Status: CANCELLED | OUTPATIENT
Start: 2024-08-02

## 2024-07-29 RX ORDER — DIPHENHYDRAMINE HYDROCHLORIDE 50 MG/ML
50 INJECTION INTRAMUSCULAR; INTRAVENOUS
Status: CANCELLED | OUTPATIENT
Start: 2024-08-02

## 2024-08-02 ENCOUNTER — HOSPITAL ENCOUNTER (OUTPATIENT)
Facility: HOSPITAL | Age: 89
Setting detail: INFUSION SERIES
Discharge: HOME | End: 2024-08-02
Payer: MEDICARE

## 2024-08-02 VITALS
RESPIRATION RATE: 16 BRPM | HEART RATE: 83 BPM | DIASTOLIC BLOOD PRESSURE: 59 MMHG | SYSTOLIC BLOOD PRESSURE: 109 MMHG | WEIGHT: 126 LBS | BODY MASS INDEX: 22.32 KG/M2 | TEMPERATURE: 97.9 F

## 2024-08-02 DIAGNOSIS — M81.0 OSTEOPOROSIS, UNSPECIFIED OSTEOPOROSIS TYPE, UNSPECIFIED PATHOLOGICAL FRACTURE PRESENCE: Primary | ICD-10-CM

## 2024-08-02 LAB
ANION GAP BLD CALC-SCNC: 8 MMOL/L (ref 10–20)
CA-I BLD-MCNC: 1.21 MMOL/L (ref 1.12–1.32)
CHLORIDE BLD-SCNC: 103 MMOL/L (ref 98–107)
CO2 BLD-SCNC: 26 MMOL/L (ref 21–32)
CREAT BLD-MCNC: 0.61 MG/DL (ref 0.6–1.3)
GLUCOSE BLD-MCNC: 124 MG/DL (ref 70–110)
PHOSPHATE SERPL-MCNC: 4.1 MG/DL (ref 2.6–4.7)
POTASSIUM BLD-SCNC: 4.5 MMOL/L (ref 3.5–5.1)
SERVICE CMNT-IMP: ABNORMAL
SODIUM BLD-SCNC: 137 MMOL/L (ref 136–145)

## 2024-08-02 PROCEDURE — 6360000002 HC RX W HCPCS: Performed by: INTERNAL MEDICINE

## 2024-08-02 PROCEDURE — 80047 BASIC METABLC PNL IONIZED CA: CPT

## 2024-08-02 PROCEDURE — 96372 THER/PROPH/DIAG INJ SC/IM: CPT

## 2024-08-02 PROCEDURE — 36415 COLL VENOUS BLD VENIPUNCTURE: CPT

## 2024-08-02 PROCEDURE — 84100 ASSAY OF PHOSPHORUS: CPT

## 2024-08-02 RX ORDER — EPINEPHRINE 1 MG/ML
0.3 INJECTION, SOLUTION INTRAMUSCULAR; SUBCUTANEOUS PRN
OUTPATIENT
Start: 2025-01-31

## 2024-08-02 RX ORDER — DIPHENHYDRAMINE HYDROCHLORIDE 50 MG/ML
50 INJECTION INTRAMUSCULAR; INTRAVENOUS
OUTPATIENT
Start: 2025-01-31

## 2024-08-02 RX ORDER — ALBUTEROL SULFATE 90 UG/1
4 AEROSOL, METERED RESPIRATORY (INHALATION) PRN
OUTPATIENT
Start: 2025-01-31

## 2024-08-02 RX ORDER — SODIUM CHLORIDE 9 MG/ML
INJECTION, SOLUTION INTRAVENOUS CONTINUOUS
OUTPATIENT
Start: 2025-01-31

## 2024-08-02 RX ORDER — ONDANSETRON 2 MG/ML
8 INJECTION INTRAMUSCULAR; INTRAVENOUS
OUTPATIENT
Start: 2025-01-31

## 2024-08-02 RX ORDER — ACETAMINOPHEN 325 MG/1
650 TABLET ORAL
OUTPATIENT
Start: 2025-01-31

## 2024-08-02 RX ADMIN — DENOSUMAB 60 MG: 60 INJECTION SUBCUTANEOUS at 14:29

## 2024-08-02 NOTE — PROGRESS NOTES
[facility] Short Note                       Date: 2024    Name: Esteban Gipson    MRN: [id number]         : 1934      1400 Pt admit to [facility] for [facility] ambulatory in stable condition. Assessment completed. No new concerns voiced.  Please review pending lab results in CC.      Ms. Gipson's vitals were reviewed prior to and after treatment.   Patient Vitals for the past 12 hrs:   Temp Pulse Resp BP   24 1345 97.9 °F (36.6 °C) 83 16 (!) 109/59         Lab results were obtained and reviewed.  Recent Results (from the past 12 hour(s))   POC CHEM 8    Collection Time: 24  1:55 PM   Result Value Ref Range    POC Ionized Calcium 1.21 1.12 - 1.32 mmol/L    POC Sodium 137 136 - 145 mmol/L    POC Potassium 4.5 3.5 - 5.1 mmol/L    POC Chloride 103 98 - 107 mmol/L    POC TCO2 26.0 21 - 32 mmol/L    Anion Gap, POC 8 (L) 10 - 20 mmol/L    POC Glucose 124 (H) 70 - 110 mg/dL    POC Creatinine 0.61 0.6 - 1.3 mg/dL    eGFR, POC 85 >60 ml/min/1.73m2    UA Comment Comment Not Indicated.         Medications given: Given left arm   Medications Administered         denosumab (PROLIA) SC injection 60 mg Admin Date  2024 Action  Given Dose  60 mg Route  SubCUTAneous Administered By  Val Norman, RN                Ms. Gipson tolerated the infusion, and had no complaints.    [facility].     Future Appointments   Date Time Provider Department Center   10/29/2024  9:30 AM Delon Monge MD [facility] DEP   2025  2:00 PM [facility]       Val Norman RN  2024  2:32 PM

## 2024-08-14 DIAGNOSIS — K21.9 GASTRO-ESOPHAGEAL REFLUX DISEASE WITHOUT ESOPHAGITIS: ICD-10-CM

## 2024-08-14 RX ORDER — AMLODIPINE BESYLATE 2.5 MG/1
TABLET ORAL
Qty: 90 TABLET | Refills: 1 | Status: SHIPPED | OUTPATIENT
Start: 2024-08-14

## 2024-08-14 RX ORDER — OMEPRAZOLE 40 MG/1
CAPSULE, DELAYED RELEASE ORAL
Qty: 90 CAPSULE | Refills: 1 | Status: SHIPPED | OUTPATIENT
Start: 2024-08-14

## 2024-08-27 ENCOUNTER — TELEPHONE (OUTPATIENT)
Age: 89
End: 2024-08-27

## 2024-08-27 NOTE — TELEPHONE ENCOUNTER
----- Message from Claudette LÓPEZ sent at 8/27/2024  9:43 AM EDT -----  Regarding: [facility] Appointment Request  E[facility] Appointment Request    Patient needs appointment for [facility] Appointment Type: Annual Visit.    Patient Requested Dates(s): Any date will be fine  Patient Requested Time: Morning but not early as 9:00  Provider Name: Delon Monge MD    Reason for Appointment Request: New Patient - No appointments available during search  --------------------------------------------------------------------------------------------------------------------------    Relationship to Patient: Self     Call Back Information: OK to leave message on voicemail  Preferred Call Back Number: Phone [phone]

## 2024-08-27 NOTE — TELEPHONE ENCOUNTER
----- Message from Claudette LÓPEZ sent at 8/27/2024  9:43 AM EDT -----  Regarding: [facility] Appointment Request  E[facility] Appointment Request    Patient needs appointment for E[facility] Appointment Type: Annual Visit.    Patient Requested Dates(s): Any date will be fine  Patient Requested Time: Morning but not early as 9:00  Provider Name: Delon Monge MD    Reason for Appointment Request: New Patient - No appointments available during search  --------------------------------------------------------------------------------------------------------------------------    Relationship to Patient: Self     Call Back Information: OK to leave message on voicemail  Preferred Call Back Number: Phone [phone]

## 2024-08-27 NOTE — TELEPHONE ENCOUNTER
KORI in regards to appt coming up on 10/29/2024 to see if pt would like to change appt to AWV requested pt to call back and verify if that would work for her

## 2024-10-25 RX ORDER — LISINOPRIL 10 MG/1
TABLET ORAL
Qty: 90 TABLET | Refills: 1 | Status: SHIPPED | OUTPATIENT
Start: 2024-10-25

## 2024-10-29 ENCOUNTER — OFFICE VISIT (OUTPATIENT)
Age: 89
End: 2024-10-29
Payer: MEDICARE

## 2024-10-29 VITALS
HEART RATE: 74 BPM | HEIGHT: 63 IN | SYSTOLIC BLOOD PRESSURE: 122 MMHG | TEMPERATURE: 97.7 F | WEIGHT: 128 LBS | OXYGEN SATURATION: 98 % | RESPIRATION RATE: 18 BRPM | DIASTOLIC BLOOD PRESSURE: 62 MMHG | BODY MASS INDEX: 22.68 KG/M2

## 2024-10-29 DIAGNOSIS — K21.9 GASTRO-ESOPHAGEAL REFLUX DISEASE WITHOUT ESOPHAGITIS: ICD-10-CM

## 2024-10-29 DIAGNOSIS — R13.19 OTHER DYSPHAGIA: ICD-10-CM

## 2024-10-29 DIAGNOSIS — M81.0 OSTEOPOROSIS WITHOUT CURRENT PATHOLOGICAL FRACTURE, UNSPECIFIED OSTEOPOROSIS TYPE: Primary | ICD-10-CM

## 2024-10-29 DIAGNOSIS — H35.032 HYPERTENSIVE RETINOPATHY OF LEFT EYE: ICD-10-CM

## 2024-10-29 DIAGNOSIS — T45.1X5A NEUROPATHY DUE TO CHEMOTHERAPEUTIC DRUG (HCC): ICD-10-CM

## 2024-10-29 DIAGNOSIS — G52.9 CRANIAL NEURALGIA: ICD-10-CM

## 2024-10-29 DIAGNOSIS — H35.3131 EARLY DRY STAGE NONEXUDATIVE AGE-RELATED MACULAR DEGENERATION OF BOTH EYES: ICD-10-CM

## 2024-10-29 DIAGNOSIS — J30.0 VASOMOTOR RHINITIS: ICD-10-CM

## 2024-10-29 DIAGNOSIS — G62.0 NEUROPATHY DUE TO CHEMOTHERAPEUTIC DRUG (HCC): ICD-10-CM

## 2024-10-29 PROCEDURE — 99214 OFFICE O/P EST MOD 30 MIN: CPT | Performed by: INTERNAL MEDICINE

## 2024-10-29 SDOH — ECONOMIC STABILITY: INCOME INSECURITY: HOW HARD IS IT FOR YOU TO PAY FOR THE VERY BASICS LIKE FOOD, HOUSING, MEDICAL CARE, AND HEATING?: NOT HARD AT ALL

## 2024-10-29 SDOH — ECONOMIC STABILITY: FOOD INSECURITY: WITHIN THE PAST 12 MONTHS, YOU WORRIED THAT YOUR FOOD WOULD RUN OUT BEFORE YOU GOT MONEY TO BUY MORE.: NEVER TRUE

## 2024-10-29 SDOH — ECONOMIC STABILITY: FOOD INSECURITY: WITHIN THE PAST 12 MONTHS, THE FOOD YOU BOUGHT JUST DIDN'T LAST AND YOU DIDN'T HAVE MONEY TO GET MORE.: NEVER TRUE

## 2024-10-29 NOTE — PROGRESS NOTES
I have reviewed all needed documentation in preparation for visit. Verified patient by name and date of birth  Chief Complaint   Patient presents with    6 Month Follow-Up     No concerns        Vitals:    10/29/24 0951   BP: 122/62   Site: Right Upper Arm   Position: Sitting   Cuff Size: Medium Adult   Pulse: 74   Resp: 18   Temp: 97.7 °F (36.5 °C)   TempSrc: Temporal   SpO2: 98%   Weight: 58.1 kg (128 lb)   Height: 1.6 m (5' 3\")       Health Maintenance Due   Topic Date Due    Respiratory Syncytial Virus (RSV) Pregnant or age 60 yrs+ (1 - 1-dose 60+ series) Never done    DTaP/Tdap/Td vaccine (1 - Tdap) 05/03/2007    Shingles vaccine (3 of 3) 03/27/2020    Flu vaccine (1) 08/01/2024    COVID-19 Vaccine (2 - 2023-24 season) 09/01/2024    Annual Wellness Visit (Medicare)  10/24/2024     \"Have you been to the ER, urgent care clinic since your last visit?  Hospitalized since your last visit?\"    NO    “Have you seen or consulted any other health care providers outside of [facility] since your last visit?”    NO            Click Here for Release of Records Request         Pari robison [facility]

## 2024-10-29 NOTE — PROGRESS NOTES
Chief Complaint   Patient presents with    6 Month Follow-Up     No concerns      Chief Complaint   Patient presents with    6 Month Follow-Up     No concerns      Routine  Chief Complaint   Patient presents with    6 Month Follow-Up     No concerns      Subjective:   Esteban Gipson is a 89 y.o. female  here for follow up of chronic medical conditions listed   Patient Active Problem List    Diagnosis Date Noted    Neuropathy due to chemotherapeutic drug (HCC) 10/29/2024    Vasomotor rhinitis 10/29/2024    Abnormal results of liver function studies 08/03/2023    Early dry stage nonexudative age-related macular degeneration of both eyes 02/02/2023    Coronary artery disease of native heart with stable angina pectoris, unspecified vessel or lesion type (HCC) 02/02/2023    Adverse effect of radiation therapy 07/14/2021    Arthritis of knee, left 03/09/2020    DNR no code (do not resuscitate) 01/08/2019    Lumbar spinal stenosis 06/20/2018    Biliary colic 11/22/2017    Spinal stenosis 09/23/2016    Lumbar disc disease with radiculopathy 07/22/2016    Osteoporosis 07/22/2016    Pancreatic cyst 07/14/2015    GERD (gastroesophageal reflux disease)     Visual loss 01/21/2015    PAC (premature atrial contraction) 07/09/2013    Rosacea 06/27/2012    Anxiety 08/16/2011    Hypercholesterolemia     Hyperlipidemia LDL goal <130 07/20/2010     Current Outpatient Medications   Medication Sig Dispense Refill    lisinopril (PRINIVIL;ZESTRIL) 10 MG tablet TAKE 1 TABLET BY MOUTH EVERY DAY FOR BLOOD PRESSURE 90 tablet 1    amLODIPine (NORVASC) 2.5 MG tablet TAKE 1 TABLET BY MOUTH EVERY DAY 90 tablet 1    omeprazole (PRILOSEC) 40 MG delayed release capsule TAKE 1 CAPSULE BY MOUTH DAILY. INDICATIONS: GASTROESOPHAGEAL REFLUX DISEASE 90 capsule 1    DULoxetine (CYMBALTA) 20 MG extended release capsule TAKE 1 CAPSULE BY MOUTH EVERY DAY 90 capsule 1    atorvastatin (LIPITOR) 10 MG tablet TAKE 1 TABLET BY MOUTH EVERY DAY 90 tablet 1

## 2024-11-04 RX ORDER — ATORVASTATIN CALCIUM 10 MG/1
TABLET, FILM COATED ORAL
Qty: 90 TABLET | Refills: 1 | Status: SHIPPED | OUTPATIENT
Start: 2024-11-04

## 2024-11-04 RX ORDER — DULOXETIN HYDROCHLORIDE 20 MG/1
CAPSULE, DELAYED RELEASE ORAL
Qty: 90 CAPSULE | Refills: 1 | Status: SHIPPED | OUTPATIENT
Start: 2024-11-04

## 2025-02-07 ENCOUNTER — HOSPITAL ENCOUNTER (OUTPATIENT)
Facility: HOSPITAL | Age: 89
Setting detail: INFUSION SERIES
Discharge: HOME | End: 2025-02-07
Payer: MEDICARE

## 2025-02-07 VITALS
DIASTOLIC BLOOD PRESSURE: 62 MMHG | RESPIRATION RATE: 18 BRPM | SYSTOLIC BLOOD PRESSURE: 103 MMHG | HEART RATE: 69 BPM | TEMPERATURE: 97.4 F

## 2025-02-07 DIAGNOSIS — M81.0 OSTEOPOROSIS, UNSPECIFIED OSTEOPOROSIS TYPE, UNSPECIFIED PATHOLOGICAL FRACTURE PRESENCE: Primary | ICD-10-CM

## 2025-02-07 LAB
ANION GAP BLD CALC-SCNC: 9 MMOL/L (ref 10–20)
CA-I BLD-MCNC: 1.16 MMOL/L (ref 1.15–1.33)
CHLORIDE BLD-SCNC: 105 MMOL/L (ref 98–107)
CO2 BLD-SCNC: 25 MMOL/L (ref 21–32)
CREAT BLD-MCNC: 0.7 MG/DL (ref 0.6–1.3)
GLUCOSE BLD-MCNC: 132 MG/DL (ref 74–99)
PHOSPHATE SERPL-MCNC: 4.3 MG/DL (ref 2.6–4.7)
POTASSIUM BLD-SCNC: 4.5 MMOL/L (ref 3.5–5.1)
SERVICE CMNT-IMP: ABNORMAL
SODIUM BLD-SCNC: 139 MMOL/L (ref 136–145)

## 2025-02-07 PROCEDURE — 96372 THER/PROPH/DIAG INJ SC/IM: CPT

## 2025-02-07 PROCEDURE — 80047 BASIC METABLC PNL IONIZED CA: CPT

## 2025-02-07 PROCEDURE — 36415 COLL VENOUS BLD VENIPUNCTURE: CPT

## 2025-02-07 PROCEDURE — 84100 ASSAY OF PHOSPHORUS: CPT

## 2025-02-07 PROCEDURE — 6360000002 HC RX W HCPCS: Performed by: INTERNAL MEDICINE

## 2025-02-07 RX ORDER — DIPHENHYDRAMINE HYDROCHLORIDE 50 MG/ML
50 INJECTION INTRAMUSCULAR; INTRAVENOUS
OUTPATIENT
Start: 2025-08-01

## 2025-02-07 RX ORDER — EPINEPHRINE 1 MG/ML
0.3 INJECTION, SOLUTION INTRAMUSCULAR; SUBCUTANEOUS PRN
OUTPATIENT
Start: 2025-08-01

## 2025-02-07 RX ORDER — HYDROCORTISONE SODIUM SUCCINATE 100 MG/2ML
100 INJECTION INTRAMUSCULAR; INTRAVENOUS
OUTPATIENT
Start: 2025-08-01

## 2025-02-07 RX ORDER — SODIUM CHLORIDE 9 MG/ML
INJECTION, SOLUTION INTRAVENOUS CONTINUOUS
OUTPATIENT
Start: 2025-08-01

## 2025-02-07 RX ORDER — ACETAMINOPHEN 325 MG/1
650 TABLET ORAL
OUTPATIENT
Start: 2025-08-01

## 2025-02-07 RX ORDER — ONDANSETRON 2 MG/ML
8 INJECTION INTRAMUSCULAR; INTRAVENOUS
OUTPATIENT
Start: 2025-08-01

## 2025-02-07 RX ORDER — ALBUTEROL SULFATE 90 UG/1
4 INHALANT RESPIRATORY (INHALATION) PRN
OUTPATIENT
Start: 2025-08-01

## 2025-02-07 RX ADMIN — DENOSUMAB 60 MG: 60 INJECTION SUBCUTANEOUS at 14:35

## 2025-02-07 NOTE — PROGRESS NOTES
[facility] Progress Note    Date: February 7, 2025        1400: Pt arrived ambulatory to [facility] for [facility] in stable condition.  Assessment completed. Labs drawn peripherally from Left AC and sent for processing.     Labs reviewed. Criteria for treatment was met.    Vitals:    02/07/25 1415   BP: 103/62   Pulse: 69   Resp: 18   Temp: 97.4 °F (36.3 °C)        Medications Administered         denosumab (PROLIA) SC injection 60 mg Admin Date  02/07/2025 Action  Given Dose  60 mg Route  SubCUTAneous Documented By  Val Norman, RN            Given left arm     Ms. Gipson tolerated the treatment well, and had no complaints.    [facility]. Gip[facility]. Patient is aware of next scheduled [facility] appointment.    Future Appointments   Date Time Provider Department Center   3/4/2025 10:00 AM Delon Monge MD [facility] DEP           Val Norman RN, RN  February 7, 2025

## 2025-02-13 DIAGNOSIS — K21.9 GASTRO-ESOPHAGEAL REFLUX DISEASE WITHOUT ESOPHAGITIS: ICD-10-CM

## 2025-02-13 RX ORDER — OMEPRAZOLE 40 MG/1
CAPSULE, DELAYED RELEASE ORAL
Qty: 90 CAPSULE | Refills: 0 | Status: SHIPPED | OUTPATIENT
Start: 2025-02-13

## 2025-02-13 RX ORDER — AMLODIPINE BESYLATE 2.5 MG/1
TABLET ORAL
Qty: 90 TABLET | Refills: 0 | Status: SHIPPED | OUTPATIENT
Start: 2025-02-13

## 2025-02-14 RX ORDER — DULOXETIN HYDROCHLORIDE 20 MG/1
CAPSULE, DELAYED RELEASE ORAL
Qty: 90 CAPSULE | Refills: 1 | OUTPATIENT
Start: 2025-02-14

## 2025-02-14 RX ORDER — LISINOPRIL 10 MG/1
TABLET ORAL
Qty: 90 TABLET | Refills: 0 | Status: SHIPPED | OUTPATIENT
Start: 2025-02-14

## 2025-03-04 ENCOUNTER — TELEPHONE (OUTPATIENT)
Age: 89
End: 2025-03-04

## 2025-03-04 ENCOUNTER — OFFICE VISIT (OUTPATIENT)
Age: 89
End: 2025-03-04
Payer: MEDICARE

## 2025-03-04 VITALS
HEIGHT: 63 IN | BODY MASS INDEX: 22.36 KG/M2 | HEART RATE: 76 BPM | DIASTOLIC BLOOD PRESSURE: 76 MMHG | WEIGHT: 126.2 LBS | TEMPERATURE: 97.9 F | OXYGEN SATURATION: 97 % | RESPIRATION RATE: 18 BRPM | SYSTOLIC BLOOD PRESSURE: 118 MMHG

## 2025-03-04 DIAGNOSIS — K21.9 GASTRO-ESOPHAGEAL REFLUX DISEASE WITHOUT ESOPHAGITIS: Primary | ICD-10-CM

## 2025-03-04 DIAGNOSIS — R94.5 ABNORMAL RESULTS OF LIVER FUNCTION STUDIES: ICD-10-CM

## 2025-03-04 DIAGNOSIS — I10 ESSENTIAL (PRIMARY) HYPERTENSION: ICD-10-CM

## 2025-03-04 DIAGNOSIS — E78.00 PURE HYPERCHOLESTEROLEMIA, UNSPECIFIED: ICD-10-CM

## 2025-03-04 DIAGNOSIS — M81.0 OSTEOPOROSIS, UNSPECIFIED OSTEOPOROSIS TYPE, UNSPECIFIED PATHOLOGICAL FRACTURE PRESENCE: ICD-10-CM

## 2025-03-04 DIAGNOSIS — R13.19 OTHER DYSPHAGIA: ICD-10-CM

## 2025-03-04 DIAGNOSIS — G52.9 CRANIAL NEURALGIA: ICD-10-CM

## 2025-03-04 DIAGNOSIS — Z00.00 MEDICARE ANNUAL WELLNESS VISIT, SUBSEQUENT: ICD-10-CM

## 2025-03-04 PROCEDURE — 99215 OFFICE O/P EST HI 40 MIN: CPT | Performed by: INTERNAL MEDICINE

## 2025-03-04 PROCEDURE — 99214 OFFICE O/P EST MOD 30 MIN: CPT | Performed by: INTERNAL MEDICINE

## 2025-03-04 RX ORDER — LISINOPRIL 10 MG/1
10 TABLET ORAL DAILY
Qty: 90 TABLET | Refills: 1 | Status: SHIPPED | OUTPATIENT
Start: 2025-03-04

## 2025-03-04 SDOH — ECONOMIC STABILITY: FOOD INSECURITY: WITHIN THE PAST 12 MONTHS, YOU WORRIED THAT YOUR FOOD WOULD RUN OUT BEFORE YOU GOT MONEY TO BUY MORE.: NEVER TRUE

## 2025-03-04 SDOH — ECONOMIC STABILITY: FOOD INSECURITY: WITHIN THE PAST 12 MONTHS, THE FOOD YOU BOUGHT JUST DIDN'T LAST AND YOU DIDN'T HAVE MONEY TO GET MORE.: NEVER TRUE

## 2025-03-04 ASSESSMENT — PATIENT HEALTH QUESTIONNAIRE - PHQ9
SUM OF ALL RESPONSES TO PHQ QUESTIONS 1-9: 0
1. LITTLE INTEREST OR PLEASURE IN DOING THINGS: NOT AT ALL
SUM OF ALL RESPONSES TO PHQ QUESTIONS 1-9: 0
1. LITTLE INTEREST OR PLEASURE IN DOING THINGS: NOT AT ALL
SUM OF ALL RESPONSES TO PHQ QUESTIONS 1-9: 0
2. FEELING DOWN, DEPRESSED OR HOPELESS: NOT AT ALL
SUM OF ALL RESPONSES TO PHQ QUESTIONS 1-9: 0
2. FEELING DOWN, DEPRESSED OR HOPELESS: NOT AT ALL

## 2025-03-04 ASSESSMENT — LIFESTYLE VARIABLES
HOW MANY STANDARD DRINKS CONTAINING ALCOHOL DO YOU HAVE ON A TYPICAL DAY: 1 OR 2
HOW OFTEN DO YOU HAVE A DRINK CONTAINING ALCOHOL: 4 OR MORE TIMES A WEEK

## 2025-03-04 NOTE — PROGRESS NOTES
I have reviewed all needed documentation in preparation for visit. Verified patient by name and date of birth  Chief Complaint   Patient presents with    Medicare AWV       Vitals:    03/04/25 1032   BP: 118/76   Site: Left Upper Arm   Position: Sitting   Cuff Size: Medium Adult   Pulse: 76   Resp: 18   Temp: 97.9 °F (36.6 °C)   TempSrc: Temporal   SpO2: 97%   Weight: 57.2 kg (126 lb 3.2 oz)   Height: 1.6 m (5' 3\")       Health Maintenance Due   Topic Date Due    DTaP/Tdap/Td vaccine (1 - Tdap) 05/03/2007    Respiratory Syncytial Virus (RSV) Pregnant or age 60 yrs+ (1 - 1-dose 75+ series) Never done    Shingles vaccine (3 of 3) 03/27/2020    COVID-19 Vaccine (2 - 2024-25 season) 09/01/2024    Annual Wellness Visit (Medicare)  10/24/2024     \"Have you been to the ER, urgent care clinic since your last visit?  Hospitalized since your last visit?\"    NO    “Have you seen or consulted any other health care providers outside of [facility] since your last visit?”    NO            Click Here for Release of Records Request         SONIA Berry

## 2025-03-05 LAB
ALBUMIN SERPL-MCNC: 3.7 G/DL (ref 3.5–5)
ALBUMIN/GLOB SERPL: 1.2 (ref 1.1–2.2)
ALP SERPL-CCNC: 80 U/L (ref 45–117)
ALT SERPL-CCNC: 26 U/L (ref 12–78)
ANION GAP SERPL CALC-SCNC: 4 MMOL/L (ref 2–12)
AST SERPL-CCNC: 22 U/L (ref 15–37)
BASOPHILS # BLD: 0.01 K/UL (ref 0–0.1)
BASOPHILS NFR BLD: 0.2 % (ref 0–1)
BILIRUB SERPL-MCNC: 0.6 MG/DL (ref 0.2–1)
BUN SERPL-MCNC: 17 MG/DL (ref 6–20)
BUN/CREAT SERPL: 25 (ref 12–20)
CALCIUM SERPL-MCNC: 9.1 MG/DL (ref 8.5–10.1)
CHLORIDE SERPL-SCNC: 105 MMOL/L (ref 97–108)
CHOLEST SERPL-MCNC: 187 MG/DL
CO2 SERPL-SCNC: 29 MMOL/L (ref 21–32)
CREAT SERPL-MCNC: 0.67 MG/DL (ref 0.55–1.02)
DIFFERENTIAL METHOD BLD: ABNORMAL
EOSINOPHIL # BLD: 0.03 K/UL (ref 0–0.4)
EOSINOPHIL NFR BLD: 0.6 % (ref 0–7)
ERYTHROCYTE [DISTWIDTH] IN BLOOD BY AUTOMATED COUNT: 13.7 % (ref 11.5–14.5)
GLOBULIN SER CALC-MCNC: 3.1 G/DL (ref 2–4)
GLUCOSE SERPL-MCNC: 92 MG/DL (ref 65–100)
HCT VFR BLD AUTO: 38.7 % (ref 35–47)
HDLC SERPL-MCNC: 97 MG/DL
HDLC SERPL: 1.9 (ref 0–5)
HGB BLD-MCNC: 13 G/DL (ref 11.5–16)
IMM GRANULOCYTES # BLD AUTO: 0.02 K/UL (ref 0–0.04)
IMM GRANULOCYTES NFR BLD AUTO: 0.4 % (ref 0–0.5)
LDLC SERPL CALC-MCNC: 75.2 MG/DL (ref 0–100)
LYMPHOCYTES # BLD: 1.62 K/UL (ref 0.8–3.5)
LYMPHOCYTES NFR BLD: 31.8 % (ref 12–49)
MCH RBC QN AUTO: 32.2 PG (ref 26–34)
MCHC RBC AUTO-ENTMCNC: 33.6 G/DL (ref 30–36.5)
MCV RBC AUTO: 95.8 FL (ref 80–99)
MONOCYTES # BLD: 0.76 K/UL (ref 0–1)
MONOCYTES NFR BLD: 14.9 % (ref 5–13)
NEUTS SEG # BLD: 2.65 K/UL (ref 1.8–8)
NEUTS SEG NFR BLD: 52.1 % (ref 32–75)
NRBC # BLD: 0 K/UL (ref 0–0.01)
NRBC BLD-RTO: 0 PER 100 WBC
PLATELET # BLD AUTO: 130 K/UL (ref 150–400)
PMV BLD AUTO: 11.7 FL (ref 8.9–12.9)
POTASSIUM SERPL-SCNC: 4.5 MMOL/L (ref 3.5–5.1)
PROT SERPL-MCNC: 6.8 G/DL (ref 6.4–8.2)
RBC # BLD AUTO: 4.04 M/UL (ref 3.8–5.2)
SODIUM SERPL-SCNC: 138 MMOL/L (ref 136–145)
TRIGL SERPL-MCNC: 74 MG/DL
VLDLC SERPL CALC-MCNC: 14.8 MG/DL
WBC # BLD AUTO: 5.1 K/UL (ref 3.6–11)

## 2025-03-05 NOTE — PATIENT INSTRUCTIONS
breath.     Pain, pressure, or a strange feeling in the back, neck, jaw, or upper belly or in one or both shoulders or arms.     Lightheadedness or sudden weakness.     A fast or irregular heartbeat.   After you call 911, the  may tell you to chew 1 adult-strength or 2 to 4 low-dose aspirin. Wait for an ambulance. Do not try to drive yourself.  Watch closely for changes in your health, and be sure to contact your doctor if you have any problems.  Where can you learn more?  Go to https://www.[employer].net/patientEd and enter F075 to learn more about \"A Healthy Heart: Care Instructions.\"  Current as of: July 31, 2024  Content Version: 14.3  © 2024 [employer].   Care instructions adapted under license by [employer]. If you have questions about a medical condition or this instruction, always ask your healthcare professional. [employer], disclaims any warranty or liability for your use of this information.    Personalized Preventive Plan for Esteban Gipson - 3/4/2025  Medicare offers a range of preventive health benefits. Some of the tests and screenings are paid in full while other may be subject to a deductible, co-insurance, and/or copay.  Some of these benefits include a comprehensive review of your medical history including lifestyle, illnesses that may run in your family, and various assessments and screenings as appropriate.  After reviewing your medical record and screening and assessments performed today your provider may have ordered immunizations, labs, imaging, and/or referrals for you.  A list of these orders (if applicable) as well as your Preventive Care list are included within your After Visit Summary for your review.

## 2025-03-05 NOTE — PROGRESS NOTES
Medicare Annual Wellness Visit    Esteban Gipson is here for Medicare AWV    Assessment & Plan   Gastro-esophageal reflux disease without esophagitis  -     ESTABLISHED, MOD MDM, 30-39 MIN [14302]  Cranial neuralgia  -     ESTABLISHED, MOD MDM, 30-39 MIN [75904]  Osteoporosis, unspecified osteoporosis type, unspecified pathological fracture presence  -     ESTABLISHED, MOD MDM, 30-39 MIN [[id number]]  Pure hypercholesterolemia, unspecified  -     Comprehensive Metabolic Panel; Future  -     Lipid Panel; Future  -     ESTABLISHED, MOD MDM, 30-39 MIN [79722]  Essential (primary) hypertension  -     CBC with Auto Differential; Future  -     Comprehensive Metabolic Panel; Future  -     Lipid Panel; Future  -     ESTABLISHED, MOD MDM, 30-39 MIN [[id number]]  Abnormal results of liver function studies  -     ESTABLISHED, MOD MDM, 30-39 MIN [[id number]]  Other dysphagia  -     ESTABLISHED, MOD MDM, 30-39 MIN [75373]  Medicare annual wellness visit, subsequent       Return in 6 months (on 9/4/2025).     Subjective   The following acute and/or chronic problems were also addressed today:  Patient Active Problem List    Diagnosis Date Noted    Neuropathy due to chemotherapeutic drug 10/29/2024    Vasomotor rhinitis 10/29/2024    Abnormal results of liver function studies 08/03/2023    Early dry stage nonexudative age-related macular degeneration of both eyes 02/02/2023    Coronary artery disease of native heart with stable angina pectoris, unspecified vessel or lesion type 02/02/2023    Adverse effect of radiation therapy 07/14/2021    Arthritis of knee, left 03/09/2020    DNR no code (do not resuscitate) 01/08/2019    Lumbar spinal stenosis 06/20/2018    Biliary colic 11/22/2017    Spinal stenosis 09/23/2016    Lumbar disc disease with radiculopathy 07/22/2016    Osteoporosis 07/22/2016    Pancreatic cyst 07/14/2015    GERD (gastroesophageal reflux disease)     Visual loss 01/21/2015    PAC (premature atrial contraction) 07/09/2013

## 2025-04-22 NOTE — TELEPHONE ENCOUNTER
Refill request received from [facility]    for   Requested Prescriptions     Pending Prescriptions Disp Refills    DULoxetine (CYMBALTA) 20 MG extended release capsule [Pharmacy Med Name: DULOXETINE HCL DR 20 MG CAP] 90 capsule 1     Sig: TAKE 1 CAPSULE BY MOUTH EVERY DAY     Last office visit: 3/4/2025   Next office visit: 9/5/2025     Routed to Dr Parrish Zapata for review.     Katlin Solorio LPN

## 2025-04-22 NOTE — TELEPHONE ENCOUNTER
Office Visit     Patient Name: Jemima Benitez  : 1971   MRN: [id number]     Patient or patient representative verbalized consent for the use of Ambient Listening during the visit with  MANFRED [place] for chart documentation. 2025  11:50 EDT    Chief Complaint:   Chief Complaint   Patient presents with    Follow-up     Recurrent UTI         Referring Provider: No ref. provider found    Primary Care Provider: Angel Bolanos DO     History of Present Illness  The patient is a 53-year-old female here for her yearly follow-up.    Urinary Tract Infection (UTI)  - Had a severe UTI on 2025 during a vacation  - Treated at urgent care with Macrobid  - Uses estrogen cream   - Took Azo for symptom relief    Constipation  - Reported persistent constipation during her last visit  - Now managed effectively with a gastroenterologist's treatment plan    Supplemental information: She has been well, with no significant health issues over the past year.        Subjective   Review of System:   As noted in HPI.    Past Medical History:   Diagnosis Date    Abnormal Pap smear of cervix     Allergic rhinitis     Arthritis     Back pain     Cervical disc disorder 2006    Herniated disk after carrying twins to full term    Chronic pain disorder     Constipation     Constipation     Depression     Headache, tension-type     Hypertension     Low back pain     Lumbosacral disc disease 2006    Same    Migraines     Migraines     Multiple gestation 2006    Neck pain     Sinus problem     Thoracic disc disorder  and ??    Urinary tract infection      Past Surgical History:   Procedure Laterality Date    CERVIX LESION DESTRUCTION      REFRACTIVE SURGERY Bilateral     SINUS SURGERY      TRIGGER POINT INJECTION      Steroid injection in muscle and also epidural steroid??     Family History   Problem Relation Age of Onset    Diabetes Father     Hypertension Father     Heart disease  Writer left message for patient stating that lab slip is ready for pickup Father     Stroke Father     Early death Father     Diabetes Paternal Grandmother     Hypertension Paternal Grandmother     Arthritis Mother     Hypertension Mother     Anxiety disorder Daughter     Anxiety disorder Son     Migraines Son     Cancer Sister         Liver    Depression Sister     Early death Sister     Depression Daughter     Breast cancer Paternal Aunt      Social History     Socioeconomic History    Marital status:    Tobacco Use    Smoking status: Never     Passive exposure: Never    Smokeless tobacco: Never   Vaping Use    Vaping status: Never Used   Substance and Sexual Activity    Alcohol use: Never    Drug use: Never    Sexual activity: Yes     Partners: Male     Birth control/protection: Post-menopausal       Current Outpatient Medications:     baclofen (LIORESAL) 10 MG tablet, Take 1-2 tabs per day as needed for muscle spasm., Disp: 45 tablet, Rfl: 0    Coenzyme Q-10 200 MG capsule, Take 1 capsule by mouth Daily., Disp: , Rfl:     doxepin (SINEquan) 10 MG capsule, Take 1 capsule by mouth., Disp: , Rfl:     estradiol (ESTRACE) 0.1 MG/GM vaginal cream, Apply 0.5 gm vaginally twice weekly at bedtime, Disp: 42.5 g, Rfl: 3    fexofenadine (ALLEGRA) 180 MG tablet, Take  by mouth., Disp: , Rfl:     FLUoxetine (PROzac) 20 MG capsule, , Disp: , Rfl:     lisinopril (PRINIVIL,ZESTRIL) 10 MG tablet, Take one tablet by mouth daily, Disp: , Rfl:     meloxicam (Mobic) 7.5 MG tablet, Take 1 tablet by mouth Daily., Disp: 30 tablet, Rfl: 0    montelukast (SINGULAIR) 10 MG tablet, Take 1 tablet by mouth Every Night., Disp: , Rfl:     Psyllium (FT Fiber Supplement) 400 MG capsule, , Disp: , Rfl:     Sennosides (Senokot Extra Strength) 17.2 MG tablet, , Disp: , Rfl:     cefdinir (OMNICEF) 300 MG capsule, 1 capsule., Disp: , Rfl:     multivitamin with minerals (ONE-A-DAY 50 PLUS PO), Take 1 tablet by mouth Daily., Disp: , Rfl:     nitrofurantoin, macrocrystal-monohydrate, (Macrobid) 100 MG capsule, Take 1  "capsule by mouth As Needed (After intercourse for UTI prevention)., Disp: 30 capsule, Rfl: 0    predniSONE (DELTASONE) 20 MG tablet, 1 tablet., Disp: , Rfl:     No Known Allergies  Objective   Visit Vitals  /70 (BP Location: Right arm, Patient Position: Sitting, Cuff Size: Adult)   Pulse 68   Temp 98.4 °F (36.9 °C) (Temporal)   Ht 165.1 cm (65\")   Wt 70.3 kg (155 lb)   LMP  (LMP Unknown)   SpO2 100%   BMI 25.79 kg/m²        Body mass index is 25.79 kg/m².     Physical Exam  Vitals and nursing note reviewed.   Constitutional:       General: She is not in acute distress.     Appearance: Normal appearance. She is normal weight. She is not ill-appearing.   Pulmonary:      Effort: Pulmonary effort is normal. No respiratory distress.   Skin:     General: Skin is warm and dry.   Neurological:      General: No focal deficit present.      Mental Status: She is alert and oriented to person, place, and time.   Psychiatric:         Mood and Affect: Mood normal.         Behavior: Behavior normal.          Labs  Lab Results   Component Value Date    COLORU Straw 04/22/2025    CLARITYU Clear 04/22/2025    SPECGRAV 1.010 04/22/2025    PHUR 7.0 04/22/2025    LEUKOCYTESUR Negative 04/22/2025    NITRITE Negative 04/22/2025    PROTEINPOCUA Negative 04/22/2025    GLUCOSEUR Negative 04/22/2025    KETONESU Negative 04/22/2025    UROBILINOGEN Normal 04/22/2025    BILIRUBINUR Negative 04/22/2025    RBCUR Negative 04/22/2025      No results found for: \"WBCUA\", \"RBCUA\", \"BACTERIA\", \"HYALCASTU\", \"SQUAMEPIUA\"     Lab Results   Component Value Date    WBC 8.42 11/26/2024    HGB 13.9 11/26/2024    HCT 41.7 11/26/2024    MCV 90 11/26/2024     (H) 11/26/2024     Lab Results   Component Value Date    GLUCOSE 80 01/04/2021    CALCIUM 9.3 07/16/2021     (L) 07/16/2021    K 4.3 07/16/2021    CO2 28 07/16/2021    CL 97 07/16/2021    BUN 8 07/16/2021    BUN 10 01/04/2021    CREATININE 0.71 07/16/2021    CREATININE 0.66 01/04/2021    " "BCR 11 07/16/2021    ANIONGAP 9 07/16/2021    ALT 15 07/16/2021    AST 16 07/16/2021     Lab Results   Component Value Date    HGBA1C 5.33 01/04/2021     No results found for: \"URICACIDSTN\", \"CIOQ8IHLZON\", \"JVEW0NCNMO\", \"LABMAGN\"  Lab Results   Component Value Date    KAKF17HV 34.4 01/04/2021     Lab Results   Component Value Date    LABPH 5 04/02/2025       No results found for: \"ATOPOBIUMV\", \"BVAB2\", \"MEGASPHAER\", \"CALBICANSN\", \"CGLABRATAN\", \"CPARAPSILOS\", \"CLUSITANIAE\", \"CKRUSEI\", \"TRICHVAG\", \"CHLAMNAA\", \"NGONORRHON\", \"UREAPLASMA\", \"MYCOPLASMAG\"    Lab Results   Component Value Date    .00 01/04/2021    TSH 1.870 01/04/2021    UZML01NE 34.4 01/04/2021         Radiographic Studies  XR Spine Cervical Complete With Obli Flex Ext  Result Date: 3/18/2025  Impression: Smooth reversal of the cervical lordosis. Mild degenerative disc disease in the mid and lower cervical spine. Electronically Signed: Qasim Martini MD  3/18/2025 12:05 PM EDT  Workstation ID: [id number]    MRI Abdomen With & Without Contrast  Result Date: 2/13/2025  Tiny cystic lesion in the pancreatic body/tail may represent a pancreatic retention cyst or small side branch IPMN. Lesion is stable in size since 2020. Follow-up imaging in 2 years is recommended. CRITICAL RESULT: No. COMMUNICATION: Per this written report. By electronically signing this report, I, the attending physician, attest that I have personally reviewed the images/data for the above examination(s) and agree with the final edited report. Drafted by Behzad Pham MD on 2/13/2025 2:04 PM Final report signed by Claudine Urena MD on 2/13/2025 3:46 PM      I have reviewed the above labs and imaging.     Assessment / Plan      Diagnoses and all orders for this visit:    1. Constipation, unspecified constipation type (Primary)    2. Postcoital UTI  -     POC Urinalysis Dipstick, Automated  -     nitrofurantoin, macrocrystal-monohydrate, (Macrobid) 100 MG capsule; Take 1 capsule by mouth " As Needed (After intercourse for UTI prevention).  Dispense: 30 capsule; Refill: 0         Assessment & Plan  1. UTI  - Had a UTI on 04/01/2025, treated at urgent care  - Urine culture showed E. coli  - Continue estrogen cream to prevent UTIs  - Prescribed Macrobid 30 capsules: one post-intercourse, twice daily for seven days during travel or at UTI onset  - Obtain urine culture to prevent antibiotic resistance  - Contact office for recurrent UTIs    2. Constipation  - Previously reported constipation, now managed effectively with gastroenterologist's plan    Follow-up  - Follow-up in 1 year or sooner if necessary       Return in about 1 year (around 4/22/2026) for Rl.    Rl Howard, MSN, APRN, FNP-C  [facility]

## 2025-04-23 RX ORDER — DULOXETIN HYDROCHLORIDE 20 MG/1
CAPSULE, DELAYED RELEASE ORAL DAILY
Qty: 90 CAPSULE | Refills: 1 | Status: SHIPPED | OUTPATIENT
Start: 2025-04-23

## 2025-04-28 RX ORDER — ATORVASTATIN CALCIUM 10 MG/1
10 TABLET, FILM COATED ORAL DAILY
Qty: 90 TABLET | Refills: 0 | Status: SHIPPED | OUTPATIENT
Start: 2025-04-28

## 2025-05-08 ENCOUNTER — TELEPHONE (OUTPATIENT)
Age: 89
End: 2025-05-08

## 2025-05-08 NOTE — TELEPHONE ENCOUNTER
Pt has fallen and hit her head about 30 mins ago and would like a call back in regards to her fall. Pt said she can be reached on her cell phone [phone]

## 2025-05-08 NOTE — TELEPHONE ENCOUNTER
Called and spoke with pt.  Verified identity x2.    Relayed Gwendolyn's verbal order to go to ER  Pt states she doesn't think she necessarily needs to go to ER  Says she fell when standing,   but bottom may have hit first  Was knocked over by another falling woman  fell and hit back of head on concrete  Says she didn't hit that hard  Pt is observed to sound oriented with clear speech  Denies lump, Denies pain,   Denies nausea  Fell, then drove home 20 miles   Says she has had other falls that were worse than this    Advised pt that message will be forwarded to Gwendolyn  Pt states she will speak to friends and decide if she will go to ER  Advised pt to please have ER records sent here  Pt verbalized understanding.    Katlin Solorio LPN

## 2025-05-14 RX ORDER — AMLODIPINE BESYLATE 2.5 MG/1
2.5 TABLET ORAL DAILY
Qty: 90 TABLET | Refills: 0 | Status: SHIPPED | OUTPATIENT
Start: 2025-05-14

## 2025-06-13 ENCOUNTER — OFFICE VISIT (OUTPATIENT)
Age: 89
End: 2025-06-13
Payer: MEDICARE

## 2025-06-13 VITALS
HEIGHT: 63 IN | SYSTOLIC BLOOD PRESSURE: 107 MMHG | HEART RATE: 83 BPM | BODY MASS INDEX: 21.93 KG/M2 | DIASTOLIC BLOOD PRESSURE: 57 MMHG | TEMPERATURE: 98.1 F | WEIGHT: 123.8 LBS | RESPIRATION RATE: 20 BRPM | OXYGEN SATURATION: 95 %

## 2025-06-13 DIAGNOSIS — R26.89 IMBALANCE: Primary | ICD-10-CM

## 2025-06-13 DIAGNOSIS — M43.6 NECK STIFFNESS: ICD-10-CM

## 2025-06-13 DIAGNOSIS — I25.118 CORONARY ARTERY DISEASE OF NATIVE HEART WITH STABLE ANGINA PECTORIS, UNSPECIFIED VESSEL OR LESION TYPE: ICD-10-CM

## 2025-06-13 DIAGNOSIS — S63.055A DISLOCATION OF FIFTH FINGER, METACARPAL JOINT, PROXIMAL, LEFT, CLOSED: ICD-10-CM

## 2025-06-13 PROCEDURE — 99214 OFFICE O/P EST MOD 30 MIN: CPT | Performed by: STUDENT IN AN ORGANIZED HEALTH CARE EDUCATION/TRAINING PROGRAM

## 2025-06-13 ASSESSMENT — ENCOUNTER SYMPTOMS
DIARRHEA: 0
NAUSEA: 0
VOMITING: 0
SINUS PAIN: 0
ABDOMINAL PAIN: 0
CONSTIPATION: 0
EYE REDNESS: 0
WHEEZING: 0
COUGH: 0
SHORTNESS OF BREATH: 0

## 2025-06-13 NOTE — PROGRESS NOTES
I have reviewed all needed documentation in preparation for visit. Verified patient by name and date of birth  Chief Complaint   Patient presents with    6 Month Follow-Up    Establish Care    Follow-Up from Hospital     Had a fall        Vitals:    06/13/25 1340   BP: (!) 107/57   BP Site: Left Upper Arm   Patient Position: Sitting   BP Cuff Size: Medium Adult   Pulse: 83   Resp: 20   Temp: 98.1 °F (36.7 °C)   TempSrc: Temporal   SpO2: 95%   Weight: 56.2 kg (123 lb 12.8 oz)   Height: 1.6 m (5' 3\")       Health Maintenance Due   Topic Date Due    DTaP/Tdap/Td vaccine (1 - Tdap) 05/03/2007    Respiratory Syncytial Virus (RSV) Pregnant or age 60 yrs+ (1 - 1-dose 75+ series) Never done    Shingles vaccine (3 of 3) 03/27/2020    COVID-19 Vaccine (2 - 2024-25 season) 09/01/2024     \"Have you been to the ER, urgent care clinic since your last visit?  Hospitalized since your last visit?\"    YES - When: approximately 10 days ago.  Where and Why: [facility] .    “Have you seen or consulted any other health care providers outside of [facility] since your last visit?”    NO            Click Here for Release of Records Request         SONIA Berry

## 2025-06-13 NOTE — ACP (ADVANCE CARE PLANNING)
Advance Care Planning   General Advance Care Planning (ACP) Conversation    Date of Conversation: 6/13/2025  Conducted with: Patient with Decision Making Capacity  Other persons present: None    Healthcare Decision Maker:    Primary Decision Maker: Meliton De Los Santos - Child - [phone]    Secondary Decision Maker: Brooklynn Bundy - Other - [phone]    Secondary Decision Maker: Cristiano Bundy Dr. - Other - [phone]    Today we documented Decision Maker(s) consistent with Legal Next of Kin hierarchy.  Content/Action Overview:  Has ACP document(s) on file - reflects the patient's care preferences  Reviewed DNR/DNI and patient elects DNR order - completed portable DNR form & placed order         Length of Voluntary ACP Conversation in minutes:  <16 minutes (Non-Billable)    Emili Almanzar DO

## 2025-06-13 NOTE — PROGRESS NOTES
Esteban Gipson is a 89 y.o. female  Chief Complaint   Patient presents with    6 Month Follow-Up    Establish Care    Follow-Up from Hospital     Had a fall      Establish care      HPI    HTN: well controlled today. No hypotensive episodes at home.     She would like to start physical therapy. She usually uses a walker. She is currently driving. She would like to work on her balance. She would like to go to the [facility].     [facility] Tatum for esophageal spasm, GERD and globus pharyngeus    Recent hospitalization for acute chest pain, cardiac work up to include LexiScan was negative. Ruled as GERD exacerbation. No recurrence since disharge. They also did all her labs including Lipid panel and A1c which were normal.     She is DNR/DNI.     Had recent fall due to imbalance and would like a referral to physical therapy for this as well as neck stiffness and fifth left digit strength after closed fracture.     ROS  Review of Systems   Constitutional:  Negative for chills and fever.   HENT:  Negative for sinus pain.    Eyes:  Negative for redness.   Respiratory:  Negative for cough, shortness of breath and wheezing.    Cardiovascular:  Negative for chest pain.   Gastrointestinal:  Negative for abdominal pain, constipation, diarrhea, nausea and vomiting.   Endocrine: Negative for polyuria.   Genitourinary:  Negative for dysuria.   Musculoskeletal:  Positive for neck stiffness. Negative for arthralgias and myalgias.   Neurological:  Positive for dizziness. Negative for light-headedness and headaches.   Psychiatric/Behavioral:  Negative for agitation and dysphoric mood. The patient is not nervous/anxious.          Vitals:    06/13/25 1340   BP: (!) 107/57   Pulse: 83   Resp: 20   Temp: 98.1 °F (36.7 °C)   SpO2: 95%      Wt Readings from Last 3 Encounters:   06/13/25 56.2 kg (123 lb 12.8 oz)   03/04/25 57.2 kg (126 lb 3.2 oz)   10/29/24 58.1 kg (128 lb)       EXAM  Physical

## 2025-07-28 RX ORDER — ATORVASTATIN CALCIUM 10 MG/1
10 TABLET, FILM COATED ORAL DAILY
Qty: 90 TABLET | Refills: 0 | Status: SHIPPED | OUTPATIENT
Start: 2025-07-28

## 2025-08-06 ENCOUNTER — TELEPHONE (OUTPATIENT)
Age: 89
End: 2025-08-06

## 2025-08-07 ENCOUNTER — HOSPITAL ENCOUNTER (OUTPATIENT)
Facility: HOSPITAL | Age: 89
Setting detail: INFUSION SERIES
Discharge: HOME | End: 2025-08-07
Payer: MEDICARE

## 2025-08-07 VITALS
DIASTOLIC BLOOD PRESSURE: 57 MMHG | RESPIRATION RATE: 17 BRPM | OXYGEN SATURATION: 97 % | SYSTOLIC BLOOD PRESSURE: 136 MMHG | HEART RATE: 63 BPM | TEMPERATURE: 98.7 F

## 2025-08-07 DIAGNOSIS — M81.0 OSTEOPOROSIS, UNSPECIFIED OSTEOPOROSIS TYPE, UNSPECIFIED PATHOLOGICAL FRACTURE PRESENCE: Primary | ICD-10-CM

## 2025-08-07 LAB
ANION GAP BLD CALC-SCNC: 9.7 MMOL/L (ref 10–20)
CA-I BLD-MCNC: 1.14 MMOL/L (ref 1.15–1.33)
CHLORIDE BLD-SCNC: 105 MMOL/L (ref 98–107)
CO2 BLD-SCNC: 24.3 MMOL/L (ref 21–32)
CREAT BLD-MCNC: 0.62 MG/DL (ref 0.6–1.3)
GLUCOSE BLD-MCNC: 163 MG/DL (ref 74–99)
MAGNESIUM SERPL-MCNC: 1.9 MG/DL (ref 1.7–2.3)
PHOSPHATE SERPL-MCNC: 4 MG/DL (ref 2.4–4.5)
POTASSIUM BLD-SCNC: 4.5 MMOL/L (ref 3.5–5.1)
SERVICE CMNT-IMP: ABNORMAL
SODIUM BLD-SCNC: 139 MMOL/L (ref 136–145)

## 2025-08-07 PROCEDURE — 84100 ASSAY OF PHOSPHORUS: CPT

## 2025-08-07 PROCEDURE — 80047 BASIC METABLC PNL IONIZED CA: CPT

## 2025-08-07 PROCEDURE — 96372 THER/PROPH/DIAG INJ SC/IM: CPT

## 2025-08-07 PROCEDURE — 6360000002 HC RX W HCPCS: Performed by: STUDENT IN AN ORGANIZED HEALTH CARE EDUCATION/TRAINING PROGRAM

## 2025-08-07 PROCEDURE — 83735 ASSAY OF MAGNESIUM: CPT

## 2025-08-07 RX ADMIN — DENOSUMAB 60 MG: 60 INJECTION SUBCUTANEOUS at 14:51

## 2025-08-11 RX ORDER — AMLODIPINE BESYLATE 2.5 MG/1
2.5 TABLET ORAL DAILY
Qty: 90 TABLET | Refills: 0 | Status: SHIPPED | OUTPATIENT
Start: 2025-08-11

## (undated) DEVICE — SUTURE NONABSORBABLE MONOFILAMENT CV-6 TTC-9 24 IN GORTX 6K02B

## (undated) DEVICE — INFECTION CONTROL KIT SYS

## (undated) DEVICE — GAUZE SPONGES,12 PLY: Brand: CURITY

## (undated) DEVICE — KIT HND CTRL 3 W STPCOCK ROT END 54IN PREM HI PRSS TBNG AT

## (undated) DEVICE — SUTURE SZ 0 27IN 5/8 CIR UR-6  TAPER PT VIOLET ABSRB VICRYL J603H

## (undated) DEVICE — BLADELESS OPTICAL TROCAR WITH FIXATION CANNULA: Brand: VERSAONE

## (undated) DEVICE — SUTURE MCRYL SZ 4-0 L27IN ABSRB UD L19MM PS-2 1/2 CIR PRIM Y426H

## (undated) DEVICE — ANGIO-SEAL VIP VASCULAR CLOSURE DEVICE: Brand: ANGIO-SEAL

## (undated) DEVICE — (D)PREP SKN CHLRAPRP APPL 26ML -- CONVERT TO ITEM 371833

## (undated) DEVICE — LAMINECTOMY RICHMOND-LF: Brand: MEDLINE INDUSTRIES, INC.

## (undated) DEVICE — SOLUTION IV 250ML 0.9% SOD CHL CLR INJ FLX BG CONT PRT CLSR

## (undated) DEVICE — FLOSEAL HEMOSTATIC MATRIX, 10 ML: Brand: FLOSEAL

## (undated) DEVICE — PAD 05IN BASE 3IN PEAK M DENS CONVOLUTED FOAM

## (undated) DEVICE — SURGIFOAM SPNG SZ 100

## (undated) DEVICE — NEEDLE HYPO 25GA L1.5IN BVL ORIENTED ECLIPSE

## (undated) DEVICE — DERMABOND SKIN ADH 0.7ML -- DERMABOND ADVANCED 12/BX

## (undated) DEVICE — REM POLYHESIVE ADULT PATIENT RETURN ELECTRODE: Brand: VALLEYLAB

## (undated) DEVICE — UNIVERSAL FIXATION CANNULA: Brand: VERSAONE

## (undated) DEVICE — SYR 10ML LUER LOK 1/5ML GRAD --

## (undated) DEVICE — PREP SKN PREVAIL 40ML APPL --

## (undated) DEVICE — CATH DIAGIMPLS MP 5FRX110CM -- IMPULSE 16391-300

## (undated) DEVICE — TRAY CATH 16F URIN MTR LTX -- CONVERT TO ITEM 363111

## (undated) DEVICE — STERILE POLYISOPRENE POWDER-FREE SURGICAL GLOVES WITH EMOLLIENT COATING: Brand: PROTEXIS

## (undated) DEVICE — APPLIER LIG CLP L13IN 10MM PSTL GRP CONTAIN 15 TI L CLP

## (undated) DEVICE — SPECIMEN RETRIEVAL POUCH: Brand: ENDO CATCH GOLD

## (undated) DEVICE — TOOL 14BA50 LEGEND 14CM 5MM BA: Brand: MIDAS REX ™

## (undated) DEVICE — 3000CC GUARDIAN II: Brand: GUARDIAN

## (undated) DEVICE — ADHESIVE SKIN CLOSURE 4X22 CM PREMIERPRO EXOFINFUSION DISP

## (undated) DEVICE — KENDALL SCD EXPRESS SLEEVES, KNEE LENGTH, MEDIUM: Brand: KENDALL SCD

## (undated) DEVICE — SOLUTION IRRIGATION NACL 0.9% 1000 ML FLX CONTAINER

## (undated) DEVICE — SUTURE ABSRB BRAID COAT UD OS-6 NO 1 27IN VCRL J535H

## (undated) DEVICE — ANGIOGRAPHY KIT

## (undated) DEVICE — PILLOW POS AD L7IN R FOAM HD REST INTUB SLOT DISP

## (undated) DEVICE — Device

## (undated) DEVICE — COVER,TABLE,HEAVY DUTY,60"X90",STRL: Brand: MEDLINE

## (undated) DEVICE — SOLUTION IRRIG 3000ML 0.9% SOD CHL FLX CONT 0797208] ICU MEDICAL INC]

## (undated) DEVICE — KIT MFLD ISOLATN NACL CNTRST PRT TBNG SPIK W/ PRSS TRNSDUC

## (undated) DEVICE — TISSEEL VHSD 10 ML KT 1504516] BAXTER BIOSURGERY]

## (undated) DEVICE — CLICKLINE SCISSORS INSERT: Brand: CLICKLINE

## (undated) DEVICE — HANDPIECE SET WITH BONE CLEANING TIP AND SUCTION TUBE: Brand: INTERPULSE

## (undated) DEVICE — 4-PORT MANIFOLD: Brand: NEPTUNE 2

## (undated) DEVICE — SURGICAL PROCEDURE KIT GEN LAPAROSCOPY LF

## (undated) DEVICE — SUTURE VCRL SZ 3-0 L27IN ABSRB UD CP-2 L26MM 1/2 CIR REV J868H

## (undated) DEVICE — FILTER SMK EVAC FLO CLR MEGADYNE

## (undated) DEVICE — KIT MED IMAG CNTRST AGNT W/ IOPAMIDOL REUSE

## (undated) DEVICE — BIPOLAR IRRIGATOR INTEGRATED TUBING AND BIPOLAR CORD SET, DISPOSABLE

## (undated) DEVICE — GOWN,SIRUS,NONRNF,SETINSLV,2XL,18/CS: Brand: MEDLINE

## (undated) DEVICE — TUBING INSUFLTN 10FT LUER -- CONVERT TO ITEM 368568

## (undated) DEVICE — PACK PROCEDURE SURG HRT CATH

## (undated) DEVICE — INSUFFLATION NEEDLE: Brand: SURGINEEDLE

## (undated) DEVICE — BONE WAX WHITE: Brand: BONE WAX WHITE

## (undated) DEVICE — DEVON™ KNEE AND BODY STRAP 60" X 3" (1.5 M X 7.6 CM): Brand: DEVON

## (undated) DEVICE — DISSECTOR RMFG CURVED 5MM --

## (undated) DEVICE — SUTURE VCRL 2-0 L27IN ABSRB UD CP-2 L26MM 1/2 CIR REV CUT J869H

## (undated) DEVICE — BLADELESS OPTICAL TROCAR WITH FIXATION CANNULA: Brand: VERSAPORT

## (undated) DEVICE — 5.5 LOCKING CAP, CREO
Type: IMPLANTABLE DEVICE | Site: SPINE LUMBAR | Status: NON-FUNCTIONAL
Brand: CREO
Removed: 2018-06-20

## (undated) DEVICE — PINNACLE INTRODUCER SHEATH: Brand: PINNACLE